# Patient Record
Sex: FEMALE | Race: WHITE | HISPANIC OR LATINO | Employment: OTHER | URBAN - METROPOLITAN AREA
[De-identification: names, ages, dates, MRNs, and addresses within clinical notes are randomized per-mention and may not be internally consistent; named-entity substitution may affect disease eponyms.]

---

## 2017-01-04 ENCOUNTER — OFFICE VISIT (OUTPATIENT)
Dept: ORTHOPEDICS | Facility: CLINIC | Age: 77
End: 2017-01-04
Payer: MEDICARE

## 2017-01-04 VITALS
HEART RATE: 64 BPM | SYSTOLIC BLOOD PRESSURE: 164 MMHG | BODY MASS INDEX: 29.51 KG/M2 | DIASTOLIC BLOOD PRESSURE: 71 MMHG | WEIGHT: 166.56 LBS | HEIGHT: 63 IN

## 2017-01-04 DIAGNOSIS — M17.11 PRIMARY OSTEOARTHRITIS OF RIGHT KNEE: Primary | ICD-10-CM

## 2017-01-04 PROCEDURE — 1159F MED LIST DOCD IN RCRD: CPT | Mod: S$GLB,,, | Performed by: ORTHOPAEDIC SURGERY

## 2017-01-04 PROCEDURE — 1125F AMNT PAIN NOTED PAIN PRSNT: CPT | Mod: S$GLB,,, | Performed by: ORTHOPAEDIC SURGERY

## 2017-01-04 PROCEDURE — 3078F DIAST BP <80 MM HG: CPT | Mod: S$GLB,,, | Performed by: ORTHOPAEDIC SURGERY

## 2017-01-04 PROCEDURE — 3077F SYST BP >= 140 MM HG: CPT | Mod: S$GLB,,, | Performed by: ORTHOPAEDIC SURGERY

## 2017-01-04 PROCEDURE — 1157F ADVNC CARE PLAN IN RCRD: CPT | Mod: S$GLB,,, | Performed by: ORTHOPAEDIC SURGERY

## 2017-01-04 PROCEDURE — 1160F RVW MEDS BY RX/DR IN RCRD: CPT | Mod: S$GLB,,, | Performed by: ORTHOPAEDIC SURGERY

## 2017-01-04 PROCEDURE — 99999 PR PBB SHADOW E&M-EST. PATIENT-LVL III: CPT | Mod: PBBFAC,,, | Performed by: ORTHOPAEDIC SURGERY

## 2017-01-04 PROCEDURE — 99214 OFFICE O/P EST MOD 30 MIN: CPT | Mod: S$GLB,,, | Performed by: ORTHOPAEDIC SURGERY

## 2017-01-04 NOTE — PROGRESS NOTES
Informed patient that  services are available free of charge.  This service was offered, the offer was not accepted, and  services were provided by her family member present and her understanding of english.    CC:  Right knee pain    HX:  Elena Frances returns for re-evaluation of knee arthritis. She has a known history of osteoarthritis of the right knee. She localizes the pain medial and lateral and describes the pain as burning. She has tried multiple NSAID's.  She has tried prior injection therapy. Pain scale is severe and constant.    ROS:    Denies low back pain, distal paresthesias, or radicular pain.  Denies Distal edema or calf pain.  Denies fevers.    PE: Right knee  On physical examination there is an effusion in the knee.  The knee is ligamentally stable to varus/valgus stress.  There is no warmth or erythema. There is no specific pain over the pes anserine bursa.    ROM of the hip does not reproduce pain.  There is no significant distal edema, and there is no calf tenderness to palpation.     Impression:  Osteoarthritis of the knee - severe.  Radiographs show advanced DJD with joint space narrowing, sclerosis, and osteophytes.    Plan:  The conservative options including NSAIDs, activity modification, physical therapy, corticosteroid injection, and viscosupplimentation were discussed. She is interested in surgical intervention at this time as these have failed.    The surgical process of knee replacement was discussed in detail with the patient including a detailed discussion of the procedure itself (including visual model, x-ray review, and literature review). The typical perioperative and post-operative course was discussed and perioperative risks were discussed to the patient's satisfaction.  Risks and complications discussed included but were not limited to the risks of anesthetic complications, infection, bleeding, wound healing complications, aseptic loosening,  instability, limb length inequality, neurologic dysfunction including numbness,  DVT, pulmonary embolism, perioperative medical risks (cardiac, pulmonary, renal, neurologic), and death and the patient will discuss this with her family.  Given her advanced deformity I suspect formal PT will not be effective.  Therefore in lieu of formal PT I have instructed her and counselled her on a home quad strengthening program and have discussed activity modification to help her tolerate her symptoms.

## 2017-01-05 ENCOUNTER — TELEPHONE (OUTPATIENT)
Dept: FAMILY MEDICINE | Facility: CLINIC | Age: 77
End: 2017-01-05

## 2017-01-05 DIAGNOSIS — M17.11 PRIMARY OSTEOARTHRITIS OF RIGHT KNEE: Primary | ICD-10-CM

## 2017-01-05 NOTE — TELEPHONE ENCOUNTER
----- Message from Anuradha Acosta sent at 1/4/2017  2:02 PM CST -----  Contact: Self/ [phone]   Pt want to speak with someone in the office. Pt has a question on a issue. Pt did not want to give any details. Please call and advise     Thank you

## 2017-01-05 NOTE — TELEPHONE ENCOUNTER
Patient was recently prescribed albuterol nebulizer q6h prn on 12/28/16. Patient reports that she has only been using it twice a day because it makes her feel jittery and gives her palpitations. Reports that she has been having itching all over her body for the past 3 days.. Patient reports that she has been taking Benadryl and Zyrtec for the past 3 days and that the itching is somewhat better today. Patient reports that she still has a cough and SOB and is inquiring whether she can still have her knee surgery with her present asthma symptoms. Patient would like to know what else she can use for the itching and how long should she continue use of albuterol. Please advise.

## 2017-01-09 ENCOUNTER — OFFICE VISIT (OUTPATIENT)
Dept: FAMILY MEDICINE | Facility: CLINIC | Age: 77
End: 2017-01-09
Payer: MEDICARE

## 2017-01-09 VITALS
DIASTOLIC BLOOD PRESSURE: 82 MMHG | WEIGHT: 166.69 LBS | BODY MASS INDEX: 29.54 KG/M2 | TEMPERATURE: 98 F | SYSTOLIC BLOOD PRESSURE: 170 MMHG | HEART RATE: 76 BPM | HEIGHT: 63 IN

## 2017-01-09 DIAGNOSIS — I10 ESSENTIAL HYPERTENSION: ICD-10-CM

## 2017-01-09 DIAGNOSIS — R25.1 TREMOR: ICD-10-CM

## 2017-01-09 DIAGNOSIS — J45.909 UNCOMPLICATED ASTHMA, UNSPECIFIED ASTHMA SEVERITY: Primary | ICD-10-CM

## 2017-01-09 PROCEDURE — 3079F DIAST BP 80-89 MM HG: CPT | Mod: S$GLB,,, | Performed by: FAMILY MEDICINE

## 2017-01-09 PROCEDURE — 1157F ADVNC CARE PLAN IN RCRD: CPT | Mod: S$GLB,,, | Performed by: FAMILY MEDICINE

## 2017-01-09 PROCEDURE — 1125F AMNT PAIN NOTED PAIN PRSNT: CPT | Mod: S$GLB,,, | Performed by: FAMILY MEDICINE

## 2017-01-09 PROCEDURE — 99214 OFFICE O/P EST MOD 30 MIN: CPT | Mod: S$GLB,,, | Performed by: FAMILY MEDICINE

## 2017-01-09 PROCEDURE — 99499 UNLISTED E&M SERVICE: CPT | Mod: S$PBB,,, | Performed by: FAMILY MEDICINE

## 2017-01-09 PROCEDURE — 99999 PR PBB SHADOW E&M-EST. PATIENT-LVL III: CPT | Mod: PBBFAC,,, | Performed by: FAMILY MEDICINE

## 2017-01-09 PROCEDURE — 3077F SYST BP >= 140 MM HG: CPT | Mod: S$GLB,,, | Performed by: FAMILY MEDICINE

## 2017-01-09 PROCEDURE — 1159F MED LIST DOCD IN RCRD: CPT | Mod: S$GLB,,, | Performed by: FAMILY MEDICINE

## 2017-01-09 PROCEDURE — 1160F RVW MEDS BY RX/DR IN RCRD: CPT | Mod: S$GLB,,, | Performed by: FAMILY MEDICINE

## 2017-01-09 RX ORDER — MONTELUKAST SODIUM 10 MG/1
10 TABLET ORAL NIGHTLY
Qty: 30 TABLET | Refills: 0 | Status: SHIPPED | OUTPATIENT
Start: 2017-01-09 | End: 2017-02-08

## 2017-01-09 RX ORDER — VALSARTAN 320 MG/1
320 TABLET ORAL DAILY
Qty: 30 TABLET | Refills: 6 | Status: SHIPPED | OUTPATIENT
Start: 2017-01-09 | End: 2017-04-12

## 2017-01-09 RX ORDER — ATENOLOL 25 MG/1
25 TABLET ORAL DAILY
Qty: 90 TABLET | Refills: 3 | Status: SHIPPED | OUTPATIENT
Start: 2017-01-09 | End: 2017-09-29

## 2017-01-09 NOTE — MR AVS SNAPSHOT
[facility]  [address], [address]  [place] [address]  Phone: [phone]  Fax: [phone]                  Elena Frances   2017 2:20 PM   Office Visit    Descripción:  Female : 1940   Personal Médico:  Michael Tinoco MD   Departamento:  [facility]           Razón de la jessee     Asthma     Itching     Cough           Diagnósticos de Esta Visita        Comentarios    Uncomplicated asthma, unspecified asthma severity    -  Primario     Essential hypertension                Lista de tareas           Citas próximas        Personal Médico Departamento Tfno del dpto    2017 1:30 PM JOINT [place], [facility] - Orthopedics Class [phone]    2017 4:00 PM Yudith Maria NP [facility] - Orthopedics [phone]      Marbella cirugías futuras / Procedimientos     2017   Surgery with Rom Moran MD   [facility]    [address] [place] [address] [phone]              Metas (5 Years of Data)     Ninguna      Follow-Up and Disposition     Return in about 2 weeks (around 2017).      Recetas para recoger        Disp Refills Start End    valsartan (DIOVAN) 320 MG tablet 30 tablet 6 2017     Take 1 tablet (320 mg total) by mouth once daily. - Oral    [facility]: [id number] - [facility] [place] - [address] AT [facility] [address] de tlfo: #: [phone]       montelukast (SINGULAIR) 10 mg tablet 30 tablet 0 2017    Take 1 tablet (10 mg total) by mouth every evening. - Oral    [facility]: [facility] [id number] [place] - [address] AT [facility] de tlfo: #: [phone]         [facility]     [facility] - Asistencia   Enfermeras registradas de [facility] pueden ayudarle a reservar genny jessee, proveer educación para la jennifer,  asesoría clínica, y otros servicios de asesoramiento.   Llame para saul servicio gratuito a [phone].             Medicamentos           Mensaje sobre Medicamentos     Verificar los cambios y / o adiciones a montalvo régimen de medicación son los mismos que discutir con montalvo médico. Si cualquiera de estos cambios o adiciones son incorrectos, por favor notifique a montalvo proveedor de atención médica.        EMPEZAR a lisa estos medicamentos NUEVOS        Refills    montelukast (SINGULAIR) 10 mg tablet 0    Sig: Take 1 tablet (10 mg total) by mouth every evening.    Categoría: Normal    Vía: Oral      CAMBIAR la forma en que está tomando estos medicamentos     Start Taking Instead of    valsartan (DIOVAN) 320 MG tablet valsartan (DIOVAN) 160 MG tablet    Dosage:  Take 1 tablet (320 mg total) by mouth once daily. Dosage:  TAKE 1 TABLET BY MOUTH ONCE DAILY    Reason for Change:  Reorder            Verifique que la siguiente lista de medicamentos es genny representación exacta de los medicamentos que está tomando actualmente. Si no hay ningunos reportados, la lista puede estar en bourgeois. Si no es correcta, por favor póngase en contacto con montalvo proveedor de atención médica. Lleve esta lista con usted en juancarlos de emergencia.           Medicamentos Actuales     albuterol (PROVENTIL) 2.5 mg /3 mL (0.083 %) nebulizer solution Take 3 mLs (2.5 mg total) by nebulization every 6 (six) hours as needed for Wheezing.    albuterol 90 mcg/actuation inhaler Inhale 2 puffs into the lungs every 6 (six) hours as needed for Wheezing.    atorvastatin (LIPITOR) 80 MG tablet Take 1 tablet (80 mg total) by mouth once daily.    diclofenac sodium (VOLTAREN) 1 % Gel Apply 2 g topically once daily.    ferrous sulfate 324 mg (65 mg iron) TbEC Take 325 mg by mouth once daily.    gabapentin (NEURONTIN) 100 MG capsule Take 2 capsules three times daily    methylPREDNISolone (MEDROL DOSEPACK) 4 mg tablet Take as directed    omeprazole (PRILOSEC) 20 MG capsule  "TAKE 1 CAPSULE BY MOUTH TWICE DAILY    paroxetine (PAXIL-CR) 25 MG 24 hr tablet Take 1 tablet (25 mg total) by mouth once daily.    valsartan (DIOVAN) 320 MG tablet Take 1 tablet (320 mg total) by mouth once daily.    atenolol (TENORMIN) 25 MG tablet Take 1 tablet (25 mg total) by mouth once daily.    blood-glucose meter ([employer] BLOOD GLUCOSE SYSTM) kit Use as instructed    lancets Misc 1 lancet by Misc.(Non-Drug; Combo Route) route 2 (two) times daily.    montelukast (SINGULAIR) 10 mg tablet Take 1 tablet (10 mg total) by mouth every evening.           Información de referencia clínica           Signos vitales - más recientes  Última actualización: 2017  2:26 PM por Sabrina TALLEY Jerrod, MA    PS Pulso Temperatura [place] BMI (IMC)    (!) 170/82 (BP Location: Right arm, Patient Position: Sitting, BP Method: Manual) 76 97.8 °F (36.6 °C) (Oral) 5' 3" (1.6 m) 75.6 kg (166 lb 10.7 oz) 29.52 kg/m2      Blood Pressure          Most Recent Value    BP  (!)  170/82      Alergias     A partir del:  2017        Vicodin [Hydrocodone-acetaminophen]      Vacunas     Administradas en la fecha de la visita:  2017        None               Elena Erica Juan Daniel   2017 2:20 PM   Office Visit    Description:  Female : 1940   Provider:  Michael Tinoco MD   Department:  [facility]           Reason for Visit     Asthma     Itching     Cough           Diagnoses this Visit        Comments    Uncomplicated asthma, unspecified asthma severity    -  Primary     Essential hypertension                To Do List           Future Appointments        Provider Department Dept Phone    2017 1:30 PM JOINT [place], [facility] - Orthopedics Class [phone]    2017 4:00 PM Yudith Maria NP [facility] - Orthopedics [phone]      Your Future Surgeries/Procedures     2017   Surgery with Rom Moran MD   [facility] ([facility])    [address]  [facility] [address]   [phone]              Goals     None      Follow-Up and Disposition     Return in about 2 weeks (around 1/23/2017).       These Medications        Disp Refills Start End    valsartan (DIOVAN) 320 MG tablet 30 tablet 6 1/9/2017     Take 1 tablet (320 mg total) by mouth once daily. - Oral    Pharmacy: [facility] [id number]  [place] - [address] AT [facility] Ph #: [phone]       montelukast (SINGULAIR) 10 mg tablet 30 tablet 0 1/9/2017 2/8/2017    Take 1 tablet (10 mg total) by mouth every evening. - Oral    Pharmacy: [id number] - [place] - [address] AT [facility] Ph #: [phone]         [facility]     [facility] On Call Nurse Care Line - 24/7 Assistance  Registered nurses in the [facility] provide clinical advisement, health education, appointment booking, and other advisory services.  Call for this free service at [phone].             Medications           Message regarding Medications     Verify the changes and/or additions to your medication regime listed below are the same as discussed with your clinician today.  If any of these changes or additions are incorrect, please notify your healthcare provider.        START taking these NEW medications        Refills    montelukast (SINGULAIR) 10 mg tablet 0    Sig: Take 1 tablet (10 mg total) by mouth every evening.    Class: Normal    Route: Oral      CHANGE how you are taking these medications     Start Taking Instead of    valsartan (DIOVAN) 320 MG tablet valsartan (DIOVAN) 160 MG tablet    Dosage:  Take 1 tablet (320 mg total) by mouth once daily. Dosage:  TAKE 1 TABLET BY MOUTH ONCE DAILY    Reason for Change:  Reorder            Verify that the below list of medications is an accurate representation of the medications you are currently taking.  If none reported, the list may be blank.  "If incorrect, please contact your healthcare provider. Carry this list with you in case of emergency.           Current Medications     albuterol (PROVENTIL) 2.5 mg /3 mL (0.083 %) nebulizer solution Take 3 mLs (2.5 mg total) by nebulization every 6 (six) hours as needed for Wheezing.    albuterol 90 mcg/actuation inhaler Inhale 2 puffs into the lungs every 6 (six) hours as needed for Wheezing.    atorvastatin (LIPITOR) 80 MG tablet Take 1 tablet (80 mg total) by mouth once daily.    diclofenac sodium (VOLTAREN) 1 % Gel Apply 2 g topically once daily.    ferrous sulfate 324 mg (65 mg iron) TbEC Take 325 mg by mouth once daily.    gabapentin (NEURONTIN) 100 MG capsule Take 2 capsules three times daily    methylPREDNISolone (MEDROL DOSEPACK) 4 mg tablet Take as directed    omeprazole (PRILOSEC) 20 MG capsule TAKE 1 CAPSULE BY MOUTH TWICE DAILY    paroxetine (PAXIL-CR) 25 MG 24 hr tablet Take 1 tablet (25 mg total) by mouth once daily.    valsartan (DIOVAN) 320 MG tablet Take 1 tablet (320 mg total) by mouth once daily.    atenolol (TENORMIN) 25 MG tablet Take 1 tablet (25 mg total) by mouth once daily.    blood-glucose meter ([employer] BLOOD GLUCOSE SYSTM) kit Use as instructed    lancets Misc 1 lancet by Misc.(Non-Drug; Combo Route) route 2 (two) times daily.    montelukast (SINGULAIR) 10 mg tablet Take 1 tablet (10 mg total) by mouth every evening.           Clinical Reference Information           Vital Signs - Last Recorded  Most recent update: 1/9/2017  2:26 PM by Sabrina Elder MA    BP Pulse Temp Ht Wt BMI    (!) 170/82 (BP Location: Right arm, Patient Position: Sitting, BP Method: Manual) 76 97.8 °F (36.6 °C) (Oral) 5' 3" (1.6 m) 75.6 kg (166 lb 10.7 oz) 29.52 kg/m2      Blood Pressure          Most Recent Value    BP  (!)  170/82      Allergies as of 1/9/2017     Vicodin [Hydrocodone-acetaminophen]      Immunizations Administered on Date of Encounter - 1/9/2017     None        "

## 2017-01-09 NOTE — PROGRESS NOTES
Subjective:       Patient ID: Elena Frances is a 76 y.o. female.    Chief Complaint: Asthma (Follow up, needs refill for Atenolol); Itching; and Cough    HPI Comments: Disclaimer: This note has been generated using voice-recognition software. There may be typographical errors that have been missed during proof-reading    77 yo presents for follow up of prior history of asthma.  She has noted gradual improvement while on Albuterol, now only using nebulizer once daily.  However, has noted recurrent rhinitis and frequent cough    Asthma   She complains of cough. There is no shortness of breath or wheezing. Associated symptoms include postnasal drip and rhinorrhea. Pertinent negatives include no chest pain or ear pain. Her past medical history is significant for asthma.   Itching   Associated symptoms include congestion and coughing. Pertinent negatives include no chest pain or chills.   Cough   Associated symptoms include postnasal drip and rhinorrhea. Pertinent negatives include no chest pain, chills, ear pain, shortness of breath or wheezing. Her past medical history is significant for asthma.     Review of Systems   Constitutional: Negative for chills.   HENT: Positive for congestion, postnasal drip and rhinorrhea. Negative for ear pain and sinus pressure.    Respiratory: Positive for cough. Negative for chest tightness, shortness of breath and wheezing.    Cardiovascular: Negative for chest pain.   Skin: Positive for itching.       Objective:      Physical Exam   Constitutional: She appears well-developed and well-nourished. No distress.   HENT:   Right Ear: Tympanic membrane and ear canal normal.   Left Ear: Tympanic membrane and ear canal normal.   Nose: Mucosal edema and rhinorrhea present.   Neck: Normal range of motion. No JVD present.   Cardiovascular: Normal rate and regular rhythm.    No murmur heard.  Pulmonary/Chest: Effort normal and breath sounds normal. She has no wheezes. She has no rales.  She exhibits no tenderness.   Musculoskeletal: She exhibits no edema.   Lymphadenopathy:     She has no cervical adenopathy.       Assessment:       1. Uncomplicated asthma, unspecified asthma severity    2. Essential hypertension        Plan:       1.  Increase Valsartan to 320mg daily  2.  Singulair 10mg daily  3.  F/u 2 weeks

## 2017-01-23 ENCOUNTER — OFFICE VISIT (OUTPATIENT)
Dept: FAMILY MEDICINE | Facility: CLINIC | Age: 77
End: 2017-01-23
Payer: MEDICARE

## 2017-01-23 ENCOUNTER — ANESTHESIA EVENT (OUTPATIENT)
Dept: SURGERY | Facility: HOSPITAL | Age: 77
DRG: 470 | End: 2017-01-23
Payer: MEDICARE

## 2017-01-23 ENCOUNTER — CLINICAL SUPPORT (OUTPATIENT)
Dept: REHABILITATION | Facility: HOSPITAL | Age: 77
End: 2017-01-23
Attending: ORTHOPAEDIC SURGERY
Payer: MEDICARE

## 2017-01-23 VITALS
TEMPERATURE: 98 F | HEIGHT: 63 IN | DIASTOLIC BLOOD PRESSURE: 78 MMHG | WEIGHT: 160.94 LBS | HEART RATE: 76 BPM | SYSTOLIC BLOOD PRESSURE: 122 MMHG | BODY MASS INDEX: 28.52 KG/M2

## 2017-01-23 DIAGNOSIS — M79.604 PAIN OF RIGHT LOWER EXTREMITY: Primary | ICD-10-CM

## 2017-01-23 DIAGNOSIS — M25.661 STIFFNESS OF RIGHT KNEE: ICD-10-CM

## 2017-01-23 DIAGNOSIS — L25.9 CONTACT DERMATITIS, UNSPECIFIED CONTACT DERMATITIS TYPE, UNSPECIFIED TRIGGER: Primary | ICD-10-CM

## 2017-01-23 DIAGNOSIS — E11.9 TYPE II OR UNSPECIFIED TYPE DIABETES MELLITUS WITHOUT MENTION OF COMPLICATION, NOT STATED AS UNCONTROLLED: ICD-10-CM

## 2017-01-23 DIAGNOSIS — E78.5 HYPERLIPIDEMIA, UNSPECIFIED HYPERLIPIDEMIA TYPE: ICD-10-CM

## 2017-01-23 DIAGNOSIS — F32.89 OTHER DEPRESSION: ICD-10-CM

## 2017-01-23 DIAGNOSIS — R29.898 RIGHT LEG WEAKNESS: ICD-10-CM

## 2017-01-23 PROCEDURE — 1125F AMNT PAIN NOTED PAIN PRSNT: CPT | Mod: S$GLB,,, | Performed by: FAMILY MEDICINE

## 2017-01-23 PROCEDURE — 3074F SYST BP LT 130 MM HG: CPT | Mod: S$GLB,,, | Performed by: FAMILY MEDICINE

## 2017-01-23 PROCEDURE — 97110 THERAPEUTIC EXERCISES: CPT

## 2017-01-23 PROCEDURE — 3078F DIAST BP <80 MM HG: CPT | Mod: S$GLB,,, | Performed by: FAMILY MEDICINE

## 2017-01-23 PROCEDURE — G8980 MOBILITY D/C STATUS: HCPCS | Mod: CL

## 2017-01-23 PROCEDURE — 4010F ACE/ARB THERAPY RXD/TAKEN: CPT | Mod: S$GLB,,, | Performed by: FAMILY MEDICINE

## 2017-01-23 PROCEDURE — 3044F HG A1C LEVEL LT 7.0%: CPT | Mod: S$GLB,,, | Performed by: FAMILY MEDICINE

## 2017-01-23 PROCEDURE — 1160F RVW MEDS BY RX/DR IN RCRD: CPT | Mod: S$GLB,,, | Performed by: FAMILY MEDICINE

## 2017-01-23 PROCEDURE — 99499 UNLISTED E&M SERVICE: CPT | Mod: S$PBB,,, | Performed by: FAMILY MEDICINE

## 2017-01-23 PROCEDURE — 1157F ADVNC CARE PLAN IN RCRD: CPT | Mod: S$GLB,,, | Performed by: FAMILY MEDICINE

## 2017-01-23 PROCEDURE — 1159F MED LIST DOCD IN RCRD: CPT | Mod: S$GLB,,, | Performed by: FAMILY MEDICINE

## 2017-01-23 PROCEDURE — G8979 MOBILITY GOAL STATUS: HCPCS | Mod: CL

## 2017-01-23 PROCEDURE — 97161 PT EVAL LOW COMPLEX 20 MIN: CPT

## 2017-01-23 PROCEDURE — 99999 PR PBB SHADOW E&M-EST. PATIENT-LVL III: CPT | Mod: PBBFAC,,, | Performed by: FAMILY MEDICINE

## 2017-01-23 PROCEDURE — 99214 OFFICE O/P EST MOD 30 MIN: CPT | Mod: S$GLB,,, | Performed by: FAMILY MEDICINE

## 2017-01-23 PROCEDURE — G8978 MOBILITY CURRENT STATUS: HCPCS | Mod: CL

## 2017-01-23 RX ORDER — PAROXETINE HYDROCHLORIDE HEMIHYDRATE 25 MG/1
25 TABLET, FILM COATED, EXTENDED RELEASE ORAL DAILY
Qty: 90 TABLET | Refills: 2 | Status: SHIPPED | OUTPATIENT
Start: 2017-01-23 | End: 2018-07-16

## 2017-01-23 RX ORDER — FLUOCINONIDE TOPICAL SOLUTION USP, 0.05% 0.5 MG/ML
SOLUTION TOPICAL 2 TIMES DAILY
Qty: 60 ML | Refills: 3 | Status: SHIPPED | OUTPATIENT
Start: 2017-01-23 | End: 2017-02-02

## 2017-01-23 RX ORDER — ATORVASTATIN CALCIUM 80 MG/1
80 TABLET, FILM COATED ORAL DAILY
Qty: 90 TABLET | Refills: 2 | Status: SHIPPED | OUTPATIENT
Start: 2017-01-23 | End: 2017-01-23 | Stop reason: SDUPTHER

## 2017-01-23 RX ORDER — PAROXETINE HYDROCHLORIDE HEMIHYDRATE 25 MG/1
25 TABLET, FILM COATED, EXTENDED RELEASE ORAL DAILY
Qty: 90 TABLET | Refills: 2 | Status: SHIPPED | OUTPATIENT
Start: 2017-01-23 | End: 2017-01-23 | Stop reason: SDUPTHER

## 2017-01-23 RX ORDER — ATORVASTATIN CALCIUM 80 MG/1
80 TABLET, FILM COATED ORAL DAILY
Qty: 90 TABLET | Refills: 2 | Status: SHIPPED | OUTPATIENT
Start: 2017-01-23 | End: 2017-10-09 | Stop reason: SDUPTHER

## 2017-01-23 NOTE — PROGRESS NOTES
[facility]  PATIENT EVALUATION    Date: 01/23/2017  Referring Physician: Rom Moran MD  Visit #: 1   Start Time:  1430  Stop Time:  1530  History     History of Present Illness: c/o 4-5 years of progressive pain in right knee, became intolerable 1/  Primary Diagnosis:   1. Right leg weakness     2. Stiffness of right knee       Treatment Diagnosis: right leg weakness and knee stiffness  Past Medical History   Diagnosis Date    Anemia     Arthritis     Depression     Diabetes mellitus type II     Generalized headaches 4/1/2014    Goiter      MNG    HTN (hypertension), benign     Hyperlipidemia     Hyperparathyroidism      s/p surgery    Intestinal obstruction      resolved spontaneously    Lumbar disc disease      s/p epidural shots    Nuclear sclerosis - Both Eyes 3/11/2014    Osteoporosis, post-menopausal      with 3 rib fractures     Prior Therapy: PT for knee years ago  Patient Therapy Goals: to be able to walk without pain    Subjective     Pain:  Location:right knee  Pain Scale: 0/10 at best 9/10 now  9/10 at worst  Activities Which Increase Pain: Standing, Walking, Night Time, Morning, Extension, Flexing, Lifting, Getting out of bed/chair and stairs  Activities Which Decrease Pain: pain medication and rest    Objective     Gait: Antalgic gait RLE with decreased wayne and step length, decreased right heel strike/toe off, increased lateral sway    Posture: forward head, increased thoracic kyphosis, weight shifted onto LLE     Right Left Right Left    Strength Strength AROM/PROM AROM/PROM   Hip flexion 5 5 WNL WNL   Extension 4 5 WNL WNL   Glute max 4 5 WNL WNL   Abduction 4 5 WNL WNL   Knee ext 3+ 5 5/3 0/0   Knee flexion 3+ 5 100/120 130/140   DF 4 5 WNL WNL   PF 3+ 5 WNL WNL         Balance/Proprioception:  Single leg stance: R:  unable L:  10+ sec    Palpation: tender right hamstring insertion    Sensation: light touch intact      Functional Limitations Reports - G  Codes  Category: Mobility  CMS Impairment/Limitation/Restriction for FOTO Knee Survey    Status Limitation  G-Code   CMS Severity Modifier  Intake   28%  72%   Current Status  CL - At least 60 percent but less than 80 percent  Predicted  53%  47%   Goal Status   CK - At least 40 percent but less than 60 percent  D/C Status CL **only report if this is a one time visit    Treatment:   Quad sets  Heel slided  SLR  Bridges  Prone knee flexion  Standing knee flexion  Heel raises    Assessment       Initial Assessment:  Patient presents with limitations in  ROM, strength, balance and posture affecting her functional mobility and ADLs .  She   will benefit from outpatient physical therapy to improve her ROM, strength, balance and posture to enable her  to return to full pain free function.  SHe is having a knee replacement in 2 weeks and has been instructed in and HEP and is independent.  She is going to perform her HEP on her own and follow up with PT after TKR.  Pt agrees  Rehab Potential: good      Plan   DC PT with HEP  Therapist: Jai Thacker, PT    I CERTIFY THE NEED FOR THESE SERVICES FURNISHED UNDER THIS PLAN OF TREATMENT AND WHILE UNDER MY CARE    Physician's comments: ________________________________________________________________________________________________________________________________________________    Physician's Name: ___________________________________

## 2017-01-23 NOTE — PROGRESS NOTES
Subjective:       Patient ID: Elena Frances is a 76 y.o. female.    Chief Complaint: Asthma (4 week follow up)    HPI Comments: Disclaimer: This note has been generated using voice-recognition software. There may be typographical errors that have been missed during proof-reading    Pt is 77 yo who presents today for follow up of asthma and essential hypertension.  She is tolerating her medications without side effects, and has noted improvement on both the cough and dyspnea since last seen.  She is also here for preop clearance, scheduled for knee replacement surgery by Dr. Moran in 3 weeks.    Preop labs are pending.  NM Stress test done in 4/16:Comments:  This is a technically excellent study. Inspection of the transaxial images demonstrated no significant cranial, caudal, or lateral patient motion in the camera between rest and stress acquisitions. There is homogeneous uptake of radiotracer in all walls   of the myocardium on stress and rest images. The extracardiac distribution of radioactivity is normal. The left ventricular cavity is normal in size and does not increase with stress. On gated SPECT, left ventricular motion is normal at rest.     Nuclear Quantitative Functional Analysis:   Quantitative measurements of LV function are over estimated secondary to small ventricular volumes.   Visually estimated LV function is normal.     Impression: NORMAL MYOCARDIAL PERFUSION  1. The perfusion scan is free of evidence for myocardial ischemia or injury.   2. Resting wall motion is physiologic.   3. Visually estimated LV function is normal.   4. The ventricular volumes are normal at rest and stress.   5. The extracardiac distribution of radioactivity is normal.   6. There was no previous study available to compare.        Asthma   There is no cough or shortness of breath. Pertinent negatives include no appetite change, chest pain, ear pain, fever, headaches, rhinorrhea, sore throat or trouble swallowing.  Her past medical history is significant for asthma.     Review of Systems   Constitutional: Negative.  Negative for activity change, appetite change, chills, diaphoresis, fatigue, fever and unexpected weight change.   HENT: Negative.  Negative for congestion, ear pain, hearing loss, rhinorrhea, sinus pressure, sore throat, tinnitus, trouble swallowing and voice change.    Eyes: Negative.  Negative for photophobia, pain and visual disturbance.   Respiratory: Negative.  Negative for cough, chest tightness and shortness of breath.    Cardiovascular: Negative.  Negative for chest pain, palpitations and leg swelling.   Gastrointestinal: Negative.  Negative for abdominal distention, abdominal pain, blood in stool, constipation, diarrhea, nausea and vomiting.   Genitourinary: Negative.  Negative for vaginal discharge and vaginal pain.   Musculoskeletal: Positive for arthralgias. Negative for back pain, joint swelling, neck pain and neck stiffness.        Recurrent knee pain   Skin: Negative for color change, pallor and rash.   Neurological: Negative.  Negative for dizziness, tremors, speech difficulty, weakness, light-headedness, numbness and headaches.   Hematological: Negative for adenopathy. Does not bruise/bleed easily.   Psychiatric/Behavioral: Negative for agitation, confusion, dysphoric mood, hallucinations and sleep disturbance. The patient is not nervous/anxious.        Objective:      Physical Exam   Constitutional: She is oriented to person, place, and time. She appears well-developed and well-nourished.   HENT:   Right Ear: Tympanic membrane and external ear normal.   Left Ear: Tympanic membrane and external ear normal.   Nose: Nose normal.   Mouth/Throat: Oropharynx is clear and moist.   Eyes: Conjunctivae and EOM are normal. Pupils are equal, round, and reactive to light.   Neck: Normal range of motion. Neck supple. No tracheal deviation present. No thyromegaly present.   Cardiovascular: Normal rate, regular  rhythm, normal heart sounds and intact distal pulses.    Pulmonary/Chest: Effort normal. She has no wheezes. She has no rales. She exhibits no tenderness.   Abdominal: Soft. Bowel sounds are normal. She exhibits no distension and no mass. There is no rebound.   Musculoskeletal: She exhibits no edema.   Lymphadenopathy:     She has no cervical adenopathy.   Neurological: She is alert and oriented to person, place, and time. She has normal reflexes. No cranial nerve deficit. Coordination normal.   Skin: Skin is warm and dry. No rash noted. No erythema.   Psychiatric: She has a normal mood and affect. Her behavior is normal.       Assessment:        1.  Essential hypertension, good control  2.  Hyperlipidemia  3.  Asthma, improved  4.  preop clearance  5.  Type II DM, diet controlled  Plan:       1.  Continue present medications, pt needs to continue Beta Blocker during perioperative period  2.  Labs pending, final clearance after review  3.  F/u 3 months   4.  A1C    Pt's labs, SPECT scan reviewed.  Pt has been cleared for anesthesia and surgery

## 2017-01-23 NOTE — ANESTHESIA PREPROCEDURE EVALUATION
Anesthesia Assessment: Preoperative EQUATION    Planned Procedure: Procedure(s) (LRB):  REPLACEMENT-KNEE-TOTAL (Right)  Requested Anesthesia Type:Regional  Surgeon: Rom Moran MD  Service: Orthopedics  Known or anticipated Date of Surgery:2/7/2017    Surgeon notes: reviewed    Electronic QUestionnaire Assessment completed via nurse interview with patient.  Elena Frances [[id number]] - 76 y.o. Female        Providers Outside of [facility]      No data to display       Surgical Risk Level      Surgical Risk Level:  3           caRDScore (Clinical Anesthesia Rapid Decision Score)        Low  Total Score: 13      13 Sum of Clinical Scores       caRDScores (Grouped)      caRDScore - Ane:  5                caRDScore - CVD:  4                caRDScore - Pul:  2                caRDScore - Met:  2                caRDScore - Phy:  0           caRDScore Items           Pre-admit from 2/7/2017 in [facility]     Anesthesia      Has known vocal cord problem  Yes [AFTER THYROID SURGERY]     Has severe degenerative arthritis (osteoarthritis)  Yes [ALL OVER BODY]     Has GERD, hiatal hernia, or chronic heartburn/dyspepsia requiring Rx some or all times  Yes     Has chronic anxiety  Yes     CVD      Activity similar to best ability for maximal activity or exercise  METS 2     Diagnosed with high blood pressure  Yes     Pulmonary      Has history of Asthma  Yes     Metabolic      Is on Rx for depression or bipolar disease  Yes     Has hyperlipoproteinemia  Yes     Physiologic      Obesity Status  Not Obese (BMI <30)       Flags      Red Flag Score:  1                Yellow Flag Score:  8           Red Flags           Pre-admit from 2/7/2017 in [facility]     Obesity Status  Not Obese (BMI <30)     Pain not well controlled at present  Yes       Yellow Flags           Pre-admit from 2/7/2017 in [facility]     Has had surgery within the last 3 months  Yes     Has had  steroids in the last year  Yes     Takes herbal medications or vitamin supplements  Yes     Obesity Status  Not Obese (BMI <30)     Has Iron Deficiency Anemia  Yes     Aspirin  Yes     Has known vocal cord problem  Yes [AFTER THYROID SURGERY]     Has history of Asthma  Yes     Has pain  Yes       PONV Risk Score (assumes periop narcotic use = +1, Max=4)      PONV Risk Score:  3           PONV Risk Factors  Total Score: 2      1 Female     1 Non-Smoker at present       Sleep Apnea  Total Score: 0        PEDRO STOP-Bang Risk Factors (Max=8)  Total Score: 2      1 Takes medication for high blood pressure     1 Age >50       PEDRO Risk Level - 1 (Low), 2 (Moderate), 3 (High)      PEDRO Risk Level:  1           RCRI (Revised Cardiac Risk Indices of ACC/AHA guidelines, Max=6)  Total Score: 0        CAD Risk Factors  Total Score: 3      1 Female. Age >55     1 Diagnosed with high blood pressure     1 Has hyperlipoproteinemia       CHADS Score if applicable (history of atrial fib/flutter, Max=6)  Total Score: 1      1 Diagnosed with high blood pressure       Maximal Exercise Capacity           Pre-admit from 2/7/2017 in [facility]     Maximal Exercise Capacity  METS 2       Summary of Dependence  Total Score: 1      1 Is totally independent of others for activities of daily living       Phone Fraility Score (Max = 17)  Total Score: 2      1 Uses 5 or more meds on reg basis     1 Functional capacity limit: METS 2       Pain Factors           Pre-admit from 2/7/2017 in [facility]     Has pain  Yes     Location and description of pain  KNEE PAIN SHARP,LOOSE     Duration of pain  Pain is chronic, has been present greater than 12 mo     Typical Pain Scores  5 to 6     Not using strong pain medications  Yes     Pain not well controlled at present  Yes       Risk Triggers (Evidence-Based Risk Triggers)        Pulmonary Risk Triggers  Total Score: 1      1 Age > or = 60       Renal Risk Triggers   Total Score: 1      1 Diagnosed with high blood pressure       Delirium Risk Triggers  Total Score: 0        Urologic Risk Triggers  Total Score: 0        Logistics        Pre-op Clinic Logistics  Total Score: 10      1 Has had surgery within the last 3 months     2 Takes herbal medications or vitamin supplements     1 Has had anesthesia, either as adult or as a child     3 Has Iron Deficiency Anemia     1 Takes medication for high blood pressure     2 Pain not well controlled at present       DOSC Logistics  Total Score: 0        Discharge Logistics  Total Score: 3      2 Functional capacity limit: METS 2     1 Has severe degenerative arthritis (osteoarthritis)       Discharge Planning           Pre-admit from 2/7/2017 in [facility]     Discharge Planning      Discharge plans  Home with assistance       Anes & Int Med <For office use only>      Total Score: 13        Surgical Risk Level Assessment       Triage considerations:     The patient has no apparent active cardiac condition (No unstable coronary Syndrome such as severe unstable angina or recent [<1 month] myocardial infarction, decompensated CHF, severe valvular   disease or significant arrhythmia)    Previous anesthesia records:MAC    Last PCP note: within 1 month , within [facility]   Subspecialty notes: Cardiology: General, Endocrinology    Other important co-morbidities:      Tests already available:  4/19/16 NUCLEAR STRESS NORMAL WITH NSR ON EKG            Instructions given. (See in Nurse's note)    Optimization:  Anesthesia Preop Clinic Assessment  Indicated      Pre-habilitation suggested:   2/1 EDUCATION CLASS        Plan:    Testing:  Hematology Profile, BMP, A1C and PT/INR   Pre-anesthesia  visit       Visit focus: possible regional anesthesia and/or nerve block          Patient  has previously scheduled Medical Appointment: 1/23    Navigation: Tests Scheduled.                      Results will be tracked by Preop Clinic.      NIESHA BARNARD 1/23/17 02/06/2017  Elena Frances is a 76 y.o., female with HTN, headaches, chronic back pain, well-controlled GERD, DM II (diet controlled), and knee pain here for TKA.    NM Stress 2016:  Impression: NORMAL MYOCARDIAL PERFUSION  1. The perfusion scan is free of evidence for myocardial ischemia or injury.   2. Resting wall motion is physiologic.   3. Visually estimated LV function is normal.   4. The ventricular volumes are normal at rest and stress.   5. The extracardiac distribution of radioactivity is normal.   6. There was    TTE 2016:  CONCLUSIONS     1 - Normal left ventricular systolic function (EF 60-65%).     2 - Normal left ventricular diastolic function.     3 - Normal right ventricular systolic function .     4 - The estimated PA systolic pressure is 26 mmHg.     5 - Trivial to mild aortic regurgitation.     6 - Trivial mitral regurgitation.     7 - Moderate tricuspid regurgitation.     ECG with PACs    [facility] Anesthesia Evaluation      I have reviewed the Medications.   Steroids Taken In Past Year:     Review of Systems  Anesthesia Hx:  History of prior surgery of interest to airway management or planning: Previous anesthesia: MAC, General 2009: Parathyroid adenoma removed with general anesthesia.  10/2016: Cataract surgery with MAC.  Denies Family Hx of Anesthesia complications.   Denies Personal Hx of Anesthesia complications.   Social:  Son is an anesthesiologist Patient's occupation is Retired. Denies Tobacco Use. Denies Alcohol Use.   Hematology/Oncology:         -- Anemia:   EENT/Dental:   Throat Symptoms (occasional) include Swallowing difficulty or pain  Denies Jaw Problems   Cardiovascular:   ECG has been reviewed.  Functional Capacity good / => 4 METS, was walking 2 miles before knee pain: denies CP/SOB  Denies Coronary Artery Disease.  Hypertension , Recent  typical clinic B/P of 119/56 & 122/64 @ POC visit    Pulmonary:  Asthma:  last episode was < 1 month ago.   Inhaler use is rescue inhaler PRN.  Possible Obstructive Sleep Apnea , (STOP/BANG) Symptoms P - Pressure being treated for high BP and A - Age > 50    Renal/:  Denies Kidney Function/Disease    Hepatic/GI:  Esophageal / Stomach Disorders Gerd Controlled by chronic antireflux medication.  Bowel Conditions:  Diverticulitis  Denies Liver Disease    Musculoskeletal:  Joint Disease:  Arthritis, Osteoarthritis  Bone Disorders: Osteopenia  Spine Disorders: cervical Degenerative disease and Disc disease  Lumbar Spine Disorders Lumbar radiculopathy  Neurological:  Osteoarthritis  Denies Seizure Disorder  Denies CVA - Cerebrovasular Accident  Denies TIA - Transient Ischemic Attack    Endocrine:  Diabetes, Type 2 Diabetes , controlled by diet.  Thyroid Disease, Goiter, Multinodular Goiter  Parathyroid Disease, Hyperparathyroidism S/P parthyroid adenoma resection 8/2009  Metabolic Disorders, Hyperlipoproteinemia  Psych:  Depression.          Physical Exam  General:  Well nourished    Airway/Jaw/Neck:  Airway Findings: Mouth Opening: Normal Tongue: Normal  General Airway Assessment: Average, Adult  Mallampati: II  Improves to II with phonation.  TM Distance: Normal, at least 6 cm  Jaw/Neck Findings:     Neck ROM: Normal ROM      Dental:  Dental Findings: In tact    Chest/Lungs:  Chest/Lungs Findings: Clear to auscultation, Normal Respiratory Rate     Heart/Vascular:  Heart Findings: Rate: Normal  Rhythm: Regular Rhythm  Heart Murmur  Systolic  Systolic Heart Murmur Description: R Upper Sternal Border, Apical  Systolic Heart Murmur Grade: Grade I  Vascular Findings:  Edema  Edema Locations: LUE  Vascular Exam Findings: trace edema        Mental Status:  Mental Status Findings:  Cooperative, Alert and Oriented         Pt. has been cleared by PCP ([facility]) 1/31/17    Carine Dean RN      Anesthesia Plan  Type of  Anesthesia, risks & benefits discussed:  Anesthesia Type:  spinal, general, CSE  Patient's Preference: Regional  Intra-op Monitoring Plan:   Intra-op Monitoring Plan Comments:   Post Op Pain Control Plan: continuous nerve block and single-shot nerve block  Post Op Pain Control Plan Comments:   Induction:   IV  Beta Blocker:  Patient is on a Beta-Blocker and has received one dose within the past 24 hours (No further documentation required).       Informed Consent: Patient understands risks and agrees with Anesthesia plan.  Questions answered. Anesthesia consent signed with patient.  ASA Score: 2     Day of Surgery Review of History & Physical:    H&P update referred to the surgeon.     Anesthesia Plan Notes: Patient offered free  services but declined.  Expressed understanding of anesthetic plan in English.        Ready For Surgery From Anesthesia Perspective.

## 2017-01-23 NOTE — PRE ADMISSION SCREENING
Anesthesia Assessment: Preoperative EQUATION    Planned Procedure: Procedure(s) (LRB):  REPLACEMENT-KNEE-TOTAL (Right)  Requested Anesthesia Type:Regional  Surgeon: Rom Moran MD  Service: Orthopedics  Known or anticipated Date of Surgery:2/7/2017    Surgeon notes: reviewed    Electronic QUestionnaire Assessment completed via nurse interview with patient.  Elena Frances [[id number]] - 76 y.o. Female        Providers Outside of [facility]      No data to display       Surgical Risk Level      Surgical Risk Level:  3           caRDScore (Clinical Anesthesia Rapid Decision Score)        Low  Total Score: 13      13 Sum of Clinical Scores       caRDScores (Grouped)      caRDScore - Ane:  5                caRDScore - CVD:  4                caRDScore - Pul:  2                caRDScore - Met:  2                caRDScore - Phy:  0           caRDScore Items           Pre-admit from 2/7/2017 in [facility]     Anesthesia      Has known vocal cord problem  Yes [AFTER THYROID SURGERY]     Has severe degenerative arthritis (osteoarthritis)  Yes [ALL OVER BODY]     Has GERD, hiatal hernia, or chronic heartburn/dyspepsia requiring Rx some or all times  Yes     Has chronic anxiety  Yes     CVD      Activity similar to best ability for maximal activity or exercise  METS 2     Diagnosed with high blood pressure  Yes     Pulmonary      Has history of Asthma  Yes     Metabolic      Is on Rx for depression or bipolar disease  Yes     Has hyperlipoproteinemia  Yes     Physiologic      Obesity Status  Not Obese (BMI <30)       Flags      Red Flag Score:  1                Yellow Flag Score:  8           Red Flags           Pre-admit from 2/7/2017 in [facility]     Obesity Status  Not Obese (BMI <30)     Pain not well controlled at present  Yes       Yellow Flags           Pre-admit from 2/7/2017 in [facility]     Has had surgery within the last 3 months  Yes     Has had  steroids in the last year  Yes     Takes herbal medications or vitamin supplements  Yes     Obesity Status  Not Obese (BMI <30)     Has Iron Deficiency Anemia  Yes     Aspirin  Yes     Has known vocal cord problem  Yes [AFTER THYROID SURGERY]     Has history of Asthma  Yes     Has pain  Yes       PONV Risk Score (assumes periop narcotic use = +1, Max=4)      PONV Risk Score:  3           PONV Risk Factors  Total Score: 2      1 Female     1 Non-Smoker at present       Sleep Apnea  Total Score: 0        PEDRO STOP-Bang Risk Factors (Max=8)  Total Score: 2      1 Takes medication for high blood pressure     1 Age >50       PEDRO Risk Level - 1 (Low), 2 (Moderate), 3 (High)      PEDRO Risk Level:  1           RCRI (Revised Cardiac Risk Indices of ACC/AHA guidelines, Max=6)  Total Score: 0        CAD Risk Factors  Total Score: 3      1 Female. Age >55     1 Diagnosed with high blood pressure     1 Has hyperlipoproteinemia       CHADS Score if applicable (history of atrial fib/flutter, Max=6)  Total Score: 1      1 Diagnosed with high blood pressure       Maximal Exercise Capacity           Pre-admit from 2/7/2017 in [facility]     Maximal Exercise Capacity  METS 2       Summary of Dependence  Total Score: 1      1 Is totally independent of others for activities of daily living       Phone Fraility Score (Max = 17)  Total Score: 2      1 Uses 5 or more meds on reg basis     1 Functional capacity limit: METS 2       Pain Factors           Pre-admit from 2/7/2017 in [facility]     Has pain  Yes     Location and description of pain  KNEE PAIN SHARP,LOOSE     Duration of pain  Pain is chronic, has been present greater than 12 mo     Typical Pain Scores  5 to 6     Not using strong pain medications  Yes     Pain not well controlled at present  Yes       Risk Triggers (Evidence-Based Risk Triggers)        Pulmonary Risk Triggers  Total Score: 1      1 Age > or = 60       Renal Risk Triggers   Total Score: 1      1 Diagnosed with high blood pressure       Delirium Risk Triggers  Total Score: 0        Urologic Risk Triggers  Total Score: 0        Logistics        Pre-op Clinic Logistics  Total Score: 10      1 Has had surgery within the last 3 months     2 Takes herbal medications or vitamin supplements     1 Has had anesthesia, either as adult or as a child     3 Has Iron Deficiency Anemia     1 Takes medication for high blood pressure     2 Pain not well controlled at present       DOSC Logistics  Total Score: 0        Discharge Logistics  Total Score: 3      2 Functional capacity limit: METS 2     1 Has severe degenerative arthritis (osteoarthritis)       Discharge Planning           Pre-admit from 2/7/2017 in [facility]     Discharge Planning      Discharge plans  Home with assistance       Anes & Int Med <For office use only>      Total Score: 13        Surgical Risk Level Assessment       Triage considerations:     The patient has no apparent active cardiac condition (No unstable coronary Syndrome such as severe unstable angina or recent [<1 month] myocardial infarction, decompensated CHF, severe valvular   disease or significant arrhythmia)    Previous anesthesia records:[facility]    Last PCP note: within 1 month , within [facility]   Subspecialty notes: Cardiology: General, Endocrinology    Other important co-morbidities:      Tests already available:  4/19/16 NUCLEAR STRESS NORMAL WITH NSR ON EKG            Instructions given. (See in Nurse's note)    Optimization:  Anesthesia Preop Clinic Assessment  Indicated      Pre-habilitation suggested:   2/1 EDUCATION CLASS        Plan:    Testing:  Hematology Profile, BMP, A1C and PT/INR   Pre-anesthesia  visit       Visit focus: possible regional anesthesia and/or nerve block        Patient  has previously scheduled Medical Appointment: 1/23    Navigation: Tests Scheduled.                      Results will be tracked by Preop Clinic.

## 2017-01-24 ENCOUNTER — TELEPHONE (OUTPATIENT)
Dept: FAMILY MEDICINE | Facility: CLINIC | Age: 77
End: 2017-01-24

## 2017-01-24 PROBLEM — R29.898 RIGHT LEG WEAKNESS: Status: ACTIVE | Noted: 2017-01-24

## 2017-01-24 PROBLEM — M25.661 STIFFNESS OF RIGHT KNEE: Status: ACTIVE | Noted: 2017-01-24

## 2017-01-24 NOTE — TELEPHONE ENCOUNTER
----- Message from Yee Bone RN sent at 1/23/2017  9:58 AM CST -----  Patient is having RIGHT TOTAL KNEE REPLACEMENT on 2/7 with DR PEREZ . Anesthesia has received existing labs and test for  perioperative planning. Anesthesia is requesting a medical optimization/clearance prior to surgery. Patient has an appointment with you today @ 10:40 Please advise.    Thank you for your assistance  Vivien BARNARD  Pre op anesthesia  [id number]

## 2017-01-27 ENCOUNTER — HOSPITAL ENCOUNTER (OUTPATIENT)
Dept: PREADMISSION TESTING | Facility: HOSPITAL | Age: 77
Discharge: HOME | End: 2017-01-27
Attending: ANESTHESIOLOGY
Payer: MEDICARE

## 2017-01-27 VITALS
HEART RATE: 80 BPM | SYSTOLIC BLOOD PRESSURE: 122 MMHG | RESPIRATION RATE: 16 BRPM | WEIGHT: 161 LBS | TEMPERATURE: 98 F | BODY MASS INDEX: 28.53 KG/M2 | DIASTOLIC BLOOD PRESSURE: 64 MMHG | OXYGEN SATURATION: 97 % | HEIGHT: 63 IN

## 2017-01-27 NOTE — PRE-PROCEDURE INSTRUCTIONS
PreOp Instructions given:  - Written medication information (what to hold and what to take)  - NPO guidelines  - Arrival place directions given; time to be given the day before procedure by the Surgeon's Office  - Bathing with antibacterial soap   - Don't wear any jewelry or bring any valuables AM of surgery  - No makeup or moisturizer to face  - No perfume/cologne, powder, lotions or aftershave    Pt. verbalized understanding.

## 2017-01-27 NOTE — DISCHARGE INSTRUCTIONS
Your surgery has been scheduled for:__________________________________________    You should report to:  ____[facility], located on the [place] side of the first floor of the           [facility] ([phone]  ____[facility], located on the [facility] side of the            Second floor of the [facility] ([phone])  ____3rd Floor [facility] located on the [facility] side of the [facility] [phone]  Please Note   - Tell your doctor if you take Aspirin, products containing Aspirin, herbal medications  or blood thinners, such as Coumadin, Ticlid, or Plavix.  (Consult your provider regarding holding or stopping before surgery).  - Arrange for someone to drive you home following surgery.  You will not be allowed to leave the surgical facility alone or drive yourself home following sedation and anesthesia.  Before Surgery  - Stop taking all herbal medications 14days prior to surgery  - No Motrin/Advil (Ibuprofen) 7 days before surgery  - No Aleve (Naproxen) 7 days before surgery  - Stop Taking Asprin, products containing Asprin _____days before surgery  - Stop taking blood thinners_______days before surgery  - Refrain from drinking alcoholic beverages for 24hours before and after surgery  - Stop or limit smoking _________days before surgery  Night before Surgery  - DO NOT EAT OR DRINK ANYTHING AFTER MIDNIGHT, INCLUDING GUM, HARD CANDY, MINTS, OR CHEWING TOBACCO.  - Take a shower or bath (shower is recommended).  Bathe with Hibiclens soap or an antibacterial soap from the neck down.  If not supplied by your surgeon, hibiclens soap will need to be purchased over the counter in pharmacy.  Rinse soap off thoroughly.  - Shampoo your hair with your regular shampoo  The Day of Surgery  - Take another bath or shower with hibiclens or any antibacterial soap, to reduce the chance of infection.  - Take heart and blood  pressure medications with a small sip of water, as advised by the perioperative team.  - Do not take fluid pills  - You may brush your teeth and rinse your mouth, but do not swall any additional water.   - Do not apply perfumes, powder, body lotions or deodorant on the day of surgery.  - Nail polish should be removed.  - Do not wear makeup or moisturizer  - Wear comfortable clothes, such as a button front shirt and loose fitting pants.  - Leave all jewelry, including body piercings, and valuables at home.    - Bring any devices you will neeed after surgery such as crutches or canes.  - If you have sleep apnea, please bring your CPAP machine  In the event that your physical condition changes including the onset of a cold or respiratory illness, or if you have to delay or cancel your surgery, please notify your surgeon.

## 2017-01-27 NOTE — IP AVS SNAPSHOT
[facility]  [address] [place] [address]  Phone: [phone]           Instrucciones de Lora de Pacientes    Nuestro objetivo es que te prepara para el éxito. Gin paquete incluye información sobre montalvo enfermedad, medicamentos y atención médica a domicilio. [place] le ayudará a cuidar y que no se enferman más y necesita volver al hospital.     Por favor, pregunte a montalvo enfermera si tiene alguna pregunta.       Hay muchos detalles a recordar cuando se prepara para salir del hospital. [place] es lo que necesita hacer:    1. Colton montalvo medicina. Si usted tiene genny receta, revise la lista de medicamentos en las siguientes páginas. Es posible que tenga nuevos medicamentos para recoger en la farmacia y otros que tendrá que dejar de lisa. Revise las instrucciones sobre cómo y cuándo lisa lulu medicamentos. Consulte con el médico o el enfermeras si no está seguro de qué hacer.    2. Ir a lulu citas de seguimiento. La información específica de seguimiento aparece en las siguientes páginas. Usted puede ser contactado por genny enfermera o proveedor clínico sobre las próximas citas. Estar seguro de que tiene todos los números de teléfono para comunicarse si es necesario. Por favor, póngase en contacto con la oficina de montalvo profesional médico si no puede hacer genny jessee.     3. Esté atento a señales de advertencia. El médico o la enfermera le dará señales de alarma detallados que debe observar y cuándo llamar para la ayuda. Estas instrucciones también pueden incluir información educativa acerca de montalvo condición. Si usted experimenta cualquiera de las señales de advertencia para montalvo jennifer, llame a montalvo médico.             [facility]    Si montalvo médico no le ha indicado a lo contrário, por favor   contactar a [facility], nuestra línea de cuidado de enfermeras está disponible para asistirle 24/7.    [phone] (servicio gratuito)    Enfermeras registradas de [facility] pueden ayudarle a  reservar genny jessee, proveer educación para la jennifer, asesoría clínica, y otros servicios de asesoramiento.                  ** Verificar la lista de medicamentos es correcta y está actualizada. Llevar esto con usted en juancarlos de emergencia. Si lulu medicamentos keating cambiado, por favor notifique a montalvo proveedor de atención médica.             Lista de medicamentos      TOME estos medicamentos        Additional Info                      * albuterol 90 mcg/actuation inhaler   Cantidad:  1 each   Recargas:  11   Dosis:  2 puff    Instrucciones:  Inhale 2 puffs into the lungs every 6 (six) hours as needed for Wheezing.     Begin Date    AM    Noon    PM    Bedtime       * albuterol 2.5 mg /3 mL (0.083 %) nebulizer solution   También conocido luna:  PROVENTIL   Cantidad:  100 each   Recargas:  0   Dosis:  2.5 mg    Instrucciones:  Take 3 mLs (2.5 mg total) by nebulization every 6 (six) hours as needed for Wheezing.     Begin Date    AM    Noon    PM    Bedtime       atenolol 25 MG tablet   También conocido luna:  TENORMIN   Cantidad:  90 tablet   Recargas:  3   Dosis:  25 mg    Instrucciones:  Take 1 tablet (25 mg total) by mouth once daily.     Begin Date    AM    Noon    PM    Bedtime       atorvastatin 80 MG tablet   También conocido luna:  LIPITOR   Cantidad:  90 tablet   Recargas:  2   Dosis:  80 mg    Instrucciones:  Take 1 tablet (80 mg total) by mouth once daily.     Begin Date    AM    Noon    PM    Bedtime       blood-glucose meter kit   También conocido luna:  [employer] BLOOD GLUCOSE SYSTM   Cantidad:  1 each   Recargas:  0   Comentarios:  [employer]S kit    Instrucciones:  Use as instructed     Begin Date    AM    Noon    PM    Bedtime       diclofenac sodium 1 % Gel   También conocido luna:  VOLTAREN   Cantidad:  1 Tube   Recargas:  2   Dosis:  2 g    Instrucciones:  Apply 2 g topically once daily.     Begin Date    AM    Noon    PM    Bedtime       ferrous sulfate 324 mg (65 mg iron) Tbec   Recargas:  0    Dosis:  325 mg    Instrucciones:  Take 325 mg by mouth once daily.     Begin Date    AM    Noon    PM    Bedtime       fluocinonide 0.05 % external solution   También conocido luna:  LIDEX   Cantidad:  60 mL   Recargas:  3    Instrucciones:  Apply topically 2 (two) times daily.     Begin Date    AM    Noon    PM    Bedtime       gabapentin 100 MG capsule   También conocido luna:  NEURONTIN   Cantidad:  180 capsule   Recargas:  11    Instrucciones:  Take 2 capsules three times daily     Begin Date    AM    Noon    PM    Bedtime       lancets Misc   Cantidad:  200 each   Recargas:  3   Dosis:  1 lancet   Comentarios:  [employer]S lancets    Instrucciones:  1 lancet by Misc.(Non-Drug; Combo Route) route 2 (two) times daily.     Begin Date    AM    Noon    PM    Bedtime       montelukast 10 mg tablet   También conocido luna:  SINGULAIR   Cantidad:  30 tablet   Recargas:  0   Dosis:  10 mg    Instrucciones:  Take 1 tablet (10 mg total) by mouth every evening.     Begin Date    AM    Noon    PM    Bedtime       omeprazole 20 MG capsule   También conocido luna:  PRILOSEC   Cantidad:  120 capsule   Recargas:  0    Instrucciones:  TAKE 1 CAPSULE BY MOUTH TWICE DAILY     Begin Date    AM    Noon    PM    Bedtime       paroxetine 25 MG 24 hr tablet   También conocido luna:  PAXIL-CR   Cantidad:  90 tablet   Recargas:  2   Dosis:  25 mg    Instrucciones:  Take 1 tablet (25 mg total) by mouth once daily.     Begin Date    AM    Noon    PM    Bedtime       valsartan 320 MG tablet   También conocido luna:  DIOVAN   Cantidad:  30 tablet   Recargas:  6   Dosis:  320 mg    Instrucciones:  Take 1 tablet (320 mg total) by mouth once daily.     Begin Date    AM    Noon    PM    Bedtime       VITAMIN B-12 ORAL   Recargas:  0   Dosis:  2500 mcg    Instrucciones:  Take 2,500 mcg by mouth.     Begin Date    AM    Noon    PM    Bedtime       VITAMIN D-3 ORAL   Recargas:  0    Instrucciones:  Take by mouth.     Begin Date    AM    Noon     PM    Bedtime       * Aviso:  Esta lista contiene medicamentos que son iguales a otros medicamentos recetados para usted. Norma las instrucciones con cuidado y pida a montalvo personal médico que revise la lista de medicamentos y las instrucciones correspondientes con usted.               Por favor traiga a todos las citas de seguimiento:    1. Dina copia de las instrucciones de chris.  2. Todos los medicamentos que está tomando saul momento, en marbella envases originales.  3. Identificación y tarjeta de seguro.    Por favor llegue 15 minutos antes de la hora de la jessee.    Por favor llame con 24 horas de antelación si tiene que cambiar montalvo jessee y / o tiempo.        Marbella Citas Programadas     Jan 27, 2017 11:45 AM CST   Non-Fasting Lab with LAB, APPOINT[place] [facility]    [address] [place] [address] [place] [address] [phone]            Feb 01, 2017  1:30 PM CST   Education Class with JOINT [facility]AMP, [facility] - Orthopedics Class ([facility] )    [facility]  [place] [address] [phone]            Feb 01, 2017  4:00 PM CST   Pre OP with Yudith Maria NP   [facility] - Orthopedics ([facility] )    [address] [place] [address] [place] [address] [phone]              Marbella cirugías futuras / Procedimientos     Feb 07, 2017   Surgery with Rom Moran MD   [facility])    [address] [place] [address] [place] [address] [phone]                  Instrucciones a luis de chris       Your surgery has been scheduled for:__________________________________________    You should report to:  ____[facility], located on the [place] side of the first floor of the           [facility] ([phone])  ____[facility], located on the [facility] side of the            Second floor of the [facility] ([phone])  ____3rd  Floor [facility] located on the [facility] side of the [facility] [phone]  Please Note   - Tell your doctor if you take Aspirin, products containing Aspirin, herbal medications  or blood thinners, such as Coumadin, Ticlid, or Plavix.  (Consult your provider regarding holding or stopping before surgery).  - Arrange for someone to drive you home following surgery.  You will not be allowed to leave the surgical facility alone or drive yourself home following sedation and anesthesia.  Before Surgery  - Stop taking all herbal medications 14days prior to surgery  - No Motrin/Advil (Ibuprofen) 7 days before surgery  - No Aleve (Naproxen) 7 days before surgery  - Stop Taking Asprin, products containing Asprin _____days before surgery  - Stop taking blood thinners_______days before surgery  - Refrain from drinking alcoholic beverages for 24hours before and after surgery  - Stop or limit smoking _________days before surgery  Night before Surgery  - DO NOT EAT OR DRINK ANYTHING AFTER MIDNIGHT, INCLUDING GUM, HARD CANDY, MINTS, OR CHEWING TOBACCO.  - Take a shower or bath (shower is recommended).  Bathe with Hibiclens soap or an antibacterial soap from the neck down.  If not supplied by your surgeon, hibiclens soap will need to be purchased over the counter in pharmacy.  Rinse soap off thoroughly.  - Shampoo your hair with your regular shampoo  The Day of Surgery  - Take another bath or shower with hibiclens or any antibacterial soap, to reduce the chance of infection.  - Take heart and blood pressure medications with a small sip of water, as advised by the perioperative team.  - Do not take fluid pills  - You may brush your teeth and rinse your mouth, but do not swall any additional water.   - Do not apply perfumes, powder, body lotions or deodorant on the day of surgery.  - Nail polish should be removed.  - Do not wear makeup or moisturizer  - Wear comfortable clothes, such as a button front  "shirt and loose fitting pants.  - Leave all jewelry, including body piercings, and valuables at home.    - Bring any devices you will neeed after surgery such as crutches or canes.  - If you have sleep apnea, please bring your CPAP machine  In the event that your physical condition changes including the onset of a cold or respiratory illness, or if you have to delay or cancel your surgery, please notify your surgeon.    Referencias/Adjuntos de chris     PAIN AT HOME, MANAGING POST-OP: NON-MEDICATION RELIEF (Welsh)    PAIN MANAGEMENT AFTER SURGERY (Welsh)    ANESTHESIA: REGIONAL ANESTHESIA (Welsh)        Información de Admisión     Fecha y hora Proveedor Departamento CSN    1/27/2017 11:00 AM Eugenia Seay MD [facility] [phone]      Los proveedores de cuidado     Personal Médico Rol [facility] Teléfono principal de la oficina    Eugenia Seay MD Attending Provider Anesthesiology [phone]      Marbella signos vitales willie     PS Pulso Temperatura Resp [place] Peso    122/64 80 98.2 °F (36.8 °C) 16 5' 3" (1.6 m) 73 kg (161 lb)    SpO2 BMI ([facility])                97% 28.52 kg/m2          Recent Lab Values        10/21/2013 2/24/2014 6/23/2014 10/21/2014 1/5/2015 5/7/2015 1/27/2016 5/24/2016      9:45 AM  8:35 AM  8:35 AM  1:48 PM  9:20 AM  9:50 AM  8:28 AM 11:03 AM    A1C 6.7 (H) 6.3 (H) 6.3 (H) 6.5 (H) 6.4 (H) 6.5 (H) 6.4 (H) 6.2      Alergias     A partir del:  1/27/2017           Reacciones    Vicodin [Hydrocodone-acetaminophen] Anxiety      Directiva Anticipada     Dina directiva anticipada es un documento que, en juancarlos de que ya no capaz de hacer decisiones por sí mismo, le dice a montalvo equipo médico qué tipo de tratamiento quiere o no quiere recibir, o que le gustaría lisa esas decisiones para usted . Si actualmente no tiene dina directiva anticipada, [facility] le anima a crear huma. Para más información llame al: [phone] ([phone]), o entrando en " [email or web address].        Language Assistance Services     ATTENTION: Language assistance services are available, free of charge. Please call [phone].      ATENCIÓN: Si habla español, tiene a montalvo disposición servicios gratuitos de asistencia lingüística. Llame al [phone].     CHÚ Ý: N?u b?n nói Ti?ng Vi?t, có các d?ch v? h? tr? ngôn ng? mi?n phí dành cho b?n. G?i s? [phone].        Instrucciones de chris para la diabetes       Diabetes (Información general)  La diabetes es un problema de jennifer a didi plazo que significa que montalvo cuerpo no produce la suficiente insulina. O también puede significar que montalvo cuerpo no puede utilizar la insulina que produce. La insulina es genny hormona en montalvo organismo, que permite que el azúcar en la cyndi (glucosa) llegue a las células en montalvo cuerpo. Todas lulu células necesitan glucosa luna combustible.  Cuando usted tiene diabetes la glucosa en montalvo cuerpo se acumula porque no puede llegar hasta las células. Esta acumulación se conoce luna un nivel alto de azúcar en la cyndi (hiperglucemia).  Montalvo nivel de azúcar en la cyndi depende de varias cosas. Depende de la clase de alimentos que usted come y de cuánto come. También depende de cuánto ejercicio hace y de cuánta insulina tiene en montalvo organismo. [place] mucho de las clases indebidas de alimentos o no sandy a tiempo los medicamentos para la diabetes puede causar con el tiempo un nivel alto de azúcar en la cyndi. Las infecciones pueden provocar un nivel alto de azúcar en la cyndi, incluso si está tomando lulu medicamentos correctamente.  Estas cosas también pueden causar un nivel bajo de azúcar en la cyndi:  · Saltarse las comidas  · No comer suficientes alimentos  · Sandy demasiado de un medicamento para la diabetes  Con el tiempo, si no se trata la diabetes puede causar problemas serios. Estos problemas incluyen enfermedades del corazón, ataque cerebral, insuficiencia renal y ceguera. También pueden incluir dolor  en los nervios o pérdida de sensación en lulu piernas o pies. Al mantener montalvo azúcar en la cyndi bajo control usted puede prevenir o retrasar estos problemas.  Los niveles normales en la cyndi son de 80 a 130 antes de las comidas y menos de 180 de 1 a 2 horas después de comer.    Cuidados en el hogar  Cuando se esté cuidando usted mismo en montalvo hogar, siga estas pautas:  · Siga la dieta que le recomiende montalvo proveedor de atención médica.  Use la insulina o cualquier otro medicamento para la diabetes, exactamente luna se lo indiquen.  · Vigile lulu niveles de azúcar en la cyndi luna le indiquen. Lleve un registro con los resultados. [place] le ayudará a montalvo proveedor a cambiar lulu medicamentos para mantener el azúcar en la cyndi bajo control.   · Trate de alcanzar montalvo peso ideal. Es posible que pueda reducir o dejar de lisa medicamentos para la diabetes si come los alimentos adecuados y hace ejercicio.  · No fume. Fumar hace que los efectos de la diabetes empeoren en montalvo circulación. Si usted fuma y tiene diabetes es mucho más probable que sufra un ataque al corazón.   · Cuídese pérez lulu pies. Si [place] perdido la sensación en lulu pies, es posible que no sarabjit genny herida o genny infección. Revise lulu pies y la galilea entre los dedos por lo menos genny vez a la semana.    · Utilice montalvo brazalete de alerta médica o lleve genny tarjeta en montalvo billetera que diga que usted tiene diabetes. [place] les ayudará a los proveedores de atención médica a brindarle los cuidados adecuados si usted se enferma hasta el punto que no pueda decirles que tiene diabetes.  Plan para un día de enfermedad  Si usted contrae un resfriado, la gripe o genny infección viral, siga estos pasos:  · Revise montalvo plan para un día de enfermedad de la diabetes y llame a montalvo proveedor de atención médica luna le indicaron que lo hiciera. Es posible que deba llamar al proveedor de inmediato si:  ¨ Montalvo nivel de azúcar en la cyndi está por encima de 240 a pesar de estar tomando los  medicamentos para la diabetes  ¨ Los niveles de cetonas en montalvo orina están por encima de lo normal o altos  ¨ Ha estado vomitando joi más de 6 horas  ¨ Tiene dificultades para respirar  ¨ Tiene genny fiebre chris  ¨ Tiene fiebre joi varios días y no mejora  ¨ Se siente más aturdido o somnoliento de lo usual  · Siga tomando lulu pastillas para la diabetes (medicamentos orales) incluso si ha estado vomitando y se siente muy enfermo. Llame a montalvo proveedor de inmediato porque es posible que necesite insulina para bajar los niveles de azúcar en la cyndi hasta que se recupere de montalvo enfermedad.    · Monitoree montalvo insulina incluso si ha estado vomitando y se siente muy enfermo. Llame a montalvo proveedor de inmediato y pregunte si necesita cambiar montalvo dosis de insulina. [place] dependerá de los resultados de lulu niveles de azúcar en la cyndi.   · Revise montalvo nivel de azúcar en la cyndi cada 2 a 4 horas, o por lo menos 4 veces al día.  · Revise con frecuencia lulu cetonas. Si está vomitando y tiene diarrea, revíselas con más frecuencia.  · No se salte comidas. Trate de comer comidas pequeñas a intervalos regulares. Hágalo aunque no sienta gana de comer.  · Paulo agua y otros líquidos que no contengan cafeína o calorías. [place] prevendrá que se deshidrate. Si tiene náuseas o vómito, tome pequeños sorbos cada 5 minutos. Para prevenir la deshidratación trate de beber genny taza (8 onzas) de líquido cada hora mientras esté despierto.  Cuidados generales  Siempre lleve con usted genny maxx de azúcar de acción rápida para el juancarlos de que tenga síntomas de un nivel bajo de azúcar en la cyndi (menos de 70). A las primeras señales de un nivel bajo de azúcar en la cyndi, coma o paulo de 15 a 20 gramos de azúcar de acción rápida para elevar el nivel de azúcar en la cyndi. Algunos ejemplos son:    · 3 a 4 tabletas de glucosa. Estas pueden comprarse en la mayoría de las farmacias.  · 4 onzas (1/2 taza) de un refresco (que no sea de dieta)  · 4 onzas  (1/2 taza) de cualquier jugo de fruta  · 8 onzas (1 taza) de leche  · 5 a 6 unidades de caramelos duros  · 1 cucharada de miel  Revise montalvo nivel de azúcar en la cyndi 15 minutos después de tratarse. Si sigue por debajo de 70, tome de 15 a 20 gramos más de azúcar de acción rápida. Vuelva a revisarse en 15 minutos. Si regresa a lo normal (70 o más), coma un bocadillo o genny comida para mantener el azúcar en la cyndi dentro de un nivel seguro. Si permanece bajo, llame a montalvo médico o vaya a genny tiffanie de emergencias.  Cuidado de seguimiento  [place] genny jessee de seguimiento con montalvo proveedor de atención médica, o luna le indiquen. Para más información acerca de la diabetes, visite la página web de la Asociación Americana de la Diabetes (American Diabetes Association) en www.diabetes.org o llame al [phone].  Cuándo buscar consejo médico  Llame a montalvo proveedor de atención médica de inmediato si tiene cualquiera de estos síntomas de un nivel alto de azúcar en la cyndi:    · Orina frecuentemente  · Mareos  · Somnolencia  · Sed  · Dolor de juliette  · Náuseas o vómito  · Dolor abdominal  · Cambios en la vista  · Respiración rápida  · Confusión o pérdida del conocimiento  También llame a montalvo proveedor de inmediato si tiene alguna de estas señales de un nivel bajo de azúcar en la cyndi:    · Fatiga  · Dolor de juliette  · Temblores  · Sudoración excesiva  · Hambre  · Se siente ansioso o inquieto  · Cambios en la vista  · Somnolencia  · Debilidad  · Confusión o pérdida del conocimiento  Llame a montalvo proveedor de inmediato si cualquiera de estos ocurre:  · Dolor en el pecho o falta de aire  · Mareos o desmayos  · Debilidad en un brazo o genny pierna, o en un lado de la aguilar  · Dificultad para hablar o para diane   © [id number] [employer]. [address] [place] [address]. Todos los derechos reservados. Esta información no pretende sustituir la atención médica profesional. Sólo montalvo médico puede diagnosticar y tratar un  problema de jennifer.               [facility] cumple con las leyes federales aplicables de derechos civiles y no discrimina por motivos de laura, color, origen nacional, edad, discapacidad, o sexo.                        [facility]  [address] [place] [address]  Phone: [phone]           Patient Discharge Instructions    Our goal is to set you up for success. This packet includes information on your condition, medications, and your home care. It will help you to care for yourself so you don't get sicker.     Please ask your nurse if you have any questions.        There are many details to remember when preparing for your surgery. Here is what you will need to do, please ask your nurse if there are more specific instructions and if you have any questions:    1. 24 hours before procedure Do not smoke or drink alcoholic beverages 24 hours prior to your procedure    2. Eating before procedure Do not eat or drink anything 8 hours before your procedure - this includes gum, mints, and candy.     3. Day of procedure Please remove all jewelry for the procedure. If you wear contact lenses, dentures, hearing aids or glasses, bring a container to put them in during your surgery and give to a family member for safekeeping.  If your doctor has scheduled you for an overnight stay, bring a small overnight bag with any personal items that you need.    4. After procedure Make arrangements in advance for transportation home by a responsible adult. It is not safe to drive a vehicle during the 24 hours following surgery.     PLEASE NOTE: You may be contacted the day before your surgery to confirm your surgery date and arrival time. The Surgery schedule has many variables which may affect the time of your surgery case. Family members should be available if your surgery time changes.                [facility]  Unless otherwise directed by your provider, please contact [facility], our nurse care line that is available for 24/7 assistance.     [phone] (toll-free)    Registered nurses in the [facility] provide clinical advisement, health education, appointment booking, and other advisory services.                ** Verificar la lista de medicamentos es correcta y está actualizada. Llevar esto con usted en juancarlos de emergencia. Si lulu medicamentos keating cambiado, por favor notifique a montalvo proveedor de atención médica.             Medication List      TAKE these medications        Additional Info                      * albuterol 90 mcg/actuation inhaler   Quantity:  1 each   Refills:  11   Dose:  2 puff    Instructions:  Inhale 2 puffs into the lungs every 6 (six) hours as needed for Wheezing.     Begin Date    AM    Noon    PM    Bedtime       * albuterol 2.5 mg /3 mL (0.083 %) nebulizer solution   Commonly known as:  PROVENTIL   Quantity:  100 each   Refills:  0   Dose:  2.5 mg    Instructions:  Take 3 mLs (2.5 mg total) by nebulization every 6 (six) hours as needed for Wheezing.     Begin Date    AM    Noon    PM    Bedtime       atenolol 25 MG tablet   Commonly known as:  TENORMIN   Quantity:  90 tablet   Refills:  3   Dose:  25 mg    Instructions:  Take 1 tablet (25 mg total) by mouth once daily.     Begin Date    AM    Noon    PM    Bedtime       atorvastatin 80 MG tablet   Commonly known as:  LIPITOR   Quantity:  90 tablet   Refills:  2   Dose:  80 mg    Instructions:  Take 1 tablet (80 mg total) by mouth once daily.     Begin Date    AM    Noon    PM    Bedtime       blood-glucose meter kit   Commonly known as:  [employer] BLOOD GLUCOSE SYSTM   Quantity:  1 each   Refills:  0   Comments:  [employer]S kit    Instructions:  Use as instructed     Begin Date    AM    Noon    PM    Bedtime       diclofenac sodium 1 % Gel   Commonly known as:  VOLTAREN   Quantity:  1 Tube   Refills:  2   Dose:  2 g    Instructions:  Apply 2 g topically once daily.     Begin Date    AM     Noon    PM    Bedtime       ferrous sulfate 324 mg (65 mg iron) Tbec   Refills:  0   Dose:  325 mg    Instructions:  Take 325 mg by mouth once daily.     Begin Date    AM    Noon    PM    Bedtime       fluocinonide 0.05 % external solution   Commonly known as:  LIDEX   Quantity:  60 mL   Refills:  3    Instructions:  Apply topically 2 (two) times daily.     Begin Date    AM    Noon    PM    Bedtime       gabapentin 100 MG capsule   Commonly known as:  NEURONTIN   Quantity:  180 capsule   Refills:  11    Instructions:  Take 2 capsules three times daily     Begin Date    AM    Noon    PM    Bedtime       lancets Misc   Quantity:  200 each   Refills:  3   Dose:  1 lancet   Comments:  [employer]S lancets    Instructions:  1 lancet by Misc.(Non-Drug; Combo Route) route 2 (two) times daily.     Begin Date    AM    Noon    PM    Bedtime       montelukast 10 mg tablet   Commonly known as:  SINGULAIR   Quantity:  30 tablet   Refills:  0   Dose:  10 mg    Instructions:  Take 1 tablet (10 mg total) by mouth every evening.     Begin Date    AM    Noon    PM    Bedtime       omeprazole 20 MG capsule   Commonly known as:  PRILOSEC   Quantity:  120 capsule   Refills:  0    Instructions:  TAKE 1 CAPSULE BY MOUTH TWICE DAILY     Begin Date    AM    Noon    PM    Bedtime       paroxetine 25 MG 24 hr tablet   Commonly known as:  PAXIL-CR   Quantity:  90 tablet   Refills:  2   Dose:  25 mg    Instructions:  Take 1 tablet (25 mg total) by mouth once daily.     Begin Date    AM    Noon    PM    Bedtime       valsartan 320 MG tablet   Commonly known as:  DIOVAN   Quantity:  30 tablet   Refills:  6   Dose:  320 mg    Instructions:  Take 1 tablet (320 mg total) by mouth once daily.     Begin Date    AM    Noon    PM    Bedtime       VITAMIN B-12 ORAL   Refills:  0   Dose:  2500 mcg    Instructions:  Take 2,500 mcg by mouth.     Begin Date    AM    Noon    PM    Bedtime       VITAMIN D-3 ORAL   Refills:  0    Instructions:  Take by mouth.      Begin Date    AM    Noon    PM    Bedtime       * Notice:  This list has 2 medication(s) that are the same as other medications prescribed for you. Read the directions carefully, and ask your doctor or other care provider to review them with you.               Por favor traiga a todos las citas de seguimiento:    1. Dina copia de las instrucciones de chris.  2. Todos los medicamentos que está tomando saul momento, en lulu envases originales.  3. Identificación y tarjeta de seguro.    Por favor llegue 15 minutos antes de la hora de la jessee.    Por favor llame con 24 horas de antelación si tiene que cambiar montalvo jessee y / o tiempo.        Your Scheduled Appointments     Jan 27, 2017 11:45 AM CST   Non-Fasting Lab with LAB, APPOINT[place] [facility] ([facility])    [address] [facility] [address] [phone]            Feb 01, 2017  1:30 PM CST   Education Class with [facility], [facility] - Orthopedics Class ([facility] )    [address]  [place] [address] [phone]            Feb 01, 2017  4:00 PM CST   Pre OP with Yudith Maria NP   [facility] - Orthopedics ([facility] )    [address]  [place] [address] [phone]              Your Future Surgeries/Procedures     Feb 07, 2017   Surgery with Rom Moran MD   [facility] ([facility])    [address] [facility] [address] [phone]                  Discharge Instructions       Your surgery has been scheduled for:__________________________________________    You should report to:  ____[facility], located on the [place] side of the first floor of the           [facility] ([phone])  ____[facility], located on the [facility] side of the            Second floor of the [facility] ([phone])  ____3rd Floor [facility]U located on the  [facility] of the [facility] [phone]  Please Note   - Tell your doctor if you take Aspirin, products containing Aspirin, herbal medications  or blood thinners, such as Coumadin, Ticlid, or Plavix.  (Consult your provider regarding holding or stopping before surgery).  - Arrange for someone to drive you home following surgery.  You will not be allowed to leave the surgical facility alone or drive yourself home following sedation and anesthesia.  Before Surgery  - Stop taking all herbal medications 14days prior to surgery  - No Motrin/Advil (Ibuprofen) 7 days before surgery  - No Aleve (Naproxen) 7 days before surgery  - Stop Taking Asprin, products containing Asprin _____days before surgery  - Stop taking blood thinners_______days before surgery  - Refrain from drinking alcoholic beverages for 24hours before and after surgery  - Stop or limit smoking _________days before surgery  Night before Surgery  - DO NOT EAT OR DRINK ANYTHING AFTER MIDNIGHT, INCLUDING GUM, HARD CANDY, MINTS, OR CHEWING TOBACCO.  - Take a shower or bath (shower is recommended).  Bathe with Hibiclens soap or an antibacterial soap from the neck down.  If not supplied by your surgeon, hibiclens soap will need to be purchased over the counter in pharmacy.  Rinse soap off thoroughly.  - Shampoo your hair with your regular shampoo  The Day of Surgery  - Take another bath or shower with hibiclens or any antibacterial soap, to reduce the chance of infection.  - Take heart and blood pressure medications with a small sip of water, as advised by the perioperative team.  - Do not take fluid pills  - You may brush your teeth and rinse your mouth, but do not swall any additional water.   - Do not apply perfumes, powder, body lotions or deodorant on the day of surgery.  - Nail polish should be removed.  - Do not wear makeup or moisturizer  - Wear comfortable clothes, such as a button front shirt and loose fitting  "pants.  - Leave all jewelry, including body piercings, and valuables at home.    - Bring any devices you will neeed after surgery such as crutches or canes.  - If you have sleep apnea, please bring your CPAP machine  In the event that your physical condition changes including the onset of a cold or respiratory illness, or if you have to delay or cancel your surgery, please notify your surgeon.    Discharge References/Attachments     PAIN AT HOME, MANAGING POST-OP: NON-MEDICATION RELIEF ([facility])    PAIN MANAGEMENT AFTER SURGERY ([facility])    ANESTHESIA: REGIONAL ANESTHESIA ([facility])        Admission Information     Date & Time Provider Department CSN    1/27/2017 11:00 AM Eugenia Seay MD [facility] [id number]      Care Providers     Provider Role Specialty Primary office phone    Eugenia Seay MD Attending Provider Anesthesiology [phone]      Your Vitals Were     BP Pulse Temp Resp Height Weight    122/64 80 98.2 °F (36.8 °C) 16 5' 3" (1.6 m) 73 kg (161 lb)    SpO2 BMI                97% 28.52 kg/m2          Recent Lab Values        10/21/2013 2/24/2014 6/23/2014 10/21/2014 1/5/2015 5/7/2015 1/27/2016 5/24/2016      9:45 AM  8:35 AM  8:35 AM  1:48 PM  9:20 AM  9:50 AM  8:28 AM 11:03 AM    A1C 6.7 (H) 6.3 (H) 6.3 (H) 6.5 (H) 6.4 (H) 6.5 (H) 6.4 (H) 6.2      Allergies as of 1/27/2017        Reactions    Vicodin [Hydrocodone-acetaminophen] Anxiety      Advance Directives     An advance directive is a document which, in the event you are no longer able to make decisions for yourself, tells your healthcare team what kind of treatment you do or do not want to receive, or who you would like to make those decisions for you.  If you do not currently have an advance directive, [facility] encourages you to create one.  For more information call:  [phone]), [phone] ([phone]),  or log on to [email or web address].        Language Assistance Services     ATTENTION: " Language assistance services are available, free of charge. Please call [phone].      ATENCIÓN: Si habla calvin, tiene a montalvo disposición servicios gratuitos de asistencia lingüística. Llame al [phone].     CHÚ Ý: N?u b?n nói Ti?ng Vi?t, có các d?ch v? h? tr? ngôn ng? mi?n phí dành cho b?n. G?i s? [phone].        Diabetes Discharge Instructions                                    [facility] complies with applicable Federal civil rights laws and does not discriminate on the basis of race, color, national origin, age, disability, or sex.

## 2017-01-31 ENCOUNTER — TELEPHONE (OUTPATIENT)
Dept: FAMILY MEDICINE | Facility: CLINIC | Age: 77
End: 2017-01-31

## 2017-01-31 NOTE — TELEPHONE ENCOUNTER
----- Message from Carine Dean RN sent at 1/31/2017  9:36 AM CST -----  Regarding: Final PCP clearance  We are looking for a final PCP clearance before surgery scheduled with Dr. Moran on Tuesday 2/7/17 (Right TKA).    Thanks,  Carine Dean RN  [facility]  Ext. [phone]

## 2017-02-03 ENCOUNTER — OFFICE VISIT (OUTPATIENT)
Dept: ORTHOPEDICS | Facility: CLINIC | Age: 77
End: 2017-02-03
Payer: MEDICARE

## 2017-02-03 VITALS
DIASTOLIC BLOOD PRESSURE: 62 MMHG | BODY MASS INDEX: 28.3 KG/M2 | SYSTOLIC BLOOD PRESSURE: 113 MMHG | WEIGHT: 159.75 LBS | HEART RATE: 74 BPM | HEIGHT: 63 IN | RESPIRATION RATE: 16 BRPM | TEMPERATURE: 98 F

## 2017-02-03 DIAGNOSIS — M17.11 PRIMARY OSTEOARTHRITIS OF RIGHT KNEE: Primary | ICD-10-CM

## 2017-02-03 PROCEDURE — 99999 PR PBB SHADOW E&M-EST. PATIENT-LVL IV: CPT | Mod: PBBFAC,,, | Performed by: NURSE PRACTITIONER

## 2017-02-03 PROCEDURE — 99499 UNLISTED E&M SERVICE: CPT | Mod: S$GLB,,, | Performed by: NURSE PRACTITIONER

## 2017-02-03 RX ORDER — ACETAMINOPHEN 500 MG
1000 TABLET ORAL EVERY 6 HOURS
Status: CANCELLED | OUTPATIENT
Start: 2017-02-03 | End: 2017-02-05

## 2017-02-03 RX ORDER — CELECOXIB 100 MG/1
200 CAPSULE ORAL DAILY
Status: CANCELLED | OUTPATIENT
Start: 2017-02-04

## 2017-02-03 RX ORDER — MORPHINE SULFATE 10 MG/ML
2 INJECTION, SOLUTION INTRAMUSCULAR; INTRAVENOUS
Status: CANCELLED | OUTPATIENT
Start: 2017-02-03

## 2017-02-03 RX ORDER — RAMELTEON 8 MG/1
8 TABLET ORAL NIGHTLY PRN
Status: CANCELLED | OUTPATIENT
Start: 2017-02-03

## 2017-02-03 RX ORDER — OXYCODONE HYDROCHLORIDE 5 MG/1
5 TABLET ORAL
Status: CANCELLED | OUTPATIENT
Start: 2017-02-03

## 2017-02-03 RX ORDER — SODIUM CHLORIDE 0.9 % (FLUSH) 0.9 %
3 SYRINGE (ML) INJECTION EVERY 8 HOURS PRN
Status: CANCELLED | OUTPATIENT
Start: 2017-02-03

## 2017-02-03 RX ORDER — ROPIVACAINE HYDROCHLORIDE 2 MG/ML
8 INJECTION, SOLUTION EPIDURAL; INFILTRATION; PERINEURAL CONTINUOUS
Status: CANCELLED | OUTPATIENT
Start: 2017-02-03

## 2017-02-03 RX ORDER — OXYCODONE HYDROCHLORIDE 5 MG/1
15 TABLET ORAL
Status: CANCELLED | OUTPATIENT
Start: 2017-02-03

## 2017-02-03 RX ORDER — LIDOCAINE HYDROCHLORIDE 10 MG/ML
1 INJECTION, SOLUTION EPIDURAL; INFILTRATION; INTRACAUDAL; PERINEURAL
Status: CANCELLED | OUTPATIENT
Start: 2017-02-03

## 2017-02-03 RX ORDER — SODIUM CHLORIDE 9 MG/ML
INJECTION, SOLUTION INTRAVENOUS CONTINUOUS
Status: CANCELLED | OUTPATIENT
Start: 2017-02-03 | End: 2017-02-04

## 2017-02-03 RX ORDER — ONDANSETRON 2 MG/ML
4 INJECTION INTRAMUSCULAR; INTRAVENOUS EVERY 12 HOURS PRN
Status: CANCELLED | OUTPATIENT
Start: 2017-02-03

## 2017-02-03 RX ORDER — FAMOTIDINE 20 MG/1
20 TABLET, FILM COATED ORAL 2 TIMES DAILY
Status: CANCELLED | OUTPATIENT
Start: 2017-02-03

## 2017-02-03 RX ORDER — NALOXONE HCL 0.4 MG/ML
0.02 VIAL (ML) INJECTION
Status: CANCELLED | OUTPATIENT
Start: 2017-02-03

## 2017-02-03 RX ORDER — FENTANYL CITRATE 50 UG/ML
25 INJECTION, SOLUTION INTRAMUSCULAR; INTRAVENOUS EVERY 5 MIN PRN
Status: CANCELLED | OUTPATIENT
Start: 2017-02-03

## 2017-02-03 RX ORDER — PREGABALIN 25 MG/1
75 CAPSULE ORAL
Status: CANCELLED | OUTPATIENT
Start: 2017-02-03

## 2017-02-03 RX ORDER — ACETAMINOPHEN 10 MG/ML
1000 INJECTION, SOLUTION INTRAVENOUS ONCE
Status: CANCELLED | OUTPATIENT
Start: 2017-02-03 | End: 2017-02-03

## 2017-02-03 RX ORDER — MUPIROCIN 20 MG/G
1 OINTMENT TOPICAL
Status: CANCELLED | OUTPATIENT
Start: 2017-02-03

## 2017-02-03 RX ORDER — PREGABALIN 25 MG/1
75 CAPSULE ORAL NIGHTLY
Status: CANCELLED | OUTPATIENT
Start: 2017-02-03

## 2017-02-03 RX ORDER — AMOXICILLIN 250 MG
1 CAPSULE ORAL 2 TIMES DAILY
Status: CANCELLED | OUTPATIENT
Start: 2017-02-03

## 2017-02-03 RX ORDER — CELECOXIB 100 MG/1
400 CAPSULE ORAL ONCE
Status: CANCELLED | OUTPATIENT
Start: 2017-02-03 | End: 2017-02-03

## 2017-02-03 RX ORDER — OXYCODONE HYDROCHLORIDE 5 MG/1
10 TABLET ORAL
Status: CANCELLED | OUTPATIENT
Start: 2017-02-03

## 2017-02-03 RX ORDER — POLYETHYLENE GLYCOL 3350 17 G/17G
17 POWDER, FOR SOLUTION ORAL DAILY
Status: CANCELLED | OUTPATIENT
Start: 2017-02-04

## 2017-02-03 RX ORDER — MIDAZOLAM HYDROCHLORIDE 5 MG/ML
1 INJECTION INTRAMUSCULAR; INTRAVENOUS EVERY 5 MIN PRN
Status: CANCELLED | OUTPATIENT
Start: 2017-02-03

## 2017-02-03 RX ORDER — BISACODYL 10 MG
10 SUPPOSITORY, RECTAL RECTAL EVERY 12 HOURS PRN
Status: CANCELLED | OUTPATIENT
Start: 2017-02-03

## 2017-02-03 RX ORDER — ASPIRIN 325 MG
325 TABLET, DELAYED RELEASE (ENTERIC COATED) ORAL 2 TIMES DAILY
Status: CANCELLED | OUTPATIENT
Start: 2017-02-03

## 2017-02-03 RX ORDER — SODIUM CHLORIDE 9 MG/ML
INJECTION, SOLUTION INTRAVENOUS
Status: CANCELLED | OUTPATIENT
Start: 2017-02-03

## 2017-02-03 NOTE — H&P
CC: Right knee pain    Elena Frances is a 76 y.o. female who has had pain in Right knee for  several years. Pain is worse with activity and weight bearing.  Patient has experienced interference of activities of daily living due to decreased range of motion and an increase in joint pain and swelling.  Patient has failed non-operative treatment including NSAIDs, corticosteroid injections, viscosupplement injections, and activity modification. Physical therapy was contraindicated in this patient due to pain and potential bone loss on this severe arthritic joint. Elena Frances currently ambulates independently.     Past Medical History   Diagnosis Date    Anemia     Arthritis     Depression     Diabetes mellitus type II     Generalized headaches 2014    Goiter      MNG    HTN (hypertension), benign     Hyperlipidemia     Hyperparathyroidism      s/p surgery    Intestinal obstruction      resolved spontaneously    Lumbar disc disease      s/p epidural shots    Nuclear sclerosis - Both Eyes 3/11/2014    Osteoporosis, post-menopausal      with 3 rib fractures       Past Surgical History   Procedure Laterality Date    Bilateral oophorectomy      Parathyroid gland surgery       for parathyroid adenoma    Cholecystectomy      Hysterectomy       section, classic       x 1    Appendectomy      Breast cyst excision       left    Lumbar epidural injection       x 4    Mouth surgery       for abscess drainage of gum of frontal teeth    Colonoscopy N/A 3/1/2016     Procedure: COLONOSCOPY;  Surgeon: Dony Bronson MD;  Location: [facility] ([address] [place] [address]);  Service: Endoscopy;  Laterality: N/A;  PM Prep    Cataract extraction w/  intraocular lens implant Right 2016     Dr. Fang ([place])    Cataract extraction w/  intraocular lens implant Left 10/17/2016     Dr. Fang ([place])       Family History   Problem Relation Age of Onset    Heart disease Mother     Hypertension Mother      Heart attack Mother     Heart disease Sister       in their 50's    Heart failure Sister     Heart disease Brother      CABG in age 60's    Hyperlipidemia Brother     Heart disease Sister      in her 50's    Heart disease Sister      in her 50's    Heart disease Sister     Hypertension Sister     Hyperlipidemia Sister     Heart disease Brother      CABG in age 60's    Hyperlipidemia Brother     Thyroid disease Daughter     Amblyopia Neg Hx     Blindness Neg Hx     Cancer Neg Hx     Cataracts Neg Hx     Diabetes Neg Hx     Glaucoma Neg Hx     Macular degeneration Neg Hx     Retinal detachment Neg Hx     Strabismus Neg Hx     Stroke Neg Hx        Review of patient's allergies indicates:   Allergen Reactions    Vicodin [hydrocodone-acetaminophen] Anxiety         Current Outpatient Prescriptions:     albuterol (PROVENTIL) 2.5 mg /3 mL (0.083 %) nebulizer solution, Take 3 mLs (2.5 mg total) by nebulization every 6 (six) hours as needed for Wheezing. (Patient taking differently: Take 2.5 mg by nebulization every 12 (twelve) hours as needed for Wheezing. ), Disp: 100 each, Rfl: 0    albuterol 90 mcg/actuation inhaler, Inhale 2 puffs into the lungs every 6 (six) hours as needed for Wheezing., Disp: 1 each, Rfl: 11    atenolol (TENORMIN) 25 MG tablet, Take 1 tablet (25 mg total) by mouth once daily., Disp: 90 tablet, Rfl: 3    atorvastatin (LIPITOR) 80 MG tablet, Take 1 tablet (80 mg total) by mouth once daily., Disp: 90 tablet, Rfl: 2    blood-glucose meter ([employer] BLOOD GLUCOSE SYSTM) kit, Use as instructed, Disp: 1 each, Rfl: 0    CALCIUM CARBONATE/VITAMIN D3 (VITAMIN D-3 ORAL), Take by mouth., Disp: , Rfl:     CYANOCOBALAMIN, VITAMIN B-12, (VITAMIN B-12 ORAL), Take 2,500 mcg by mouth., Disp: , Rfl:     diclofenac sodium (VOLTAREN) 1 % Gel, Apply 2 g topically once daily., Disp: 1 Tube, Rfl: 2    ferrous sulfate 324 mg (65 mg iron) TbEC, Take 325 mg by mouth once daily., Disp: ,  "Rfl:     fluocinonide (LIDEX) 0.05 % external solution, Apply topically 2 (two) times daily., Disp: 60 mL, Rfl: 3    gabapentin (NEURONTIN) 100 MG capsule, Take 2 capsules three times daily, Disp: 180 capsule, Rfl: 11    lancets Misc, 1 lancet by Misc.(Non-Drug; Combo Route) route 2 (two) times daily., Disp: 200 each, Rfl: 3    montelukast (SINGULAIR) 10 mg tablet, Take 1 tablet (10 mg total) by mouth every evening., Disp: 30 tablet, Rfl: 0    omeprazole (PRILOSEC) 20 MG capsule, TAKE 1 CAPSULE BY MOUTH TWICE DAILY, Disp: 120 capsule, Rfl: 0    paroxetine (PAXIL-CR) 25 MG 24 hr tablet, Take 1 tablet (25 mg total) by mouth once daily., Disp: 90 tablet, Rfl: 2    valsartan (DIOVAN) 320 MG tablet, Take 1 tablet (320 mg total) by mouth once daily., Disp: 30 tablet, Rfl: 6    Review of Systems:  Constitutional: no fever or chills, pain well controlled  Eyes: no visual changes  ENT: no nasal congestion or sore throat  Respiratory: no cough or shortness of breath  Cardiovascular: no chest pain or palpitations  Gastrointestinal: no nausea or vomiting, tolerating diet  Genitourinary: no hematuria or dysuria  Integument/Breast: no rash or pruritis  Hematologic/Lymphatic: no easy bruising or lymphadenopathy  Musculoskeletal: c/o right knee pain worse with activity  Neurological: no seizures or tremors  Behavioral/Psych: no auditory or visual hallucinations  Endocrine: no heat or cold intolerance    PE:  Visit Vitals    /62    Pulse 74    Temp 98 °F (36.7 °C) (Oral)    Resp 16    Ht 5' 3" (1.6 m)    Wt 72.4 kg (159 lb 11.6 oz)    BMI 28.29 kg/m2     General: Pleasant, cooperative, NAD   Gait: antalgic  HEENT: NCAT, sclera nonicteric   Lungs: Respirations clear bilaterally; equal and unlabored.   CV: S1S2; 2+ bilateral upper and lower extremity pulses.   Skin: Intact throughout with no rashes, erythema, or lesions  Extremities: No LE edema,  no erythema or warmth of the skin in either lower " extremity.    Right knee exam:  Knee Range of Motion:normal, pain with passive range of motion  Effusion:none  Condition of skin:intact  Location of tenderness:Medial joint line   Strength:normal  Stability: stable to testing    Hip Examination:normal    Radiographs: Radiographs reveal There is moderate right tibiofemoral cartilage space narrowing with bilateral extensive chondrocalcinosis.    Knee Alignment: normal    Diagnosis: osteoarthritis Right knee    Plan: Right total knee arthroplasty    Due to the serious nature of total joint infection and the high prevalence of community acquired MRSA, vancomycin will be used perioperatively.

## 2017-02-03 NOTE — PROGRESS NOTES
Elena Frances is a 76 y.o. year old here today for a pre-operative visit in preparation for a Right total knee arthroplasty to be performed by  Dr. Moran on 2/7/17.  she was last seen and treated in the clinic on 1/4/2017. she will be medically optimized by the pre op center. There has been no significant change in medical status since last visit. No fever, chills, malaise, or unexplained weight change.      Allergies, Medications, past medical and surgical history reviewed.    Focused examination performed.    All questions answered. Patient encouraged to call with questions. Contact information given.     Pre, cici, and post operative procedures and expectations discussed. Questions were answered. Elena Frances has been educated and is ready to proceed with surgery. Approximately 30 minutes was spent discussing surgical outcomes, plans, procedures pre, cici, and post operative expections and care.  Surgical consent signed.    Elena Frances will contact us if there are any questions, concerns, or changes in medical status prior to surgery.

## 2017-02-06 DIAGNOSIS — E11.9 TYPE 2 DIABETES MELLITUS WITHOUT COMPLICATION: ICD-10-CM

## 2017-02-07 ENCOUNTER — ANESTHESIA (OUTPATIENT)
Dept: SURGERY | Facility: HOSPITAL | Age: 77
DRG: 470 | End: 2017-02-07
Payer: MEDICARE

## 2017-02-07 ENCOUNTER — HOSPITAL ENCOUNTER (INPATIENT)
Facility: HOSPITAL | Age: 77
LOS: 2 days | Discharge: HOME HEALTH | DRG: 470 | End: 2017-02-09
Attending: ORTHOPAEDIC SURGERY | Admitting: ORTHOPAEDIC SURGERY
Payer: MEDICARE

## 2017-02-07 ENCOUNTER — SURGERY (OUTPATIENT)
Age: 77
End: 2017-02-07

## 2017-02-07 DIAGNOSIS — M17.11 PRIMARY OSTEOARTHRITIS OF RIGHT KNEE: ICD-10-CM

## 2017-02-07 LAB
ANION GAP SERPL CALC-SCNC: 7 MMOL/L
BUN SERPL-MCNC: 14 MG/DL
CALCIUM SERPL-MCNC: 7.8 MG/DL
CHLORIDE SERPL-SCNC: 108 MMOL/L
CO2 SERPL-SCNC: 26 MMOL/L
CREAT SERPL-MCNC: 0.8 MG/DL
EST. GFR  (AFRICAN AMERICAN): >60 ML/MIN/1.73 M^2
EST. GFR  (NON AFRICAN AMERICAN): >60 ML/MIN/1.73 M^2
GLUCOSE SERPL-MCNC: 126 MG/DL
POTASSIUM SERPL-SCNC: 4.2 MMOL/L
SODIUM SERPL-SCNC: 141 MMOL/L

## 2017-02-07 PROCEDURE — 63600175 PHARM REV CODE 636 W HCPCS: Performed by: NURSE PRACTITIONER

## 2017-02-07 PROCEDURE — 94640 AIRWAY INHALATION TREATMENT: CPT

## 2017-02-07 PROCEDURE — 64450 NJX AA&/STRD OTHER PN/BRANCH: CPT | Performed by: ANESTHESIOLOGY

## 2017-02-07 PROCEDURE — 25000003 PHARM REV CODE 250: Performed by: ANESTHESIOLOGY

## 2017-02-07 PROCEDURE — 3E0T3GC INTRODUCTION OF OTHER THERAPEUTIC SUBSTANCE INTO PERIPHERAL NERVES AND PLEXI, PERCUTANEOUS APPROACH: ICD-10-PCS | Performed by: ANESTHESIOLOGY

## 2017-02-07 PROCEDURE — 80048 BASIC METABOLIC PNL TOTAL CA: CPT

## 2017-02-07 PROCEDURE — 0SRC0J9 REPLACEMENT OF RIGHT KNEE JOINT WITH SYNTHETIC SUBSTITUTE, CEMENTED, OPEN APPROACH: ICD-10-PCS | Performed by: ORTHOPAEDIC SURGERY

## 2017-02-07 PROCEDURE — 63600175 PHARM REV CODE 636 W HCPCS

## 2017-02-07 PROCEDURE — 27800517 HC TRAY,EPIDURAL-CONTINUOUS: Performed by: STUDENT IN AN ORGANIZED HEALTH CARE EDUCATION/TRAINING PROGRAM

## 2017-02-07 PROCEDURE — 11000001 HC ACUTE MED/SURG PRIVATE ROOM

## 2017-02-07 PROCEDURE — 88305 TISSUE EXAM BY PATHOLOGIST: CPT | Performed by: PATHOLOGY

## 2017-02-07 PROCEDURE — C1776 JOINT DEVICE (IMPLANTABLE): HCPCS | Performed by: ORTHOPAEDIC SURGERY

## 2017-02-07 PROCEDURE — C1713 ANCHOR/SCREW BN/BN,TIS/BN: HCPCS | Performed by: ORTHOPAEDIC SURGERY

## 2017-02-07 PROCEDURE — 25000003 PHARM REV CODE 250: Performed by: STUDENT IN AN ORGANIZED HEALTH CARE EDUCATION/TRAINING PROGRAM

## 2017-02-07 PROCEDURE — 63600175 PHARM REV CODE 636 W HCPCS: Performed by: ANESTHESIOLOGY

## 2017-02-07 PROCEDURE — 25000242 PHARM REV CODE 250 ALT 637 W/ HCPCS: Performed by: STUDENT IN AN ORGANIZED HEALTH CARE EDUCATION/TRAINING PROGRAM

## 2017-02-07 PROCEDURE — 25000003 PHARM REV CODE 250: Performed by: NURSE PRACTITIONER

## 2017-02-07 PROCEDURE — 37000009 HC ANESTHESIA EA ADD 15 MINS: Performed by: ORTHOPAEDIC SURGERY

## 2017-02-07 PROCEDURE — 71000033 HC RECOVERY, INTIAL HOUR: Performed by: ORTHOPAEDIC SURGERY

## 2017-02-07 PROCEDURE — 25000003 PHARM REV CODE 250: Performed by: NURSE ANESTHETIST, CERTIFIED REGISTERED

## 2017-02-07 PROCEDURE — 37000008 HC ANESTHESIA 1ST 15 MINUTES: Performed by: ORTHOPAEDIC SURGERY

## 2017-02-07 PROCEDURE — D9220A PRA ANESTHESIA: Mod: ANES,,, | Performed by: ANESTHESIOLOGY

## 2017-02-07 PROCEDURE — 97162 PT EVAL MOD COMPLEX 30 MIN: CPT

## 2017-02-07 PROCEDURE — 63600175 PHARM REV CODE 636 W HCPCS: Performed by: NURSE ANESTHETIST, CERTIFIED REGISTERED

## 2017-02-07 PROCEDURE — D9220A PRA ANESTHESIA: Mod: CRNA,,, | Performed by: NURSE ANESTHETIST, CERTIFIED REGISTERED

## 2017-02-07 PROCEDURE — 27000221 HC OXYGEN, UP TO 24 HOURS

## 2017-02-07 PROCEDURE — 76942 ECHO GUIDE FOR BIOPSY: CPT | Mod: 26,,, | Performed by: ANESTHESIOLOGY

## 2017-02-07 PROCEDURE — 71000039 HC RECOVERY, EACH ADD'L HOUR: Performed by: ORTHOPAEDIC SURGERY

## 2017-02-07 PROCEDURE — 27447 TOTAL KNEE ARTHROPLASTY: CPT | Mod: RT,,, | Performed by: ORTHOPAEDIC SURGERY

## 2017-02-07 PROCEDURE — 63600175 PHARM REV CODE 636 W HCPCS: Performed by: ORTHOPAEDIC SURGERY

## 2017-02-07 PROCEDURE — G8988 SELF CARE GOAL STATUS: HCPCS | Mod: CI

## 2017-02-07 PROCEDURE — 27200688 HC TRAY, SPINAL-HYPER/ ISOBARIC: Performed by: ANESTHESIOLOGY

## 2017-02-07 PROCEDURE — 64448 NJX AA&/STRD FEM NRV NFS IMG: CPT | Mod: 59,RT,, | Performed by: ANESTHESIOLOGY

## 2017-02-07 PROCEDURE — 27201423 OPTIME MED/SURG SUP & DEVICES STERILE SUPPLY: Performed by: ORTHOPAEDIC SURGERY

## 2017-02-07 PROCEDURE — 97165 OT EVAL LOW COMPLEX 30 MIN: CPT

## 2017-02-07 PROCEDURE — 36000710: Performed by: ORTHOPAEDIC SURGERY

## 2017-02-07 PROCEDURE — G8987 SELF CARE CURRENT STATUS: HCPCS | Mod: CN

## 2017-02-07 PROCEDURE — 36000711: Performed by: ORTHOPAEDIC SURGERY

## 2017-02-07 PROCEDURE — 88311 DECALCIFY TISSUE: CPT | Mod: 26,,, | Performed by: PATHOLOGY

## 2017-02-07 PROCEDURE — 88305 TISSUE EXAM BY PATHOLOGIST: CPT | Mod: 26,,, | Performed by: PATHOLOGY

## 2017-02-07 PROCEDURE — 27200750 HC INSULATED NEEDLE/ STIMUPLEX: Performed by: STUDENT IN AN ORGANIZED HEALTH CARE EDUCATION/TRAINING PROGRAM

## 2017-02-07 PROCEDURE — G8989 SELF CARE D/C STATUS: HCPCS | Mod: CN

## 2017-02-07 DEVICE — COMP FEM POST STAB CEM SZ 3 RT: Type: IMPLANTABLE DEVICE | Site: FEMUR | Status: FUNCTIONAL

## 2017-02-07 DEVICE — CEMENT BONE SIMPLE P INDIVID: Type: IMPLANTABLE DEVICE | Site: KNEE | Status: FUNCTIONAL

## 2017-02-07 DEVICE — BASEPLATE TIB CEM PRIM SZ 3: Type: IMPLANTABLE DEVICE | Site: TIBIA | Status: FUNCTIONAL

## 2017-02-07 DEVICE — IMPLANTABLE DEVICE: Type: IMPLANTABLE DEVICE | Site: PATELLA | Status: FUNCTIONAL

## 2017-02-07 RX ORDER — ALBUTEROL SULFATE 2.5 MG/.5ML
2.5 SOLUTION RESPIRATORY (INHALATION) EVERY 4 HOURS
Status: DISCONTINUED | OUTPATIENT
Start: 2017-02-07 | End: 2017-02-07

## 2017-02-07 RX ORDER — GABAPENTIN 100 MG/1
200 CAPSULE ORAL 3 TIMES DAILY
Status: DISCONTINUED | OUTPATIENT
Start: 2017-02-07 | End: 2017-02-07

## 2017-02-07 RX ORDER — MIDAZOLAM HYDROCHLORIDE 1 MG/ML
1 INJECTION INTRAMUSCULAR; INTRAVENOUS EVERY 5 MIN PRN
Status: DISCONTINUED | OUTPATIENT
Start: 2017-02-07 | End: 2017-02-07 | Stop reason: HOSPADM

## 2017-02-07 RX ORDER — MONTELUKAST SODIUM 10 MG/1
10 TABLET ORAL DAILY
Status: DISCONTINUED | OUTPATIENT
Start: 2017-02-07 | End: 2017-02-09 | Stop reason: HOSPADM

## 2017-02-07 RX ORDER — MORPHINE SULFATE 2 MG/ML
2 INJECTION, SOLUTION INTRAMUSCULAR; INTRAVENOUS
Status: DISCONTINUED | OUTPATIENT
Start: 2017-02-07 | End: 2017-02-09 | Stop reason: HOSPADM

## 2017-02-07 RX ORDER — DOCUSATE SODIUM 100 MG/1
100 CAPSULE, LIQUID FILLED ORAL 2 TIMES DAILY PRN
Qty: 60 CAPSULE | Refills: 1 | Status: SHIPPED | OUTPATIENT
Start: 2017-02-07 | End: 2017-03-27

## 2017-02-07 RX ORDER — ACETAMINOPHEN 10 MG/ML
1000 INJECTION, SOLUTION INTRAVENOUS ONCE
Status: COMPLETED | OUTPATIENT
Start: 2017-02-07 | End: 2017-02-07

## 2017-02-07 RX ORDER — FAMOTIDINE 20 MG/1
20 TABLET, FILM COATED ORAL 2 TIMES DAILY
Status: DISCONTINUED | OUTPATIENT
Start: 2017-02-07 | End: 2017-02-09 | Stop reason: HOSPADM

## 2017-02-07 RX ORDER — OXYCODONE HYDROCHLORIDE 5 MG/1
10 TABLET ORAL
Status: DISCONTINUED | OUTPATIENT
Start: 2017-02-07 | End: 2017-02-09 | Stop reason: HOSPADM

## 2017-02-07 RX ORDER — KETAMINE HCL IN 0.9 % NACL 50 MG/5 ML
SYRINGE (ML) INTRAVENOUS
Status: DISCONTINUED | OUTPATIENT
Start: 2017-02-07 | End: 2017-02-07

## 2017-02-07 RX ORDER — PHENYLEPHRINE HYDROCHLORIDE 10 MG/ML
INJECTION INTRAVENOUS
Status: DISCONTINUED | OUTPATIENT
Start: 2017-02-07 | End: 2017-02-07

## 2017-02-07 RX ORDER — SODIUM CHLORIDE 9 MG/ML
INJECTION, SOLUTION INTRAVENOUS CONTINUOUS
Status: ACTIVE | OUTPATIENT
Start: 2017-02-07 | End: 2017-02-08

## 2017-02-07 RX ORDER — ACETAMINOPHEN 500 MG
1000 TABLET ORAL EVERY 6 HOURS
Status: DISPENSED | OUTPATIENT
Start: 2017-02-07 | End: 2017-02-09

## 2017-02-07 RX ORDER — SODIUM CHLORIDE 9 MG/ML
INJECTION, SOLUTION INTRAVENOUS CONTINUOUS PRN
Status: DISCONTINUED | OUTPATIENT
Start: 2017-02-07 | End: 2017-02-07

## 2017-02-07 RX ORDER — PROPOFOL 10 MG/ML
VIAL (ML) INTRAVENOUS CONTINUOUS PRN
Status: DISCONTINUED | OUTPATIENT
Start: 2017-02-07 | End: 2017-02-07

## 2017-02-07 RX ORDER — OXYCODONE AND ACETAMINOPHEN 10; 325 MG/1; MG/1
1 TABLET ORAL EVERY 4 HOURS PRN
Qty: 90 TABLET | Refills: 0 | Status: SHIPPED | OUTPATIENT
Start: 2017-02-07 | End: 2017-02-21 | Stop reason: SDUPTHER

## 2017-02-07 RX ORDER — POLYETHYLENE GLYCOL 3350 17 G/17G
17 POWDER, FOR SOLUTION ORAL DAILY
Status: DISCONTINUED | OUTPATIENT
Start: 2017-02-07 | End: 2017-02-09 | Stop reason: HOSPADM

## 2017-02-07 RX ORDER — PREGABALIN 75 MG/1
75 CAPSULE ORAL NIGHTLY
Status: DISCONTINUED | OUTPATIENT
Start: 2017-02-07 | End: 2017-02-09 | Stop reason: HOSPADM

## 2017-02-07 RX ORDER — SODIUM CHLORIDE 0.9 % (FLUSH) 0.9 %
3 SYRINGE (ML) INJECTION EVERY 8 HOURS PRN
Status: DISCONTINUED | OUTPATIENT
Start: 2017-02-07 | End: 2017-02-09 | Stop reason: HOSPADM

## 2017-02-07 RX ORDER — ONDANSETRON 2 MG/ML
4 INJECTION INTRAMUSCULAR; INTRAVENOUS EVERY 8 HOURS PRN
Status: DISCONTINUED | OUTPATIENT
Start: 2017-02-07 | End: 2017-02-09 | Stop reason: HOSPADM

## 2017-02-07 RX ORDER — CELECOXIB 200 MG/1
400 CAPSULE ORAL ONCE
Status: COMPLETED | OUTPATIENT
Start: 2017-02-07 | End: 2017-02-07

## 2017-02-07 RX ORDER — AMOXICILLIN 250 MG
1 CAPSULE ORAL 2 TIMES DAILY
Status: DISCONTINUED | OUTPATIENT
Start: 2017-02-07 | End: 2017-02-09 | Stop reason: HOSPADM

## 2017-02-07 RX ORDER — SODIUM CHLORIDE 9 MG/ML
INJECTION, SOLUTION INTRAVENOUS
Status: COMPLETED | OUTPATIENT
Start: 2017-02-07 | End: 2017-02-07

## 2017-02-07 RX ORDER — ACETAMINOPHEN 10 MG/ML
INJECTION, SOLUTION INTRAVENOUS
Status: COMPLETED
Start: 2017-02-07 | End: 2017-02-07

## 2017-02-07 RX ORDER — NALOXONE HCL 0.4 MG/ML
0.02 VIAL (ML) INJECTION
Status: DISCONTINUED | OUTPATIENT
Start: 2017-02-07 | End: 2017-02-09 | Stop reason: HOSPADM

## 2017-02-07 RX ORDER — OXYCODONE HYDROCHLORIDE 5 MG/1
15 TABLET ORAL
Status: DISCONTINUED | OUTPATIENT
Start: 2017-02-07 | End: 2017-02-09 | Stop reason: HOSPADM

## 2017-02-07 RX ORDER — PREGABALIN 75 MG/1
75 CAPSULE ORAL
Status: COMPLETED | OUTPATIENT
Start: 2017-02-07 | End: 2017-02-07

## 2017-02-07 RX ORDER — ASPIRIN 325 MG
325 TABLET, DELAYED RELEASE (ENTERIC COATED) ORAL 2 TIMES DAILY
Status: DISCONTINUED | OUTPATIENT
Start: 2017-02-07 | End: 2017-02-09 | Stop reason: HOSPADM

## 2017-02-07 RX ORDER — ONDANSETRON HYDROCHLORIDE 8 MG/1
8 TABLET, FILM COATED ORAL EVERY 12 HOURS PRN
Qty: 60 TABLET | Refills: 0 | Status: SHIPPED | OUTPATIENT
Start: 2017-02-07 | End: 2017-07-26

## 2017-02-07 RX ORDER — VALSARTAN 160 MG/1
320 TABLET ORAL DAILY
Status: DISCONTINUED | OUTPATIENT
Start: 2017-02-07 | End: 2017-02-09 | Stop reason: HOSPADM

## 2017-02-07 RX ORDER — DEXAMETHASONE SODIUM PHOSPHATE 4 MG/ML
INJECTION, SOLUTION INTRA-ARTICULAR; INTRALESIONAL; INTRAMUSCULAR; INTRAVENOUS; SOFT TISSUE
Status: DISCONTINUED | OUTPATIENT
Start: 2017-02-07 | End: 2017-02-07

## 2017-02-07 RX ORDER — LIDOCAINE HYDROCHLORIDE 10 MG/ML
1 INJECTION, SOLUTION EPIDURAL; INFILTRATION; INTRACAUDAL; PERINEURAL
Status: COMPLETED | OUTPATIENT
Start: 2017-02-07 | End: 2017-02-07

## 2017-02-07 RX ORDER — PAROXETINE HYDROCHLORIDE HEMIHYDRATE 25 MG/1
25 TABLET, FILM COATED, EXTENDED RELEASE ORAL DAILY
Status: DISCONTINUED | OUTPATIENT
Start: 2017-02-07 | End: 2017-02-09 | Stop reason: HOSPADM

## 2017-02-07 RX ORDER — HYDRALAZINE HYDROCHLORIDE 25 MG/1
25 TABLET, FILM COATED ORAL EVERY 8 HOURS PRN
Status: DISCONTINUED | OUTPATIENT
Start: 2017-02-07 | End: 2017-02-09 | Stop reason: HOSPADM

## 2017-02-07 RX ORDER — ATORVASTATIN CALCIUM 20 MG/1
80 TABLET, FILM COATED ORAL DAILY
Status: DISCONTINUED | OUTPATIENT
Start: 2017-02-07 | End: 2017-02-09 | Stop reason: HOSPADM

## 2017-02-07 RX ORDER — MUPIROCIN 20 MG/G
1 OINTMENT TOPICAL
Status: COMPLETED | OUTPATIENT
Start: 2017-02-07 | End: 2017-02-07

## 2017-02-07 RX ORDER — ASPIRIN 325 MG
325 TABLET ORAL 2 TIMES DAILY
Qty: 70 TABLET | Refills: 0 | Status: SHIPPED | OUTPATIENT
Start: 2017-02-07 | End: 2019-07-03

## 2017-02-07 RX ORDER — ALBUTEROL SULFATE 2.5 MG/.5ML
2.5 SOLUTION RESPIRATORY (INHALATION) EVERY 4 HOURS PRN
Status: DISCONTINUED | OUTPATIENT
Start: 2017-02-07 | End: 2017-02-09 | Stop reason: HOSPADM

## 2017-02-07 RX ORDER — ATENOLOL 25 MG/1
25 TABLET ORAL DAILY
Status: DISCONTINUED | OUTPATIENT
Start: 2017-02-07 | End: 2017-02-09 | Stop reason: HOSPADM

## 2017-02-07 RX ORDER — ROPIVACAINE HYDROCHLORIDE 2 MG/ML
8 INJECTION, SOLUTION EPIDURAL; INFILTRATION; PERINEURAL CONTINUOUS
Status: DISCONTINUED | OUTPATIENT
Start: 2017-02-07 | End: 2017-02-09 | Stop reason: HOSPADM

## 2017-02-07 RX ORDER — ROPIVACAINE HYDROCHLORIDE 2 MG/ML
INJECTION, SOLUTION EPIDURAL; INFILTRATION; PERINEURAL
Status: COMPLETED
Start: 2017-02-07 | End: 2017-02-07

## 2017-02-07 RX ORDER — FENTANYL CITRATE 50 UG/ML
25 INJECTION, SOLUTION INTRAMUSCULAR; INTRAVENOUS EVERY 5 MIN PRN
Status: DISCONTINUED | OUTPATIENT
Start: 2017-02-07 | End: 2017-02-07 | Stop reason: HOSPADM

## 2017-02-07 RX ORDER — RAMELTEON 8 MG/1
8 TABLET ORAL NIGHTLY PRN
Status: DISCONTINUED | OUTPATIENT
Start: 2017-02-07 | End: 2017-02-09 | Stop reason: HOSPADM

## 2017-02-07 RX ORDER — CEFAZOLIN SODIUM 2 G/50ML
2 SOLUTION INTRAVENOUS
Status: COMPLETED | OUTPATIENT
Start: 2017-02-07 | End: 2017-02-08

## 2017-02-07 RX ORDER — LIDOCAINE HCL/PF 100 MG/5ML
SYRINGE (ML) INTRAVENOUS
Status: DISCONTINUED | OUTPATIENT
Start: 2017-02-07 | End: 2017-02-07

## 2017-02-07 RX ORDER — BISACODYL 10 MG
10 SUPPOSITORY, RECTAL RECTAL EVERY 12 HOURS PRN
Status: DISCONTINUED | OUTPATIENT
Start: 2017-02-07 | End: 2017-02-09 | Stop reason: HOSPADM

## 2017-02-07 RX ORDER — OXYCODONE HYDROCHLORIDE 5 MG/1
5 TABLET ORAL
Status: DISCONTINUED | OUTPATIENT
Start: 2017-02-07 | End: 2017-02-09 | Stop reason: HOSPADM

## 2017-02-07 RX ORDER — ONDANSETRON 2 MG/ML
INJECTION INTRAMUSCULAR; INTRAVENOUS
Status: DISCONTINUED | OUTPATIENT
Start: 2017-02-07 | End: 2017-02-07

## 2017-02-07 RX ADMIN — MIDAZOLAM HYDROCHLORIDE 1 MG: 1 INJECTION, SOLUTION INTRAMUSCULAR; INTRAVENOUS at 06:02

## 2017-02-07 RX ADMIN — PREGABALIN 75 MG: 75 CAPSULE ORAL at 06:02

## 2017-02-07 RX ADMIN — SODIUM CHLORIDE: 0.9 INJECTION, SOLUTION INTRAVENOUS at 06:02

## 2017-02-07 RX ADMIN — SODIUM CHLORIDE, SODIUM GLUCONATE, SODIUM ACETATE, POTASSIUM CHLORIDE, MAGNESIUM CHLORIDE, SODIUM PHOSPHATE, DIBASIC, AND POTASSIUM PHOSPHATE: .53; .5; .37; .037; .03; .012; .00082 INJECTION, SOLUTION INTRAVENOUS at 07:02

## 2017-02-07 RX ADMIN — PREGABALIN 75 MG: 75 CAPSULE ORAL at 09:02

## 2017-02-07 RX ADMIN — CEFAZOLIN SODIUM 2 G: 2 SOLUTION INTRAVENOUS at 11:02

## 2017-02-07 RX ADMIN — Medication 1000 MG: at 05:02

## 2017-02-07 RX ADMIN — ROPIVACAINE HYDROCHLORIDE 8 ML/HR: 2 INJECTION, SOLUTION EPIDURAL; INFILTRATION at 08:02

## 2017-02-07 RX ADMIN — PROPOFOL 100 MCG/KG/MIN: 10 INJECTION, EMULSION INTRAVENOUS at 07:02

## 2017-02-07 RX ADMIN — DEXAMETHASONE SODIUM PHOSPHATE 8 MG: 4 INJECTION, SOLUTION INTRAMUSCULAR; INTRAVENOUS at 07:02

## 2017-02-07 RX ADMIN — STANDARDIZED SENNA CONCENTRATE AND DOCUSATE SODIUM 1 TABLET: 8.6; 5 TABLET, FILM COATED ORAL at 09:02

## 2017-02-07 RX ADMIN — ROPIVACAINE HYDROCHLORIDE 8 ML/HR: 2 INJECTION, SOLUTION EPIDURAL; INFILTRATION at 11:02

## 2017-02-07 RX ADMIN — LIDOCAINE HYDROCHLORIDE 20 MG: 20 INJECTION, SOLUTION INTRAVENOUS at 07:02

## 2017-02-07 RX ADMIN — ACETAMINOPHEN 1000 MG: 500 TABLET ORAL at 05:02

## 2017-02-07 RX ADMIN — OXYCODONE HYDROCHLORIDE 10 MG: 5 TABLET ORAL at 08:02

## 2017-02-07 RX ADMIN — VANCOMYCIN HYDROCHLORIDE 1000 MG: 1 INJECTION, POWDER, LYOPHILIZED, FOR SOLUTION INTRAVENOUS at 06:02

## 2017-02-07 RX ADMIN — MORPHINE SULFATE 2 MG: 2 INJECTION, SOLUTION INTRAMUSCULAR; INTRAVENOUS at 03:02

## 2017-02-07 RX ADMIN — ATORVASTATIN CALCIUM 80 MG: 20 TABLET, FILM COATED ORAL at 08:02

## 2017-02-07 RX ADMIN — ACETAMINOPHEN 1000 MG: 10 INJECTION, SOLUTION INTRAVENOUS at 08:02

## 2017-02-07 RX ADMIN — ONDANSETRON 4 MG: 2 INJECTION INTRAMUSCULAR; INTRAVENOUS at 07:02

## 2017-02-07 RX ADMIN — Medication 2 G: at 07:02

## 2017-02-07 RX ADMIN — TRANEXAMIC ACID 3000 MG: 100 INJECTION, SOLUTION INTRAVENOUS at 07:02

## 2017-02-07 RX ADMIN — ASPIRIN 325 MG: 325 TABLET, DELAYED RELEASE ORAL at 09:02

## 2017-02-07 RX ADMIN — ASPIRIN 325 MG: 325 TABLET, DELAYED RELEASE ORAL at 08:02

## 2017-02-07 RX ADMIN — CEFAZOLIN SODIUM 2 G: 2 SOLUTION INTRAVENOUS at 04:02

## 2017-02-07 RX ADMIN — MORPHINE SULFATE 2 MG: 2 INJECTION, SOLUTION INTRAMUSCULAR; INTRAVENOUS at 11:02

## 2017-02-07 RX ADMIN — ALBUTEROL SULFATE 2.5 MG: 2.5 SOLUTION RESPIRATORY (INHALATION) at 09:02

## 2017-02-07 RX ADMIN — OXYCODONE HYDROCHLORIDE 10 MG: 5 TABLET ORAL at 04:02

## 2017-02-07 RX ADMIN — ACETAMINOPHEN 1000 MG: 500 TABLET ORAL at 01:02

## 2017-02-07 RX ADMIN — OXYCODONE HYDROCHLORIDE 10 MG: 5 TABLET ORAL at 11:02

## 2017-02-07 RX ADMIN — FAMOTIDINE 20 MG: 20 TABLET, FILM COATED ORAL at 09:02

## 2017-02-07 RX ADMIN — PAROXETINE HYDROCHLORIDE 25 MG: 25 TABLET, FILM COATED, EXTENDED RELEASE ORAL at 11:02

## 2017-02-07 RX ADMIN — POLYETHYLENE GLYCOL 3350 17 G: 17 POWDER, FOR SOLUTION ORAL at 08:02

## 2017-02-07 RX ADMIN — STANDARDIZED SENNA CONCENTRATE AND DOCUSATE SODIUM 1 TABLET: 8.6; 5 TABLET, FILM COATED ORAL at 08:02

## 2017-02-07 RX ADMIN — PHENYLEPHRINE HYDROCHLORIDE 100 MCG: 10 INJECTION INTRAVENOUS at 07:02

## 2017-02-07 RX ADMIN — MORPHINE SULFATE 2 MG: 2 INJECTION, SOLUTION INTRAMUSCULAR; INTRAVENOUS at 12:02

## 2017-02-07 RX ADMIN — MUPIROCIN 1 G: 20 OINTMENT TOPICAL at 06:02

## 2017-02-07 RX ADMIN — ALBUTEROL SULFATE 2.5 MG: 2.5 SOLUTION RESPIRATORY (INHALATION) at 12:02

## 2017-02-07 RX ADMIN — RAMELTEON 8 MG: 8 TABLET, FILM COATED ORAL at 08:02

## 2017-02-07 RX ADMIN — PHENYLEPHRINE HYDROCHLORIDE 100 MCG: 10 INJECTION INTRAVENOUS at 08:02

## 2017-02-07 RX ADMIN — OXYCODONE HYDROCHLORIDE 15 MG: 5 TABLET ORAL at 08:02

## 2017-02-07 RX ADMIN — SODIUM CHLORIDE: 0.9 INJECTION, SOLUTION INTRAVENOUS at 03:02

## 2017-02-07 RX ADMIN — MIDAZOLAM HYDROCHLORIDE 2 MG: 1 INJECTION, SOLUTION INTRAMUSCULAR; INTRAVENOUS at 06:02

## 2017-02-07 RX ADMIN — SODIUM CHLORIDE: 0.9 INJECTION, SOLUTION INTRAVENOUS at 08:02

## 2017-02-07 RX ADMIN — CALCIUM GLUCONATE 1000 MG: 94 INJECTION, SOLUTION INTRAVENOUS at 03:02

## 2017-02-07 RX ADMIN — ATENOLOL 25 MG: 25 TABLET ORAL at 03:02

## 2017-02-07 RX ADMIN — MONTELUKAST SODIUM 10 MG: 10 TABLET, FILM COATED ORAL at 03:02

## 2017-02-07 RX ADMIN — ROPIVACAINE HYDROCHLORIDE 8 ML/HR: 2 INJECTION, SOLUTION EPIDURAL; INFILTRATION at 12:02

## 2017-02-07 RX ADMIN — FAMOTIDINE 20 MG: 20 TABLET, FILM COATED ORAL at 08:02

## 2017-02-07 RX ADMIN — LIDOCAINE HYDROCHLORIDE 10 MG: 10 INJECTION, SOLUTION EPIDURAL; INFILTRATION; INTRACAUDAL; PERINEURAL at 06:02

## 2017-02-07 RX ADMIN — Medication 20 MG: at 07:02

## 2017-02-07 RX ADMIN — CELECOXIB 400 MG: 200 CAPSULE ORAL at 08:02

## 2017-02-07 NOTE — PT/OT/SLP EVAL
Occupational Therapy  Evaluation    Elena Frances   MRN: [id number]   Admitting Diagnosis: Primary osteoarthritis of right knee    OT Date of Treatment: 17   OT Start Time: 1145  OT Stop Time: 1215  OT Total Time (min): 30 min    Billable Minutes:  Evaluation 30    Diagnosis: Primary osteoarthritis of right knee ; R TKA    Past Medical History   Diagnosis Date    Anemia     Arthritis     Depression     Generalized headaches 2014    Goiter      MNG    HTN (hypertension), benign     Hyperlipidemia     Hyperparathyroidism      s/p surgery    Intestinal obstruction      resolved spontaneously    Lumbar disc disease      s/p epidural shots    Nuclear sclerosis - Both Eyes 3/11/2014    Osteoporosis, post-menopausal      with 3 rib fractures      Past Surgical History   Procedure Laterality Date    Bilateral oophorectomy      Parathyroid gland surgery       for parathyroid adenoma    Cholecystectomy      Hysterectomy       section, classic       x 1    Appendectomy      Breast cyst excision       left    Lumbar epidural injection       x 4    Mouth surgery       for abscess drainage of gum of frontal teeth    Colonoscopy N/A 3/1/2016     Procedure: COLONOSCOPY;  Surgeon: Dony Bronson MD;  Location: [address]);  Service: Endoscopy;  Laterality: N/A;  PM Prep    Cataract extraction w/  intraocular lens implant Right 2016     Dr. Fang ([place])    Cataract extraction w/  intraocular lens implant Left 10/17/2016     Dr. Fang ([place])           General Precautions: Standard, fall  Orthopedic Precautions: RLE weight bearing as tolerated  Braces: N/A    Do you have any cultural, spiritual, Yarsanism conflicts, given your current situation?: None     Patient History:  Living Environment  Lives With: spouse ( works during the day)  Living Arrangements: house ([facility] with no MARIELA)  Home Layout: Able to live on 1st floor  Transportation Available: family or friend  will provide  Living Environment Comment: Pt (I) in all ADL prior to admit. Pt lives with  in a SSH with no MARIELA. Pt has access to walk in shower with small lip to enter.   Equipment Currently Used at Home: none    Prior level of function:   Bed Mobility/Transfers: independent  Grooming: independent  Bathing: independent  Upper Body Dressing: independent  Lower Body Dressing: independent  Toileting: independent  Home Management Skills: independent  Homemaking Responsibilities: No         Subjective:  Communicated with RN prior to session.  Chief Complaint: No complaints  Patient/Family stated goals: Return to PLOF    Pain Ratin10  Location - Side: Right     Location: knee  Pain Addressed: Pre-medicate for activity, Nurse notified  Pain Rating Post-Intervention: 2/10    Objective:  Patient found with: blood pressure cuff, FCD, perineural catheter, PCEA, peripheral IV, Polar ice, pulse ox (continuous), telemetry    Cognitive Exam:  Oriented to: Person, Place, Time and Situation  Follows Commands/attention: Follows multistep  commands  Communication: clear/fluent  Memory:  No Deficits noted  Safety awareness/insight to disability: intact  Coping skills/emotional control: Appropriate to situation    Visual/perceptual:  Intact    Physical Exam:  Postural examination/scapula alignment: No postural abnormalities identified  Skin integrity: Visible skin intact  Edema: None noted     Sensation:   Intact    Upper Extremity Range of Motion:  Right Upper Extremity: WFL  Left Upper Extremity: WFL    Upper Extremity Strength:  Right Upper Extremity: WFL 4/5  Left Upper Extremity: WFL  4/5   Strength: WFL      Gross motor coordination: WFL    Functional Mobility:  Bed Mobility:  Rolling/Turning Right: Moderate assistance  Supine to Sit: Moderate Assistance  Sit to Supine: Moderate Assistance    Transfers:  Sit <> Stand Assistance: Minimum Assistance  Sit <> Stand Assistive Device: Standard Walker    Functional  "Ambulation: Pt took several steps forward, backwards, and sideways with CGA and SW for support.     Activities of Daily Living:             Toileting Where Assessed: Bed level  Toileting Level of Assistance: Maximum assistance (pt performed toielting using bedpan at bed level)            Balance:   Static Sit: FAIR+: Able to take MINIMAL challenges from all directions  Dynamic Sit: FAIR+: Maintains balance through MINIMAL excursions of active trunk motion  Static Stand: FAIR: Maintains without assist but unable to take challenges  Dynamic stand: FAIR: Needs CONTACT GUARD during gait        AM-PAC 6 CLICK ADL  How much help from another person does this patient currently need?  1 = Unable, Total/Dependent Assistance  2 = A lot, Maximum/Moderate Assistance  3 = A little, Minimum/Contact Guard/Supervision  4 = None, Modified [place]/Independent    Putting on and taking off regular lower body clothing? : 2  Bathing (including washing, rinsing, drying)?: 2  Toileting, which includes using toilet, bedpan, or urinal? : 2  Putting on and taking off regular upper body clothing?: 2  Taking care of personal grooming such as brushing teeth?: 3  Eating meals?: 3  Total Score: 14    AM-PAC Raw Score CMS "G-Code Modifier Level of Impairment Assistance   6 % Total / Unable   7 - 9 CM 80 - 100% Maximal Assist   10 - 14 CL 60 - 80% Moderate Assist   15 - 19 CK 40 - 60% Moderate Assist   20 - 22 CJ 20 - 40% Minimal Assist   23 CI 1-20% SBA / CGA   24 CH 0% Independent/ Mod I       Patient left supine with all lines intact, call button in reach and RN notified    Assessment:  Elena Frances is a 76 y.o. female with a medical diagnosis of Primary osteoarthritis of right knee and presents with decreased strength, endurance, self care skills, and overall functional mobility. Pt tolerated evaluation well this date. She required max A to perform toileting at bed level. Pt participated in bed mobility with mod A and sit " <> stand with min A. She took several steps utilizing SW and CGA with good performance. Pt would benefit from skilled OT to address goals and maximize return to PLOF.     Rehab identified problem list/impairments: Rehab identified problem list/impairments: weakness, impaired endurance, impaired self care skills, impaired functional mobilty, pain, orthopedic precautions    Rehab potential is good.    Activity tolerance: Good    Discharge recommendations: Discharge Facility/Level Of Care Needs: home health OT     Barriers to discharge:      Equipment recommendations: shower chair, bedside commode, walker, standard     GOALS:   Occupational Therapy Goals        Problem: Occupational Therapy Goal    Goal Priority Disciplines Outcome Interventions   Occupational Therapy Goal     OT, PT/OT     Description:  Goals to be met by: 02/14/17    Patient will increase functional independence with ADLs by performing:    UE Dressing with Supervision.  LE Dressing with Stand-by Assistance.  Grooming while standing at sink with Supervision.  Toileting from bedside commode with Supervision for hygiene and clothing management.   Bathing from  edge of bed with Stand-by Assistance.  Supine to sit with Modified [place].  Toilet transfer to bedside commode with Supervision.  Increased functional strength to 5/5 for BUE.  Upper extremity exercise program x15 reps per handout, with independence.                PLAN:  Patient to be seen daily to address the above listed problems via self-care/home management, therapeutic activities, therapeutic exercises  Plan of Care expires: 02/14/17  Plan of Care reviewed with: patient,          SUSAN Swartz  02/07/2017

## 2017-02-07 NOTE — PLAN OF CARE
Problem: Occupational Therapy Goal  Goal: Occupational Therapy Goal  Goals to be met by: 02/14/17    Patient will increase functional independence with ADLs by performing:    UE Dressing with Supervision.  LE Dressing with Stand-by Assistance.  Grooming while standing at sink with Supervision.  Toileting from bedside commode with Supervision for hygiene and clothing management.   Bathing from edge of bed with Stand-by Assistance.  Supine to sit with Modified [employer].  Toilet transfer to bedside commode with Supervision.  Increased functional strength to 5/5 for BUE.  Upper extremity exercise program x15 reps per handout, with independence.  Continue POC.

## 2017-02-07 NOTE — INTERVAL H&P NOTE
The patient has been examined and the H&P has been reviewed:    I concur with the findings and no changes have occurred since H&P was written.    Anesthesia/Surgery risks, benefits and alternative options discussed and understood by patient/family.          Active Hospital Problems    Diagnosis  POA    Primary osteoarthritis of right knee [M17.11]  Yes      Resolved Hospital Problems    Diagnosis Date Resolved POA   No resolved problems to display.

## 2017-02-07 NOTE — ANESTHESIA PROCEDURE NOTES
IPACK Single Shot Block    Patient location during procedure: pre-op   Block not for primary anesthetic.  Reason for block: at surgeon's request and post-op pain management   Post-op Pain Location: R knee pain   Start time: 2/7/2017 6:29 AM  Timeout: 2/7/2017 6:29 AM   End time: 2/7/2017 6:50 AM  Staffing  Anesthesiologist: PRESTON MOISE  Resident/CRNA: YOLANDA CYR  Performed by: resident/CRNA   Preanesthetic Checklist  Completed: patient identified, site marked, surgical consent, pre-op evaluation, timeout performed, IV checked, risks and benefits discussed and monitors and equipment checked  Peripheral Block  Patient position: supine  Prep: ChloraPrep  Patient monitoring: heart rate, cardiac monitor, continuous pulse ox, continuous capnometry and frequent blood pressure checks  Block type: I PACK  Laterality: right  Injection technique: single shot  Needle  Needle type: Stimuplex   Needle gauge: 21 G  Needle length: 4 in  Needle localization: anatomical landmarks and ultrasound guidance   -ultrasound image captured on disc.  Assessment  Injection assessment: negative aspiration, negative parasthesia and local visualized surrounding nerve  Paresthesia pain: none  Heart rate change: no  Slow fractionated injection: yes  Medications:  Bolus administered: 20 mL of 0.25 ropivacaine  Epinephrine added: 3.75 mcg/mL (1/300,000)  Additional Notes  VSS.  [facility] RN monitoring vitals throughout procedure.  Patient tolerated procedure well.

## 2017-02-07 NOTE — NURSING
Pt admitted to floor, stable, VSS, no complaints at this time noted or stated, I.S at bedside and explained to pt, SCDs intact. BITE pain menu, TV guide, pain control pamphlet, given, explained, and offered to patient. Will hand over to nurse. Zeinab Farrell RN

## 2017-02-07 NOTE — PLAN OF CARE
Problem: Physical Therapy Goal  Goal: Physical Therapy Goal  Goals to be met by: 17     Patient will increase functional independence with mobility by performin. Supine to sit with Set-up [place]  2. Sit to supine with Set-up [place]  3. Sit to stand transfer with Supervision  4. Bed to chair transfer with Stand-by Assistance using appropriate AD  5. Gait x 150 feet with Stand-by Assistance using appropriate AD.   6. Lower extremity exercise program x20-30 reps per handout, with independence  Outcome: Ongoing (interventions implemented as appropriate)  Pt tolerated session well today. Pt with no LOB during ambulation and transfers today using SW. Pt will cont to benefit from skilled therapy services and is appropriate for HHPT upon d/c. Pt safe to t/f with nsg assistance x1 person using SW (min A).

## 2017-02-07 NOTE — TRANSFER OF CARE
"Anesthesia Transfer of Care Note    Patient: Elena Frances    Procedure(s) Performed: Procedure(s) (LRB):  REPLACEMENT-KNEE-TOTAL (Right)    Patient location: PACU    Anesthesia Type: regional and MAC    Transport from OR: Transported from OR on room air with adequate spontaneous ventilation    Post pain: adequate analgesia    Post assessment: no apparent anesthetic complications    Post vital signs: stable    Level of consciousness: awake, alert and oriented    Nausea/Vomiting: no nausea/vomiting    Complications: none          Last vitals:   Visit Vitals    BP (!) 124/49 (BP Location: Left arm, Patient Position: Lying, BP Method: Automatic)    Pulse 68    Temp 36.7 °C (98 °F) (Oral)    Resp 12    Ht 5' 3" (1.6 m)    Wt 68 kg (150 lb)    SpO2 100%    Breastfeeding No    BMI 26.57 kg/m2     "

## 2017-02-07 NOTE — PROGRESS NOTES
No family available to taken patient belongings so placed in phase II lockers, notified patient , verbalizes understanding. PACU charge nurse notified to take belongings with patient once patient is transferred to room. Patient label given to PACU charge.

## 2017-02-07 NOTE — H&P (VIEW-ONLY)
CC: Right knee pain    Elena Frances is a 76 y.o. female who has had pain in Right knee for  several years. Pain is worse with activity and weight bearing.  Patient has experienced interference of activities of daily living due to decreased range of motion and an increase in joint pain and swelling.  Patient has failed non-operative treatment including NSAIDs, corticosteroid injections, viscosupplement injections, and activity modification. Physical therapy was contraindicated in this patient due to pain and potential bone loss on this severe arthritic joint. Elena Frances currently ambulates independently.     Past Medical History   Diagnosis Date    Anemia     Arthritis     Depression     Diabetes mellitus type II     Generalized headaches 2014    Goiter      MNG    HTN (hypertension), benign     Hyperlipidemia     Hyperparathyroidism      s/p surgery    Intestinal obstruction      resolved spontaneously    Lumbar disc disease      s/p epidural shots    Nuclear sclerosis - Both Eyes 3/11/2014    Osteoporosis, post-menopausal      with 3 rib fractures       Past Surgical History   Procedure Laterality Date    Bilateral oophorectomy      Parathyroid gland surgery       for parathyroid adenoma    Cholecystectomy      Hysterectomy       section, classic       x 1    Appendectomy      Breast cyst excision       left    Lumbar epidural injection       x 4    Mouth surgery       for abscess drainage of gum of frontal teeth    Colonoscopy N/A 3/1/2016     Procedure: COLONOSCOPY;  Surgeon: Dony Bronson MD;  Location: [facility] ([address] [place] [address]);  Service: Endoscopy;  Laterality: N/A;  PM Prep    Cataract extraction w/  intraocular lens implant Right 2016     Dr. Fang ([place])    Cataract extraction w/  intraocular lens implant Left 10/17/2016     Dr. Fang ([place])       Family History   Problem Relation Age of Onset    Heart disease Mother     Hypertension Mother      Heart attack Mother     Heart disease Sister       in their 50's    Heart failure Sister     Heart disease Brother      CABG in age 60's    Hyperlipidemia Brother     Heart disease Sister      in her 50's    Heart disease Sister      in her 50's    Heart disease Sister     Hypertension Sister     Hyperlipidemia Sister     Heart disease Brother      CABG in age 60's    Hyperlipidemia Brother     Thyroid disease Daughter     Amblyopia Neg Hx     Blindness Neg Hx     Cancer Neg Hx     Cataracts Neg Hx     Diabetes Neg Hx     Glaucoma Neg Hx     Macular degeneration Neg Hx     Retinal detachment Neg Hx     Strabismus Neg Hx     Stroke Neg Hx        Review of patient's allergies indicates:   Allergen Reactions    Vicodin [hydrocodone-acetaminophen] Anxiety         Current Outpatient Prescriptions:     albuterol (PROVENTIL) 2.5 mg /3 mL (0.083 %) nebulizer solution, Take 3 mLs (2.5 mg total) by nebulization every 6 (six) hours as needed for Wheezing. (Patient taking differently: Take 2.5 mg by nebulization every 12 (twelve) hours as needed for Wheezing. ), Disp: 100 each, Rfl: 0    albuterol 90 mcg/actuation inhaler, Inhale 2 puffs into the lungs every 6 (six) hours as needed for Wheezing., Disp: 1 each, Rfl: 11    atenolol (TENORMIN) 25 MG tablet, Take 1 tablet (25 mg total) by mouth once daily., Disp: 90 tablet, Rfl: 3    atorvastatin (LIPITOR) 80 MG tablet, Take 1 tablet (80 mg total) by mouth once daily., Disp: 90 tablet, Rfl: 2    blood-glucose meter ([employer] BLOOD GLUCOSE SYSTM) kit, Use as instructed, Disp: 1 each, Rfl: 0    CALCIUM CARBONATE/VITAMIN D3 (VITAMIN D-3 ORAL), Take by mouth., Disp: , Rfl:     CYANOCOBALAMIN, VITAMIN B-12, (VITAMIN B-12 ORAL), Take 2,500 mcg by mouth., Disp: , Rfl:     diclofenac sodium (VOLTAREN) 1 % Gel, Apply 2 g topically once daily., Disp: 1 Tube, Rfl: 2    ferrous sulfate 324 mg (65 mg iron) TbEC, Take 325 mg by mouth once daily., Disp: ,  "Rfl:     fluocinonide (LIDEX) 0.05 % external solution, Apply topically 2 (two) times daily., Disp: 60 mL, Rfl: 3    gabapentin (NEURONTIN) 100 MG capsule, Take 2 capsules three times daily, Disp: 180 capsule, Rfl: 11    lancets Misc, 1 lancet by Misc.(Non-Drug; Combo Route) route 2 (two) times daily., Disp: 200 each, Rfl: 3    montelukast (SINGULAIR) 10 mg tablet, Take 1 tablet (10 mg total) by mouth every evening., Disp: 30 tablet, Rfl: 0    omeprazole (PRILOSEC) 20 MG capsule, TAKE 1 CAPSULE BY MOUTH TWICE DAILY, Disp: 120 capsule, Rfl: 0    paroxetine (PAXIL-CR) 25 MG 24 hr tablet, Take 1 tablet (25 mg total) by mouth once daily., Disp: 90 tablet, Rfl: 2    valsartan (DIOVAN) 320 MG tablet, Take 1 tablet (320 mg total) by mouth once daily., Disp: 30 tablet, Rfl: 6    Review of Systems:  Constitutional: no fever or chills, pain well controlled  Eyes: no visual changes  ENT: no nasal congestion or sore throat  Respiratory: no cough or shortness of breath  Cardiovascular: no chest pain or palpitations  Gastrointestinal: no nausea or vomiting, tolerating diet  Genitourinary: no hematuria or dysuria  Integument/Breast: no rash or pruritis  Hematologic/Lymphatic: no easy bruising or lymphadenopathy  Musculoskeletal: c/o right knee pain worse with activity  Neurological: no seizures or tremors  Behavioral/Psych: no auditory or visual hallucinations  Endocrine: no heat or cold intolerance    PE:  Visit Vitals    /62    Pulse 74    Temp 98 °F (36.7 °C) (Oral)    Resp 16    Ht 5' 3" (1.6 m)    Wt 72.4 kg (159 lb 11.6 oz)    BMI 28.29 kg/m2     General: Pleasant, cooperative, NAD   Gait: antalgic  HEENT: NCAT, sclera nonicteric   Lungs: Respirations clear bilaterally; equal and unlabored.   CV: S1S2; 2+ bilateral upper and lower extremity pulses.   Skin: Intact throughout with no rashes, erythema, or lesions  Extremities: No LE edema,  no erythema or warmth of the skin in either lower " extremity.    Right knee exam:  Knee Range of Motion:normal, pain with passive range of motion  Effusion:none  Condition of skin:intact  Location of tenderness:Medial joint line   Strength:normal  Stability: stable to testing    Hip Examination:normal    Radiographs: Radiographs reveal There is moderate right tibiofemoral cartilage space narrowing with bilateral extensive chondrocalcinosis.    Knee Alignment: normal    Diagnosis: osteoarthritis Right knee    Plan: Right total knee arthroplasty    Due to the serious nature of total joint infection and the high prevalence of community acquired MRSA, vancomycin will be used perioperatively.

## 2017-02-07 NOTE — IP AVS SNAPSHOT
[facility]  [address] [place] [address]  Phone: [phone]           Instrucciones de Lora de Pacientes    Nuestro objetivo es que te prepara para el éxito. Gin paquete incluye información sobre montalvo enfermedad, medicamentos y atención médica a domicilio. [place] le ayudará a cuidar y que no se enferman más y necesita volver al hospital.     Por favor, pregunte a montalvo enfermera si tiene alguna pregunta.       Hay muchos detalles a recordar cuando se prepara para salir del hospital. [place] es lo que necesita hacer:    1. Lula montalvo medicina. Si usted tiene genny receta, revise la lista de medicamentos en las siguientes páginas. Es posible que tenga nuevos medicamentos para recoger en la farmacia y otros que tendrá que dejar de lisa. Revise las instrucciones sobre cómo y cuándo lisa lulu medicamentos. Consulte con el médico o el enfermeras si no está seguro de qué hacer.    2. Ir a lulu citas de seguimiento. La información específica de seguimiento aparece en las siguientes páginas. Usted puede ser contactado por genny enfermera o proveedor clínico sobre las próximas citas. Estar seguro de que tiene todos los números de teléfono para comunicarse si es necesario. Por favor, póngase en contacto con la oficina de montalvo profesional médico si no puede hacer genny jessee.     3. Esté atento a señales de advertencia. El médico o la enfermera le dará señales de alarma detallados que debe observar y cuándo llamar para la ayuda. Estas instrucciones también pueden incluir información educativa acerca de montalvo condición. Si usted experimenta cualquiera de las señales de advertencia para montalvo jennifer, llame a montalvo médico.             [facility]    Si montalvo médico no le ha indicado a lo contrário, por favor   contactar a [facility] [place], nuestra línea de cuidado de enfermeras está disponible para asistirle 24/7.    [phone] (servicio gratuito)    Enfermeras registradas de [facility] pueden ayudarle a  reservar genny jessee, proveer educación para la jennifer, asesoría clínica, y otros servicios de asesoramiento.                  ** Verificar la lista de medicamentos es correcta y está actualizada. Llevar esto con usted en juancarlos de emergencia. Si lulu medicamentos keating cambiado, por favor notifique a montalvo proveedor de atención médica.             Lista de medicamentos      EMPIECE a lisa estos medicamentos        Additional Info                      aspirin 325 MG tablet   Cantidad:  70 tablet   Recargas:  0   Dosis:  325 mg    Instrucciones:  Take 1 tablet (325 mg total) by mouth 2 (two) times daily.     Begin Date    AM    Noon    PM    Bedtime       docusate sodium 100 MG capsule   También conocido luna:  COLACE   Cantidad:  60 capsule   Recargas:  1   Dosis:  100 mg    Instrucciones:  Take 1 capsule (100 mg total) by mouth 2 (two) times daily as needed for Constipation.     Begin Date    AM    Noon    PM    Bedtime       ondansetron 8 MG tablet   También conocido luna:  ZOFRAN   Cantidad:  60 tablet   Recargas:  0   Dosis:  8 mg    Instrucciones:  Take 1 tablet (8 mg total) by mouth every 12 (twelve) hours as needed for Nausea.     Begin Date    AM    Noon    PM    Bedtime       oxycodone-acetaminophen  mg per tablet   También conocido luna:  PERCOCET   Cantidad:  90 tablet   Recargas:  0   Dosis:  1 tablet    Instrucciones:  Take 1 tablet by mouth every 4 (four) hours as needed for Pain.     Begin Date    AM    Noon    PM    Bedtime         CAMBIE la forma de lisa estos medicamentos        Additional Info                      * albuterol 90 mcg/actuation inhaler   Cantidad:  1 each   Recargas:  11   Dosis:  2 puff   Qué cambió:  Se modificó otro medicamento con el mismo nombre. Asegúrese de que entiende cómo y cuándo lisa cada huma.    Instrucciones:  Inhale 2 puffs into the lungs every 6 (six) hours as needed for Wheezing.     Begin Date    AM    Noon    PM    Bedtime       * albuterol 2.5 mg /3 mL (0.083 %)  nebulizer solution   También conocido luna:  PROVENTIL   Cantidad:  100 each   Recargas:  0   Dosis:  2.5 mg   Qué cambió:  cuándo lo debe lisa    Instrucciones:  Take 3 mLs (2.5 mg total) by nebulization every 6 (six) hours as needed for Wheezing.     Begin Date    AM    Noon    PM    Bedtime       * Aviso:  Esta lista contiene medicamentos que son iguales a otros medicamentos recetados para usted. Norma las instrucciones con cuidado y pida a montalvo personal médico que revise la lista de medicamentos y las instrucciones correspondientes con usted.      SIGA tomando estos medicamentos        Additional Info                      atenolol 25 MG tablet   También conocido luna:  TENORMIN   Cantidad:  90 tablet   Recargas:  3   Dosis:  25 mg    Última administración:  25 mg on 2/9/2017  8:01 AM   Instrucciones:  Take 1 tablet (25 mg total) by mouth once daily.     Begin Date    AM    Noon    PM    Bedtime       atorvastatin 80 MG tablet   También conocido luna:  LIPITOR   Cantidad:  90 tablet   Recargas:  2   Dosis:  80 mg    Última administración:  80 mg on 2/9/2017  8:00 AM   Instrucciones:  Take 1 tablet (80 mg total) by mouth once daily.     Begin Date    AM    Noon    PM    Bedtime       blood-glucose meter kit   También conocido luna:  [employer] BLOOD GLUCOSE SYSTM   Cantidad:  1 each   Recargas:  0   Comentarios:  [employer]S kit    Instrucciones:  Use as instructed     Begin Date    AM    Noon    PM    Bedtime       diclofenac sodium 1 % Gel   También conocido luna:  VOLTAREN   Cantidad:  1 Tube   Recargas:  2   Dosis:  2 g    Instrucciones:  Apply 2 g topically once daily.     Begin Date    AM    Noon    PM    Bedtime       ferrous sulfate 324 mg (65 mg iron) Tbec   Recargas:  0   Dosis:  325 mg    Instrucciones:  Take 325 mg by mouth once daily.     Begin Date    AM    Noon    PM    Bedtime       fluocinonide 0.05 % external solution   También conocido luna:  LIDEX   Cantidad:  60 mL   Recargas:  3     Instrucciones:  Apply topically 2 (two) times daily.     Begin Date    AM    Noon    PM    Bedtime       gabapentin 100 MG capsule   También conocido luna:  NEURONTIN   Cantidad:  180 capsule   Recargas:  11    Instrucciones:  Take 2 capsules three times daily     Begin Date    AM    Noon    PM    Bedtime       lancets Misc   Cantidad:  200 each   Recargas:  3   Dosis:  1 lancet   Comentarios:  [employer]S lancets    Instrucciones:  1 lancet by Misc.(Non-Drug; Combo Route) route 2 (two) times daily.     Begin Date    AM    Noon    PM    Bedtime       omeprazole 20 MG capsule   También conocido luna:  PRILOSEC   Cantidad:  120 capsule   Recargas:  0    Instrucciones:  TAKE 1 CAPSULE BY MOUTH TWICE DAILY     Begin Date    AM    Noon    PM    Bedtime       paroxetine 25 MG 24 hr tablet   También conocido luna:  PAXIL-CR   Cantidad:  90 tablet   Recargas:  2   Dosis:  25 mg    Última administración:  25 mg on 2/9/2017  8:01 AM   Instrucciones:  Take 1 tablet (25 mg total) by mouth once daily.     Begin Date    AM    Noon    PM    Bedtime       valsartan 320 MG tablet   También conocido luna:  DIOVAN   Cantidad:  30 tablet   Recargas:  6   Dosis:  320 mg    Última administración:  320 mg on 2/9/2017  8:00 AM   Instrucciones:  Take 1 tablet (320 mg total) by mouth once daily.     Begin Date    AM    Noon    PM    Bedtime       VITAMIN B-12 ORAL   Recargas:  0   Dosis:  2500 mcg    Instrucciones:  Take 2,500 mcg by mouth.     Begin Date    AM    Noon    PM    Bedtime       VITAMIN D-3 ORAL   Recargas:  0    Instrucciones:  Take by mouth.     Begin Date    AM    Noon    PM    Bedtime         CONSULTE con montalvo médico sobre estos medicamentos        Additional Info                      montelukast 10 mg tablet   También conocido luna:  SINGULAIR   Cantidad:  30 tablet   Recargas:  0   Dosis:  10 mg   Preguntar sobre: ¿Debería lisa saul medicamento?    Última administración:  10 mg on 2/9/2017  8:01 AM   Instrucciones:  Take 1  "tablet (10 mg total) by mouth every evening.     Begin Date    AM    Noon    PM    Bedtime            Dónde puede recoger marbella medicamentos      Puede obtener estos medicamentos en cualquier farmacia     Traiga genny receta en papel para cada huma de estos medicamentos     aspirin 325 MG tablet    docusate sodium 100 MG capsule    ondansetron 8 MG tablet    oxycodone-acetaminophen  mg per tablet                  Por favor traiga a todos las citas de seguimiento:    1. Genny copia de las instrucciones de chris.  2. Todos los medicamentos que está tomando saul momento, en marbella envases originales.  3. Identificación y tarjeta de seguro.    Por favor llegue 15 minutos antes de la hora de la jessee.    Por favor llame con 24 horas de antelación si tiene que cambiar montalvo jessee y / o tiempo.        [place] Citas Programadas     Feb 21, 2017  9:15 AM CST   Post OP with MARCY Horn - Orthopedics ([facility] )    [address]  [place] [id number] [phone]              Información de seguimiento     Realice un seguimiento con:  Yudith Maria NP    Cómo:  Yeison a    Cuándo:  2/21/2017    Especialidad:  Orthopedic Surgery    Por qué:  Follow-Up Appointment    Información de contacto:    [address]  [place] [address]  [phone]          Instrucciones de chris     [place]    3 IN 1 COMMODE FOR HOME USE     Preguntas:    Type:  Standard    Height:  5' 3" (1.6 m)    Weight:  72.4 kg (159 lb 11.6 oz)    Does patient have medical equipment at home?:  none    Length of need (1-99 months):  99    Vendor:  Other (use comments) Comment - Duplicate    Expected Date of Delivery:  2/7/2017    Expected Time of Delivery:      Activity as tolerated     Call MD for:  difficulty breathing or increased cough     Call MD for:  increased confusion or weakness     Call MD for:  persistent dizziness, light-headedness, or visual disturbances     Call MD for:  persistent nausea and vomiting or diarrhea " "    Call MD for:  redness, tenderness, or signs of infection (pain, swelling, redness, odor or green/yellow discharge around incision site)     Call MD for:  severe persistent headache     Call MD for:  severe uncontrolled pain     Call MD for:  temperature >100.4     Call MD for:  worsening rash     COMMODE FOR HOME USE     Preguntas:    Type:  Standard    Height:  5' 3" (1.6 m)    Weight:  68 kg (150 lb)    Does patient have medical equipment at home?:  none    Length of need (1-99 months):  99    Vendor:  [facility] Comment - Aimee to deliver to pts bedside    Expected Date of Delivery:  2/9/2017    Expected Time of Delivery:      Diet general     Preguntas:    Total calories:      Fat restriction, if any:      Protein restriction, if any:      Na restriction, if any:      Fluid restriction:      Additional restrictions:      Leave dressing on - Keep it clean, dry, and intact until clinic visit     Lifting restrictions     Comentarios:    No Strenuous Exercise or Lifting Greater Than 5 Pounds    Sponge bath only until clinic visit     TRANSFER TUB BENCH FOR HOME USE     Preguntas:    Type of Transfer Tub Bench:  Padded    Height:  5' 3" (1.6 m)    Weight:  68 kg (150 lb)    Does patient have medical equipment at home?:  none    Length of need (1-99 months):  99    Vendor:  Other (use comments) Comment - Pt refused    Expected Date of Delivery:  2/9/2017    Expected Time of Delivery:      TRANSFER TUB BENCH FOR HOME USE     Preguntas:    Type of Transfer Tub Bench:  Unpadded    Height:  5' 3" (1.6 m)    Weight:  72.4 kg (159 lb 11.6 oz)    Does patient have medical equipment at home?:  none    Length of need (1-99 months):  99    Vendor:  Other (use comments) Comment - Duplicate    Expected Date of Delivery:  2/7/2017    Expected Time of Delivery:      WALKER FOR HOME USE     Preguntas:    Type of Walker:  Adult (5'4"-6'6")    With wheels?:  No    Height:  5' 3" (1.6 m)    Weight:  68 kg (150 lb)    Length of " "need (1-99 months):  99    Platform attachment:      Accessories/Other:      Assistance needed:      Does patient have medical equipment at home?:  none    Vendor:  [facility] Comment - Aimee to deliver to bedside    Expected Date of Delivery:  2/9/2017    Expected Time of Delivery:      WALKER FOR HOME USE     Preguntas:    Type of Walker:  Adult (5'4"-6'6")    With wheels?:  No    Height:  5' 3" (1.6 m)    Weight:  68 kg (150 lb)    Length of need (1-99 months):  99    Platform attachment:      Accessories/Other:      Assistance needed:      Does patient have medical equipment at home?:      Vendor:  Other (use comments) Comment - Duplicate    Expected Date of Delivery:  2/7/2017    Expected Time of Delivery:      WALKER FOR HOME USE     Preguntas:    Type of Walker:  Adult (5'4"-6'6")    With wheels?:  No    Height:  5' 3" (1.6 m)    Weight:  72.4 kg (159 lb 11.6 oz)    Length of need (1-99 months):  99    Platform attachment:      Accessories/Other:      Assistance needed:      Does patient have medical equipment at home?:  none    Please check all that apply:  Walker will be used for gait training.    Vendor:  Other (use comments) Comment - Duplicate    Expected Date of Delivery:  2/7/2017    Expected Time of Delivery:          Primary Diagnosis     Bañuelos diagnosis primaria es:  Degenerative Joint Disease Of Lower Leg      Información de Admisión     Fecha y hora Proveedor Departamento CSN    2/7/2017  5:33 AM Rom Moran MD [facility] [phone]      Los proveedores de cuidado     Personal Médico Rol Especialidad Teléfono principal de la oficina    Rom Moran MD Attending Provider Orthopedic Surgery [phone]    Rom Moran MD Surgeon  Orthopedic Surgery [phone]      [place] signos vitales willie     PS Pulso Temperatura Resp [place]    123/57 (BP Location: Left arm, Patient Position: Lying, BP Method: Automatic) 73 98.4 °F (36.9 °C) (Oral) 18 5' 3" (1.6 m) 68 kg (150 lb)    " SpO2 BMI (IMC)                96% 26.57 kg/m2          Recent Lab Values        2/24/2014 6/23/2014 10/21/2014 1/5/2015 5/7/2015 1/27/2016 5/24/2016 1/27/2017      8:35 AM  8:35 AM  1:48 PM  9:20 AM  9:50 AM  8:28 AM 11:03 AM 11:50 AM    A1C 6.3 (H) 6.3 (H) 6.5 (H) 6.4 (H) 6.5 (H) 6.4 (H) 6.2 6.2    Comment for A1C at 11:50 AM on 1/27/2017:  According to ADA guidelines, hemoglobin A1C <7.0% represents  optimal control in non-pregnant diabetic patients.  Different  metrics may apply to specific populations.   Standards of Medical Care in Diabetes - 2016.  For the purpose of screening for the presence of diabetes:  <5.7%     Consistent with the absence of diabetes  5.7-6.4%  Consistent with increasing risk for diabetes   (prediabetes)  >or=6.5%  Consistent with diabetes  Currently no consensus exists for use of hemoglobin A1C  for diagnosis of diabetes for children.        Pending Labs     Order Current Status    Specimen to Pathology - Surgery In process      Alergias     A partir del:  2/9/2017           Reacciones    Vicodin [Hydrocodone-acetaminophen] Anxiety      Directiva Anticipada     Dina directiva anticipada es un documento que, en juancarlos de que ya no capaz de hacer decisiones por sí mismo, le dice a montalvo equipo médico qué tipo de tratamiento quiere o no quiere recibir, o que le gustaría lisa esas decisiones para usted . Si actualmente no tiene dina directiva anticipada, [facility] le anima a crear huma. Para más información llame al: [phone] ([phone]), o entrando en [email or web address].        Language Assistance Services     ATTENTION: Language assistance services are available, free of charge. Please call [phone].      ATENCIÓN: Si habla español, tiene a montalvo disposición servicios gratuitos de asistencia lingüística. Jason al [phone].     LIBRA Ý: N?u b?n nói Ti?ng Vi?t, có các d?ch v? h? tr? ngôn ng? mi?n phí dành cho b?n. G?i s? [phone].         Instrucciones de chris para la diabetes       Diabetes (Información general)  La diabetes es un problema de jennifer a didi plazo que significa que montalvo cuerpo no produce la suficiente insulina. O también puede significar que montalvo cuerpo no puede utilizar la insulina que produce. La insulina es genny hormona en montalvo organismo, que permite que el azúcar en la cyndi (glucosa) llegue a las células en montalvo cuerpo. Todas lulu células necesitan glucosa luna combustible.  Cuando usted tiene diabetes la glucosa en montalvo cuerpo se acumula porque no puede llegar hasta las células. Esta acumulación se conoce luna un nivel alto de azúcar en la cyndi (hiperglucemia).  Montalvo nivel de azúcar en la cyndi depende de varias cosas. Depende de la clase de alimentos que usted come y de cuánto come. También depende de cuánto ejercicio hace y de cuánta insulina tiene en montalvo organismo. [place] mucho de las clases indebidas de alimentos o no sandy a tiempo los medicamentos para la diabetes puede causar con el tiempo un nivel alto de azúcar en la cyndi. Las infecciones pueden provocar un nivel alto de azúcar en la cyndi, incluso si está tomando lulu medicamentos correctamente.  Estas cosas también pueden causar un nivel bajo de azúcar en la cyndi:  · Saltarse las comidas  · No comer suficientes alimentos  · Sandy demasiado de un medicamento para la diabetes  Con el tiempo, si no se trata la diabetes puede causar problemas serios. Estos problemas incluyen enfermedades del corazón, ataque cerebral, insuficiencia renal y ceguera. También pueden incluir dolor en los nervios o pérdida de sensación en lulu piernas o pies. Al mantener montalvo azúcar en la cyndi bajo control usted puede prevenir o retrasar estos problemas.  Los niveles normales en la cyndi son de 80 a 130 antes de las comidas y menos de 180 de 1 a 2 horas después de comer.    Cuidados en el hogar  Cuando se esté cuidando usted mismo en montalvo hogar, siga estas pautas:  · Siga la dieta que le recomiende montalvo  proveedor de atención médica.  Use la insulina o cualquier otro medicamento para la diabetes, exactamente luna se lo indiquen.  · Vigile lulu niveles de azúcar en la cyndi luna le indiquen. Lleve un registro con los resultados. [place] le ayudará a montalvo proveedor a cambiar lulu medicamentos para mantener el azúcar en la cyndi bajo control.   · Trate de alcanzar montalvo peso ideal. Es posible que pueda reducir o dejar de lisa medicamentos para la diabetes si come los alimentos adecuados y hace ejercicio.  · No fume. Fumar hace que los efectos de la diabetes empeoren en montalvo circulación. Si usted fuma y tiene diabetes es mucho más probable que sufra un ataque al corazón.   · Cuídese pérez lulu pies. Si [place] perdido la sensación en lulu pies, es posible que no sarabjit genny herida o genny infección. Revise lulu pies y la galilea entre los dedos por lo menos genny vez a la semana.    · Utilice montalvo brazalete de alerta médica o lleve genny tarjeta en montalvo billetera que diga que usted tiene diabetes. [place] les ayudará a los proveedores de atención médica a brindarle los cuidados adecuados si usted se enferma hasta el punto que no pueda decirles que tiene diabetes.  Plan para un día de enfermedad  Si usted contrae un resfriado, la gripe o genny infección viral, siga estos pasos:  · Revise montalvo plan para un día de enfermedad de la diabetes y llame a montalvo proveedor de atención médica luna le indicaron que lo hiciera. Es posible que deba llamar al proveedor de inmediato si:  ¨ Montalvo nivel de azúcar en la cyndi está por encima de 240 a pesar de estar tomando los medicamentos para la diabetes  ¨ Los niveles de cetonas en montalvo orina están por encima de lo normal o altos  ¨ Ha estado vomitando joi más de 6 horas  ¨ Tiene dificultades para respirar  ¨ Tiene genny fiebre chris  ¨ Tiene fiebre joi varios días y no mejora  ¨ Se siente más aturdido o somnoliento de lo usual  · Siga tomando lulu pastillas para la diabetes (medicamentos orales) incluso si ha estado vomitando y  se siente muy enfermo. Llame a montalvo proveedor de inmediato porque es posible que necesite insulina para bajar los niveles de azúcar en la cyndi hasta que se recupere de montalvo enfermedad.    · Monitoree montalvo insulina incluso si ha estado vomitando y se siente muy enfermo. Llame a montalvo proveedor de inmediato y pregunte si necesita cambiar montalvo dosis de insulina. [place] dependerá de los resultados de lulu niveles de azúcar en la cyndi.   · Revise montalvo nivel de azúcar en la cyndi cada 2 a 4 horas, o por lo menos 4 veces al día.  · Revise con frecuencia lulu cetonas. Si está vomitando y tiene diarrea, revíselas con más frecuencia.  · No se salte comidas. Trate de comer comidas pequeñas a intervalos regulares. Hágalo aunque no sienta gana de comer.  · Paulo agua y otros líquidos que no contengan cafeína o calorías. [place] prevendrá que se deshidrate. Si tiene náuseas o vómito, tome pequeños sorbos cada 5 minutos. Para prevenir la deshidratación trate de beber genny taza (8 onzas) de líquido cada hora mientras esté despierto.  Cuidados generales  Siempre lleve con usted genny maxx de azúcar de acción rápida para el juancarlos de que tenga síntomas de un nivel bajo de azúcar en la cyndi (menos de 70). A las primeras señales de un nivel bajo de azúcar en la cyndi, coma o paulo de 15 a 20 gramos de azúcar de acción rápida para elevar el nivel de azúcar en la cyndi. Algunos ejemplos son:    · 3 a 4 tabletas de glucosa. Estas pueden comprarse en la mayoría de las farmacias.  · 4 onzas (1/2 taza) de un refresco (que no sea de dieta)  · 4 onzas (1/2 taza) de cualquier jugo de fruta  · 8 onzas (1 taza) de leche  · 5 a 6 unidades de caramelos duros  · 1 cucharada de miel  Revise montalvo nivel de azúcar en la cyndi 15 minutos después de tratarse. Si sigue por debajo de 70, tome de 15 a 20 gramos más de azúcar de acción rápida. Vuelva a revisarse en 15 minutos. Si regresa a lo normal (70 o más), coma un bocadillo o genny comida para mantener el azúcar en la  cyndi dentro de un nivel seguro. Si permanece bajo, llame a montalvo médico o vaya a genny tiffanie de emergencias.  Cuidado de seguimiento  [place] genny jessee de seguimiento con montalvo proveedor de atención médica, o luna le indiquen. Para más información acerca de la diabetes, visite la [employer] web de la Asociación Americana de la Diabetes (American Diabetes Association) en www.diabetes.org o llame al [phone].  Cuándo buscar consejo médico  Llame a montalvo proveedor de atención médica de inmediato si tiene cualquiera de estos síntomas de un nivel alto de azúcar en la cyndi:    · Orina frecuentemente  · Mareos  · Somnolencia  · Sed  · Dolor de juliette  · Náuseas o vómito  · Dolor abdominal  · Cambios en la vista  · Respiración rápida  · Confusión o pérdida del conocimiento  También llame a montalvo proveedor de inmediato si tiene alguna de estas señales de un nivel bajo de azúcar en la cyndi:    · Fatiga  · Dolor de juliette  · Temblores  · Sudoración excesiva  · Hambre  · Se siente ansioso o inquieto  · Cambios en la vista  · Somnolencia  · Debilidad  · Confusión o pérdida del conocimiento  Llame a montalvo proveedor de inmediato si cualquiera de estos ocurre:  · Dolor en el pecho o falta de aire  · Mareos o desmayos  · Debilidad en un brazo o genny pierna, o en un lado de la aguilar  · Dificultad para hablar o para diane   Date Last Reviewed: 6/1/2016  © [id number] [employer]. [address] [place] [id number]. Todos los derechos reservados. Esta información no pretende sustituir la atención médica profesional. Sólo montalvo médico puede diagnosticar y tratar un problema de jennifer.               [facility] cumple con las leyes federales aplicables de derechos civiles y no discrimina por motivos de laura, color, origen nacional, edad, discapacidad, o sexo.                        [facility]  [address] [place] [address]  Phone: [phone]           Patient Discharge Instructions     Our  goal is to set you up for success. This packet includes information on your condition, medications, and your home care. It will help you to care for yourself so you don't get sicker and need to go back to the hospital.     Please ask your nurse if you have any questions.        There are many details to remember when preparing to leave the hospital. Here is what you will need to do:    1. Take your medicine. If you are prescribed medications, review your Medication List in the following pages. You may have new medications to  at the pharmacy and others that you'll need to stop taking. Review the instructions for how and when to take your medications. Talk with your doctor or nurses if you are unsure of what to do.     2. Go to your follow-up appointments. Specific follow-up information is listed in the following pages. Your may be contacted by a transition nurse or clinical provider about future appointments. Be sure we have all of the phone numbers to reach you, if needed. Please contact your provider's office if you are unable to make an appointment.     3. Watch for warning signs. Your doctor or nurse will give you detailed warning signs to watch for and when to call for assistance. These instructions may also include educational information about your condition. If you experience any of warning signs to your health, call your doctor.               [facility]  Unless otherwise directed by your provider, please contact [facility], our nurse care line that is available for 24/7 assistance.     [phone] (toll-free)    Registered nurses in the [facility] provide clinical advisement, health education, appointment booking, and other advisory services.                ** Verificar la lista de medicamentos es correcta y está actualizada. Llevar esto con usted en juancarlos de emergencia. Si lulu medicamentos keating cambiado, por favor notifique a montalvo proveedor de atención médica.              Medication List      START taking these medications        Additional Info                      aspirin 325 MG tablet   Quantity:  70 tablet   Refills:  0   Dose:  325 mg    Instructions:  Take 1 tablet (325 mg total) by mouth 2 (two) times daily.     Begin Date    AM    Noon    PM    Bedtime       docusate sodium 100 MG capsule   Commonly known as:  COLACE   Quantity:  60 capsule   Refills:  1   Dose:  100 mg    Instructions:  Take 1 capsule (100 mg total) by mouth 2 (two) times daily as needed for Constipation.     Begin Date    AM    Noon    PM    Bedtime       ondansetron 8 MG tablet   Commonly known as:  ZOFRAN   Quantity:  60 tablet   Refills:  0   Dose:  8 mg    Instructions:  Take 1 tablet (8 mg total) by mouth every 12 (twelve) hours as needed for Nausea.     Begin Date    AM    Noon    PM    Bedtime       oxycodone-acetaminophen  mg per tablet   Commonly known as:  PERCOCET   Quantity:  90 tablet   Refills:  0   Dose:  1 tablet    Instructions:  Take 1 tablet by mouth every 4 (four) hours as needed for Pain.     Begin Date    AM    Noon    PM    Bedtime         CHANGE how you take these medications        Additional Info                      * albuterol 90 mcg/actuation inhaler   Quantity:  1 each   Refills:  11   Dose:  2 puff   What changed:  Another medication with the same name was changed. Make sure you understand how and when to take each.    Instructions:  Inhale 2 puffs into the lungs every 6 (six) hours as needed for Wheezing.     Begin Date    AM    Noon    PM    Bedtime       * albuterol 2.5 mg /3 mL (0.083 %) nebulizer solution   Commonly known as:  PROVENTIL   Quantity:  100 each   Refills:  0   Dose:  2.5 mg   What changed:  when to take this    Instructions:  Take 3 mLs (2.5 mg total) by nebulization every 6 (six) hours as needed for Wheezing.     Begin Date    AM    Noon    PM    Bedtime       * Notice:  This list has 2 medication(s) that are the same as other medications prescribed  for you. Read the directions carefully, and ask your doctor or other care provider to review them with you.      CONTINUE taking these medications        Additional Info                      atenolol 25 MG tablet   Commonly known as:  TENORMIN   Quantity:  90 tablet   Refills:  3   Dose:  25 mg    Last time this was given:  25 mg on 2/9/2017  8:01 AM   Instructions:  Take 1 tablet (25 mg total) by mouth once daily.     Begin Date    AM    Noon    PM    Bedtime       atorvastatin 80 MG tablet   Commonly known as:  LIPITOR   Quantity:  90 tablet   Refills:  2   Dose:  80 mg    Last time this was given:  80 mg on 2/9/2017  8:00 AM   Instructions:  Take 1 tablet (80 mg total) by mouth once daily.     Begin Date    AM    Noon    PM    Bedtime       blood-glucose meter kit   Commonly known as:  [employer] BLOOD GLUCOSE SYSTM   Quantity:  1 each   Refills:  0   Comments:  [employer]S kit    Instructions:  Use as instructed     Begin Date    AM    Noon    PM    Bedtime       diclofenac sodium 1 % Gel   Commonly known as:  VOLTAREN   Quantity:  1 Tube   Refills:  2   Dose:  2 g    Instructions:  Apply 2 g topically once daily.     Begin Date    AM    Noon    PM    Bedtime       ferrous sulfate 324 mg (65 mg iron) Tbec   Refills:  0   Dose:  325 mg    Instructions:  Take 325 mg by mouth once daily.     Begin Date    AM    Noon    PM    Bedtime       fluocinonide 0.05 % external solution   Commonly known as:  LIDEX   Quantity:  60 mL   Refills:  3    Instructions:  Apply topically 2 (two) times daily.     Begin Date    AM    Noon    PM    Bedtime       gabapentin 100 MG capsule   Commonly known as:  NEURONTIN   Quantity:  180 capsule   Refills:  11    Instructions:  Take 2 capsules three times daily     Begin Date    AM    Noon    PM    Bedtime       lancets Misc   Quantity:  200 each   Refills:  3   Dose:  1 lancet   Comments:  [employer]S lancets    Instructions:  1 lancet by Misc.(Non-Drug; Combo Route) route 2 (two)  times daily.     Begin Date    AM    Noon    PM    Bedtime       omeprazole 20 MG capsule   Commonly known as:  PRILOSEC   Quantity:  120 capsule   Refills:  0    Instructions:  TAKE 1 CAPSULE BY MOUTH TWICE DAILY     Begin Date    AM    Noon    PM    Bedtime       paroxetine 25 MG 24 hr tablet   Commonly known as:  PAXIL-CR   Quantity:  90 tablet   Refills:  2   Dose:  25 mg    Last time this was given:  25 mg on 2/9/2017  8:01 AM   Instructions:  Take 1 tablet (25 mg total) by mouth once daily.     Begin Date    AM    Noon    PM    Bedtime       valsartan 320 MG tablet   Commonly known as:  DIOVAN   Quantity:  30 tablet   Refills:  6   Dose:  320 mg    Last time this was given:  320 mg on 2/9/2017  8:00 AM   Instructions:  Take 1 tablet (320 mg total) by mouth once daily.     Begin Date    AM    Noon    PM    Bedtime       VITAMIN B-12 ORAL   Refills:  0   Dose:  2500 mcg    Instructions:  Take 2,500 mcg by mouth.     Begin Date    AM    Noon    PM    Bedtime       VITAMIN D-3 ORAL   Refills:  0    Instructions:  Take by mouth.     Begin Date    AM    Noon    PM    Bedtime         ASK your doctor about these medications        Additional Info                      montelukast 10 mg tablet   Commonly known as:  SINGULAIR   Quantity:  30 tablet   Refills:  0   Dose:  10 mg   Ask about: Should I take this medication?    Last time this was given:  10 mg on 2/9/2017  8:01 AM   Instructions:  Take 1 tablet (10 mg total) by mouth every evening.     Begin Date    AM    Noon    PM    Bedtime            Where to Get Your Medications      You can get these medications from any pharmacy     Bring a paper prescription for each of these medications     aspirin 325 MG tablet    docusate sodium 100 MG capsule    ondansetron 8 MG tablet    oxycodone-acetaminophen  mg per tablet                  Por favor traiga a todos las citas de seguimiento:    1. Dina copia de las instrucciones de chris.  2. Todos los medicamentos que está  "tomando saul momento, en lulu envases originales.  3. Identificación y tarjeta de seguro.    Por favor llegue 15 minutos antes de la hora de la jessee.    Por favor llame con 24 horas de antelación si tiene que cambiar montalvo jessee y / o tiempo.        Your Scheduled Appointments     Feb 21, 2017  9:15 AM CST   Post OP with [facility] )    [address]  [place] [id number] [phone]              Follow-up Information     Follow up with Yudith Maria NP. Go on 2/21/2017.    Specialty:  Orthopedic Surgery    Why:  Follow-Up Appointment    Contact information:    [address]  [place] [id number] [phone]          Discharge Instructions     Future Orders    3 IN 1 COMMODE FOR HOME USE     Questions:    Type:  Standard    Height:  5' 3" (1.6 m)    Weight:  72.4 kg (159 lb 11.6 oz)    Does patient have medical equipment at home?:  none    Length of need (1-99 months):  99    Vendor:  Other (use comments) Comment - Duplicate    Expected Date of Delivery:  2/7/2017    Expected Time of Delivery:      Activity as tolerated     Call MD for:  difficulty breathing or increased cough     Call MD for:  increased confusion or weakness     Call MD for:  persistent dizziness, light-headedness, or visual disturbances     Call MD for:  persistent nausea and vomiting or diarrhea     Call MD for:  redness, tenderness, or signs of infection (pain, swelling, redness, odor or green/yellow discharge around incision site)     Call MD for:  severe persistent headache     Call MD for:  severe uncontrolled pain     Call MD for:  temperature >100.4     Call MD for:  worsening rash     COMMODE FOR HOME USE     Questions:    Type:  Standard    Height:  5' 3" (1.6 m)    Weight:  68 kg (150 lb)    Does patient have medical equipment at home?:  none    Length of need (1-99 months):  99    Vendor:  [facility] Comment - Aimee to deliver to pts bedside    Expected Date of " "Delivery:  2/9/2017    Expected Time of Delivery:      Diet general     Questions:    Total calories:      Fat restriction, if any:      Protein restriction, if any:      Na restriction, if any:      Fluid restriction:      Additional restrictions:      Leave dressing on - Keep it clean, dry, and intact until clinic visit     Lifting restrictions     Comments:    No Strenuous Exercise or Lifting Greater Than 5 Pounds    Sponge bath only until clinic visit     TRANSFER TUB BENCH FOR HOME USE     Questions:    Type of Transfer Tub Bench:  Padded    Height:  5' 3" (1.6 m)    Weight:  68 kg (150 lb)    Does patient have medical equipment at home?:  none    Length of need (1-99 months):  99    Vendor:  Other (use comments) Comment - Pt refused    Expected Date of Delivery:  2/9/2017    Expected Time of Delivery:      TRANSFER TUB BENCH FOR HOME USE     Questions:    Type of Transfer Tub Bench:  Unpadded    Height:  5' 3" (1.6 m)    Weight:  72.4 kg (159 lb 11.6 oz)    Does patient have medical equipment at home?:  none    Length of need (1-99 months):  99    Vendor:  Other (use comments) Comment - Duplicate    Expected Date of Delivery:  2/7/2017    Expected Time of Delivery:      WALKER FOR HOME USE     Questions:    Type of Walker:  Adult (5'4"-6'6")    With wheels?:  No    Height:  5' 3" (1.6 m)    Weight:  68 kg (150 lb)    Length of need (1-99 months):  99    Platform attachment:      Accessories/Other:      Assistance needed:      Does patient have medical equipment at home?:  none    Vendor:  [facility] Comment - Aimee to deliver to bedside    Expected Date of Delivery:  2/9/2017    Expected Time of Delivery:      WALKER FOR HOME USE     Questions:    Type of Walker:  Adult (5'4"-6'6")    With wheels?:  No    Height:  5' 3" (1.6 m)    Weight:  68 kg (150 lb)    Length of need (1-99 months):  99    Platform attachment:      Accessories/Other:      Assistance needed:      Does patient have medical equipment at " "home?:      Vendor:  Other (use comments) Comment - Duplicate    Expected Date of Delivery:  2/7/2017    Expected Time of Delivery:      WALKER FOR HOME USE     Questions:    Type of Walker:  Adult (5'4"-6'6")    With wheels?:  No    Height:  5' 3" (1.6 m)    Weight:  72.4 kg (159 lb 11.6 oz)    Length of need (1-99 months):  99    Platform attachment:      Accessories/Other:      Assistance needed:      Does patient have medical equipment at home?:  none    Please check all that apply:  Walker will be used for gait training.    Vendor:  Other (use comments) Comment - Duplicate    Expected Date of Delivery:  2/7/2017    Expected Time of Delivery:          Primary Diagnosis     Your primary diagnosis was:  Degenerative Joint Disease Of Lower Leg      Admission Information     Date & Time Provider Department CSN    2/7/2017  5:33 AM Rom Moran MD [facility] [id number]      Care Providers     Provider Role Specialty Primary office phone    Rom Moran MD Attending Provider Orthopedic Surgery [phone]    Rom Moran MD Surgeon  Orthopedic Surgery [phone]      Your Vitals Were     BP Pulse Temp Resp Height Weight    123/57 (BP Location: Left arm, Patient Position: Lying, BP Method: Automatic) 73 98.4 °F (36.9 °C) (Oral) 18 5' 3" (1.6 m) 68 kg (150 lb)    SpO2 BMI                96% 26.57 kg/m2          Recent Lab Values        2/24/2014 6/23/2014 10/21/2014 1/5/2015 5/7/2015 1/27/2016 5/24/2016 1/27/2017      8:35 AM  8:35 AM  1:48 PM  9:20 AM  9:50 AM  8:28 AM 11:03 AM 11:50 AM    A1C 6.3 (H) 6.3 (H) 6.5 (H) 6.4 (H) 6.5 (H) 6.4 (H) 6.2 6.2    Comment for A1C at 11:50 AM on 1/27/2017:  According to ADA guidelines, hemoglobin A1C <7.0% represents  optimal control in non-pregnant diabetic patients.  Different  metrics may apply to specific populations.   Standards of Medical Care in Diabetes - 2016.  For the purpose of screening for the presence of diabetes:  <5.7%     Consistent with " the absence of diabetes  5.7-6.4%  Consistent with increasing risk for diabetes   (prediabetes)  >or=6.5%  Consistent with diabetes  Currently no consensus exists for use of hemoglobin A1C  for diagnosis of diabetes for children.        Pending Labs     Order Current Status    Specimen to Pathology - Surgery In process      Allergies as of 2/9/2017        Reactions    Vicodin [Hydrocodone-acetaminophen] Anxiety      Advance Directives     An advance directive is a document which, in the event you are no longer able to make decisions for yourself, tells your healthcare team what kind of treatment you do or do not want to receive, or who you would like to make those decisions for you.  If you do not currently have an advance directive, [facility] encourages you to create one.  For more information call:  [phone]), [phone] ([phone]),  or log on to [email or web address].        Language Assistance Services     ATTENTION: Language assistance services are available, free of charge. Please call [phone].      ATENCIÓN: Si habla español, tiene a montalvo disposición servicios gratuitos de asistencia lingüística. Llame al [phone].     CHÚ Ý: N?u b?n nói Ti?ng Vi?t, có các d?ch v? h? tr? ngôn ng? mi?n phí dành cho b?n. G?i s? [phone].        Diabetes Discharge Instructions                                    [facility] complies with applicable Federal civil rights laws and does not discriminate on the basis of race, color, national origin, age, disability, or sex.

## 2017-02-07 NOTE — OP NOTE
02/07/2017    PREOPERATIVE DIAGNOSIS:  Osteoarthritis right knee.    POSTOPERATIVE DIAGNOSIS:  Osteoarthritis right knee.    PROCEDURE:  Right total knee replacement. (CPT# 10821)    SURGEON:  Rom Moran M.D.    ASSISTANT:   Tanya.    ANESTHESIA:  spinal.    ESTIMATED BLOOD LOSS:  100 mL    Complications: None    Specimen: Bone    INDICATIONS:  Elena Frances is a 76 y.o. year-old with a longstanding history of right knee pain.  She has failed extensive conservative care to this point.  Preoperative imaging revealed degenerative joint disease and treatment options were discussed.  She elected to proceed with knee replacement.    Risks and complications were discussed including, but not limited to risks of anesthetic complications, infections, wound healing complications, aseptic loosening, instability, DVT, pulmonary embolism and death among others and she elected to proceed.    COMPONENTS USED:  The [employer] triathlon system with size femur 3, tibia 3, 11  mm polyethylene, 31 mm patella.    DESCRIPTION OF PROCEDURE:  The patient was taken to the Operating Room where anesthesia was administered by the Anesthesia Department. She was then placed in the supine position and all superficial neurovascular structures were well padded.  The right lower extremity was then sterilely prepped and draped in the normal fashion.    Under tourniquet control, a 20 cm longitudinal incision was made over the anterior aspect of the knee.  Subcutaneous tissue was sharply dissected down to the deep fascia, which was incised along the line of the incision.  A standard medial parapatellar arthrotomy was made.  The proximal medial tibial plateau was then subperiosteally exposed protecting the medial collateral ligament.  A portion of the infrapatellar fat pad was excised.  The patella was everted and the knee was flexed to 90 degrees.    The extramedullary tibial alignment guide was then placed and the proximal tibial osteotomy was  made approximately 2 mm distal to the most shallow surface of the tibial plateau.  This was done perpendicular to the axis of the tibia with approximately 3 degrees posterior slope.    A drill was then used to gain access into the intramedullary canal of the distal femur. The distal femoral cutting guide was placed and the 8 mm distal femoral cut was made in 5 degrees of valgus.  Femur was sized to a size 3.  The size 3 cutting guide was applied and the posterior femoral condyle cuts were made.  At this time, the cutting guide was removed and the cruciate ligaments, osteophytes, menisci and any debris was removed from the joint space.  Flexion and extension gaps were checked and found to be equal and stable at 11 mm. The femoral cutting guide was reapplied and the posterior chamfer, anterior, and anterior chamfer cuts were made. The notch cutting guide for the posterior stabilize housing was placed and the notch cuts were made.    We then turned our attention back towards the proximal tibia.  This was sized to a size 3, placed in neutral rotation, and the keel was punched in the standard fashion.  Trial sizes of the 3 femur, 3 tibia, and 11 mm polyethylene liner were then placed.    The patellar cutting guide was then used to remove the dorsal 8 mm of the patellar surface to a final thickness of 14 mm.  This was sized to a size 31. Drill holes were placed and patellar trial was placed.    At this time, the knee was placed through range of motion.  Excellent patellofemoral tracking and stability were noted throughout.  Full extension was easily obtained and intraoperative range of motion was from approximately 0 to 130 degrees.    Trial components were removed and the bony surfaces were thoroughly irrigated in a pulse lavage fashion and dried.  Two batches of cement were mixed and the tibial, femoral and patellar components were cemented into position, impacted and held in place as the cement polymerized.  Any excess  cement was removed using Violet elevators.  The 11 mm polyethylene was then locked into position.    The wound was once again thoroughly irrigated.  The tourniquet was deflated and any significant bleeding was stopped using electrocautery.  Tranexamic acid was placed in the wound as well to aid in hemostasis.    The quadriceps tendon and parapatellar retinaculum were then closed with interrupted figure-of-eight sutures of #1 Vicryl.  Subcutaneous tissue was closed with interrupted inverted stitches #3-0 Vicryl.  Skin was approximated using 3-0 monocryl and dermabond.  Sterile dressing was applied and she was returned to the Postanesthesia Care Unit in stable condition.    As the attending surgeon I was physically present for the key/critical portions of the procedure.

## 2017-02-07 NOTE — ANESTHESIA POSTPROCEDURE EVALUATION
"Anesthesia Post Evaluation    Patient: Elena Frances    Procedure(s) Performed: Procedure(s) (LRB):  REPLACEMENT-KNEE-TOTAL (Right)    Final Anesthesia Type: regional  Patient location during evaluation: PACU  Patient participation: Yes- Able to Participate  Level of consciousness: awake and alert, awake and oriented  Post-procedure vital signs: reviewed and stable  Pain management: adequate  Airway patency: patent  PONV status at discharge: No PONV  Anesthetic complications: no      Cardiovascular status: blood pressure returned to baseline and hemodynamically stable  Respiratory status: unassisted, spontaneous ventilation and room air  Hydration status: euvolemic  Follow-up not needed.        Visit Vitals    /61 (BP Location: Left arm, Patient Position: Lying, BP Method: Automatic)    Pulse 72    Temp 36.3 °C (97.4 °F) (Axillary)    Resp (!) 22    Ht 5' 3" (1.6 m)    Wt 68 kg (150 lb)    SpO2 99%    Breastfeeding No    BMI 26.57 kg/m2       Pain/Elkin Score: Pain Assessment Performed: Yes (2/7/2017  8:30 AM)  Presence of Pain: complains of pain/discomfort (2/7/2017  8:30 AM)  Pain Rating Prior to Med Admin: 7 (2/7/2017 12:39 PM)  Elkin Score: 9 (2/7/2017  8:30 AM)      "

## 2017-02-07 NOTE — ANESTHESIA RELEASE NOTE
"Anesthesia Release from PACU Note    Patient: Elena Frances    Procedure(s) Performed: Procedure(s) (LRB):  REPLACEMENT-KNEE-TOTAL (Right)    Anesthesia type: regional    Post pain: Adequate analgesia    Post assessment: no apparent anesthetic complications, tolerated procedure well and no evidence of recall    Last Vitals:   Visit Vitals    /61 (BP Location: Left arm, Patient Position: Lying, BP Method: Automatic)    Pulse 72    Temp 36.3 °C (97.4 °F) (Axillary)    Resp (!) 22    Ht 5' 3" (1.6 m)    Wt 68 kg (150 lb)    SpO2 99%    Breastfeeding No    BMI 26.57 kg/m2       Post vital signs: stable    Level of consciousness: awake, alert  and oriented    Nausea/Vomiting: no nausea/no vomiting    Complications: none    Airway Patency: patent    Respiratory: unassisted, spontaneous ventilation, room air    Cardiovascular: stable and blood pressure at baseline    Hydration: euvolemic  "

## 2017-02-07 NOTE — PLAN OF CARE
[facility]    HOME HEALTH ORDERS  FACE TO FACE ENCOUNTER    Patient Name: Elena Frances  YOB: 1940    PCP: Michael Tinoco MD   PCP Address: [address] [facility] [address] / [facility] [address]  PCP Phone Number: [phone]  PCP Fax: [phone]    Encounter Date: 02/07/2017    Admit to Home Health    Diagnoses:  Active Hospital Problems    Diagnosis  POA    *Primary osteoarthritis of right knee [M17.11]  Yes      Resolved Hospital Problems    Diagnosis Date Resolved POA   No resolved problems to display.       Future Appointments  Date Time Provider Department Center   2/21/2017 9:15 AM Yudith Maria NP [facility]     Follow-up Information     Follow up with Rom Moran MD In 2 weeks.    Specialty:  Orthopedic Surgery    Why:  For wound re-check, For suture removal    Contact information:    [address]  [facility] [address]  [phone]              I have seen and examined this patient face to face today. My clinical findings that support the need for the home health skilled services and home bound status are the following:  Weakness/numbness causing balance and gait disturbance due to Joint Replacement making it taxing to leave home.    Allergies:  Review of patient's allergies indicates:   Allergen Reactions    Vicodin [hydrocodone-acetaminophen] Anxiety       Diet: regular diet    Activities: activity as tolerated    Home Health Admitting Clinician:   SN/PT to complete comprehensive assessment including routine vital signs. Instruct on disease process and s/s of complications to report to MD. Follow specific home health arthoplasty protocol. Review/verify medication list sent home with the patient at time of discharge  and instruct patient/caregiver as needed. If coumadin ordered, coumadin clinic to manage INR with INR draws 2x per week with a goal to maintain INR between 1.8 and 2.2. Frequency may be adjusted depending on start of  care date.    Notify MD if SBP > 160 or < 90; DBP > 90 or < 50; HR > 120 or < 50; Temp > 101    Home Medical Equipment:  Walker, 3-1 bedside commode, transfer tub bench    CONSULTS:    Physical Therapy may admit if patient not on coumadin, PT to perform comprehensive assessment if performing admit visit and generate therapy plan of care. Evaluate for home safety and equipment needs; Establish/upgrade home exercise program. Perform/instruct on therapeutic exercises, gait training, transfer training, and Range of Motion.      OTHER: (only select if patient needs other therapy disciplines)      MISCELLANEOUS CARE:      WOUND CARE ORDERS:  Assess Surgical Incision/DSRG each TX  Aquacel AG drsg applied post-op leave on 14 days post op. Call MD if any drainage reaches border to border of drsg horizontally, s/s of infection, temp >101, induration, swelling or redness.  If dressing is removed per MD order, then apply island dressing, change/teach caregiver to perform daily dressing change if island dressing present.    Medications: Review discharge medications with patient and family and provide education.      Current Discharge Medication List      START taking these medications    Details   aspirin 325 MG tablet Take 1 tablet (325 mg total) by mouth 2 (two) times daily.  Qty: 70 tablet, Refills: 0      docusate sodium (COLACE) 100 MG capsule Take 1 capsule (100 mg total) by mouth 2 (two) times daily as needed for Constipation.  Qty: 60 capsule, Refills: 1      ondansetron (ZOFRAN) 8 MG tablet Take 1 tablet (8 mg total) by mouth every 12 (twelve) hours as needed for Nausea.  Qty: 60 tablet, Refills: 0      oxycodone-acetaminophen (PERCOCET)  mg per tablet Take 1 tablet by mouth every 4 (four) hours as needed for Pain.  Qty: 90 tablet, Refills: 0         CONTINUE these medications which have NOT CHANGED    Details   albuterol (PROVENTIL) 2.5 mg /3 mL (0.083 %) nebulizer solution Take 3 mLs (2.5 mg total) by  nebulization every 6 (six) hours as needed for Wheezing.  Qty: 100 each, Refills: 0    Associated Diagnoses: Uncomplicated asthma, unspecified asthma severity      atenolol (TENORMIN) 25 MG tablet Take 1 tablet (25 mg total) by mouth once daily.  Qty: 90 tablet, Refills: 3    Associated Diagnoses: Tremor      atorvastatin (LIPITOR) 80 MG tablet Take 1 tablet (80 mg total) by mouth once daily.  Qty: 90 tablet, Refills: 2    Associated Diagnoses: Hyperlipidemia, unspecified hyperlipidemia type      CALCIUM CARBONATE/VITAMIN D3 (VITAMIN D-3 ORAL) Take by mouth.      CYANOCOBALAMIN, VITAMIN B-12, (VITAMIN B-12 ORAL) Take 2,500 mcg by mouth.      gabapentin (NEURONTIN) 100 MG capsule Take 2 capsules three times daily  Qty: 180 capsule, Refills: 11      montelukast (SINGULAIR) 10 mg tablet Take 1 tablet (10 mg total) by mouth every evening.  Qty: 30 tablet, Refills: 0    Associated Diagnoses: Uncomplicated asthma, unspecified asthma severity      omeprazole (PRILOSEC) 20 MG capsule TAKE 1 CAPSULE BY MOUTH TWICE DAILY  Qty: 120 capsule, Refills: 0      paroxetine (PAXIL-CR) 25 MG 24 hr tablet Take 1 tablet (25 mg total) by mouth once daily.  Qty: 90 tablet, Refills: 2    Associated Diagnoses: Other depression      valsartan (DIOVAN) 320 MG tablet Take 1 tablet (320 mg total) by mouth once daily.  Qty: 30 tablet, Refills: 6    Associated Diagnoses: Essential hypertension      albuterol 90 mcg/actuation inhaler Inhale 2 puffs into the lungs every 6 (six) hours as needed for Wheezing.  Qty: 1 each, Refills: 11    Associated Diagnoses: Cough      blood-glucose meter ([employer] BLOOD GLUCOSE SYSTM) kit Use as instructed  Qty: 1 each, Refills: 0    Comments: [employer]S kit      diclofenac sodium (VOLTAREN) 1 % Gel Apply 2 g topically once daily.  Qty: 1 Tube, Refills: 2    Associated Diagnoses: Lumbar facet arthropathy; Sacroiliitis; Hip arthritis; DDD (degenerative disc disease)      ferrous sulfate 324 mg (65 mg iron) TbEC  Take 325 mg by mouth once daily.      fluocinonide (LIDEX) 0.05 % external solution Apply topically 2 (two) times daily.  Qty: 60 mL, Refills: 3    Associated Diagnoses: Contact dermatitis, unspecified contact dermatitis type, unspecified trigger      lancets Misc 1 lancet by Misc.(Non-Drug; Combo Route) route 2 (two) times daily.  Qty: 200 each, Refills: 3    Comments: [employer]S lancets             I certify that this patient is confined to her home and needs intermittent skilled nursing care and physical therapy.

## 2017-02-07 NOTE — PT/OT/SLP EVAL
Physical Therapy  Evaluation    Elena Frances   MRN: [id number]   Admitting Diagnosis: Primary osteoarthritis of right knee    PT Received On: 17  PT Start Time: 1146     PT Stop Time: 1215    PT Total Time (min): 29 min       Billable Minutes:  Evaluation 29    Diagnosis: Primary osteoarthritis of right knee      Past Medical History   Diagnosis Date    Anemia     Arthritis     Depression     Generalized headaches 2014    Goiter      MNG    HTN (hypertension), benign     Hyperlipidemia     Hyperparathyroidism      s/p surgery    Intestinal obstruction      resolved spontaneously    Lumbar disc disease      s/p epidural shots    Nuclear sclerosis - Both Eyes 3/11/2014    Osteoporosis, post-menopausal      with 3 rib fractures      Past Surgical History   Procedure Laterality Date    Bilateral oophorectomy      Parathyroid gland surgery       for parathyroid adenoma    Cholecystectomy      Hysterectomy       section, classic       x 1    Appendectomy      Breast cyst excision       left    Lumbar epidural injection       x 4    Mouth surgery       for abscess drainage of gum of frontal teeth    Colonoscopy N/A 3/1/2016     Procedure: COLONOSCOPY;  Surgeon: Dony Bronson MD;  Location: [address]);  Service: Endoscopy;  Laterality: N/A;  PM Prep    Cataract extraction w/  intraocular lens implant Right 2016     Dr. Fang ([facility])    Cataract extraction w/  intraocular lens implant Left 10/17/2016     Dr. Fang ([facility])       Referring physician: Dean  Date referred to PT: 2017      General Precautions: Standard, fall  Orthopedic Precautions: RLE weight bearing as tolerated   Braces: N/A       Do you have any cultural, spiritual, Christianity conflicts, given your current situation?: none    Patient History:  Lives With: spouse  Living Arrangements: house  Home Accessibility: other (see comments), not wheelchair accessible (no MARIELA)  Home Layout: Able to  live on 1st floor  Stair Railings at Home: none  Transportation Available: family or friend will provide  Living Environment Comment: Pt does not have assistance during the day but  can assist at night.  Equipment Currently Used at Home: none  DME owned (not currently used):     Previous Level of Function:  Ambulation Skills: independent  Transfer Skills: independent  ADL Skills: independent  Work/Leisure Activity: independent    Subjective:  Communicated with nsg prior to session.    Chief Complaint: decreased functional mobility with (R) LE weakness  Patient goals: return home to [facility]    Pain Ratin/10   Location - Side: Right  Location - Orientation: anterior  Location: knee  Pain Addressed: Pre-medicate for activity, Nurse notified  Pain Rating Post-Intervention: 2/10    Objective:   Patient found with: blood pressure cuff, FCD, perineural catheter, oxygen, PCEA, peripheral IV, Polar ice, pulse ox (continuous), telemetry     Cognitive Exam:  Oriented to: Person, Place, Time and Situation    Follows Commands/attention: Follows multistep  commands  Communication: clear/fluent  Safety awareness/insight to disability: intact    Physical Exam:  Postural examination/scapula alignment: Rounded shoulder    Skin integrity: Visible skin intact  Edema: None noted     Sensation:   Intact  light/touch (B) LE    Lower Extremity Range of Motion:  Right Lower Extremity: Deficits: limitations into flex/ext  Left Lower Extremity: WNL    Lower Extremity Strength:  Right Lower Extremity: Deficits: 3+/5 grossly  Left Lower Extremity: WNL       Functional Mobility:  Bed Mobility:  Scooting/Bridging: Stand by Assistance  Supine to Sit: Minimum Assistance  Sit to Supine: Minimum Assistance    Transfers:  Sit <> Stand Assistance: Minimum Assistance  Sit <> Stand Assistive Device: Standard Walker    Gait:   Gait Distance: 6 steps fwd/back; 3 lateral steps  Assistance 1: Minimum assistance  Gait Assistive Device: Standard  walker  Gait Pattern: 3-point gait  Gait Deviation(s): decreased wayne, increased time in double stance, decreased velocity of limb motion, decreased step length, decreased stride length, decreased toe-to-floor clearance, decreased weight-shifting ability, foot flat    Balance:   Static Sit: FAIR+: Able to take MINIMAL challenges from all directions  Dynamic Sit: FAIR+: Maintains balance through MINIMAL excursions of active trunk motion  Static Stand: FAIR+: Takes MINIMAL challenges from all directions  Dynamic stand: FAIR: Needs CONTACT GUARD during gait    Therapeutic Activities and Exercises:  -Pt assisted to bedpan prior to sitting up at EOB    -Pt/dtr educated on:  A. PT POC and role of PT  B. Importance of OOB activity to improve functional outcomes    AM-PAC 6 CLICK MOBILITY  How much help from another person does this patient currently need?   1 = Unable, Total/Dependent Assistance  2 = A lot, Maximum/Moderate Assistance  3 = A little, Minimum/Contact Guard/Supervision  4 = None, Modified [place]/Independent    Turning over in bed (including adjusting bedclothes, sheets and blankets)?: 3  Sitting down on and standing up from a chair with arms (e.g., wheelchair, bedside commode, etc.): 3  Moving from lying on back to sitting on the side of the bed?: 3  Moving to and from a bed to a chair (including a wheelchair)?: 3  Need to walk in hospital room?: 3  Climbing 3-5 steps with a railing?: 3  Total Score: 18     AM-PAC Raw Score CMS G-Code Modifier Level of Impairment Assistance   6 % Total / Unable   7 - 9 CM 80 - 100% Maximal Assist   10 - 14 CL 60 - 80% Moderate Assist   15 - 19 CK 40 - 60% Moderate Assist   20 - 22 CJ 20 - 40% Minimal Assist   23 CI 1-20% SBA / CGA   24 CH 0% Independent/ Mod I     Patient left supine with all lines intact, call button in reach and nsg notified.    Assessment:   Elena Frances is a 76 y.o. female with a medical diagnosis of Primary osteoarthritis of  right knee and presents with impaired functional mobility with (R) LE weakness. Pt tolerated session well today. Pt with no LOB during ambulation and transfers today using SW. Pt will cont to benefit from skilled therapy services and is appropriate for HHPT upon d/c. Pt safe to t/f with nsg assistance x1 person using SW (min A)..    Rehab identified problem list/impairments: Rehab identified problem list/impairments: weakness, impaired endurance, impaired self care skills, impaired functional mobilty, gait instability, decreased safety awareness, decreased lower extremity function, decreased ROM, impaired joint extensibility, impaired muscle length, orthopedic precautions, pain, impaired skin    Rehab potential is good.    Activity tolerance: Good    Discharge recommendations: Discharge Facility/Level Of Care Needs: home health PT     Barriers to discharge: Barriers to Discharge: Decreased caregiver support    Equipment recommendations: Equipment Needed After Discharge: bedside commode, shower chair, walker, standard     GOALS:   Physical Therapy Goals        Problem: Physical Therapy Goal    Goal Priority Disciplines Outcome Goal Variances Interventions   Physical Therapy Goal     PT/OT, PT Ongoing (interventions implemented as appropriate)     Description:  Goals to be met by: 17     Patient will increase functional independence with mobility by performin. Supine to sit with Set-up [place]  2. Sit to supine with Set-up [place]  3. Sit to stand transfer with Supervision  4. Bed to chair transfer with Stand-by Assistance using appropriate AD  5. Gait  x 150 feet with Stand-by Assistance using appropriate AD.   6. Lower extremity exercise program x20-30 reps per handout, with independence                PLAN:    Patient to be seen BID to address the above listed problems via gait training, therapeutic activities, therapeutic exercises, neuromuscular re-education  Plan of Care expires:  03/07/17  Plan of Care reviewed with: patient, family          Alfonso Andre, PT  02/07/2017

## 2017-02-07 NOTE — ANESTHESIA PROCEDURE NOTES
Adductor Canal Catheter    Patient location during procedure: pre-op   Block not for primary anesthetic.  Reason for block: at surgeon's request and post-op pain management   Post-op Pain Location: R knee pain  Start time: 2/7/2017 6:29 AM  Timeout: 2/7/2017 6:29 AM   End time: 2/7/2017 6:50 AM  Staffing  Anesthesiologist: PRESTON MOISE  Resident/CRNA: YOLANDA CYR  Performed by: resident/CRNA   Preanesthetic Checklist  Completed: patient identified, site marked, surgical consent, pre-op evaluation, timeout performed, IV checked, risks and benefits discussed and monitors and equipment checked  Peripheral Block  Patient position: supine  Prep: ChloraPrep and site prepped and draped  Patient monitoring: heart rate, cardiac monitor, continuous pulse ox, continuous capnometry and frequent blood pressure checks  Block type: adductor canal  Laterality: right  Injection technique: continuous  Needle  Needle type: Tuohy   Needle gauge: 17 G  Needle length: 3.5 in  Needle localization: anatomical landmarks and ultrasound guidance  Catheter type: spring wound  Catheter size: 19 G  Test dose: lidocaine 1.5% with Epi 1-to-200,000 and negative   -ultrasound image captured on disc.  Assessment  Injection assessment: negative aspiration, negative parasthesia and local visualized surrounding nerve  Paresthesia pain: none  Heart rate change: no  Slow fractionated injection: yes  Medications:  Bolus administered: 15 mL of 0.25 ropivacaine  Epinephrine added: 3.75 mcg/mL (1/300,000)  Additional Notes  VSS.  [facility] RN monitoring vitals throughout procedure.  Patient tolerated procedure well.

## 2017-02-07 NOTE — NURSING TRANSFER
Nursing Transfer Note      2/7/2017     Transfer To: 542b    Transfer via stretcher    Transfer with IV pump, PNC    Transported by pct    Medicines sent: yes    Chart send with patient: Yes    Notified: daughter

## 2017-02-07 NOTE — ANESTHESIA PROCEDURE NOTES
Spinal    Diagnosis: Right Knee OA  Start time: 2/7/2017 7:08 AM  Timeout: 2/7/2017 7:06 AM  End time: 2/7/2017 7:13 AM  Staffing  Anesthesiologist: SHARI PADRON  Performed by: anesthesiologist   Preanesthetic Checklist  Completed: patient identified, site marked, surgical consent, pre-op evaluation, timeout performed, IV checked, risks and benefits discussed and monitors and equipment checked  Spinal Block  Patient position: sitting  Prep: ChloraPrep  Patient monitoring: heart rate, cardiac monitor and continuous pulse ox  Approach: midline  Location: L4-5  Injection technique: single shot  CSF Fluid: clear free-flowing CSF  Needle  Needle type: Nia   Needle gauge: 25 G  Needle length: 3.5 in  Additional Documentation: negative aspiration for heme and no paresthesia on injection  Needle localization: anatomical landmarks  Assessment  Sensory level: T8   Dermatomal levels determined by pinch or prick  Ease of block: easy  Patient's tolerance of the procedure: comfortable throughout block and no complaints  Medications:  Bolus administered: 3 mL of 1.5 mepivacaine  Additional Notes  VSS  No complications

## 2017-02-08 ENCOUNTER — NURSE TRIAGE (OUTPATIENT)
Dept: ADMINISTRATIVE | Facility: CLINIC | Age: 77
End: 2017-02-08

## 2017-02-08 PROCEDURE — 63600175 PHARM REV CODE 636 W HCPCS: Performed by: NURSE PRACTITIONER

## 2017-02-08 PROCEDURE — 25000003 PHARM REV CODE 250: Performed by: NURSE PRACTITIONER

## 2017-02-08 PROCEDURE — 11000001 HC ACUTE MED/SURG PRIVATE ROOM

## 2017-02-08 PROCEDURE — 25000003 PHARM REV CODE 250: Performed by: STUDENT IN AN ORGANIZED HEALTH CARE EDUCATION/TRAINING PROGRAM

## 2017-02-08 PROCEDURE — 97535 SELF CARE MNGMENT TRAINING: CPT

## 2017-02-08 PROCEDURE — 25000003 PHARM REV CODE 250

## 2017-02-08 PROCEDURE — 97116 GAIT TRAINING THERAPY: CPT

## 2017-02-08 PROCEDURE — 97530 THERAPEUTIC ACTIVITIES: CPT

## 2017-02-08 PROCEDURE — 25000003 PHARM REV CODE 250: Performed by: ANESTHESIOLOGY

## 2017-02-08 PROCEDURE — 97110 THERAPEUTIC EXERCISES: CPT

## 2017-02-08 PROCEDURE — 99231 SBSQ HOSP IP/OBS SF/LOW 25: CPT | Mod: ,,, | Performed by: ANESTHESIOLOGY

## 2017-02-08 PROCEDURE — 94799 UNLISTED PULMONARY SVC/PX: CPT

## 2017-02-08 RX ORDER — LIDOCAINE HYDROCHLORIDE AND EPINEPHRINE 15; 5 MG/ML; UG/ML
INJECTION, SOLUTION EPIDURAL
Status: COMPLETED
Start: 2017-02-08 | End: 2017-02-08

## 2017-02-08 RX ORDER — CELECOXIB 100 MG/1
200 CAPSULE ORAL DAILY
Status: DISCONTINUED | OUTPATIENT
Start: 2017-02-08 | End: 2017-02-09 | Stop reason: HOSPADM

## 2017-02-08 RX ADMIN — MORPHINE SULFATE 2 MG: 2 INJECTION, SOLUTION INTRAMUSCULAR; INTRAVENOUS at 04:02

## 2017-02-08 RX ADMIN — OXYCODONE HYDROCHLORIDE 15 MG: 5 TABLET ORAL at 08:02

## 2017-02-08 RX ADMIN — STANDARDIZED SENNA CONCENTRATE AND DOCUSATE SODIUM 1 TABLET: 8.6; 5 TABLET, FILM COATED ORAL at 09:02

## 2017-02-08 RX ADMIN — PREGABALIN 75 MG: 75 CAPSULE ORAL at 09:02

## 2017-02-08 RX ADMIN — VALSARTAN 320 MG: 160 TABLET, FILM COATED ORAL at 08:02

## 2017-02-08 RX ADMIN — MORPHINE SULFATE 2 MG: 2 INJECTION, SOLUTION INTRAMUSCULAR; INTRAVENOUS at 01:02

## 2017-02-08 RX ADMIN — PAROXETINE HYDROCHLORIDE 25 MG: 25 TABLET, FILM COATED, EXTENDED RELEASE ORAL at 08:02

## 2017-02-08 RX ADMIN — ASPIRIN 325 MG: 325 TABLET, DELAYED RELEASE ORAL at 09:02

## 2017-02-08 RX ADMIN — STANDARDIZED SENNA CONCENTRATE AND DOCUSATE SODIUM 1 TABLET: 8.6; 5 TABLET, FILM COATED ORAL at 08:02

## 2017-02-08 RX ADMIN — ASPIRIN 325 MG: 325 TABLET, DELAYED RELEASE ORAL at 08:02

## 2017-02-08 RX ADMIN — ATORVASTATIN CALCIUM 80 MG: 20 TABLET, FILM COATED ORAL at 08:02

## 2017-02-08 RX ADMIN — CELECOXIB 200 MG: 100 CAPSULE ORAL at 08:02

## 2017-02-08 RX ADMIN — MORPHINE SULFATE 2 MG: 2 INJECTION, SOLUTION INTRAMUSCULAR; INTRAVENOUS at 09:02

## 2017-02-08 RX ADMIN — FAMOTIDINE 20 MG: 20 TABLET, FILM COATED ORAL at 08:02

## 2017-02-08 RX ADMIN — OXYCODONE HYDROCHLORIDE 15 MG: 5 TABLET ORAL at 04:02

## 2017-02-08 RX ADMIN — OXYCODONE HYDROCHLORIDE 15 MG: 5 TABLET ORAL at 09:02

## 2017-02-08 RX ADMIN — ACETAMINOPHEN 1000 MG: 500 TABLET ORAL at 05:02

## 2017-02-08 RX ADMIN — ATENOLOL 25 MG: 25 TABLET ORAL at 08:02

## 2017-02-08 RX ADMIN — LIDOCAINE HYDROCHLORIDE AND EPINEPHRINE: 15; 5 INJECTION, SOLUTION EPIDURAL at 02:02

## 2017-02-08 RX ADMIN — OXYCODONE HYDROCHLORIDE 15 MG: 5 TABLET ORAL at 05:02

## 2017-02-08 RX ADMIN — ACETAMINOPHEN 1000 MG: 500 TABLET ORAL at 06:02

## 2017-02-08 RX ADMIN — ROPIVACAINE HYDROCHLORIDE 8 ML/HR: 2 INJECTION, SOLUTION EPIDURAL; INFILTRATION at 04:02

## 2017-02-08 RX ADMIN — OXYCODONE HYDROCHLORIDE 15 MG: 5 TABLET ORAL at 11:02

## 2017-02-08 RX ADMIN — MONTELUKAST SODIUM 10 MG: 10 TABLET, FILM COATED ORAL at 08:02

## 2017-02-08 RX ADMIN — FAMOTIDINE 20 MG: 20 TABLET, FILM COATED ORAL at 09:02

## 2017-02-08 RX ADMIN — ACETAMINOPHEN 1000 MG: 500 TABLET ORAL at 12:02

## 2017-02-08 RX ADMIN — OXYCODONE HYDROCHLORIDE 15 MG: 5 TABLET ORAL at 02:02

## 2017-02-08 RX ADMIN — POLYETHYLENE GLYCOL 3350 17 G: 17 POWDER, FOR SOLUTION ORAL at 08:02

## 2017-02-08 NOTE — PT/OT/SLP PROGRESS
Physical Therapy  Treatment    Elena Frances   MRN: [id number]   Admitting Diagnosis: Primary osteoarthritis of right knee    PT Received On: 17  PT Start Time: 1011     PT Stop Time: 1101    PT Total Time (min): 50 min       Billable Minutes:  Gait [id number], Therapeutic Activity 14 and Therapeutic Exercise 23    Treatment Type: Treatment  PT/PTA: PT             General Precautions: Standard, fall  Orthopedic Precautions: RLE weight bearing as tolerated   Braces: N/A    Do you have any cultural, spiritual, Latter day conflicts, given your current situation?: none    Subjective:  Communicated with nsg prior to session. Pt agreeable to PT session      Pain Ratin/10  Location - Side: Right  Location - Orientation: posterior  Location: knee  Pain Addressed: Pre-medicate for activity, Nurse notified, Cessation of Activity  Pain Rating Post-Intervention: 10/10    Objective:   Patient found with: Polar ice, FCD, perineural catheter, PCEA    Functional Mobility:  Bed Mobility:   Scooting/Bridging: Stand by Assistance  Supine to Sit: Minimum Assistance  Sit to Supine: Minimum Assistance    Transfers:  Sit <> Stand Assistance: Contact Guard Assistance x1 trial from EOB, x1 trial from chair, x1 trial from mat  Sit <> Stand Assistive Device: Standard Walker  Bed <> Chair Technique: Stand Pivot  Bed <> Chair Assistance: Contact Guard Assistance  Bed <> Chair Assistive Device: Standard Walker    Gait:   Gait Distance: 10' x3 trials  Assistance 1: Minimum assistance  Gait Assistive Device: Standard walker  Gait Pattern: 3-point gait  Gait Deviation(s): decreased wayne, increased time in double stance, decreased velocity of limb motion, decreased step length, decreased stride length, decreased toe-to-floor clearance, decreased weight-shifting ability, foot flat     -Pt given cueing for walker mgmt and upright posture during ambulation. Pt with short step length on (L) foot 2/2 increased pain with WBing on (R)  LE.    Balance:   Static Sit: FAIR+: Able to take MINIMAL challenges from all directions  Dynamic Sit: FAIR+: Maintains balance through MINIMAL excursions of active trunk motion  Static Stand: FAIR+: Takes MINIMAL challenges from all directions  Dynamic stand: FAIR: Needs CONTACT GUARD during gait     Therapeutic Activities and Exercises:  -Pt performed TKR protocol exercises x20 reps of:  A. AP  B. GS  C.QS  D.SAQ  E. Heel slides    -Pt unable to complete all exercises 2/2 increased pain    -Pt educated on:  A. Protecting surgical incision during transfers  B. S/s associated with TKR  C. DME mgmt  D. Performing HEP to prevent blood clots  E. Importance of OOB activity to improve functional outcomes after surgery    -Pt white board updated     AM-PAC 6 CLICK MOBILITY  How much help from another person does this patient currently need?   1 = Unable, Total/Dependent Assistance  2 = A lot, Maximum/Moderate Assistance  3 = A little, Minimum/Contact Guard/Supervision  4 = None, Modified [place]/Independent    Turning over in bed (including adjusting bedclothes, sheets and blankets)?: 3  Sitting down on and standing up from a chair with arms (e.g., wheelchair, bedside commode, etc.): 3  Moving from lying on back to sitting on the side of the bed?: 3  Moving to and from a bed to a chair (including a wheelchair)?: 3  Need to walk in hospital room?: 3  Climbing 3-5 steps with a railing?: 2  Total Score: 17    AM-PAC Raw Score CMS G-Code Modifier Level of Impairment Assistance   6 % Total / Unable   7 - 9 CM 80 - 100% Maximal Assist   10 - 14 CL 60 - 80% Moderate Assist   15 - 19 CK 40 - 60% Moderate Assist   20 - 22 CJ 20 - 40% Minimal Assist   23 CI 1-20% SBA / CGA   24 CH 0% Independent/ Mod I     Patient left up in chair with all lines intact, call button in reach and nsg notified.    Assessment:  Elena Frances is a 76 y.o. female with a medical diagnosis of Primary osteoarthritis of right knee and  presents with decreased activity tolerance and functional mobility with increased pain. Pt tolerated session fairly this morning. Pt limited in all activities by increased pain. Pt tolerated 3 trials of ambulation for 10' each with no LOB. Pt unable to complete exercises 2/2 increased pain. Pt will cont to benefit from skilled therapy services and is appropriate for HHPT upon d/c. Pt may need SNF placement pending pain control and assistance at home during the day.    Rehab identified problem list/impairments: Rehab identified problem list/impairments: weakness, impaired endurance, impaired self care skills, impaired functional mobilty, gait instability, impaired balance, pain, decreased safety awareness, decreased lower extremity function, decreased ROM, impaired joint extensibility, impaired muscle length, orthopedic precautions, impaired skin, edema    Rehab potential is good.    Activity tolerance: Fair    Discharge recommendations: Discharge Facility/Level Of Care Needs: home health PT  (possbile SNF pending pain control and assistance at home)    Barriers to discharge: Barriers to Discharge: Decreased caregiver support    Equipment recommendations: Equipment Needed After Discharge: bedside commode, shower chair, walker, standard     GOALS:   Physical Therapy Goals        Problem: Physical Therapy Goal    Goal Priority Disciplines Outcome Goal Variances Interventions   Physical Therapy Goal     PT/OT, PT Ongoing (interventions implemented as appropriate)     Description:  Goals to be met by: 17     Patient will increase functional independence with mobility by performin. Supine to sit with Set-up [place]-not met  2. Sit to supine with Set-up [place]-not met  3. Sit to stand transfer with Supervision-not met  4. Bed to chair transfer with Stand-by Assistance using appropriate AD-not met  5. Gait  x 150 feet with Stand-by Assistance using appropriate AD.-not met  6. Lower extremity exercise  program x20-30 reps per handout, with independence-not met                 PLAN:    Patient to be seen BID  to address the above listed problems via gait training, therapeutic activities, therapeutic exercises, neuromuscular re-education  Plan of Care expires: 03/07/17  Plan of Care reviewed with: patient         Alfonso Powell, PT  02/08/2017

## 2017-02-08 NOTE — PLAN OF CARE
SW sent home health orders, facesheet, and h&p to [facility] via [facility] for home health agency assignment.    SW awaiting DME recommendations from PT/OT.    Leonela Rivera LMSW  [id number]

## 2017-02-08 NOTE — PLAN OF CARE
Michael Tinoco MD  Future Appointments  Date Time Provider Department Center   2/21/2017 9:15 AM Yudith Maria NP [facility] [id number] - [place] - [address] AT [facility]  [address] [place] [address]  Phone: [phone] Fax: [phone]       02/08/17 1213   Discharge Assessment   Assessment Type Discharge Planning Assessment   Confirmed/corrected address and phone number on facesheet? Yes   Assessment information obtained from? Patient   Expected Length of Stay (days) 2   Communicated expected length of stay with patient/caregiver yes   Prior to hospitilization cognitive status: Alert/Oriented   Prior to hospitalization functional status: Independent   Current cognitive status: Alert/Oriented   Current Functional Status: Assistive Equipment   Arrived From home or self-care   Lives With spouse   Able to Return to Prior Arrangements yes   Is patient able to care for self after discharge? No   How many people do you have in your home that can help with your care after discharge? 1   Who are your caregiver(s) and their phone number(s)? Paulpillo Rivases     Patient's perception of discharge disposition home health   Readmission Within The Last 30 Days no previous admission in last 30 days   Patient currently being followed by outpatient case management? No   Patient currently receives home health services? No   Does the patient currently use HME? No   Patient currently receives private duty nursing? No   Patient currently receives any other outside agency services? No   Equipment Currently Used at Home none   Do you have any problems affording any of your prescribed medications? No   Is the patient taking medications as prescribed? yes   Do you have any financial concerns preventing you from receiving the healthcare you need? No   Does the patient have transportation to healthcare appointments? Yes   Transportation  Available family or friend will provide   On Dialysis? No   Does the patient receive services at the [facility]? No   Are there any open cases? No   Discharge Plan A Home Health   Discharge Plan B Skilled Nursing Facility   Patient/Family In Agreement With Plan yes

## 2017-02-08 NOTE — NURSING
Spoke to on call for the pain management team. Patient complains of pain being uncontrolled although receiving around the clock oxycodone 15mg every three hours and morphine for breakthrough pain. PNC infusing; site is clean and intact. MD rounded on patient with team about half an hour ago and will follow up with her again this afternoon.

## 2017-02-08 NOTE — ANESTHESIA POST-OP PAIN MANAGEMENT
Acute Pain Service and Perioperative Surgical Home Progress Note    HPI  Elena Frances is a 76 y.o., female,     Interval history      Surgery:  Procedure(s) (LRB):  REPLACEMENT-KNEE-TOTAL (Right)    Post Op Day #: 1    Catheter type: Perineural Adductor Canal    Infusion type: Ropivacaine 0.2%  8 ml/hr basal    Problem List:    Active Hospital Problems    Diagnosis  POA    *Primary osteoarthritis of right knee [M17.11]  Yes      Resolved Hospital Problems    Diagnosis Date Resolved POA   No resolved problems to display.       Subjective:       General appearance of alert, oriented, no complaints   Pain with rest: 3    Numbers   Pain with movement: 6    Numbers   Side Effects    1. Pruritis No    2. Nausea No    3. Motor Blockade No, 1=Ability to bend knees and ankles    4. Sedation No, 1=awake and alert    Schedule Medications:    acetaminophen  1,000 mg Oral Q6H    aspirin  325 mg Oral BID    atenolol  25 mg Oral Daily    atorvastatin  80 mg Oral Daily    celecoxib  200 mg Oral Daily    famotidine  20 mg Oral BID    montelukast  10 mg Oral Daily    paroxetine  25 mg Oral Daily    polyethylene glycol  17 g Oral Daily    pregabalin  75 mg Oral QHS    senna-docusate 8.6-50 mg  1 tablet Oral BID    valsartan  320 mg Oral Daily        Continuous Infusions:   ropivacaine (PF) 2 mg/ml (0.2%) 8 mL/hr (02/08/17 0458)    ropivacaine          PRN Medications:  albuterol sulfate, bisacodyl, hydrALAZINE, morphine, morphine, morphine, naloxone, ondansetron, oxycodone, oxycodone, oxycodone, promethazine (PHENERGAN) IVPB, ramelteon, ropivacaine, sodium chloride 0.9%       Antibiotics:  Antibiotics     None             Objective:     Catheter site clean, dry, intact          Vital Signs (Most Recent):  Temp: 36.4 °C (97.6 °F) (02/08/17 0719)  Pulse: 69 (02/08/17 0719)  Resp: 16 (02/08/17 0719)  BP: (!) 147/64 (02/08/17 0719)  SpO2: 98 % (02/08/17 0719) Vital Signs Range (Last 24H):  Temp:  [35.9 °C (96.7  °F)-36.9 °C (98.5 °F)]   Pulse:  [63-85]   Resp:  [12-26]   BP: (109-160)/(55-70)   SpO2:  [95 %-100 %]          I & O (Last 24H):  Intake/Output Summary (Last 24 hours) at 02/08/17 0839  Last data filed at 02/08/17 0600   Gross per 24 hour   Intake             2694 ml   Output              200 ml   Net             2494 ml       Physical Exam:    GA: Alert, comfortable, no acute distress.   Pulmonary: Clear to auscultation A/P/L. No wheezing, crackles, or rhonchi.  Cardiac: RRR S1 & S2 w/o rubs/murmurs/gallops.   Abdominal:Bowel sounds present. No tenderness to palpation or distension. No appreciable hepatosplenomegaly.   Skin: No jaundice, rashes, or visible lesions.         Laboratory:  CBC: No results for input(s): WBC, RBC, HGB, HCT, PLT, MCV, MCH, MCHC in the last 72 hours.    BMP: Recent Labs      02/07/17   0825   NA  141   K  4.2   CO2  26   CL  108   BUN  14   CREATININE  0.8   GLU  126*   CALCIUM  7.8*       No results for input(s): INR, PROTIME, APTT in the last 72 hours.    Invalid input(s): PT      Anticoagulant dose  mg.    Assessment:         Pain control adequate    Plan:     1) Pain:    Adductor canal perineural catheter in place and infusing. Good level. Multimodal pain regimen with acetaminophen, Celebrex, Lyrica, and prn oxycodone given  Will continue to monitor. Plan to D/C perineural catheter in AM or home with On-Q if patient discharged today.   2) HTN: Atenolol 25 mg daily, Valsartan 320 mg   3) Asthma: albuterol PRN and singular    4) FEN/GI: Tolerating liquids, advance diet as tolerated. + flatus. No BM yet.   5) Dispo: Pt working well with PT/OT. Case management and SW for placement. Plan for D/C tomorrow afternoon or possibly today if patient works well with PT and meets goals.    Evaluator John Paul Calhoun MD    I have seen the patient, reviewed the Resident's assessment and plan. I have personally interviewed and examined the patient at bedside and: agree with the findings.  Ms.  Juan Daniel lacks motivation.  She appears more comfortable than her stated VAS score.  Will continue to work with PT/OT.  Will f/u on her progress today     JAIME Crawford MD  Staff Anesthesiologist  Perioperative Surgical Home and Acute Pain Service

## 2017-02-08 NOTE — NURSING
Spoke to Dr. Martínez about patients pain being uncontrolled despite receiving oxy15 q3h and morphine for breakthrough pain. MD said to follow up with anesthesia. Livier is going to assess patient now. Will follow with plan of care.

## 2017-02-08 NOTE — PLAN OF CARE
Problem: Physical Therapy Goal  Goal: Physical Therapy Goal  Goals to be met by: 17     Patient will increase functional independence with mobility by performin. Supine to sit with Set-up [place]-not met  2. Sit to supine with Set-up [place]-not met  3. Sit to stand transfer with Supervision-not met  4. Bed to chair transfer with Stand-by Assistance using appropriate AD-not met  5. Gait x 150 feet with Stand-by Assistance using appropriate AD.-not met  6. Lower extremity exercise program x20-30 reps per handout, with independence-not met   Outcome: Ongoing (interventions implemented as appropriate)  Pt tolerated session fairly this morning. Pt limited in all activities by increased pain. Pt tolerated 3 trials of ambulation for 10' each with no LOB. Pt unable to complete exercises 2/2 increased pain. Pt will cont to benefit from skilled therapy services and is appropriate for HHPT upon d/c. Pt may need SNF placement pending pain control and assistance at home during the day.

## 2017-02-08 NOTE — TELEPHONE ENCOUNTER
Reason for Disposition   Information only question and nurse able to answer    Protocols used: ST NO PROTOCOL CALL: INFORMATION ONLY-A-[place]    Patient is currently a inpatient at [facility].  Patient is calling stating that she is in pain.  Patient is trying to reach Dr. Moran.  Patient was transferred to [facility] via . Spoke with patient's nurse Yudith, who stated that she thought the patient was calling a family member.  Please advise.

## 2017-02-08 NOTE — PLAN OF CARE
Patient AAOx3. VSS. No falls/injuries as pt calls for assistance. Fall precautions in place. No signs and symptoms of infection noted. No signs of skin breakdown noted.Surgical dressing clean, dry, and intact. IV intact and saline locked. PNC site clean and intact. Pain assessed during hourly rounds. Pt complains pain is a 12/10 each time assessed and says pain management isn't effective for her. Anesthesia team is aware. Bed in lowest position, wheels locked, call light in reach. Will continue to monitor.

## 2017-02-08 NOTE — PROGRESS NOTES
Orthopaedic Surgery  Progress Note    Subjective:  Patient examined at bedside  No acute events overnight  Pain currently controlled  Denies any chest pain / SOB / HA / Numbness / Paraesthesias / Nausea / Emesis    Objective:    Temp:  [97.4 °F (36.3 °C)-98.5 °F (36.9 °C)] 97.9 °F (36.6 °C)  Pulse:  [63-85] 82  Resp:  [12-26] 16  SpO2:  [95 %-100 %] 98 %  BP: ()/(47-70) 130/70    Physical Exam:    Gen:  No acute distress  Lungs:  Normal respiratory effort.  Abdomen:  Soft, non-tender, non-distended  Operative Site: Dressings Clean, Dry, Intact  SILT throughout  Distal Pulses Palpable    Lab Results   Component Value Date    WBC 7.29 01/27/2017    HGB 13.2 01/27/2017    HCT 39.0 01/27/2017    MCV 88 01/27/2017     01/27/2017         BMP  Lab Results   Component Value Date     02/07/2017    K 4.2 02/07/2017     02/07/2017    CO2 26 02/07/2017    BUN 14 02/07/2017    CREATININE 0.8 02/07/2017    CALCIUM 7.8 (L) 02/07/2017    ANIONGAP 7 (L) 02/07/2017    ESTGFRAFRICA >60.0 02/07/2017    EGFRNONAA >60.0 02/07/2017         A/P: Elena Frances is a 76 y.o. POD 1 s/p TKA    Weight Bearing Status: WBAT    Continue Current Pain Control Regimen  Continue with Current Physical Therapy Regimen  Continue DVT Prophylaxis -     Dispo: PT/OT with discharge home today versus tomorrow pending mobilization        Jai Martínez MD  Orthopaedic Surgery

## 2017-02-08 NOTE — PLAN OF CARE
SW went to meet with pt at bedside to discuss d/c disposition, pt was currently working with PT/OT. JOAQUINA will return shortly.    Leonela Rivera LMSW  [id number]

## 2017-02-08 NOTE — PLAN OF CARE
Patient is awake, alert, and oriented. Skin is warm and dry. Pulses are present in all extremities. Neurovascularly intact. No complaints of pain or discomfort at present.

## 2017-02-08 NOTE — PLAN OF CARE
Problem: Physical Therapy Goal  Goal: Physical Therapy Goal  Goals to be met by: 17     Patient will increase functional independence with mobility by performin. Supine to sit with Set-up [place]-not met  2. Sit to supine with Set-up [place]-not met  3. Sit to stand transfer with Supervision-not met  4. Bed to chair transfer with Stand-by Assistance using appropriate AD-not met  5. Gait x 150 feet with Stand-by Assistance using appropriate AD.-not met  6. Lower extremity exercise program x20-30 reps per handout, with independence-not met   Outcome: Ongoing (interventions implemented as appropriate)  Pt tolerated PM session well today. Pt able to walk 75' using SW with CGA and no LOB. Pt able to tolerate increased exercises but reported pain throughout exercises on mat. Pt will continue to benefit from skilled therapy services and is appropriate for HHPT pending assistance at home.

## 2017-02-08 NOTE — ADDENDUM NOTE
Addendum  created 02/08/17 1321 by Frantz Crawford MD    Charge Capture section accepted, Sign clinical note

## 2017-02-08 NOTE — PLAN OF CARE
Problem: Occupational Therapy Goal  Goal: Occupational Therapy Goal  Goals to be met by: 02/14/17    Patient will increase functional independence with ADLs by performing:    UE Dressing with Supervision.  LE Dressing with Stand-by Assistance.  Grooming while standing at sink with Supervision.  Toileting from bedside commode with Supervision for hygiene and clothing management.   Bathing from edge of bed with Stand-by Assistance.  Supine to sit with Modified [place].  Toilet transfer to bedside commode with Supervision.  Increased functional strength to 5/5 for BUE.  Upper extremity exercise program x15 reps per handout, with independence.   Continue POC.

## 2017-02-08 NOTE — PT/OT/SLP PROGRESS
Physical Therapy  Treatment    Elena Frances   MRN: [id number]   Admitting Diagnosis: Primary osteoarthritis of right knee    PT Received On: 17  PT Start Time: 1600     PT Stop Time: 1639    PT Total Time (min): 39 min       Billable Minutes:  Gait Training8, Therapeutic Activity 8 and Therapeutic Exercise 23    Treatment Type: Treatment  PT/PTA: PT             General Precautions: Standard, fall  Orthopedic Precautions: RLE weight bearing as tolerated   Braces: N/A    Do you have any cultural, spiritual, Jainism conflicts, given your current situation?: none    Subjective:  Communicated with nsg prior to session. Pt agreeable to PT session      Pain Ratin/10  Location - Side: Right  Location - Orientation: posterior  Location: knee  Pain Addressed: Pre-medicate for activity, Nurse notified, Cessation of Activity  Pain Rating Post-Intervention: 4/10    Objective:   Patient found with: FCD, perineural catheter, PCEA, Polar ice    Functional Mobility:  Bed Mobility:   Scooting/Bridging: Stand by Assistance  Supine to Sit: Contact Guard Assistance (leg lift) x2 trials  Sit to Supine: Contact Guard Assistance (leg lift) x2 trials    Transfers:  Sit <> Stand Assistance: Contact Guard Assistance x1 trial from EOB, x1 trial from chair, x1 trial from mat  Sit <> Stand Assistive Device: Standard Walker  Bed <> Chair Technique: Stand Pivot  Bed <> Chair Assistance: Contact Guard Assistance  Bed <> Chair Assistive Device: Standard Walker    Gait:   Gait Distance: 75' x1 trial, 15' x1 trial  Assistance 1: Contact Guard Assistance  Gait Assistive Device: Standard walker  Gait Pattern: 3-point gait  Gait Deviation(s): decreased wayne, increased time in double stance, decreased velocity of limb motion, decreased step length, decreased stride length, decreased toe-to-floor clearance, decreased weight-shifting ability, foot flat     -Pt given cueing for reinforcement of 3-point gait pattern and to maintain  upright posture during ambulation.    Balance:   Static Sit: FAIR+: Able to take MINIMAL challenges from all directions  Dynamic Sit: FAIR+: Maintains balance through MINIMAL excursions of active trunk motion  Static Stand: FAIR+: Takes MINIMAL challenges from all directions  Dynamic stand: FAIR: Needs CONTACT GUARD during gait     Therapeutic Activities and Exercises:  -Pt performed TKR protocol exercises on mat of:  A. AP  B.GS  C.QS  D. SAQ  E. Heel slides  F. Hip abd/add    -Pt unable to perform SLR and LAQ 2/2 increased pain. PT provided visual demonstration of exercises and pt verbalized understanding    -Pt/son educated on:  A. Car t/f  B. Proper breathing during exercises  C. DME mgmt  D. Protecting surgical incision during transfers  E.  Performing HEP to prevent blood clots    AM-PAC 6 CLICK MOBILITY  How much help from another person does this patient currently need?   1 = Unable, Total/Dependent Assistance  2 = A lot, Maximum/Moderate Assistance  3 = A little, Minimum/Contact Guard/Supervision  4 = None, Modified [place]/Independent    Turning over in bed (including adjusting bedclothes, sheets and blankets)?: 3  Sitting down on and standing up from a chair with arms (e.g., wheelchair, bedside commode, etc.): 3  Moving from lying on back to sitting on the side of the bed?: 3  Moving to and from a bed to a chair (including a wheelchair)?: 3  Need to walk in hospital room?: 3  Climbing 3-5 steps with a railing?: 2  Total Score: 17    AM-PAC Raw Score CMS G-Code Modifier Level of Impairment Assistance   6 % Total / Unable   7 - 9 CM 80 - 100% Maximal Assist   10 - 14 CL 60 - 80% Moderate Assist   15 - 19 CK 40 - 60% Moderate Assist   20 - 22 CJ 20 - 40% Minimal Assist   23 CI 1-20% SBA / CGA   24 CH 0% Independent/ Mod I     Patient left supine with all lines intact, call button in reach and nsg notified.    Assessment:  Elena Frances is a 76 y.o. female with a medical diagnosis of  Primary osteoarthritis of right knee and presents with increased activity tolerance and functional mobility. Pt tolerated PM session well today. Pt able to walk 75' using SW with CGA and no LOB. Pt able to tolerate increased exercises but reported pain throughout exercises on mat. Pt will continue to benefit from skilled therapy services and is appropriate for HHPT pending  assistance at home.    Rehab identified problem list/impairments: Rehab identified problem list/impairments: weakness, impaired endurance, impaired self care skills, impaired functional mobilty, gait instability, impaired balance, pain, decreased lower extremity function, decreased ROM, impaired joint extensibility, decreased safety awareness, impaired muscle length, orthopedic precautions, impaired skin    Rehab potential is good.    Activity tolerance: Good    Discharge recommendations: Discharge Facility/Level Of Care Needs: home health PT     Barriers to discharge: Barriers to Discharge: Decreased caregiver support    Equipment recommendations: Equipment Needed After Discharge: bedside commode, shower chair, walker, standard     GOALS:   Physical Therapy Goals        Problem: Physical Therapy Goal    Goal Priority Disciplines Outcome Goal Variances Interventions   Physical Therapy Goal     PT/OT, PT Ongoing (interventions implemented as appropriate)     Description:  Goals to be met by: 17     Patient will increase functional independence with mobility by performin. Supine to sit with Set-up [place]-not met  2. Sit to supine with Set-up [place]-not met  3. Sit to stand transfer with Supervision-not met  4. Bed to chair transfer with Stand-by Assistance using appropriate AD-not met  5. Gait  x 150 feet with Stand-by Assistance using appropriate AD.-not met  6. Lower extremity exercise program x20-30 reps per handout, with independence-not met                 PLAN:    Patient to be seen daily  to address the above  listed problems via gait training, therapeutic activities, therapeutic exercises, neuromuscular re-education  Plan of Care expires: 03/07/17  Plan of Care reviewed with: patient         Alfonso Powell, PT  02/08/2017

## 2017-02-08 NOTE — PT/OT/SLP PROGRESS
Occupational Therapy  Treatment    Elena Frances   MRN: [id number]   Admitting Diagnosis: Primary osteoarthritis of right knee    OT Date of Treatment: 02/08/17   OT Start Time: 1400  OT Stop Time: 1424  OT Total Time (min): 24 min    Billable Minutes:  Self Care/Home Management 24    General Precautions: Standard, fall  Orthopedic Precautions: RLE weight bearing as tolerated  Braces: N/A    Do you have any cultural, spiritual, Restorationism conflicts, given your current situation?: None    Subjective:  Communicated with RN prior to session.      Pain Rating:  (Pt did not rate pain however reported it had gone down since the first attempt to see her for therapy (10/10 pain).)  Location - Side: Right     Location: knee  Pain Addressed: Pre-medicate for activity, Reposition, Nurse notified  Pain Rating Post-Intervention: 0/10 (Did not rate)    Objective:  Patient found with: FCD, perineural catheter, PCEA, Polar ice     Functional Mobility:  Bed Mobility:  Supine to Sit: Moderate Assistance    Transfers:   Sit <> Stand Assistance: Minimum Assistance  Sit <> Stand Assistive Device: Rolling Walker  Toilet Transfer Technique: Stand Pivot  Toilet Transfer Assistance: Minimum Assistance    Functional Ambulation: Pt walked to bathroom from EOB using RW and min A-CGA to perform ADL tasks and returned to bedside chair. Pt tolerated functional mobility well.     Activities of Daily Living:       Grooming Position: Standing at sink (to brush teeth with set-up)  Grooming Level of Assistance: Stand by assistance  Toileting Where Assessed: Toilet  Toileting Level of Assistance: Minimum assistance            Balance:   Static Sit: FAIR+: Able to take MINIMAL challenges from all directions  Dynamic Sit: FAIR+: Maintains balance through MINIMAL excursions of active trunk motion  Static Stand: FAIR: Maintains without assist but unable to take challenges  Dynamic stand: FAIR: Needs CONTACT GUARD during gait        AM-PAC 6 CLICK ADL    How much help from another person does this patient currently need?   1 = Unable, Total/Dependent Assistance  2 = A lot, Maximum/Moderate Assistance  3 = A little, Minimum/Contact Guard/Supervision  4 = None, Modified [place]/Independent    Putting on and taking off regular lower body clothing? : 2  Bathing (including washing, rinsing, drying)?: 2  Toileting, which includes using toilet, bedpan, or urinal? : 3  Putting on and taking off regular upper body clothing?: 3  Taking care of personal grooming such as brushing teeth?: 3  Eating meals?: 3  Total Score: 16     AM-PAC Raw Score CMS G-Code Modifier Level of Impairment Assistance   6 % Total / Unable   7 - 9 CM 80 - 100% Maximal Assist   10 - 14 CL 60 - 80% Moderate Assist   15 - 19 CK 40 - 60% Moderate Assist   20 - 22 CJ 20 - 40% Minimal Assist   23 CI 1-20% SBA / CGA   24 CH 0% Independent/ Mod I     Patient left up in chair with all lines intact, call button in reach and RN notified    ASSESSMENT:  Elena Frances is a 76 y.o. female with a medical diagnosis of Primary osteoarthritis of right knee and presents with decreased strength, self care, and overall functional mobility. Pt progressing well with therapy. She completed bed mobility with mod A and sit <> stand min A. Pt completed functional mobility to bathroom to perform ADL tasks with min A- CGA utilizing RW for support. She completed toileting with min A and grooming task standing at sink with SBA with support of RW. Pt returned to beside chair following treatment session. She would benefit from continued OT to address goals and increase safety during ADL.    Rehab identified problem list/impairments: Rehab identified problem list/impairments: weakness, impaired endurance, impaired self care skills, impaired functional mobilty, pain, orthopedic precautions    Rehab potential is good.    Activity tolerance: Good    Discharge recommendations: Discharge Facility/Level Of Care Needs:  home health OT     Barriers to discharge: Barriers to Discharge: Decreased caregiver support    Equipment recommendations: bedside commode, shower chair, walker, standard     GOALS:   Occupational Therapy Goals        Problem: Occupational Therapy Goal    Goal Priority Disciplines Outcome Interventions   Occupational Therapy Goal     OT, PT/OT     Description:  Goals to be met by: 02/14/17    Patient will increase functional independence with ADLs by performing:    UE Dressing with Supervision.  LE Dressing with Stand-by Assistance.  Grooming while standing at sink with Supervision.  Toileting from bedside commode with Supervision for hygiene and clothing management.   Bathing from  edge of bed with Stand-by Assistance.  Supine to sit with Modified [place].  Toilet transfer to bedside commode with Supervision.  Increased functional strength to 5/5 for BUE.  Upper extremity exercise program x15 reps per handout, with independence.                Plan:  Patient to be seen daily to address the above listed problems via self-care/home management, therapeutic activities, therapeutic exercises  Plan of Care expires:    Plan of Care reviewed with: patient         SUSAN Swartz  02/08/2017

## 2017-02-09 VITALS
HEIGHT: 63 IN | TEMPERATURE: 98 F | WEIGHT: 150 LBS | OXYGEN SATURATION: 96 % | SYSTOLIC BLOOD PRESSURE: 140 MMHG | DIASTOLIC BLOOD PRESSURE: 64 MMHG | BODY MASS INDEX: 26.58 KG/M2 | HEART RATE: 73 BPM | RESPIRATION RATE: 16 BRPM

## 2017-02-09 PROCEDURE — 25000003 PHARM REV CODE 250: Performed by: STUDENT IN AN ORGANIZED HEALTH CARE EDUCATION/TRAINING PROGRAM

## 2017-02-09 PROCEDURE — 25000003 PHARM REV CODE 250: Performed by: NURSE PRACTITIONER

## 2017-02-09 PROCEDURE — 63600175 PHARM REV CODE 636 W HCPCS: Performed by: NURSE PRACTITIONER

## 2017-02-09 PROCEDURE — 97116 GAIT TRAINING THERAPY: CPT

## 2017-02-09 PROCEDURE — 97110 THERAPEUTIC EXERCISES: CPT

## 2017-02-09 PROCEDURE — 99231 SBSQ HOSP IP/OBS SF/LOW 25: CPT | Mod: ,,, | Performed by: ANESTHESIOLOGY

## 2017-02-09 PROCEDURE — 97530 THERAPEUTIC ACTIVITIES: CPT

## 2017-02-09 PROCEDURE — 25000003 PHARM REV CODE 250: Performed by: ANESTHESIOLOGY

## 2017-02-09 RX ADMIN — ASPIRIN 325 MG: 325 TABLET, DELAYED RELEASE ORAL at 08:02

## 2017-02-09 RX ADMIN — OXYCODONE HYDROCHLORIDE 15 MG: 5 TABLET ORAL at 12:02

## 2017-02-09 RX ADMIN — POLYETHYLENE GLYCOL 3350 17 G: 17 POWDER, FOR SOLUTION ORAL at 08:02

## 2017-02-09 RX ADMIN — MONTELUKAST SODIUM 10 MG: 10 TABLET, FILM COATED ORAL at 08:02

## 2017-02-09 RX ADMIN — ROPIVACAINE HYDROCHLORIDE 8 ML/HR: 2 INJECTION, SOLUTION EPIDURAL; INFILTRATION at 02:02

## 2017-02-09 RX ADMIN — ATORVASTATIN CALCIUM 80 MG: 20 TABLET, FILM COATED ORAL at 08:02

## 2017-02-09 RX ADMIN — OXYCODONE HYDROCHLORIDE 15 MG: 5 TABLET ORAL at 11:02

## 2017-02-09 RX ADMIN — ACETAMINOPHEN 1000 MG: 500 TABLET ORAL at 12:02

## 2017-02-09 RX ADMIN — OXYCODONE HYDROCHLORIDE 15 MG: 5 TABLET ORAL at 07:02

## 2017-02-09 RX ADMIN — ATENOLOL 25 MG: 25 TABLET ORAL at 08:02

## 2017-02-09 RX ADMIN — FAMOTIDINE 20 MG: 20 TABLET, FILM COATED ORAL at 08:02

## 2017-02-09 RX ADMIN — VALSARTAN 320 MG: 160 TABLET, FILM COATED ORAL at 08:02

## 2017-02-09 RX ADMIN — CELECOXIB 200 MG: 100 CAPSULE ORAL at 08:02

## 2017-02-09 RX ADMIN — STANDARDIZED SENNA CONCENTRATE AND DOCUSATE SODIUM 1 TABLET: 8.6; 5 TABLET, FILM COATED ORAL at 08:02

## 2017-02-09 RX ADMIN — PAROXETINE HYDROCHLORIDE 25 MG: 25 TABLET, FILM COATED, EXTENDED RELEASE ORAL at 08:02

## 2017-02-09 NOTE — PLAN OF CARE
Pt discharged to home via wheelchair. Pt denies pain at the present time. Condition stable. Follows commands. Pt given prescriptions and copy of discharge home care instructions. Pt verbalized understanding of activity limitations and s/s of when to call the Dr. Pt also given information on followup appointment. All belongings with the pt. Pt verbalized understanding of all discharge instructions.

## 2017-02-09 NOTE — ADDENDUM NOTE
Addendum  created 02/09/17 1245 by Frantz Crawford MD    Charge Capture section accepted, Sign clinical note

## 2017-02-09 NOTE — PLAN OF CARE
Problem: Patient Care Overview  Goal: Plan of Care Review  Outcome: Ongoing (interventions implemented as appropriate)  Patient AAO X3  VSS.  No falls noted fall precaution remain .  Skin intact  Pain assessed no pain noted at this time. Bed in low position, side rails up X2, call light within reach, bed wheels locked.  Safety maintained  NADN.  Will continue to monitor

## 2017-02-09 NOTE — DISCHARGE SUMMARY
[facility]  Discharge Summary      Admit Date: 2/7/2017    Discharge Date and Time:  02/09/2017 8:11 AM    Attending Physician: Rom Moran MD     Reason for Admission: knee oa    Procedures Performed: Procedure(s) (LRB):  REPLACEMENT-KNEE-TOTAL (Right)    Hospital Course:  On 2/7, the patient arrived to the [facility] on the second floor of [facility] for proper pre-operative management.  Upon completion of pre-operative preparation, the patient was taken back to the operative theatre.  A  TKA was performed without complication and the patient was transported to the post anesthesia care unit in stable condition.  After appropriate recovery from the anaesthetic agents used during the surgery, the patient was then transported to the hospital inpatient floor.  The interim of the hospital stay from arrival on the floor up to discharge has been uncomplicated, please see progress notes for daily details of the patient's inpatient stay.     Rodriguez:     Drain:     Pain Management:     - Regional Anesthetics: yes   - Pain Control Issues during IP Stay: Nil    Physical Therapy: Patient progress through expected milestones and currently meets discharge expectations for recommended discharge disposition.        Consults:     Significant Diagnostic Studies:     Final Diagnoses:    Principal Problem: Primary osteoarthritis of right knee   Secondary Diagnoses:   Active Hospital Problems    Diagnosis  POA    *Primary osteoarthritis of right knee [M17.11]  Yes      Resolved Hospital Problems    Diagnosis Date Resolved POA   No resolved problems to display.       Discharged Condition: good    Disposition: Home-Health Care [facility]    Follow Up/Patient Instructions:     Medications:  Reconciled Home Medications:   Current Discharge Medication List      START taking these medications    Details   aspirin 325 MG tablet Take 1 tablet (325 mg total) by mouth 2 (two) times daily.  Qty: 70 tablet,  Refills: 0      docusate sodium (COLACE) 100 MG capsule Take 1 capsule (100 mg total) by mouth 2 (two) times daily as needed for Constipation.  Qty: 60 capsule, Refills: 1      ondansetron (ZOFRAN) 8 MG tablet Take 1 tablet (8 mg total) by mouth every 12 (twelve) hours as needed for Nausea.  Qty: 60 tablet, Refills: 0      oxycodone-acetaminophen (PERCOCET)  mg per tablet Take 1 tablet by mouth every 4 (four) hours as needed for Pain.  Qty: 90 tablet, Refills: 0         CONTINUE these medications which have NOT CHANGED    Details   albuterol (PROVENTIL) 2.5 mg /3 mL (0.083 %) nebulizer solution Take 3 mLs (2.5 mg total) by nebulization every 6 (six) hours as needed for Wheezing.  Qty: 100 each, Refills: 0    Associated Diagnoses: Uncomplicated asthma, unspecified asthma severity      atenolol (TENORMIN) 25 MG tablet Take 1 tablet (25 mg total) by mouth once daily.  Qty: 90 tablet, Refills: 3    Associated Diagnoses: Tremor      atorvastatin (LIPITOR) 80 MG tablet Take 1 tablet (80 mg total) by mouth once daily.  Qty: 90 tablet, Refills: 2    Associated Diagnoses: Hyperlipidemia, unspecified hyperlipidemia type      CALCIUM CARBONATE/VITAMIN D3 (VITAMIN D-3 ORAL) Take by mouth.      CYANOCOBALAMIN, VITAMIN B-12, (VITAMIN B-12 ORAL) Take 2,500 mcg by mouth.      gabapentin (NEURONTIN) 100 MG capsule Take 2 capsules three times daily  Qty: 180 capsule, Refills: 11      omeprazole (PRILOSEC) 20 MG capsule TAKE 1 CAPSULE BY MOUTH TWICE DAILY  Qty: 120 capsule, Refills: 0      paroxetine (PAXIL-CR) 25 MG 24 hr tablet Take 1 tablet (25 mg total) by mouth once daily.  Qty: 90 tablet, Refills: 2    Associated Diagnoses: Other depression      valsartan (DIOVAN) 320 MG tablet Take 1 tablet (320 mg total) by mouth once daily.  Qty: 30 tablet, Refills: 6    Associated Diagnoses: Essential hypertension      albuterol 90 mcg/actuation inhaler Inhale 2 puffs into the lungs every 6 (six) hours as needed for Wheezing.  Qty: 1  "each, Refills: 11    Associated Diagnoses: Cough      blood-glucose meter ([employer] BLOOD GLUCOSE SYSTM) kit Use as instructed  Qty: 1 each, Refills: 0    Comments: [employer]S kit      diclofenac sodium (VOLTAREN) 1 % Gel Apply 2 g topically once daily.  Qty: 1 Tube, Refills: 2    Associated Diagnoses: Lumbar facet arthropathy; Sacroiliitis; Hip arthritis; DDD (degenerative disc disease)      ferrous sulfate 324 mg (65 mg iron) TbEC Take 325 mg by mouth once daily.      fluocinonide (LIDEX) 0.05 % external solution Apply topically 2 (two) times daily.  Qty: 60 mL, Refills: 3    Associated Diagnoses: Contact dermatitis, unspecified contact dermatitis type, unspecified trigger      lancets Misc 1 lancet by Misc.(Non-Drug; Combo Route) route 2 (two) times daily.  Qty: 200 each, Refills: 3    Comments: [employer]S lancets         STOP taking these medications       montelukast (SINGULAIR) 10 mg tablet Comments:   Reason for Stopping:               Discharge Procedure Orders  COMMODE FOR HOME USE   Order Specific Question Answer Comments   Type: Standard    Height: 5' 3" (1.6 m)    Weight: 68 kg (150 lb)    Length of need (1-99 months): 99      WALKER FOR HOME USE   Order Specific Question Answer Comments   Type of Walker: Adult (5'4"-6'6")    With wheels? No    Height: 5' 3" (1.6 m)    Weight: 68 kg (150 lb)    Length of need (1-99 months): 99      TRANSFER TUB BENCH FOR HOME USE   Order Specific Question Answer Comments   Type of Transfer Tub Bench: Padded    Height: 5' 3" (1.6 m)    Weight: 68 kg (150 lb)    Length of need (1-99 months): 99      WALKER FOR HOME USE   Order Specific Question Answer Comments   Type of Walker: Adult (5'4"-6'6")    With wheels? No    Height: 5' 3" (1.6 m)    Weight: 68 kg (150 lb)    Length of need (1-99 months): 99    Vendor: Other (use comments) Duplicate   Expected Date of Delivery: 2/7/2017      3 IN 1 COMMODE FOR HOME USE   Order Specific Question Answer Comments   Type: " "Standard    Height: 5' 3" (1.6 m)    Weight: 72.4 kg (159 lb 11.6 oz)    Does patient have medical equipment at home? none    Length of need (1-99 months): 99    Vendor: Other (use comments) Duplicate   Expected Date of Delivery: 2/7/2017      TRANSFER TUB BENCH FOR HOME USE   Order Specific Question Answer Comments   Type of Transfer Tub Bench: Unpadded    Height: 5' 3" (1.6 m)    Weight: 72.4 kg (159 lb 11.6 oz)    Does patient have medical equipment at home? none    Length of need (1-99 months): 99    Vendor: Other (use comments) Duplicate   Expected Date of Delivery: 2/7/2017      WALKER FOR HOME USE   Order Specific Question Answer Comments   Type of Walker: Adult (5'4"-6'6")    With wheels? No    Height: 5' 3" (1.6 m)    Weight: 72.4 kg (159 lb 11.6 oz)    Length of need (1-99 months): 99    Does patient have medical equipment at home? none    Please check all that apply: Walker will be used for gait training.    Vendor: Other (use comments) Duplicate   Expected Date of Delivery: 2/7/2017      Diet general     Activity as tolerated     Sponge bath only until clinic visit     Keep surgical extremity elevated     Ice to affected area     Lifting restrictions   Order Comments: No Strenuous Exercise or Lifting Greater Than 5 Pounds     Call MD for:  increased confusion or weakness     Call MD for:  persistent dizziness, light-headedness, or visual disturbances     Call MD for:  worsening rash     Call MD for:  severe persistent headache     Call MD for:  difficulty breathing or increased cough     Call MD for:  redness, tenderness, or signs of infection (pain, swelling, redness, odor or green/yellow discharge around incision site)     Call MD for:  severe uncontrolled pain     Call MD for:  persistent nausea and vomiting or diarrhea     Call MD for:  temperature >100.4     Leave dressing on - Keep it clean, dry, and intact until clinic visit       Follow-up Information     Follow up with Rom Moran MD In 2 " weeks.    Specialty:  Orthopedic Surgery    Why:  For wound re-check, For suture removal    Contact information:    [address]  [facility] [id number] [phone]

## 2017-02-09 NOTE — PROGRESS NOTES
Orthopaedic Surgery  Progress Note    Subjective:  Patient examined at bedside  No acute events overnight  Pain currently controlled  Denies any chest pain / SOB / HA / Numbness / Paraesthesias / Nausea / Emesis    Objective:    Temp:  [97.6 °F (36.4 °C)-99.3 °F (37.4 °C)] 98.9 °F (37.2 °C)  Pulse:  [69-77] 72  Resp:  [16-20] 16  SpO2:  [97 %-98 %] 97 %  BP: (112-147)/(54-64) 125/60    Physical Exam:    Gen:  No acute distress  Lungs:  Normal respiratory effort.  Abdomen:  Soft, non-tender, non-distended  Operative Site: Dressings Clean, Dry, Intact  SILT throughout  Distal Pulses Palpable    Lab Results   Component Value Date    WBC 7.29 01/27/2017    HGB 13.2 01/27/2017    HCT 39.0 01/27/2017    MCV 88 01/27/2017     01/27/2017         BMP  Lab Results   Component Value Date     02/07/2017    K 4.2 02/07/2017     02/07/2017    CO2 26 02/07/2017    BUN 14 02/07/2017    CREATININE 0.8 02/07/2017    CALCIUM 7.8 (L) 02/07/2017    ANIONGAP 7 (L) 02/07/2017    ESTGFRAFRICA >60.0 02/07/2017    EGFRNONAA >60.0 02/07/2017         A/P: Elena Frances is a 76 y.o. POD 2 s/p TKA    Weight Bearing Status: WBAT    Continue Current Pain Control Regimen  Continue with Current Physical Therapy Regimen  Continue DVT Prophylaxis -     Dispo: PT/OT with discharge home today        Jai Martínez MD  Orthopaedic Surgery

## 2017-02-09 NOTE — PLAN OF CARE
POD 2 s/p TKA. PT/OT ordered to eval and treat. PT/OT recommended HH PT and HME. Plans to discharge patient home today with home health and HME. Patient has good family support. Patient family to provide transportation after discharge. SW and CM will continue to follow for any additional needs.    Future Appointments  Date Time Provider Department Center   2/21/2017 9:15 AM Yudith Maria NP [facility] ORTHO [facility]      02/09/17 0932   Final Note   Assessment Type Final Discharge Note   Discharge Disposition Home-Health   Discharge planning education complete? Yes   What phone number can be called within the next 1-3 days to see how you are doing after discharge? ([phone])   Hospital Follow Up  Appt(s) scheduled? Yes   Discharge plans and expectations educations in teach back method with documentation complete? Yes   Offered [facility] -- Bedside Delivery? Yes   Discharge/Hospital Encounter Summary to ([facility]) PCP n/a   Referral to Outpatient Case Management complete? n/a   Referral to / orders for Home Health Complete? Yes   30 day supply of medicines given at discharge, if documented non-compliance / non-adherence? n/a   Any social issues identified prior to discharge? No   Did you assess the readiness or willingness of the family or caregiver to support self management of care? Yes

## 2017-02-09 NOTE — PT/OT/SLP PROGRESS
Physical Therapy  Treatment    Elena Frances   MRN: [id number]   Admitting Diagnosis: Primary osteoarthritis of right knee    PT Received On: 17  PT Start Time: 09     PT Stop Time: 1010    PT Total Time (min): 50 min       Billable Minutes:  Gait [id number], Therapeutic Activity 14 and Therapeutic Exercise 23    Treatment Type: Treatment  PT/PTA: PT             General Precautions: Standard, fall  Orthopedic Precautions: RLE weight bearing as tolerated   Braces: N/A    Do you have any cultural, spiritual, Mandaeism conflicts, given your current situation?: none    Subjective:  Communicated with nsg prior to session.      Pain Ratin/10  Location - Side: Right  Location - Orientation: posterior  Location: knee  Pain Addressed: Pre-medicate for activity, Reposition, Cessation of Activity  Pain Rating Post-Intervention: 4/10    Objective:   Patient found with: FCD, perineural catheter, PCEA, Polar ice    Functional Mobility:  Bed Mobility:   Scooting/Bridging: Stand by Assistance  Supine to Sit: Stand by Assistance, Contact Guard Assistance  Sit to Supine: Stand by Assistance    Transfers:  Sit <> Stand Assistance: Supervision x1 trial from EOB, x1 trial from commode, x1 trial from mat  Sit <> Stand Assistive Device: Standard Walker  Bed <> Chair Technique: Stand Pivot  Bed <> Chair Assistance: Stand By Assistance  Bed <> Chair Assistive Device: Standard Walker  Toilet t/f: SBA using SW    Gait:   Gait Distance: 85' x1 trial, 25'x1 trial  Assistance 1: Stand by Assistance  Gait Assistive Device: Standard walker  Gait Pattern: 3-point gait  Gait Deviation(s): decreased wayne, increased time in double stance, decreased velocity of limb motion, decreased step length, decreased stride length, decreased toe-to-floor clearance, decreased weight-shifting ability, foot flat     -Pt demo good adherence to 3-point gait pattern and maintained upright position of head during ambulation.     Balance:   Static  Sit: FAIR+: Able to take MINIMAL challenges from all directions  Dynamic Sit: FAIR+: Maintains balance through MINIMAL excursions of active trunk motion  Static Stand: FAIR+: Takes MINIMAL challenges from all directions  Dynamic stand: FAIR+: Needs CLOSE SUPERVISION during gait and is able to right self with minor LOB     Therapeutic Activities and Exercises:  -Pt performed TKR protocol exercises of:  A. AP  B. QS  C.GS  D. SAQ  E. Heel slides  F. Hip abd/add  G. SLR  H. LAQ    -Pt able to perform repetitions of all exercises with AAROM to complete more difficult exercises. Pt with pain during exercises and limited to 10-15 reps for more difficult exercises.    -Pt educated on:  1. Car t/f  2. Safety when mobilizing at home  3. Energy conservation   4. DME mgmt  5. Performing HEP at home to prevent blood clots  6. Coordinating care from family members/possibility of hiring help during the day    -Pt ROM meausred at -8 degrees ext to 80 degrees flexion      AM-PAC 6 CLICK MOBILITY  How much help from another person does this patient currently need?   1 = Unable, Total/Dependent Assistance  2 = A lot, Maximum/Moderate Assistance  3 = A little, Minimum/Contact Guard/Supervision  4 = None, Modified [place]/Independent    Turning over in bed (including adjusting bedclothes, sheets and blankets)?: 3  Sitting down on and standing up from a chair with arms (e.g., wheelchair, bedside commode, etc.): 4  Moving from lying on back to sitting on the side of the bed?: 3  Moving to and from a bed to a chair (including a wheelchair)?: 4  Need to walk in hospital room?: 4  Climbing 3-5 steps with a railing?: 3  Total Score: 21    AM-PAC Raw Score CMS G-Code Modifier Level of Impairment Assistance   6 % Total / Unable   7 - 9 CM 80 - 100% Maximal Assist   10 - 14 CL 60 - 80% Moderate Assist   15 - 19 CK 40 - 60% Moderate Assist   20 - 22 CJ 20 - 40% Minimal Assist   23 CI 1-20% SBA / CGA   24 CH 0% Independent/ Mod I      Patient left up in chair with all lines intact, call button in reach and nsg notified.    Assessment:  Elena Frances is a 76 y.o. female with a medical diagnosis of Primary osteoarthritis of right knee and presents with improved activity tolerance. Pt tolerated session well today. Pt performed all transfers without assistance from PT but was given leg lift assist to reach EOB sitting after performing supine exercises. Pt with no LOB during ambulation and transfers using SW. Pt verbalized understanding of car t/f and was given additional literature in [place] about home modification and safety when returning home. Pt safe to d/c with HHPT at this time.    Rehab identified problem list/impairments: Rehab identified problem list/impairments: weakness, impaired endurance, impaired self care skills, impaired functional mobilty, gait instability, impaired balance, pain, decreased safety awareness, decreased lower extremity function, decreased ROM, impaired joint extensibility, impaired muscle length, orthopedic precautions, impaired skin    Rehab potential is good.    Activity tolerance: Good    Discharge recommendations: Discharge Facility/Level Of Care Needs: home health PT     Barriers to discharge: Barriers to Discharge: Decreased caregiver support    Equipment recommendations: Equipment Needed After Discharge: bedside commode, shower chair, walker, standard     GOALS:   Physical Therapy Goals     Not on file      Multidisciplinary Problems (Resolved)        Problem: Physical Therapy Goal    Goal Priority Disciplines Outcome Goal Variances Interventions   Physical Therapy Goal   (Resolved)     PT/OT, PT Outcome(s) achieved     Description:  Goals to be met by: 17     Patient will increase functional independence with mobility by performin. Supine to sit with Set-up [place]- met  2. Sit to supine with Set-up [place]- met  3. Sit to stand transfer with Supervision- met  4. Bed to chair  transfer with Stand-by Assistance using appropriate AD- met  5. Gait  x 150 feet with Stand-by Assistance using appropriate AD.-not met  6. Lower extremity exercise program x20-30 reps per handout, with independence-not met                  PLAN:    Patient to be seen daily  to address the above listed problems via gait training, therapeutic activities, therapeutic exercises, neuromuscular re-education  Plan of Care expires: 03/07/17  Plan of Care reviewed with: patient         Alfonso Powell, PT  02/09/2017

## 2017-02-09 NOTE — ADDENDUM NOTE
Addendum  created 02/09/17 1548 by Livier Alatorre RN    Anesthesia Event edited, Procedure Event Log accessed

## 2017-02-09 NOTE — ADDENDUM NOTE
Addendum  created 02/09/17 1231 by Livier Alatorre RN    Flowsheet accepted, LDA properties accepted, Sign clinical note

## 2017-02-09 NOTE — PROGRESS NOTES
Pt resting comfortably.  Adductor PNC has been paused since this AM.  Reports adequate pain relief.  Catheter site CDI, removed without difficulty, tip intact.  Pt tolerated well.  Educated regarding continued pain management.  Understanding verbalized.

## 2017-02-09 NOTE — PLAN OF CARE
Problem: Physical Therapy Goal  Goal: Physical Therapy Goal  Goals to be met by: 17     Patient will increase functional independence with mobility by performin. Supine to sit with Set-up [place]- met  2. Sit to supine with Set-up [place]- met  3. Sit to stand transfer with Supervision- met  4. Bed to chair transfer with Stand-by Assistance using appropriate AD- met  5. Gait x 150 feet with Stand-by Assistance using appropriate AD.-not met  6. Lower extremity exercise program x20-30 reps per handout, with independence-not met   Outcome: Outcome(s) achieved Date Met:  17  Pt tolerated session well today. Pt performed all transfers without assistance from PT but was given leg lift assist to reach EOB sitting after performing supine exercises. Pt with no LOB during ambulation and transfers using SW. Pt verbalized understanding of car t/f and was given additional literature in [place] about home modification and safety when returning home. Pt safe to d/c with HHPT at this time.

## 2017-02-09 NOTE — PLAN OF CARE
Per PT/OT, pt will need a standard walker and bedside commode upon d/c. JOAQUINA Shaw at [facility] ([id number]) to coordinate delivery of DME to pt's room prior to d/c.     Leonela Rivera LMSW  [id number]

## 2017-02-09 NOTE — ANESTHESIA POST-OP PAIN MANAGEMENT
Acute Pain Service and Perioperative Surgical Home Progress Note    HPI  Elena Frances is a 76 y.o., female,     Interval history      Surgery:  Procedure(s) (LRB):  REPLACEMENT-KNEE-TOTAL (Right)    Post Op Day #: 2    Catheter type: Perineural Adductor Canal    Infusion type: Ropivacaine 0.2%  8 ml/hr basal    Problem List:    Active Hospital Problems    Diagnosis  POA    *Primary osteoarthritis of right knee [M17.11]  Yes      Resolved Hospital Problems    Diagnosis Date Resolved POA   No resolved problems to display.       Subjective:       General appearance of alert, oriented, no complaints   Pain with rest: 2    Numbers   Pain with movement: 4    Numbers   Side Effects    1. Pruritis No    2. Nausea No    3. Motor Blockade No, 0=Ability to raise lower extremities off bed    4. Sedation No, 1=awake and alert    Schedule Medications:    aspirin  325 mg Oral BID    atenolol  25 mg Oral Daily    atorvastatin  80 mg Oral Daily    celecoxib  200 mg Oral Daily    famotidine  20 mg Oral BID    montelukast  10 mg Oral Daily    paroxetine  25 mg Oral Daily    polyethylene glycol  17 g Oral Daily    pregabalin  75 mg Oral QHS    senna-docusate 8.6-50 mg  1 tablet Oral BID    valsartan  320 mg Oral Daily        Continuous Infusions:   ropivacaine (PF) 2 mg/ml (0.2%) 8 mL/hr (02/09/17 0206)    ropivacaine          PRN Medications:  albuterol sulfate, bisacodyl, hydrALAZINE, morphine, morphine, morphine, naloxone, ondansetron, oxycodone, oxycodone, oxycodone, promethazine (PHENERGAN) IVPB, ramelteon, ropivacaine, sodium chloride 0.9%       Antibiotics:  Antibiotics     None             Objective:     Catheter site clean, dry, intact          Vital Signs (Most Recent):  Temp: 37 °C (98.6 °F) (02/09/17 0435)  Pulse: 70 (02/09/17 0435)  Resp: 18 (02/09/17 0435)  BP: 121/71 (02/09/17 0435)  SpO2: 98 % (02/09/17 0435) Vital Signs Range (Last 24H):  Temp:  [36.6 °C (97.9 °F)-37.4 °C (99.3 °F)]   Pulse:   [70-77]   Resp:  [16-20]   BP: (112-129)/(54-71)   SpO2:  [97 %-98 %]          I & O (Last 24H):  Intake/Output Summary (Last 24 hours) at 02/09/17 0739  Last data filed at 02/08/17 1549   Gross per 24 hour   Intake             1612 ml   Output              725 ml   Net              887 ml       Physical Exam:    GA: Alert, comfortable, no acute distress.   Pulmonary: Clear to auscultation A/P/L. No wheezing, crackles, or rhonchi.  Cardiac: RRR S1 & S2 w/o rubs/murmurs/gallops.   Abdominal:Bowel sounds present. No tenderness to palpation or distension. No appreciable hepatosplenomegaly.   Skin: No jaundice, rashes, or visible lesions.         Laboratory:  CBC: No results for input(s): WBC, RBC, HGB, HCT, PLT, MCV, MCH, MCHC in the last 72 hours.    BMP: Recent Labs      02/07/17   0825   NA  141   K  4.2   CO2  26   CL  108   BUN  14   CREATININE  0.8   GLU  126*   CALCIUM  7.8*       No results for input(s): INR, PROTIME, APTT in the last 72 hours.    Invalid input(s): PT      Anticoagulant dose  mg.    Assessment:         Pain control adequate    Plan:     1) Pain: Adductor canal perineural catheter stopped this AM. Continue multimodal pain regimen with acetaminophen, Celebrex, Lyrica, and prn oxycodone given.  Will continue to monitor.    2) HTN: Atenolol 25 mg daily, Valsartan 320 mg   3) Asthma: albuterol PRN and singulair   4) FEN/GI: Tolerating full diet.  + flatus. No BM.   5) Dispo: Pt working well with PT/OT. Case management and SW for placement. Plan for D/C this afternoon.     Evaluator John Paul Calhoun MD      I have seen the patient, reviewed the Resident's assessment and plan. I have personally interviewed and examined the patient at bedside and: agree with the findings. She is doing much better this AM.  Pain still controlled after stopping PNC.  No other significant issues.  D/C home with  today    JAIME Crawford MD  Staff Anesthesiologist  Perioperative Surgical Home and Acute Pain  Service

## 2017-02-09 NOTE — PLAN OF CARE
Pt is progressing with plan of care. Frequent rounds made to assess pain and safety. Pt's pain controlled with pain medication ordered at this time. FCDs in place. Bed locked and at lowest position. Side rails up x 2. Call light within reach. Will continue to monitor.

## 2017-02-09 NOTE — PT/OT/SLP DISCHARGE
Physical Therapy Discharge Summary    Elena Frances  MRN: [id number]   Primary osteoarthritis of right knee   Patient Discharged from acute Physical Therapy on 17.  Please refer to prior PT noted date on 17 for functional status.     Assessment:   Patient was discharge unexpectedly.  Information required to complete and accurate discharge summary is unknown.  Refer to therapy initial evaluation and last progress note for initial and most recent functional status and goal achievement.  Recommendations made may be found in medical record.  GOALS:   Physical Therapy Goals     Not on file      Multidisciplinary Problems (Resolved)        Problem: Physical Therapy Goal    Goal Priority Disciplines Outcome Goal Variances Interventions   Physical Therapy Goal   (Resolved)     PT/OT, PT Outcome(s) achieved     Description:  Goals to be met by: 17     Patient will increase functional independence with mobility by performin. Supine to sit with Set-up [facility]- met  2. Sit to supine with Set-up [facility]- met  3. Sit to stand transfer with Supervision- met  4. Bed to chair transfer with Stand-by Assistance using appropriate AD- met  5. Gait  x 150 feet with Stand-by Assistance using appropriate AD.-not met  6. Lower extremity exercise program x20-30 reps per handout, with independence-not met                Reasons for Discontinuation of Therapy Services  Transfer to alternate level of care.      Plan:  Patient Discharged to: Home with Home Health Service   Alfonso Powell, PT  .

## 2017-02-09 NOTE — PLAN OF CARE
Received call from glenn [facility]ensen to deliver standard walker and bedside commode to room 542b, equipment delivered

## 2017-02-13 ENCOUNTER — PATIENT OUTREACH (OUTPATIENT)
Dept: ADMINISTRATIVE | Facility: CLINIC | Age: 77
End: 2017-02-13
Payer: MEDICARE

## 2017-02-13 NOTE — PATIENT INSTRUCTIONS
Total Knee Replacement  During total knee replacement surgery, your damaged knee joint is replaced with an artificial joint, called a prosthesis. This surgery almost always reduces joint pain and improves your quality of life.     The parts of the prosthesis are secured to the bones of the knee. Together they form the new joint.   Before your surgery  You will most likely arrive at the hospital on the morning of the surgery. Be sure to follow all of your doctors instructions on preparing for surgery:  · Stop eating or drinking 10 hours before surgery.  · If you take a daily medicine, ask if you should still take it the morning of surgery.  · At the hospital, your temperature, pulse, breathing, and blood pressure will be checked.  · An IV (intravenous) line will be started to provide fluids and medicines needed during surgery.  The surgical procedure  When the surgical team is ready, youll be taken to the operating room. There youll be given anesthesia to help you sleep through surgery, or to make you numb from the waist down. Then an incision is made on the front or side of your knee. Any damaged bone is cleaned away, and the new joint components are put into place. The incision is closed with surgical staples or stitches.  After your surgery  After surgery, youll be sent to the recovery room. When you are fully awake, youll be moved to your room. The nurses will give you medicines to ease your pain. You may have a catheter (small tube) in your bladder. A continuous passive motion machine may be used on your knee to keep it from getting stiff. A sequential compression machine may be used to prevent blood clots by gently squeezing then releasing your leg. You may be given medicine to prevent blood clots. Soon, healthcare providers will help you get up and moving.  When to call your doctor  Once at home, call your doctor if you have any of the symptoms below:  · An increase in knee pain  · Pain or swelling in the  calf or leg  · Unusual redness, heat, or drainage at the incision site  · Fever of 100.4°F (38°C) or higher  · Shaking chills  · Trouble breathing or chest pain (call 911)   © 8793-8135 [employer]. [address] [place] [address]. All rights reserved. This information is not intended as a substitute for professional medical care. Always follow your healthcare professional's instructions.

## 2017-02-15 ENCOUNTER — TELEPHONE (OUTPATIENT)
Dept: ORTHOPEDICS | Facility: CLINIC | Age: 77
End: 2017-02-15

## 2017-02-15 NOTE — TELEPHONE ENCOUNTER
I spoke with Kerri regarding call and I informed her per Dr. Moran that it'ss aquacel dressing and it should stay on until her post-op visit, that she an reinforce with 4x4's and ACE if needed. Kerri verbalized understanding.

## 2017-02-15 NOTE — TELEPHONE ENCOUNTER
----- Message from Rom Moran MD sent at 2/15/2017 10:30 AM CST -----  Contact: boris( [facility]) [phone]  It is aquacel dressing.  It should stay on until post-op visit.  Can reinforce with 4x4's and ACE if needed.    ----- Message -----     From: Zayda Ferguson LPN     Sent: 2/15/2017  10:24 AM       To: Rom Moran MD, Yudith Maria NP        ----- Message -----     From: Sujatha Gore     Sent: 2/15/2017   9:59 AM       To: Crow Zurita Staff, Dean ELDRIDGE Staff    She is calling to speak with a staff about the  Pt wound dressing it is saturated and she need further instructions due to the wound care order

## 2017-02-21 ENCOUNTER — OFFICE VISIT (OUTPATIENT)
Dept: ORTHOPEDICS | Facility: CLINIC | Age: 77
End: 2017-02-21
Payer: MEDICARE

## 2017-02-21 ENCOUNTER — HOSPITAL ENCOUNTER (OUTPATIENT)
Dept: RADIOLOGY | Facility: HOSPITAL | Age: 77
Discharge: HOME | End: 2017-02-21
Attending: NURSE PRACTITIONER
Payer: MEDICARE

## 2017-02-21 VITALS
HEART RATE: 75 BPM | WEIGHT: 158.81 LBS | BODY MASS INDEX: 28.14 KG/M2 | HEIGHT: 63 IN | SYSTOLIC BLOOD PRESSURE: 107 MMHG | DIASTOLIC BLOOD PRESSURE: 62 MMHG

## 2017-02-21 DIAGNOSIS — Z96.651 S/P TOTAL KNEE REPLACEMENT USING CEMENT, RIGHT: ICD-10-CM

## 2017-02-21 DIAGNOSIS — Z96.651 S/P TOTAL KNEE REPLACEMENT USING CEMENT, RIGHT: Primary | ICD-10-CM

## 2017-02-21 PROCEDURE — 99024 POSTOP FOLLOW-UP VISIT: CPT | Mod: S$GLB,,, | Performed by: NURSE PRACTITIONER

## 2017-02-21 PROCEDURE — 73560 X-RAY EXAM OF KNEE 1 OR 2: CPT | Mod: TC,59,LT

## 2017-02-21 PROCEDURE — 99999 PR PBB SHADOW E&M-EST. PATIENT-LVL IV: CPT | Mod: PBBFAC,,, | Performed by: NURSE PRACTITIONER

## 2017-02-21 PROCEDURE — 73562 X-RAY EXAM OF KNEE 3: CPT | Mod: 26,RT,, | Performed by: RADIOLOGY

## 2017-02-21 PROCEDURE — 73560 X-RAY EXAM OF KNEE 1 OR 2: CPT | Mod: 26,59,LT, | Performed by: RADIOLOGY

## 2017-02-21 RX ORDER — OXYCODONE AND ACETAMINOPHEN 10; 325 MG/1; MG/1
1 TABLET ORAL EVERY 4 HOURS PRN
Qty: 90 TABLET | Refills: 0 | Status: SHIPPED | OUTPATIENT
Start: 2017-02-21 | End: 2019-07-05

## 2017-02-21 NOTE — PROGRESS NOTES
Elena Frances presents for initial post-operative visit following a right total knee arthroplasty performed by Dr. Moran on 2/7/2017. Tolerating pain medication well.      Exam:   There were no vitals taken for this visit.   Ambulating well with assistive device.  Incision is clean and dry without drainage or erythema. ROM:0-90    Initial post-operative radiographs reviewed today revealing a well fixed and aligned prosthesis.    A/P:  2 weeks s/p right total knee replacement.   - The patient was advised to keep the incision clean and dry for the next 24 hours after which she may wash the area with antibacterial soap in the shower. Will not submerge until the incision is completely healed.   - Outpatient PT: orders entered for [facility]. Pt states that [employer] needs a referral for transportation to the therapy appointments, but she's not sure what the letter needs to say or where to send it. I told her to f/u with [facility] and let me know the information so I can take care of that for her  - Continue aspirin for 1 month from surgery.  - Pain medication refilled  - Follow up in 4 weeks with Dr. Moran. Pt will call clinic with problems/concerns.

## 2017-03-01 ENCOUNTER — TELEPHONE (OUTPATIENT)
Dept: ORTHOPEDICS | Facility: CLINIC | Age: 77
End: 2017-03-01

## 2017-03-01 NOTE — TELEPHONE ENCOUNTER
----- Message from Trino Curiel sent at 3/1/2017 10:14 AM CST -----  Contact: self/home  Pt would like to speak with you. This is all the information that was provided by the pt.

## 2017-03-06 RX ORDER — NABUMETONE 750 MG/1
750 TABLET, FILM COATED ORAL 2 TIMES DAILY
Qty: 60 TABLET | Refills: 3 | Status: SHIPPED | OUTPATIENT
Start: 2017-03-06 | End: 2017-05-18

## 2017-03-07 RX ORDER — OMEPRAZOLE 20 MG/1
CAPSULE, DELAYED RELEASE ORAL
Qty: 120 CAPSULE | Refills: 0 | Status: SHIPPED | OUTPATIENT
Start: 2017-03-07 | End: 2017-04-12 | Stop reason: SDUPTHER

## 2017-03-27 ENCOUNTER — OFFICE VISIT (OUTPATIENT)
Dept: ORTHOPEDICS | Facility: CLINIC | Age: 77
End: 2017-03-27
Payer: MEDICARE

## 2017-03-27 VITALS — WEIGHT: 154.63 LBS | TEMPERATURE: 98 F | HEIGHT: 63 IN | BODY MASS INDEX: 27.4 KG/M2

## 2017-03-27 DIAGNOSIS — Z98.890 POSTOPERATIVE STATE: Primary | ICD-10-CM

## 2017-03-27 PROCEDURE — 99024 POSTOP FOLLOW-UP VISIT: CPT | Mod: S$GLB,,, | Performed by: ORTHOPAEDIC SURGERY

## 2017-03-27 PROCEDURE — 99999 PR PBB SHADOW E&M-EST. PATIENT-LVL II: CPT | Mod: PBBFAC,,, | Performed by: ORTHOPAEDIC SURGERY

## 2017-03-27 NOTE — PROGRESS NOTES
Elena Frances presents for post-operative evaluation.  She is now 6 weeks s/p  right knee replacement. The wound is healing well with no signs of erythema or warmth.  There is no drainage.  No clinical signs or symptoms of infection are present.     ROM: 0-120    She has completed outpatient PT.  Future dental prophylaxis was discussed.    F/U in 1 year.

## 2017-04-12 ENCOUNTER — LAB VISIT (OUTPATIENT)
Dept: LAB | Facility: HOSPITAL | Age: 77
End: 2017-04-12
Attending: FAMILY MEDICINE
Payer: MEDICARE

## 2017-04-12 ENCOUNTER — OFFICE VISIT (OUTPATIENT)
Dept: FAMILY MEDICINE | Facility: CLINIC | Age: 77
End: 2017-04-12
Payer: MEDICARE

## 2017-04-12 VITALS
HEIGHT: 63 IN | WEIGHT: 154.31 LBS | HEART RATE: 84 BPM | TEMPERATURE: 98 F | DIASTOLIC BLOOD PRESSURE: 72 MMHG | BODY MASS INDEX: 27.34 KG/M2 | SYSTOLIC BLOOD PRESSURE: 126 MMHG

## 2017-04-12 DIAGNOSIS — Z23 NEED FOR PROPHYLACTIC VACCINATION AND INOCULATION AGAINST INFLUENZA: ICD-10-CM

## 2017-04-12 DIAGNOSIS — R53.83 FATIGUE, UNSPECIFIED TYPE: ICD-10-CM

## 2017-04-12 DIAGNOSIS — D50.9 IRON DEFICIENCY ANEMIA, UNSPECIFIED IRON DEFICIENCY ANEMIA TYPE: ICD-10-CM

## 2017-04-12 DIAGNOSIS — D50.9 IRON DEFICIENCY ANEMIA, UNSPECIFIED IRON DEFICIENCY ANEMIA TYPE: Primary | ICD-10-CM

## 2017-04-12 DIAGNOSIS — M25.561 RIGHT KNEE PAIN, UNSPECIFIED CHRONICITY: ICD-10-CM

## 2017-04-12 DIAGNOSIS — Z29.9 PREVENTIVE MEASURE: ICD-10-CM

## 2017-04-12 DIAGNOSIS — E11.9 DIABETES MELLITUS WITHOUT COMPLICATION: ICD-10-CM

## 2017-04-12 LAB
ANION GAP SERPL CALC-SCNC: 9 MMOL/L
BASOPHILS # BLD AUTO: 0.03 K/UL
BASOPHILS NFR BLD: 0.3 %
BUN SERPL-MCNC: 24 MG/DL
CALCIUM SERPL-MCNC: 9.8 MG/DL
CHLORIDE SERPL-SCNC: 101 MMOL/L
CO2 SERPL-SCNC: 25 MMOL/L
CREAT SERPL-MCNC: 1 MG/DL
DIFFERENTIAL METHOD: ABNORMAL
EOSINOPHIL # BLD AUTO: 0.2 K/UL
EOSINOPHIL NFR BLD: 1.8 %
ERYTHROCYTE [DISTWIDTH] IN BLOOD BY AUTOMATED COUNT: 14.8 %
EST. GFR  (AFRICAN AMERICAN): >60 ML/MIN/1.73 M^2
EST. GFR  (NON AFRICAN AMERICAN): 55.2 ML/MIN/1.73 M^2
GLUCOSE SERPL-MCNC: 106 MG/DL
HCT VFR BLD AUTO: 38.6 %
HGB BLD-MCNC: 12.3 G/DL
LYMPHOCYTES # BLD AUTO: 2.4 K/UL
LYMPHOCYTES NFR BLD: 27.6 %
MCH RBC QN AUTO: 28.8 PG
MCHC RBC AUTO-ENTMCNC: 31.9 %
MCV RBC AUTO: 90 FL
MONOCYTES # BLD AUTO: 0.6 K/UL
MONOCYTES NFR BLD: 7.1 %
NEUTROPHILS # BLD AUTO: 5.5 K/UL
NEUTROPHILS NFR BLD: 63 %
PLATELET # BLD AUTO: 279 K/UL
PMV BLD AUTO: 11.1 FL
POTASSIUM SERPL-SCNC: 5 MMOL/L
RBC # BLD AUTO: 4.27 M/UL
SODIUM SERPL-SCNC: 135 MMOL/L
TSH SERPL DL<=0.005 MIU/L-ACNC: 1.45 UIU/ML
WBC # BLD AUTO: 8.69 K/UL

## 2017-04-12 PROCEDURE — 4010F ACE/ARB THERAPY RXD/TAKEN: CPT | Mod: S$GLB,,, | Performed by: FAMILY MEDICINE

## 2017-04-12 PROCEDURE — 83036 HEMOGLOBIN GLYCOSYLATED A1C: CPT

## 2017-04-12 PROCEDURE — 3044F HG A1C LEVEL LT 7.0%: CPT | Mod: S$GLB,,, | Performed by: FAMILY MEDICINE

## 2017-04-12 PROCEDURE — 99499 UNLISTED E&M SERVICE: CPT | Mod: S$PBB,,, | Performed by: FAMILY MEDICINE

## 2017-04-12 PROCEDURE — 1125F AMNT PAIN NOTED PAIN PRSNT: CPT | Mod: S$GLB,,, | Performed by: FAMILY MEDICINE

## 2017-04-12 PROCEDURE — 85025 COMPLETE CBC W/AUTO DIFF WBC: CPT

## 2017-04-12 PROCEDURE — 3078F DIAST BP <80 MM HG: CPT | Mod: S$GLB,,, | Performed by: FAMILY MEDICINE

## 2017-04-12 PROCEDURE — G0009 ADMIN PNEUMOCOCCAL VACCINE: HCPCS | Mod: S$GLB,,, | Performed by: FAMILY MEDICINE

## 2017-04-12 PROCEDURE — 84443 ASSAY THYROID STIM HORMONE: CPT

## 2017-04-12 PROCEDURE — 36415 COLL VENOUS BLD VENIPUNCTURE: CPT | Mod: PO

## 2017-04-12 PROCEDURE — 80048 BASIC METABOLIC PNL TOTAL CA: CPT

## 2017-04-12 PROCEDURE — 99999 PR PBB SHADOW E&M-EST. PATIENT-LVL III: CPT | Mod: PBBFAC,,, | Performed by: FAMILY MEDICINE

## 2017-04-12 PROCEDURE — 3074F SYST BP LT 130 MM HG: CPT | Mod: S$GLB,,, | Performed by: FAMILY MEDICINE

## 2017-04-12 PROCEDURE — 90670 PCV13 VACCINE IM: CPT | Mod: S$GLB,,, | Performed by: FAMILY MEDICINE

## 2017-04-12 PROCEDURE — 1160F RVW MEDS BY RX/DR IN RCRD: CPT | Mod: S$GLB,,, | Performed by: FAMILY MEDICINE

## 2017-04-12 PROCEDURE — G0008 ADMIN INFLUENZA VIRUS VAC: HCPCS | Mod: S$GLB,,, | Performed by: FAMILY MEDICINE

## 2017-04-12 PROCEDURE — 1157F ADVNC CARE PLAN IN RCRD: CPT | Mod: S$GLB,,, | Performed by: FAMILY MEDICINE

## 2017-04-12 PROCEDURE — 90662 IIV NO PRSV INCREASED AG IM: CPT | Mod: S$GLB,,, | Performed by: FAMILY MEDICINE

## 2017-04-12 PROCEDURE — 99214 OFFICE O/P EST MOD 30 MIN: CPT | Mod: S$GLB,,, | Performed by: FAMILY MEDICINE

## 2017-04-12 PROCEDURE — 1159F MED LIST DOCD IN RCRD: CPT | Mod: S$GLB,,, | Performed by: FAMILY MEDICINE

## 2017-04-12 RX ORDER — CELECOXIB 200 MG/1
200 CAPSULE ORAL 2 TIMES DAILY
Qty: 60 CAPSULE | Refills: 0 | Status: SHIPPED | OUTPATIENT
Start: 2017-04-12 | End: 2017-05-18 | Stop reason: SDUPTHER

## 2017-04-12 RX ORDER — VALSARTAN 160 MG/1
TABLET ORAL
COMMUNITY
Start: 2017-03-30 | End: 2017-07-26 | Stop reason: SDUPTHER

## 2017-04-12 RX ORDER — OMEPRAZOLE 40 MG/1
40 CAPSULE, DELAYED RELEASE ORAL DAILY
Qty: 30 CAPSULE | Refills: 11 | Status: SHIPPED | OUTPATIENT
Start: 2017-04-12 | End: 2017-07-26

## 2017-04-12 NOTE — MR AVS SNAPSHOT
[facility]  [address] [facility], [address]  [place] [address]  Phone: [phone]  Fax: [phone]                  Elena Frances   2017 3:00 PM   Office Visit    Descripción:  Female : 1940   Personal Médico:  Michael Tinoco MD   Departamento:  [facility]           Razón de la jessee     Cough     Back Pain     Fatigue           Diagnósticos de Esta Visita        Comentarios    Iron deficiency anemia, unspecified iron deficiency anemia type    -  Primario     Right knee pain, unspecified chronicity         Fatigue, unspecified type         Diabetes mellitus without complication         Preventive measure                Lista de tareas           Metas (5 Years of Data)     Ninguna      Recetas para recoger        Disp Refills Start End    celecoxib (CELEBREX) 200 MG capsule 60 capsule 0 2017    Take 1 capsule (200 mg total) by mouth 2 (two) times daily. - Oral    [facility] [id number] - [place], [place] - [address] AT [facility] No. de tlfo: #: [phone]       omeprazole (PRILOSEC) 40 MG capsule 30 capsule 11 2017     Take 1 capsule (40 mg total) by mouth once daily. - Oral    [facility] [id number] [place] - [address] AT [facility] No. de tlfo: #: [phone]         Ochschinmay en Llamada     Kelsieschinmay En Llamada Línea de Enfermeras - Asistencia   Enfermeras registradas de [facility] pueden ayudarle a reservar genny jessee, proveer educación para la jennifer, asesoría clínica, y otros servicios de asesoramiento.   Llame para saul servicio gratuito a [phone].             Medicamentos           Mensaje sobre Medicamentos     Verificar los cambios y / o adiciones a montalvo régimen de medicación son los mismos que discutir con montalvo médico. Si cualquiera de estos cambios o adiciones son incorrectos, por favor notifique a montalvo proveedor de  atención médica.        EMPEZAR a lisa estos medicamentos NUEVOS        Refills    celecoxib (CELEBREX) 200 MG capsule 0    Sig: Take 1 capsule (200 mg total) by mouth 2 (two) times daily.    Categoría: Normal    Vía: Oral      CAMBIAR la forma en que está tomando estos medicamentos     Start Taking Instead of    omeprazole (PRILOSEC) 40 MG capsule omeprazole (PRILOSEC) 20 MG capsule    Dosage:  Take 1 capsule (40 mg total) by mouth once daily. Dosage:  TAKE 1 CAPSULE BY MOUTH TWICE DAILY    Reason for Change:  Reorder       DEJAR de lisa estos medicamentos     albuterol (PROVENTIL) 2.5 mg /3 mL (0.083 %) nebulizer solution Take 3 mLs (2.5 mg total) by nebulization every 6 (six) hours as needed for Wheezing.           Verifique que la siguiente lista de medicamentos es genny representación exacta de los medicamentos que está tomando actualmente. Si no hay ningunos reportados, la lista puede estar en bourgeois. Si no es correcta, por favor póngase en contacto con montalvo proveedor de atención médica. Lleve esta lista con usted en juancarlos de emergencia.           Medicamentos Actuales     albuterol 90 mcg/actuation inhaler Inhale 2 puffs into the lungs every 6 (six) hours as needed for Wheezing.    atenolol (TENORMIN) 25 MG tablet Take 1 tablet (25 mg total) by mouth once daily.    atorvastatin (LIPITOR) 80 MG tablet Take 1 tablet (80 mg total) by mouth once daily.    CALCIUM CARBONATE/VITAMIN D3 (VITAMIN D-3 ORAL) Take by mouth.    gabapentin (NEURONTIN) 100 MG capsule Take 2 capsules three times daily    nabumetone (RELAFEN) 750 MG tablet Take 1 tablet (750 mg total) by mouth 2 (two) times daily.    omeprazole (PRILOSEC) 40 MG capsule Take 1 capsule (40 mg total) by mouth once daily.    paroxetine (PAXIL-CR) 25 MG 24 hr tablet Take 1 tablet (25 mg total) by mouth once daily.    valsartan (DIOVAN) 160 MG tablet     aspirin 325 MG tablet Take 1 tablet (325 mg total) by mouth 2 (two) times daily.    blood-glucose meter  "([employer] BLOOD GLUCOSE SYSTM) kit Use as instructed    celecoxib (CELEBREX) 200 MG capsule Take 1 capsule (200 mg total) by mouth 2 (two) times daily.    CYANOCOBALAMIN, VITAMIN B-12, (VITAMIN B-12 ORAL) Take 2,500 mcg by mouth.    ferrous sulfate 324 mg (65 mg iron) TbEC Take 325 mg by mouth once daily.    fluocinonide (LIDEX) 0.05 % external solution Apply topically 2 (two) times daily.    lancets Misc 1 lancet by Misc.(Non-Drug; Combo Route) route 2 (two) times daily.    ondansetron (ZOFRAN) 8 MG tablet Take 1 tablet (8 mg total) by mouth every 12 (twelve) hours as needed for Nausea.    oxycodone-acetaminophen (PERCOCET)  mg per tablet Take 1 tablet by mouth every 4 (four) hours as needed for Pain.           Información de referencia clínica           [place] signos vitales willie     PS Pulso Temperatura [place] BMI (IMC)    126/72 (BP Location: Right arm, Patient Position: Sitting, BP Method: Manual) 84 97.8 °F (36.6 °C) (Oral) 5' 3" (1.6 m) 70 kg (154 lb 5.2 oz) 27.34 kg/m2      Blood Pressure          Most Recent Value    BP  126/72      Alergias     A partir del:  4/12/2017        Vicodin [Hydrocodone-acetaminophen]      Vacunas     Administradas en la fecha de la visita:  4/12/2017        Nombre Fecha Dosis Fecha del VIS Vía    Pneumococcal Conjugate - 13 Valent  Incomplete 0.5 mL 11/5/2015 Intramuscular      Orders Placed During Today's Visit      Órdenes normales de esta visita    Pneumococcal Conjugate Vaccine (13 Valent) (IM)     Exámenes/Procedimientos futuros Se espera el Vence    Basic metabolic panel  4/12/2017 4/12/2018    CBC auto differential  4/12/2017 7/11/2017    Hemoglobin A1c  4/12/2017 7/11/2017    TSH  4/12/2017 6/11/2018      Language Assistance Services     ATTENTION: Language assistance services are available, free of charge. Please call [phone].      ATENCIÓN: Si habla español, tiene a montalvo disposición servicios gratuitos de asistencia lingüística. Llame al " [phone].     [facility] Ý: N?u b?n nói Ti?ng Vi?t, có các d?ch v? h? tr? ngôn ng? mi?n phí dành cho b?n. G?i s? [phone].         [facility] cumple con las leyes federales aplicables de derechos civiles y no discrimina por motivos de laura, color, origen nacional, edad, discapacidad, o sexo.                 Elena Frances   2017 3:00 PM   Office Visit    Description:  Female : 1940   Provider:  Michael Tinoco MD   Department:  [facility]           Reason for Visit     Cough     Back Pain     Fatigue           Diagnoses this Visit        Comments    Iron deficiency anemia, unspecified iron deficiency anemia type    -  Primary     Right knee pain, unspecified chronicity         Fatigue, unspecified type         Diabetes mellitus without complication         Preventive measure                To Do List           Goals     None       These Medications        Disp Refills Start End    celecoxib (CELEBREX) 200 MG capsule 60 capsule 0 2017    Take 1 capsule (200 mg total) by mouth 2 (two) times daily. - Oral    Pharmacy: [facility] [address] [place] [address] [address] AT [facility] Ph #: [phone]       omeprazole (PRILOSEC) 40 MG capsule 30 capsule 11 2017     Take 1 capsule (40 mg total) by mouth once daily. - Oral    Pharmacy: [facility] [id number]  [place] [address] AT [facility] Ph #: [phone]         [facility] On [facility]     [facility] Nurse Care Line - 24/ Assistance  Unless otherwise directed by your provider, please contact [facility], our nurse care line that is available for 24/ assistance.     Registered nurses in the [facility] provide: appointment scheduling, clinical advisement, health education, and other advisory services.  Call: [phone] (toll free)               Medications           Message regarding  Medications     Verify the changes and/or additions to your medication regime listed below are the same as discussed with your clinician today.  If any of these changes or additions are incorrect, please notify your healthcare provider.        START taking these NEW medications        Refills    celecoxib (CELEBREX) 200 MG capsule 0    Sig: Take 1 capsule (200 mg total) by mouth 2 (two) times daily.    Class: Normal    Route: Oral      CHANGE how you are taking these medications     Start Taking Instead of    omeprazole (PRILOSEC) 40 MG capsule omeprazole (PRILOSEC) 20 MG capsule    Dosage:  Take 1 capsule (40 mg total) by mouth once daily. Dosage:  TAKE 1 CAPSULE BY MOUTH TWICE DAILY    Reason for Change:  Reorder       STOP taking these medications     albuterol (PROVENTIL) 2.5 mg /3 mL (0.083 %) nebulizer solution Take 3 mLs (2.5 mg total) by nebulization every 6 (six) hours as needed for Wheezing.           Verify that the below list of medications is an accurate representation of the medications you are currently taking.  If none reported, the list may be blank. If incorrect, please contact your healthcare provider. Carry this list with you in case of emergency.           Current Medications     albuterol 90 mcg/actuation inhaler Inhale 2 puffs into the lungs every 6 (six) hours as needed for Wheezing.    atenolol (TENORMIN) 25 MG tablet Take 1 tablet (25 mg total) by mouth once daily.    atorvastatin (LIPITOR) 80 MG tablet Take 1 tablet (80 mg total) by mouth once daily.    CALCIUM CARBONATE/VITAMIN D3 (VITAMIN D-3 ORAL) Take by mouth.    gabapentin (NEURONTIN) 100 MG capsule Take 2 capsules three times daily    nabumetone (RELAFEN) 750 MG tablet Take 1 tablet (750 mg total) by mouth 2 (two) times daily.    omeprazole (PRILOSEC) 40 MG capsule Take 1 capsule (40 mg total) by mouth once daily.    paroxetine (PAXIL-CR) 25 MG 24 hr tablet Take 1 tablet (25 mg total) by mouth once daily.    valsartan (DIOVAN) 160  "MG tablet     aspirin 325 MG tablet Take 1 tablet (325 mg total) by mouth 2 (two) times daily.    blood-glucose meter ([employer] BLOOD GLUCOSE SYSTM) kit Use as instructed    celecoxib (CELEBREX) 200 MG capsule Take 1 capsule (200 mg total) by mouth 2 (two) times daily.    CYANOCOBALAMIN, VITAMIN B-12, (VITAMIN B-12 ORAL) Take 2,500 mcg by mouth.    ferrous sulfate 324 mg (65 mg iron) TbEC Take 325 mg by mouth once daily.    fluocinonide (LIDEX) 0.05 % external solution Apply topically 2 (two) times daily.    lancets Misc 1 lancet by Misc.(Non-Drug; Combo Route) route 2 (two) times daily.    ondansetron (ZOFRAN) 8 MG tablet Take 1 tablet (8 mg total) by mouth every 12 (twelve) hours as needed for Nausea.    oxycodone-acetaminophen (PERCOCET)  mg per tablet Take 1 tablet by mouth every 4 (four) hours as needed for Pain.           Clinical Reference Information           Your Vitals Were     BP Pulse Temp Height Weight BMI    126/72 (BP Location: Right arm, Patient Position: Sitting, BP Method: Manual) 84 97.8 °F (36.6 °C) (Oral) 5' 3" (1.6 m) 70 kg (154 lb 5.2 oz) 27.34 kg/m2      Blood Pressure          Most Recent Value    BP  126/72      Allergies as of 4/12/2017     Vicodin [Hydrocodone-acetaminophen]      Immunizations Administered on Date of Encounter - 4/12/2017     Name Date Dose VIS Date Route    Pneumococcal Conjugate - 13 Valent  Incomplete 0.5 mL 11/5/2015 Intramuscular      Orders Placed During Today's Visit      Normal Orders This Visit    Pneumococcal Conjugate Vaccine (13 Valent) (IM)     Future Labs/Procedures Expected by Expires    Basic metabolic panel  4/12/2017 4/12/2018    CBC auto differential  4/12/2017 7/11/2017    Hemoglobin A1c  4/12/2017 7/11/2017    TSH  4/12/2017 6/11/2018      Language Assistance Services     ATTENTION: Language assistance services are available, free of charge. Please call [phone].      ATENCIÓN: Si veronicala calvin, tiene a montalvo disposición servicios " bozenaos de asistencia lingüística. Jason valenzuela [phone].     [facility] Ý: N?u b?n nói Ti?ng Vi?t, có các d?ch v? h? tr? ngôn ng? mi?n phí dành cho b?n. G?i s? [phone].         [facility] complies with applicable Federal civil rights laws and does not discriminate on the basis of race, color, national origin, age, disability, or sex.

## 2017-04-12 NOTE — PROGRESS NOTES
Two patient identifiers verified.  Allergies reviewed. Prevnar 13 IM administered to left deltoid and high dose flu to right deltoid per MD order.  Patient tolerated injections well; no redness, bleeding, or bruising noted to injection sites.  Patient instructed to remain in clinic setting for 15 minutes.  Verbalizes understanding.

## 2017-04-12 NOTE — PROGRESS NOTES
Subjective:       Patient ID: Elena Frances is a 77 y.o. female.    Chief Complaint: Cough; Back Pain; and Fatigue    HPI Comments: Disclaimer: This note has been generated using voice-recognition software. There may be typographical errors that have been missed during proof-reading    78 yo presents today with ongoing symptoms of fatigue.  S/p right knee replacement 1/17.  Has noted some weight loss, but has had decreased appetite recently    Cough   Pertinent negatives include no chest pain, chills, fever, headaches or shortness of breath.   Back Pain   Pertinent negatives include no chest pain, fever, headaches or weakness.   Fatigue   Associated symptoms include coughing and fatigue. Pertinent negatives include no chest pain, chills, fever, headaches or weakness.     Review of Systems   Constitutional: Positive for fatigue. Negative for chills, fever and unexpected weight change.   Respiratory: Positive for cough. Negative for choking, chest tightness and shortness of breath.    Cardiovascular: Negative for chest pain, palpitations and leg swelling.   Endocrine: Negative for polydipsia, polyphagia and polyuria.   Genitourinary: Negative for difficulty urinating.   Musculoskeletal: Positive for back pain.   Neurological: Negative for dizziness, weakness, light-headedness and headaches.   Hematological: Negative for adenopathy. Does not bruise/bleed easily.       Objective:      Physical Exam   Constitutional: She appears well-developed and well-nourished. No distress.   HENT:   Right Ear: Tympanic membrane and ear canal normal.   Left Ear: Tympanic membrane and ear canal normal.   Nose: Right sinus exhibits no maxillary sinus tenderness and no frontal sinus tenderness. Left sinus exhibits no maxillary sinus tenderness and no frontal sinus tenderness.   Mouth/Throat: No posterior oropharyngeal erythema.   Neck: Normal range of motion. No tracheal deviation present. No thyromegaly present.    Cardiovascular: Normal rate and regular rhythm.    No murmur heard.  Pulmonary/Chest: Effort normal and breath sounds normal. She has no wheezes. She has no rales.   Musculoskeletal: She exhibits no edema.       Assessment:       1. Iron deficiency anemia, unspecified iron deficiency anemia type    2. Right knee pain, unspecified chronicity    3. Fatigue, unspecified type    4. Diabetes mellitus without complication    5. Preventive measure        Plan:       1.  CBC, BMP, A1C, TSH  2.  Fergon bid  3.  Prevnar, flu vaccine today

## 2017-04-13 ENCOUNTER — TELEPHONE (OUTPATIENT)
Dept: FAMILY MEDICINE | Facility: CLINIC | Age: 77
End: 2017-04-13

## 2017-04-13 LAB
ESTIMATED AVG GLUCOSE: 131 MG/DL
HBA1C MFR BLD HPLC: 6.2 %

## 2017-04-13 NOTE — TELEPHONE ENCOUNTER
Please inform pt that labs are in good range without anemia.  Her A1C is 6.2, good control of diabetes.  However, the test shows that she is dehydrated, needs to increase fluid intake, thanks

## 2017-04-18 DIAGNOSIS — M46.1 SACROILIITIS: ICD-10-CM

## 2017-04-18 DIAGNOSIS — M47.816 LUMBAR FACET ARTHROPATHY: ICD-10-CM

## 2017-04-18 DIAGNOSIS — M16.10 HIP ARTHRITIS: ICD-10-CM

## 2017-04-18 RX ORDER — DICLOFENAC SODIUM 10 MG/G
2 GEL TOPICAL DAILY
Qty: 1 TUBE | Refills: 2 | Status: SHIPPED | OUTPATIENT
Start: 2017-04-18 | End: 2018-11-19 | Stop reason: SDUPTHER

## 2017-04-27 ENCOUNTER — OFFICE VISIT (OUTPATIENT)
Dept: FAMILY MEDICINE | Facility: CLINIC | Age: 77
End: 2017-04-27
Payer: MEDICARE

## 2017-04-27 VITALS
BODY MASS INDEX: 27.23 KG/M2 | WEIGHT: 153.69 LBS | TEMPERATURE: 99 F | HEIGHT: 63 IN | SYSTOLIC BLOOD PRESSURE: 128 MMHG | DIASTOLIC BLOOD PRESSURE: 78 MMHG

## 2017-04-27 DIAGNOSIS — J20.9 BRONCHITIS, ACUTE, WITH BRONCHOSPASM: Primary | ICD-10-CM

## 2017-04-27 PROCEDURE — 99214 OFFICE O/P EST MOD 30 MIN: CPT | Mod: S$GLB,,, | Performed by: NURSE PRACTITIONER

## 2017-04-27 PROCEDURE — 3078F DIAST BP <80 MM HG: CPT | Mod: S$GLB,,, | Performed by: NURSE PRACTITIONER

## 2017-04-27 PROCEDURE — 1159F MED LIST DOCD IN RCRD: CPT | Mod: S$GLB,,, | Performed by: NURSE PRACTITIONER

## 2017-04-27 PROCEDURE — 1160F RVW MEDS BY RX/DR IN RCRD: CPT | Mod: S$GLB,,, | Performed by: NURSE PRACTITIONER

## 2017-04-27 PROCEDURE — 99999 PR PBB SHADOW E&M-EST. PATIENT-LVL IV: CPT | Mod: PBBFAC,,, | Performed by: NURSE PRACTITIONER

## 2017-04-27 PROCEDURE — 3074F SYST BP LT 130 MM HG: CPT | Mod: S$GLB,,, | Performed by: NURSE PRACTITIONER

## 2017-04-27 RX ORDER — PREDNISONE 20 MG/1
TABLET ORAL
Qty: 10 TABLET | Refills: 0 | Status: SHIPPED | OUTPATIENT
Start: 2017-04-27 | End: 2017-05-03

## 2017-04-27 RX ORDER — MONTELUKAST SODIUM 10 MG/1
10 TABLET ORAL NIGHTLY
Qty: 30 TABLET | Refills: 0 | Status: SHIPPED | OUTPATIENT
Start: 2017-04-27 | End: 2017-07-30 | Stop reason: SDUPTHER

## 2017-04-27 NOTE — PATIENT INSTRUCTIONS
Take the Singulair at night     Prednisone once a day with food, take first dose today    Use your albuterol nebulizer or inhaler every 4-6 hours    TO ER for any worsening    Call for any fever over 100.4      Bronquitis Con Silbidos [Bronchitis With Wheezing, Bacterial, Adults]    La BRONQUITIS es genny infección de las vías respiratorias. Suele ocurrir joi un resfriado común y, por lo general, es ocasionada por un virus. Entre lulu síntomas se encuentran la tos con mucosidad y fiebre leve.  Si hay mucha inflamación, el paso del aire se restringe. Las vías respiratorias pueden contraerse espasmódicamente, en especial si usted tiene asma. Eso provoca silbidos y dificultad para respirar, incluso en personas que no tienen asma.  La bronquitis suele durar entre 7 y 14 joseluis. Los silbidos deberían mejorar con el tratamiento joi la primera semana. Es probable que le receten un inhalador para relajar las vías respiratorias y detener los silbidos. Montalvo médico le recetará antibióticos si kalina que también hay genny infección bacteriana secundaria.  Cuidados En [place]:  · Si los síntomas son severos, descanse en montalvo casa joi los primeros 2 ó 3 días. Cuando reanude lulu actividades, evite cansarse demasiado.  · No fume y evite exponerse al humo de los demás.  · Puede usar acetaminofén (Tylenol) o ibuprofeno (Motrin o Advil) para controlar la fiebre, a menos que le hayan recetado otro medicamento. [NOTA: Si tiene genny enfermedad hepática o renal crónica, o ha tenido alguna vez genny úlcera estomacal o sangrado gastrointestinal, consulte con montalvo médico antes de lisa estos medicamentos.] (La aspirina no debe usarse nunca en personas menores de 18 años que tengan fiebre, ya que puede causar daños graves al hígado.)  · Es posible que tenga poco apetito, por lo que genny dieta ligera es adecuada. Para evitar la deshidratación, paulo entre 6 y 8 vasos de líquido cada día (agua, refrescos, jugos de fruta, té, sopa etc.). La abundancia  de líquido ayuda a desprender las secreciones de los pulmones.  · Los medicamentos sin receta para la tos que contienen dextrometorfano (luna Robitussin DM) y los descongestionantes (luna Actifed o Sudafed) pueden ayudar a aliviar la tos y la congestión. [NOTA: No use descongestionantes si tiene la presión arterial chris.]  · Si le dieron un inhalador, úselo exactamente marilia luna le indicaron. Si necesita usarlo con mayor frecuencia que lo recetado, es posible que montalvo afección esté empeorando. En marilia juancarlos, comuníquese con montalvo médico o saul centro.  · Termine de lisa todos los antibióticos que le hayan recetado aunque ya se sienta mejor a los pocos días.  Programe genny VISITA DE CONTROL con montalvo médico, o según le indiquen, si no empieza a sentirse mejor después de david días.  [NOTA: Si usted tiene 65 años o más, o si tiene asma crónica o enfermedad pulmonar obstructiva crónica (EPOC), recomendamos genny VACUNA NEUMOCÓCICA cada oliva años y genny VACUNA CONTRA LA GRIPE [FLU-SHOT]) cada otoño. Hable con montalvo médico sobre esto. Si le keating hecho genny radiografía o un electrocardiograma (EKG), éstos serán evaluados por un especialista. Le informarán de los nuevos hallazgos que puedan afectar la atención médica que necesita.]  Busque Prontamente Atención Médica  si algo de lo siguiente ocurre:  · Más silbidos, falta de aire o dolor al respirar.  · Fiebre de 100.4°F (38°C) o más chris, tomada oralmente, que no mejora con los medicamentos.  · Tos con expulsión de cyndi o aumento en la cantidad de esputo de color.  · Debilidad, somnolencia, dolor de juliette, dolor en la aguilar o en los oídos, o rigidez en el barbie.  · Hinchazón, sensibilidad o enrojecimiento en la parte baja de las piernas.  Date Last Reviewed: 9/13/2015  © 1369-1900 [employer]. [address] [place] [address], [place] [address]. Todos los derechos reservados. Esta información no pretende sustituir la atención médica profesional. Sólo montalvo médico puede diagnosticar y  tratar un problema de jennifer.

## 2017-04-27 NOTE — MR AVS SNAPSHOT
[facility]  [address] [facility], [address]  [place] [address]  Phone: [phone]  Fax: [phone]                  Elena Frances   2017 4:00 PM   Office Visit    Descripción:  Female : 1940   Personal Médico:  Autumn Ibanez, FNP-C   Departamento:  [facility]           Razón de la jessee     Cough     Nasal Congestion           Diagnósticos de Esta Visita        Comentarios    Bronchitis, acute, with bronchospasm    -  Primario            Lista de tareas           Metas (5 Years of Data)     Ninguna      Recetas para recoger        Disp Refills Start End    montelukast (SINGULAIR) 10 mg tablet 30 tablet 0 2017    Take 1 tablet (10 mg total) by mouth every evening. - Oral    [facility]: [facility] - [place] - [address] AT [facility] No[address] de tlfo: #: [phone]       predniSONE (DELTASONE) 20 MG tablet 10 tablet 0 2017    Take 2 po with breakfast x 5d    [facility]: [facility] [address] - [employer], [place] - [address] AT [facility] de tlfo: #: [phone]       Notas para la farmacia: Take 2 po with breakfast x 5d      [facility]     [facility]ermeras - Asistencia   Enfermeras registradas de [facility] pueden ayudarle a reservar genny jessee, proveer educación para la jennifer, asesoría clínica, y otros servicios de asesoramiento.   Llame para saul servicio gratuito a [phone].             Medicamentos           Mensaje sobre Medicamentos     Verificar los cambios y / o adiciones a montalvo régimen de medicación son los mismos que discutir con montalvo médico. Si cualquiera de estos cambios o adiciones son incorrectos, por favor notifique a montalvo proveedor de atención médica.        EMPEZAR a lisa estos medicamentos NUEVOS        Refills    montelukast (SINGULAIR) 10 mg tablet 0    Sig: Take 1 tablet (10 mg  total) by mouth every evening.    Categoría: Normal    Vía: Oral    predniSONE (DELTASONE) 20 MG tablet 0    Sig: Take 2 po with breakfast x 5d    Categoría: Normal           Verifique que la siguiente lista de medicamentos es genny representación exacta de los medicamentos que está tomando actualmente. Si no hay ningunos reportados, la lista puede estar en bourgeois. Si no es correcta, por favor póngase en contacto con montalvo proveedor de atención médica. Lleve esta lista con usted en juancarlos de emergencia.           Medicamentos Actuales     albuterol 90 mcg/actuation inhaler Inhale 2 puffs into the lungs every 6 (six) hours as needed for Wheezing.    aspirin 325 MG tablet Take 1 tablet (325 mg total) by mouth 2 (two) times daily.    atenolol (TENORMIN) 25 MG tablet Take 1 tablet (25 mg total) by mouth once daily.    atorvastatin (LIPITOR) 80 MG tablet Take 1 tablet (80 mg total) by mouth once daily.    blood-glucose meter ([employer] BLOOD GLUCOSE SYSTM) kit Use as instructed    CALCIUM CARBONATE/VITAMIN D3 (VITAMIN D-3 ORAL) Take by mouth.    celecoxib (CELEBREX) 200 MG capsule Take 1 capsule (200 mg total) by mouth 2 (two) times daily.    CYANOCOBALAMIN, VITAMIN B-12, (VITAMIN B-12 ORAL) Take 2,500 mcg by mouth.    diclofenac sodium (VOLTAREN) 1 % Gel Apply 2 g topically once daily.    ferrous sulfate 324 mg (65 mg iron) TbEC Take 325 mg by mouth once daily.    fluocinonide (LIDEX) 0.05 % external solution Apply topically 2 (two) times daily.    gabapentin (NEURONTIN) 100 MG capsule Take 2 capsules three times daily    lancets Misc 1 lancet by Misc.(Non-Drug; Combo Route) route 2 (two) times daily.    montelukast (SINGULAIR) 10 mg tablet Take 1 tablet (10 mg total) by mouth every evening.    nabumetone (RELAFEN) 750 MG tablet Take 1 tablet (750 mg total) by mouth 2 (two) times daily.    omeprazole (PRILOSEC) 40 MG capsule Take 1 capsule (40 mg total) by mouth once daily.    ondansetron (ZOFRAN) 8 MG tablet Take 1 tablet (8  "mg total) by mouth every 12 (twelve) hours as needed for Nausea.    oxycodone-acetaminophen (PERCOCET)  mg per tablet Take 1 tablet by mouth every 4 (four) hours as needed for Pain.    paroxetine (PAXIL-CR) 25 MG 24 hr tablet Take 1 tablet (25 mg total) by mouth once daily.    predniSONE (DELTASONE) 20 MG tablet Take 2 po with breakfast x 5d    valsartan (DIOVAN) 160 MG tablet            Información de referencia clínica           [place] signos vitales willie     PS Temperatura [place] BMI (IMC)       128/78 98.7 °F (37.1 °C) (Oral) 5' 3" (1.6 m) 69.7 kg (153 lb 10.6 oz) 27.22 kg/m2       Blood Pressure          Most Recent Value    BP  128/78      Alergias     A partir del:  4/27/2017        Vicodin [Hydrocodone-acetaminophen]      Vacunas     Administradas en la fecha de la visita:  4/27/2017        None      Instrucciones    Take the Singulair at night     Prednisone once a day with food, take first dose today    Use your albuterol nebulizer or inhaler every 4-6 hours    TO ER for any worsening    Call for any fever over 100.4      Bronquitis Con Silbidos [Bronchitis With Wheezing, Bacterial, Adults]    La BRONQUITIS es genny infección de las vías respiratorias. Suele ocurrir joi un resfriado común y, por lo general, es ocasionada por un virus. Entre marbella síntomas se encuentran la tos con mucosidad y fiebre leve.  Si hay mucha inflamación, el paso del aire se restringe. Las vías respiratorias pueden contraerse espasmódicamente, en especial si usted tiene asma. Eso provoca silbidos y dificultad para respirar, incluso en personas que no tienen asma.  La bronquitis suele durar entre 7 y 14 joseluis. Los silbidos deberían mejorar con el tratamiento joi la primera semana. Es probable que le receten un inhalador para relajar las vías respiratorias y detener los silbidos. Bañuelos médico le recetará antibióticos si kalina que también hay genny infección bacteriana secundaria.  Cuidados En [place]:  · Si los síntomas son " severos, descanse en montalvo casa joi los primeros 2 ó 3 días. Cuando reanude lulu actividades, evite cansarse demasiado.  · No fume y evite exponerse al humo de los demás.  · Puede usar acetaminofén (Tylenol) o ibuprofeno (Motrin o Advil) para controlar la fiebre, a menos que le hayan recetado otro medicamento. [NOTA: Si tiene genny enfermedad hepática o renal crónica, o ha tenido alguna vez genny úlcera estomacal o sangrado gastrointestinal, consulte con montalvo médico antes de lisa estos medicamentos.] (La aspirina no debe usarse nunca en personas menores de 18 años que tengan fiebre, ya que puede causar daños graves al hígado.)  · Es posible que tenga poco apetito, por lo que genny dieta ligera es adecuada. Para evitar la deshidratación, paulo entre 6 y 8 vasos de líquido cada día (agua, refrescos, jugos de fruta, té, sopa etc.). La abundancia de líquido ayuda a desprender las secreciones de los pulmones.  · Los medicamentos sin receta para la tos que contienen dextrometorfano (luna Robitussin DM) y los descongestionantes (luna Actifed o Sudafed) pueden ayudar a aliviar la tos y la congestión. [NOTA: No use descongestionantes si tiene la presión arterial chris.]  · Si le dieron un inhalador, úselo exactamente marilia luna le indicaron. Si necesita usarlo con mayor frecuencia que lo recetado, es posible que montalvo afección esté empeorando. En marilia juancarlos, comuníquese con montalvo médico o saul centro.  · Termine de lisa todos los antibióticos que le hayan recetado aunque ya se sienta mejor a los pocos días.  Programe genny VISITA DE CONTROL con montalvo médico, o según le indiquen, si no empieza a sentirse mejor después de david días.  [NOTA: Si usted tiene 65 años o más, o si tiene asma crónica o enfermedad pulmonar obstructiva crónica (EPOC), recomendamos genny VACUNA NEUMOCÓCICA cada oliva años y genny VACUNA CONTRA LA GRIPE [FLU-SHOT]) cada otoño. Hable con montalvo médico sobre esto. Si le keating hecho genny radiografía o un electrocardiograma (EKG), éstos  serán evaluados por un especialista. Le informarán de los nuevos hallazgos que puedan afectar la atención médica que necesita.]  Busque Prontamente Atención Médica  si algo de lo siguiente ocurre:  · Más silbidos, falta de aire o dolor al respirar.  · Fiebre de 100.4°F (38°C) o más chris, tomada oralmente, que no mejora con los medicamentos.  · Tos con expulsión de cyndi o aumento en la cantidad de esputo de color.  · Debilidad, somnolencia, dolor de juliette, dolor en la aguilar o en los oídos, o rigidez en el barbie.  · Hinchazón, sensibilidad o enrojecimiento en la parte baja de las piernas.  Date Last Reviewed: 2015-2016 [employer]. [address] [place] [address]. Todos los derechos reservados. Esta información no pretende sustituir la atención médica profesional. Sólo montalvo médico puede diagnosticar y tratar un problema de jennifer.             Language Assistance Services     ATTENTION: Language assistance services are available, free of charge. Please call [phone].      ATENCIÓN: Si habla español, tiene a montalvo disposición servicios gratuitos de asistencia lingüística. Llame al [phone].     [facility] Ý: N?u b?n nói Ti?ng Vi?t, có các d?ch v? h? tr? ngôn ng? mi?n phí dành cho b?n. G?i s? [phone].         [facility] cumple con las leyes federales aplicables de derechos civiles y no discrimina por motivos de laura, color, origen nacional, edad, discapacidad, o sexo.                 Elena Frances   2017 4:00 PM   Office Visit    Description:  Female : 1940   Provider:  CURT Issa   Department:  [facility] Medicine           Reason for Visit     Cough     Nasal Congestion           Diagnoses this Visit        Comments    Bronchitis, acute, with bronchospasm    -  Primary            To Do List           Goals     None       These Medications        Disp Refills Start End    montelukast (SINGULAIR) 10 mg tablet  30 tablet 0 4/27/2017 5/27/2017    Take 1 tablet (10 mg total) by mouth every evening. - Oral    Pharmacy: [facility] [id number] AT [facility] #: [phone]       predniSONE (DELTASONE) 20 MG tablet 10 tablet 0 4/27/2017 5/2/2017    Take 2 po with breakfast x 5d    Pharmacy: [facility] [id number] AT [facility] Ph #: [phone]       Notes to Pharmacy: Take 2 po with breakfast x 5d      [facility]     [facility] On Call Nurse Care Line - 24/7 Assistance  Unless otherwise directed by your provider, please contact [facility], our nurse care line that is available for 24/7 assistance.     Registered nurses in the [facility] provide: appointment scheduling, clinical advisement, health education, and other advisory services.  Call: [phone] (toll free)               Medications           Message regarding Medications     Verify the changes and/or additions to your medication regime listed below are the same as discussed with your clinician today.  If any of these changes or additions are incorrect, please notify your healthcare provider.        START taking these NEW medications        Refills    montelukast (SINGULAIR) 10 mg tablet 0    Sig: Take 1 tablet (10 mg total) by mouth every evening.    Class: Normal    Route: Oral    predniSONE (DELTASONE) 20 MG tablet 0    Sig: Take 2 po with breakfast x 5d    Class: Normal           Verify that the below list of medications is an accurate representation of the medications you are currently taking.  If none reported, the list may be blank. If incorrect, please contact your healthcare provider. Carry this list with you in case of emergency.           Current Medications     albuterol 90 mcg/actuation inhaler Inhale 2 puffs into the lungs every 6 (six) hours as needed for Wheezing.    aspirin 325 MG tablet Take 1  "tablet (325 mg total) by mouth 2 (two) times daily.    atenolol (TENORMIN) 25 MG tablet Take 1 tablet (25 mg total) by mouth once daily.    atorvastatin (LIPITOR) 80 MG tablet Take 1 tablet (80 mg total) by mouth once daily.    blood-glucose meter ([employer] BLOOD GLUCOSE SYSTM) kit Use as instructed    CALCIUM CARBONATE/VITAMIN D3 (VITAMIN D-3 ORAL) Take by mouth.    celecoxib (CELEBREX) 200 MG capsule Take 1 capsule (200 mg total) by mouth 2 (two) times daily.    CYANOCOBALAMIN, VITAMIN B-12, (VITAMIN B-12 ORAL) Take 2,500 mcg by mouth.    diclofenac sodium (VOLTAREN) 1 % Gel Apply 2 g topically once daily.    ferrous sulfate 324 mg (65 mg iron) TbEC Take 325 mg by mouth once daily.    fluocinonide (LIDEX) 0.05 % external solution Apply topically 2 (two) times daily.    gabapentin (NEURONTIN) 100 MG capsule Take 2 capsules three times daily    lancets Misc 1 lancet by Misc.(Non-Drug; Combo Route) route 2 (two) times daily.    montelukast (SINGULAIR) 10 mg tablet Take 1 tablet (10 mg total) by mouth every evening.    nabumetone (RELAFEN) 750 MG tablet Take 1 tablet (750 mg total) by mouth 2 (two) times daily.    omeprazole (PRILOSEC) 40 MG capsule Take 1 capsule (40 mg total) by mouth once daily.    ondansetron (ZOFRAN) 8 MG tablet Take 1 tablet (8 mg total) by mouth every 12 (twelve) hours as needed for Nausea.    oxycodone-acetaminophen (PERCOCET)  mg per tablet Take 1 tablet by mouth every 4 (four) hours as needed for Pain.    paroxetine (PAXIL-CR) 25 MG 24 hr tablet Take 1 tablet (25 mg total) by mouth once daily.    predniSONE (DELTASONE) 20 MG tablet Take 2 po with breakfast x 5d    valsartan (DIOVAN) 160 MG tablet            Clinical Reference Information           Your Vitals Were     BP Temp Height Weight BMI       128/78 98.7 °F (37.1 °C) (Oral) 5' 3" (1.6 m) 69.7 kg (153 lb 10.6 oz) 27.22 kg/m2       Blood Pressure          Most Recent Value    BP  128/78      Allergies as of 4/27/2017     Vicodin " [Hydrocodone-acetaminophen]      Immunizations Administered on Date of Encounter - 4/27/2017     None      Instructions    Take the Singulair at night     Prednisone once a day with food, take first dose today    Use your albuterol nebulizer or inhaler every 4-6 hours    TO ER for any worsening    Call for any fever over 100.4      Bronquitis Con Silbidos [Bronchitis With Wheezing, Bacterial, Adults]    La BRONQUITIS es genny infección de las vías respiratorias. Suele ocurrir joi un resfriado común y, por lo general, es ocasionada por un virus. Entre lulu síntomas se encuentran la tos con mucosidad y fiebre leve.  Si hay mucha inflamación, el paso del aire se restringe. Las vías respiratorias pueden contraerse espasmódicamente, en especial si usted tiene asma. Eso provoca silbidos y dificultad para respirar, incluso en personas que no tienen asma.  La bronquitis suele durar entre 7 y 14 joseluis. Los silbidos deberían mejorar con el tratamiento joi la primera semana. Es probable que le receten un inhalador para relajar las vías respiratorias y detener los silbidos. Montalvo médico le recetará antibióticos si kalina que también hay genny infección bacteriana secundaria.  Cuidados En [place]:  · Si los síntomas son severos, descanse en montalvo casa joi los primeros 2 ó 3 días. Cuando reanude lulu actividades, evite cansarse demasiado.  · No fume y evite exponerse al humo de los demás.  · Puede usar acetaminofén (Tylenol) o ibuprofeno (Motrin o Advil) para controlar la fiebre, a menos que le hayan recetado otro medicamento. [NOTA: Si tiene genny enfermedad hepática o renal crónica, o ha tenido alguna vez genny úlcera estomacal o sangrado gastrointestinal, consulte con montalvo médico antes de lias estos medicamentos.] (La aspirina no debe usarse nunca en personas menores de 18 años que tengan fiebre, ya que puede causar daños graves al hígado.)  · Es posible que tenga poco apetito, por lo que genny dieta ligera es adecuada. Para evitar la  deshidratación, paulo entre 6 y 8 vasos de líquido cada día (agua, refrescos, jugos de fruta, té, sopa etc.). La abundancia de líquido ayuda a desprender las secreciones de los pulmones.  · Los medicamentos sin receta para la tos que contienen dextrometorfano (luna Robitussin DM) y los descongestionantes (luna Actifed o Sudafed) pueden ayudar a aliviar la tos y la congestión. [NOTA: No use descongestionantes si tiene la presión arterial chris.]  · Si le dieron un inhalador, úselo exactamente marilia luna le indicaron. Si necesita usarlo con mayor frecuencia que lo recetado, es posible que montalvo afección esté empeorando. En marilia juancarlos, comuníquese con montalvo médico o saul centro.  · Termine de lisa todos los antibióticos que le hayan recetado aunque ya se sienta mejor a los pocos días.  Programe genny VISITA DE CONTROL con montalvo médico, o según le indiquen, si no empieza a sentirse mejor después de david días.  [NOTA: Si usted tiene 65 años o más, o si tiene asma crónica o enfermedad pulmonar obstructiva crónica (EPOC), recomendamos genny VACUNA NEUMOCÓCICA cada oliva años y genny VACUNA CONTRA LA GRIPE [FLU-SHOT]) cada otoño. Hable con montalvo médico sobre esto. Si le keating hecho genny radiografía o un electrocardiograma (EKG), éstos serán evaluados por un especialista. Le informarán de los nuevos hallazgos que puedan afectar la atención médica que necesita.]  Busque Prontamente Atención Médica  si algo de lo siguiente ocurre:  · Más silbidos, falta de aire o dolor al respirar.  · Fiebre de 100.4°F (38°C) o más chris, tomada oralmente, que no mejora con los medicamentos.  · Tos con expulsión de cyndi o aumento en la cantidad de esputo de color.  · Debilidad, somnolencia, dolor de juliette, dolor en la aguilar o en los oídos, o rigidez en el barbie.  · Hinchazón, sensibilidad o enrojecimiento en la parte baja de las piernas.  Date Last Reviewed: 9/13/2015  © 8115-1859 [employer]. [address] [place] [address], [place] [address]. Todos los  derechos reservados. Esta información no pretende sustituir la atención médica profesional. Sólo montalvo médico puede diagnosticar y tratar un problema de jennifer.             Language Assistance Services     ATTENTION: Language assistance services are available, free of charge. Please call [phone].      ATENCIÓN: Si habla español, tiene a montalvo disposición servicios gratuitos de asistencia lingüística. Llame al [phone].     [employer] Ý: N?u b?n nói Ti?ng Vi?t, có các d?ch v? h? tr? ngôn ng? mi?n phí dành cho b?n. G?i s? [phone].         [facility] complies with applicable Federal civil rights laws and does not discriminate on the basis of race, color, national origin, age, disability, or sex.

## 2017-04-27 NOTE — PROGRESS NOTES
Subjective:       Patient ID: Elena Frances is a 77 y.o. female.    Chief Complaint: Cough and Nasal Congestion    HPI Comments: Pt here states that she has a cough and  runny nose/ nasal congestion.  Pt states that sx started Friday and have gotten progressively worse.  No fever, no n/v/d.  Pt states that her chest feels tight.  Pt states that she has noticed some wheezing.  Pt states that she has been using her nebulizer at home.  Pt states that she does feel short of breath at times    Pt communicated with  via interpretor service on speaker phone      Cough   Associated symptoms include postnasal drip, rhinorrhea and wheezing. Pertinent negatives include no chest pain, chills, fever, headaches, rash or sore throat.     Past Medical History:   Diagnosis Date    Anemia     Arthritis     Depression     Generalized headaches 2014    Goiter     MNG    HTN (hypertension), benign     Hyperlipidemia     Hyperparathyroidism     s/p surgery    Intestinal obstruction     resolved spontaneously    Lumbar disc disease     s/p epidural shots    Nuclear sclerosis - Both Eyes 3/11/2014    Osteoporosis, post-menopausal     with 3 rib fractures     Past Surgical History:   Procedure Laterality Date    APPENDECTOMY      BILATERAL OOPHORECTOMY      BREAST CYST EXCISION      left    CATARACT EXTRACTION W/  INTRAOCULAR LENS IMPLANT Right 2016    Dr. Fang ([place])    CATARACT EXTRACTION W/  INTRAOCULAR LENS IMPLANT Left 10/17/2016    Dr. Fang ([place])     SECTION, CLASSIC      x 1    CHOLECYSTECTOMY      COLONOSCOPY N/A 3/1/2016    Procedure: COLONOSCOPY;  Surgeon: Dony Bronson MD;  Location: [address]);  Service: Endoscopy;  Laterality: N/A;  PM Prep    HYSTERECTOMY      KNEE SURGERY Right 2017    [facility]    LUMBAR EPIDURAL INJECTION      x 4    MOUTH SURGERY      for abscess drainage of gum of frontal teeth    PARATHYROID GLAND SURGERY      for parathyroid adenoma  "    Social History     Social History Narrative     Family History   Problem Relation Age of Onset    Heart disease Mother     Hypertension Mother     Heart attack Mother     Heart disease Sister       in their 50's    Heart failure Sister     Heart disease Brother      CABG in age 60's    Hyperlipidemia Brother     Heart disease Sister      in her 50's    Heart disease Sister      in her 50's    Heart disease Sister     Hypertension Sister     Hyperlipidemia Sister     Heart disease Brother      CABG in age 60's    Hyperlipidemia Brother     Thyroid disease Daughter     Amblyopia Neg Hx     Blindness Neg Hx     Cancer Neg Hx     Cataracts Neg Hx     Diabetes Neg Hx     Glaucoma Neg Hx     Macular degeneration Neg Hx     Retinal detachment Neg Hx     Strabismus Neg Hx     Stroke Neg Hx      Vitals:    17 1547   BP: 128/78   Temp: 98.7 °F (37.1 °C)   TempSrc: Oral   Weight: 69.7 kg (153 lb 10.6 oz)   Height: 5' 3" (1.6 m)     Outpatient Encounter Prescriptions as of 2017   Medication Sig Dispense Refill    albuterol 90 mcg/actuation inhaler Inhale 2 puffs into the lungs every 6 (six) hours as needed for Wheezing. 1 each 11    aspirin 325 MG tablet Take 1 tablet (325 mg total) by mouth 2 (two) times daily. 70 tablet 0    atenolol (TENORMIN) 25 MG tablet Take 1 tablet (25 mg total) by mouth once daily. 90 tablet 3    atorvastatin (LIPITOR) 80 MG tablet Take 1 tablet (80 mg total) by mouth once daily. 90 tablet 2    celecoxib (CELEBREX) 200 MG capsule Take 1 capsule (200 mg total) by mouth 2 (two) times daily. 60 capsule 0    CYANOCOBALAMIN, VITAMIN B-12, (VITAMIN B-12 ORAL) Take 2,500 mcg by mouth.      diclofenac sodium (VOLTAREN) 1 % Gel Apply 2 g topically once daily. 1 Tube 2    omeprazole (PRILOSEC) 40 MG capsule Take 1 capsule (40 mg total) by mouth once daily. 30 capsule 11    paroxetine (PAXIL-CR) 25 MG 24 hr tablet Take 1 tablet (25 mg total) by mouth once " "daily. 90 tablet 2    valsartan (DIOVAN) 160 MG tablet       blood-glucose meter ([employer] BLOOD GLUCOSE SYSTM) kit Use as instructed 1 each 0    CALCIUM CARBONATE/VITAMIN D3 (VITAMIN D-3 ORAL) Take by mouth.      ferrous sulfate 324 mg (65 mg iron) TbEC Take 325 mg by mouth once daily.      fluocinonide (LIDEX) 0.05 % external solution Apply topically 2 (two) times daily. 60 mL 3    gabapentin (NEURONTIN) 100 MG capsule Take 2 capsules three times daily 180 capsule 11    lancets Misc 1 lancet by Misc.(Non-Drug; Combo Route) route 2 (two) times daily. 200 each 3    montelukast (SINGULAIR) 10 mg tablet Take 1 tablet (10 mg total) by mouth every evening. 30 tablet 0    nabumetone (RELAFEN) 750 MG tablet Take 1 tablet (750 mg total) by mouth 2 (two) times daily. 60 tablet 3    ondansetron (ZOFRAN) 8 MG tablet Take 1 tablet (8 mg total) by mouth every 12 (twelve) hours as needed for Nausea. 60 tablet 0    oxycodone-acetaminophen (PERCOCET)  mg per tablet Take 1 tablet by mouth every 4 (four) hours as needed for Pain. 90 tablet 0    predniSONE (DELTASONE) 20 MG tablet Take 2 po with breakfast x 5d 10 tablet 0     No facility-administered encounter medications on file as of 4/27/2017.      Wt Readings from Last 3 Encounters:   04/27/17 69.7 kg (153 lb 10.6 oz)   04/12/17 70 kg (154 lb 5.2 oz)   03/27/17 70.1 kg (154 lb 10.4 oz)     Last 3 sets of Vitals    Vitals - 1 value per visit 3/27/2017 4/12/2017 4/27/2017   SYSTOLIC - 126 128   DIASTOLIC - 72 78   PULSE - 84 -   TEMPERATURE 98.3 97.8 98.7   RESPIRATIONS - - -   Weight (lb) 154.65 154.32 153.66   Weight (kg) 70.15 70 69.7   HEIGHT 5' 3" 5' 3" 5' 3"   BODY MASS INDEX 27.4 27.34 27.22   VISIT REPORT - - -   Pain Score  1 5 -     No results found for: CBC  Review of Systems   Constitutional: Negative for chills, diaphoresis, fatigue and fever.   HENT: Positive for congestion, postnasal drip and rhinorrhea. Negative for ear discharge, sinus pressure, " sneezing, sore throat, tinnitus, trouble swallowing and voice change.    Respiratory: Positive for cough, chest tightness and wheezing.    Cardiovascular: Negative for chest pain and palpitations.   Gastrointestinal: Negative for abdominal pain, constipation, diarrhea, nausea and vomiting.   Musculoskeletal: Negative for arthralgias and back pain.   Skin: Negative for rash.   Neurological: Negative for dizziness, facial asymmetry and headaches.   Hematological: Negative for adenopathy.   Psychiatric/Behavioral: Negative for sleep disturbance.       Objective:      Physical Exam   Constitutional: She appears well-developed and well-nourished. No distress.   HENT:   Head: Normocephalic.   Right Ear: External ear normal.   Left Ear: External ear normal.   Nose: Rhinorrhea present.   Mouth/Throat: Uvula is midline, oropharynx is clear and moist and mucous membranes are normal. No oropharyngeal exudate or posterior oropharyngeal erythema.   Clear     Eyes: Conjunctivae are normal. Pupils are equal, round, and reactive to light. Right eye exhibits no discharge. Left eye exhibits no discharge.   Neck: Normal range of motion.   Pulmonary/Chest: Effort normal. No stridor. No respiratory distress. She has wheezes.   Abdominal: Soft.   Lymphadenopathy:     She has no cervical adenopathy.   Skin: Skin is warm and dry. No rash noted. She is not diaphoretic. No erythema. No pallor.   Psychiatric: She has a normal mood and affect. Her behavior is normal. Judgment and thought content normal.   Nursing note and vitals reviewed.      Assessment:       1. Bronchitis, acute, with bronchospasm        Plan:       All instructions given to pt by interpretor, pt given ER precautions and inst to take her temp if she felt feverish or had chills and to call if temp greater than 100.4  Pt verbalized understanding  Elena was seen today for cough and nasal congestion.    Diagnoses and all orders for this visit:    Bronchitis, acute, with  bronchospasm  -     montelukast (SINGULAIR) 10 mg tablet; Take 1 tablet (10 mg total) by mouth every evening.  -     predniSONE (DELTASONE) 20 MG tablet; Take 2 po with breakfast x 5d      Patient Instructions   Take the Singulair at night     Prednisone once a day with food, take first dose today    Use your albuterol nebulizer or inhaler every 4-6 hours    TO ER for any worsening    Call for any fever over 100.4      Bronquitis Con Silbidos [Bronchitis With Wheezing, Bacterial, Adults]    La BRONQUITIS es genny infección de las vías respiratorias. Suele ocurrir joi un resfriado común y, por lo general, es ocasionada por un virus. Entre lulu síntomas se encuentran la tos con mucosidad y fiebre leve.  Si hay mucha inflamación, el paso del aire se restringe. Las vías respiratorias pueden contraerse espasmódicamente, en especial si usted tiene asma. Eso provoca silbidos y dificultad para respirar, incluso en personas que no tienen asma.  La bronquitis suele durar entre 7 y 14 joseluis. Los silbidos deberían mejorar con el tratamiento joi la primera semana. Es probable que le receten un inhalador para relajar las vías respiratorias y detener los silbidos. Montalvo médico le recetará antibióticos si kalina que también hay genny infección bacteriana secundaria.  Cuidados En [place]:  · Si los síntomas son severos, descanse en montalvo casa joi los primeros 2 ó 3 días. Cuando reanude lulu actividades, evite cansarse demasiado.  · No fume y evite exponerse al humo de los demás.  · Puede usar acetaminofén (Tylenol) o ibuprofeno (Motrin o Advil) para controlar la fiebre, a menos que le hayan recetado otro medicamento. [NOTA: Si tiene genny enfermedad hepática o renal crónica, o ha tenido alguna vez genny úlcera estomacal o sangrado gastrointestinal, consulte con montalvo médico antes de lisa estos medicamentos.] (La aspirina no debe usarse nunca en personas menores de 18 años que tengan fiebre, ya que puede causar daños graves al hígado.)  · Es  posible que tenga poco apetito, por lo que genny dieta ligera es adecuada. Para evitar la deshidratación, paulo entre 6 y 8 vasos de líquido cada día (agua, refrescos, jugos de fruta, té, sopa etc.). La abundancia de líquido ayuda a desprender las secreciones de los pulmones.  · Los medicamentos sin receta para la tos que contienen dextrometorfano (luna Robitussin DM) y los descongestionantes (luna Actifed o Sudafed) pueden ayudar a aliviar la tos y la congestión. [NOTA: No use descongestionantes si tiene la presión arterial chris.]  · Si le dieron un inhalador, úselo exactamente marilia luna le indicaron. Si necesita usarlo con mayor frecuencia que lo recetado, es posible que montalvo afección esté empeorando. En marilia juancarlos, comuníquese con montalvo médico o saul centro.  · Termine de lisa todos los antibióticos que le hayan recetado aunque ya se sienta mejor a los pocos días.  Programe genny VISITA DE CONTROL con montalvo médico, o según le indiquen, si no empieza a sentirse mejor después de david días.  [NOTA: Si usted tiene 65 años o más, o si tiene asma crónica o enfermedad pulmonar obstructiva crónica (EPOC), recomendamos genny VACUNA NEUMOCÓCICA cada oliva años y genny VACUNA CONTRA LA GRIPE [FLU-SHOT]) cada otoño. Hable con montalvo médico sobre esto. Si le keating hecho genny radiografía o un electrocardiograma (EKG), éstos serán evaluados por un especialista. Le informarán de los nuevos hallazgos que puedan afectar la atención médica que necesita.]  Busque Prontamente Atención Médica  si algo de lo siguiente ocurre:  · Más silbidos, falta de aire o dolor al respirar.  · Fiebre de 100.4°F (38°C) o más chris, tomada oralmente, que no mejora con los medicamentos.  · Tos con expulsión de cyndi o aumento en la cantidad de esputo de color.  · Debilidad, somnolencia, dolor de juliette, dolor en la aguilar o en los oídos, o rigidez en el barbie.  · Hinchazón, sensibilidad o enrojecimiento en la parte baja de las piernas.  Date Last Reviewed: 9/13/2015  © 8649-5558  [employer]. [address] [place] [address], [place] [address]. Todos los derechos reservados. Esta información no pretende sustituir la atención médica profesional. Sólo montalvo médico puede diagnosticar y tratar un problema de jennifer.

## 2017-05-03 ENCOUNTER — HOSPITAL ENCOUNTER (OUTPATIENT)
Dept: RADIOLOGY | Facility: HOSPITAL | Age: 77
Discharge: HOME | End: 2017-05-03
Attending: FAMILY MEDICINE
Payer: MEDICARE

## 2017-05-03 ENCOUNTER — OFFICE VISIT (OUTPATIENT)
Dept: FAMILY MEDICINE | Facility: CLINIC | Age: 77
End: 2017-05-03
Payer: MEDICARE

## 2017-05-03 VITALS
SYSTOLIC BLOOD PRESSURE: 126 MMHG | HEART RATE: 92 BPM | WEIGHT: 152.75 LBS | DIASTOLIC BLOOD PRESSURE: 76 MMHG | TEMPERATURE: 98 F | BODY MASS INDEX: 27.07 KG/M2 | HEIGHT: 63 IN

## 2017-05-03 DIAGNOSIS — R05.9 COUGH: Primary | ICD-10-CM

## 2017-05-03 DIAGNOSIS — R05.9 COUGH: ICD-10-CM

## 2017-05-03 PROCEDURE — 99999 PR PBB SHADOW E&M-EST. PATIENT-LVL III: CPT | Mod: PBBFAC,,, | Performed by: FAMILY MEDICINE

## 2017-05-03 PROCEDURE — 1160F RVW MEDS BY RX/DR IN RCRD: CPT | Mod: S$GLB,,, | Performed by: FAMILY MEDICINE

## 2017-05-03 PROCEDURE — 3074F SYST BP LT 130 MM HG: CPT | Mod: S$GLB,,, | Performed by: FAMILY MEDICINE

## 2017-05-03 PROCEDURE — 1159F MED LIST DOCD IN RCRD: CPT | Mod: S$GLB,,, | Performed by: FAMILY MEDICINE

## 2017-05-03 PROCEDURE — 71020 XR CHEST PA AND LATERAL: CPT | Mod: TC,PO

## 2017-05-03 PROCEDURE — 99214 OFFICE O/P EST MOD 30 MIN: CPT | Mod: S$GLB,,, | Performed by: FAMILY MEDICINE

## 2017-05-03 PROCEDURE — 3078F DIAST BP <80 MM HG: CPT | Mod: S$GLB,,, | Performed by: FAMILY MEDICINE

## 2017-05-03 PROCEDURE — 1126F AMNT PAIN NOTED NONE PRSNT: CPT | Mod: S$GLB,,, | Performed by: FAMILY MEDICINE

## 2017-05-03 PROCEDURE — 71020 XR CHEST PA AND LATERAL: CPT | Mod: 26,,, | Performed by: RADIOLOGY

## 2017-05-03 RX ORDER — BENZONATATE 200 MG/1
200 CAPSULE ORAL 3 TIMES DAILY PRN
Qty: 30 CAPSULE | Refills: 0 | Status: SHIPPED | OUTPATIENT
Start: 2017-05-03 | End: 2017-05-10

## 2017-05-03 RX ORDER — DOXYCYCLINE 100 MG/1
100 CAPSULE ORAL 2 TIMES DAILY
Qty: 20 CAPSULE | Refills: 0 | Status: SHIPPED | OUTPATIENT
Start: 2017-05-03 | End: 2017-05-13

## 2017-05-03 RX ORDER — FLUOCINONIDE TOPICAL SOLUTION USP, 0.05% 0.5 MG/ML
SOLUTION TOPICAL NIGHTLY
COMMUNITY
Start: 2017-04-17 | End: 2019-08-08

## 2017-05-03 NOTE — MR AVS SNAPSHOT
[facility]  [address] [facility], [address]  [place] [address]  Phone: [phone]  Fax: [phone]                  Elena Frances   5/3/2017 10:00 AM   Office Visit    Descripción:  Female : 1940   Personal Médico:  Michael Tinoco MD   Departamento:  [facility]           Razón de la jessee     Cough     Sore Throat     Chest Congestion           Diagnósticos de Esta Visita        Comentarios    Cough    -  Primario            Lista de tareas           Citas próximas        Personal Médico Departamento Tfno del dpto    5/3/2017 10:30 AM [facility] XR1 300 LB LIMIT [facility] [phone]      Metas (5 Years of Data)     Ninguna      Recetas para recoger        Disp Refills Start End    doxycycline (VIBRAMYCIN) 100 MG Cap 20 capsule 0 5/3/2017 2017    Take 1 capsule (100 mg total) by mouth 2 (two) times daily. - Oral    [id number] - [employer], [place] - [address] AT [facility] & [facility] No. de tlfo: #: [phone]       benzonatate (TESSALON) 200 MG capsule 30 capsule 0 5/3/2017 5/10/2017    Take 1 capsule (200 mg total) by mouth 3 (three) times daily as needed for Cough. - Oral    [facility]: [id number] - [employer], [place] - [address] AT [facility] [place] [facility] No. de tlfo: #: [phone]         [facility]     [facility]ermeras - Asistencia   Enfermeras registradas de [facility] pueden ayudarle a reservar genny jessee, proveer educación para la jennifer, asesoría clínica, y otros servicios de asesoramiento.   Llame para saul servicio gratuito a [phone].             Medicamentos           Mensaje sobre Medicamentos     Verificar los cambios y / o adiciones a montalvo régimen de medicación son los mismos que discutir con montalvo médico. Si cualquiera de estos cambios o adiciones son incorrectos, por favor notifique a montalvo proveedor de atención  médica.        EMPEZAR a lisa estos medicamentos NUEVOS        Refills    doxycycline (VIBRAMYCIN) 100 MG Cap 0    Sig: Take 1 capsule (100 mg total) by mouth 2 (two) times daily.    Categoría: Normal    Vía: Oral    benzonatate (TESSALON) 200 MG capsule 0    Sig: Take 1 capsule (200 mg total) by mouth 3 (three) times daily as needed for Cough.    Categoría: Normal    Vía: Oral      DEJAR de lisa estos medicamentos     predniSONE (DELTASONE) 20 MG tablet Take 2 po with breakfast x 5d           Verifique que la siguiente lista de medicamentos es genny representación exacta de los medicamentos que está tomando actualmente. Si no hay ningunos reportados, la lista puede estar en bourgeois. Si no es correcta, por favor póngase en contacto con montalvo proveedor de atención médica. Lleve esta lista con usted en juancarlos de emergencia.           Medicamentos Actuales     albuterol 90 mcg/actuation inhaler Inhale 2 puffs into the lungs every 6 (six) hours as needed for Wheezing.    atenolol (TENORMIN) 25 MG tablet Take 1 tablet (25 mg total) by mouth once daily.    atorvastatin (LIPITOR) 80 MG tablet Take 1 tablet (80 mg total) by mouth once daily.    CALCIUM CARBONATE/VITAMIN D3 (VITAMIN D-3 ORAL) Take by mouth.    celecoxib (CELEBREX) 200 MG capsule Take 1 capsule (200 mg total) by mouth 2 (two) times daily.    CYANOCOBALAMIN, VITAMIN B-12, (VITAMIN B-12 ORAL) Take 2,500 mcg by mouth.    diclofenac sodium (VOLTAREN) 1 % Gel Apply 2 g topically once daily.    ferrous sulfate 324 mg (65 mg iron) TbEC Take 325 mg by mouth once daily.    fluocinonide (LIDEX) 0.05 % external solution     gabapentin (NEURONTIN) 100 MG capsule Take 2 capsules three times daily    montelukast (SINGULAIR) 10 mg tablet Take 1 tablet (10 mg total) by mouth every evening.    nabumetone (RELAFEN) 750 MG tablet Take 1 tablet (750 mg total) by mouth 2 (two) times daily.    omeprazole (PRILOSEC) 40 MG capsule Take 1 capsule (40 mg total) by mouth once daily.     "ondansetron (ZOFRAN) 8 MG tablet Take 1 tablet (8 mg total) by mouth every 12 (twelve) hours as needed for Nausea.    oxycodone-acetaminophen (PERCOCET)  mg per tablet Take 1 tablet by mouth every 4 (four) hours as needed for Pain.    paroxetine (PAXIL-CR) 25 MG 24 hr tablet Take 1 tablet (25 mg total) by mouth once daily.    valsartan (DIOVAN) 160 MG tablet     aspirin 325 MG tablet Take 1 tablet (325 mg total) by mouth 2 (two) times daily.    benzonatate (TESSALON) 200 MG capsule Take 1 capsule (200 mg total) by mouth 3 (three) times daily as needed for Cough.    blood-glucose meter (CAISULT BLOOD GLUCOSE SYSTM) kit Use as instructed    doxycycline (VIBRAMYCIN) 100 MG Cap Take 1 capsule (100 mg total) by mouth 2 (two) times daily.    lancets Misc 1 lancet by Misc.(Non-Drug; Combo Route) route 2 (two) times daily.           Información de referencia clínica           [place] signos vitales willie     PS Pulso Temperatura [place] Peso BMI (IMC)    126/76 (BP Location: Right arm, Patient Position: Sitting, BP Method: Manual) 92 98.2 °F (36.8 °C) (Oral) 5' 3" (1.6 m) 69.3 kg (152 lb 12.5 oz) 27.06 kg/m2      Blood Pressure          Most Recent Value    BP  126/76      Alergias     A partir del:  5/3/2017        Vicodin [Hydrocodone-acetaminophen]      Vacunas     Administradas en la fecha de la visita:  5/3/2017        None      Orders Placed During Today's Visit     Exámenes/Procedimientos futuros Se espera el Vence    X-Ray Chest PA And Lateral  5/3/2017 8/1/2017      Language Assistance Services     ATTENTION: Language assistance services are available, free of charge. Please call [phone].      ATENCIÓN: Si habla español, tiene a montalvo disposición servicios gratuitos de asistencia lingüística. Llame al [phone].     CHÚ Ý: N?u b?n nói Ti?ng Vi?t, có các d?ch v? h? tr? elier ng? mi?n phí dành cho b?n. G?i s? [phone].         [facility] cumple con las leyes federales aplicables de " derechos civiles y no discrimina por motivos de laura, color, origen nacional, edad, discapacidad, o sexo.                 Elena Frances   5/3/2017 10:00 AM   Office Visit    Description:  Female : 1940   Provider:  Michael Tinoco MD   Department:  [place] [facility]           Reason for Visit     Cough     Sore Throat     Chest Congestion           Diagnoses this Visit        Comments    Cough    -  Primary            To Do List           Future Appointments        Provider Department Dept Phone    5/3/2017 10:30 AM [facility] XR1 [address] LB LIMIT [facility] [phone]      Goals     None       These Medications        Disp Refills Start End    doxycycline (VIBRAMYCIN) 100 MG Cap 20 capsule 0 5/3/2017 2017    Take 1 capsule (100 mg total) by mouth 2 (two) times daily. - Oral    Pharmacy: [facility] [id number]  [place] - [address] AT [facility] & [facility] Ph #: [phone]       benzonatate (TESSALON) 200 MG capsule 30 capsule 0 5/3/2017 5/10/2017    Take 1 capsule (200 mg total) by mouth 3 (three) times daily as needed for Cough. - Oral    Pharmacy: [facility] [id number]  [facility] [address] AT [facility] & [facility] Ph #: [phone]         [facility]     [facility] Nurse Care Line -  Assistance  Unless otherwise directed by your provider, please contact [facility], our nurse care line that is available for  assistance.     Registered nurses in the [facility] provide: appointment scheduling, clinical advisement, health education, and other advisory services.  Call: [phone] (toll free)               Medications           Message regarding Medications     Verify the changes and/or additions to your medication regime listed below are the same as discussed with your clinician today.  If any of these changes or additions are incorrect, please notify your healthcare  provider.        START taking these NEW medications        Refills    doxycycline (VIBRAMYCIN) 100 MG Cap 0    Sig: Take 1 capsule (100 mg total) by mouth 2 (two) times daily.    Class: Normal    Route: Oral    benzonatate (TESSALON) 200 MG capsule 0    Sig: Take 1 capsule (200 mg total) by mouth 3 (three) times daily as needed for Cough.    Class: Normal    Route: Oral      STOP taking these medications     predniSONE (DELTASONE) 20 MG tablet Take 2 po with breakfast x 5d           Verify that the below list of medications is an accurate representation of the medications you are currently taking.  If none reported, the list may be blank. If incorrect, please contact your healthcare provider. Carry this list with you in case of emergency.           Current Medications     albuterol 90 mcg/actuation inhaler Inhale 2 puffs into the lungs every 6 (six) hours as needed for Wheezing.    atenolol (TENORMIN) 25 MG tablet Take 1 tablet (25 mg total) by mouth once daily.    atorvastatin (LIPITOR) 80 MG tablet Take 1 tablet (80 mg total) by mouth once daily.    CALCIUM CARBONATE/VITAMIN D3 (VITAMIN D-3 ORAL) Take by mouth.    celecoxib (CELEBREX) 200 MG capsule Take 1 capsule (200 mg total) by mouth 2 (two) times daily.    CYANOCOBALAMIN, VITAMIN B-12, (VITAMIN B-12 ORAL) Take 2,500 mcg by mouth.    diclofenac sodium (VOLTAREN) 1 % Gel Apply 2 g topically once daily.    ferrous sulfate 324 mg (65 mg iron) TbEC Take 325 mg by mouth once daily.    fluocinonide (LIDEX) 0.05 % external solution     gabapentin (NEURONTIN) 100 MG capsule Take 2 capsules three times daily    montelukast (SINGULAIR) 10 mg tablet Take 1 tablet (10 mg total) by mouth every evening.    nabumetone (RELAFEN) 750 MG tablet Take 1 tablet (750 mg total) by mouth 2 (two) times daily.    omeprazole (PRILOSEC) 40 MG capsule Take 1 capsule (40 mg total) by mouth once daily.    ondansetron (ZOFRAN) 8 MG tablet Take 1 tablet (8 mg total) by mouth every 12 (twelve)  "hours as needed for Nausea.    oxycodone-acetaminophen (PERCOCET)  mg per tablet Take 1 tablet by mouth every 4 (four) hours as needed for Pain.    paroxetine (PAXIL-CR) 25 MG 24 hr tablet Take 1 tablet (25 mg total) by mouth once daily.    valsartan (DIOVAN) 160 MG tablet     aspirin 325 MG tablet Take 1 tablet (325 mg total) by mouth 2 (two) times daily.    benzonatate (TESSALON) 200 MG capsule Take 1 capsule (200 mg total) by mouth 3 (three) times daily as needed for Cough.    blood-glucose meter ([employer] BLOOD GLUCOSE SYSTM) kit Use as instructed    doxycycline (VIBRAMYCIN) 100 MG Cap Take 1 capsule (100 mg total) by mouth 2 (two) times daily.    lancets Misc 1 lancet by Misc.(Non-Drug; Combo Route) route 2 (two) times daily.           Clinical Reference Information           Your Vitals Were     BP Pulse Temp Height Weight BMI    126/76 (BP Location: Right arm, Patient Position: Sitting, BP Method: Manual) 92 98.2 °F (36.8 °C) (Oral) 5' 3" (1.6 m) 69.3 kg (152 lb 12.5 oz) 27.06 kg/m2      Blood Pressure          Most Recent Value    BP  126/76      Allergies as of 5/3/2017     Vicodin [Hydrocodone-acetaminophen]      Immunizations Administered on Date of Encounter - 5/3/2017     None      Orders Placed During Today's Visit     Future Labs/Procedures Expected by Expires    X-Ray Chest PA And Lateral  5/3/2017 8/1/2017      Language Assistance Services     ATTENTION: Language assistance services are available, free of charge. Please call [phone].      ATENCIÓN: Si veronicala calvin, tiene a montalvo disposición servicios gratuitos de asistencia lingüística. Llame al [phone].     CHÚ Ý: N?u b?n nói Ti?ng Vi?t, có các d?ch v? h? tr? ngôn ng? mi?n phí dành cho b?n. G?i s? [phone].         [facility] complies with applicable Federal civil rights laws and does not discriminate on the basis of race, color, national origin, age, disability, or sex.          "

## 2017-05-11 RX ORDER — OMEPRAZOLE 20 MG/1
CAPSULE, DELAYED RELEASE ORAL
Qty: 120 CAPSULE | Refills: 0 | Status: SHIPPED | OUTPATIENT
Start: 2017-05-11 | End: 2017-07-30 | Stop reason: SDUPTHER

## 2017-05-18 DIAGNOSIS — M25.561 RIGHT KNEE PAIN, UNSPECIFIED CHRONICITY: ICD-10-CM

## 2017-05-18 RX ORDER — CELECOXIB 200 MG/1
200 CAPSULE ORAL 2 TIMES DAILY
Qty: 60 CAPSULE | Refills: 0 | Status: SHIPPED | OUTPATIENT
Start: 2017-05-18 | End: 2017-07-06 | Stop reason: SDUPTHER

## 2017-05-18 NOTE — TELEPHONE ENCOUNTER
Dr Tinoco is out of the office & pt requesting refills Celebrex    I did refill it for one month, but I noticed she has hx of iron deficiency anemia. EGD normal 2016    STOP taking Nabumetone which is in medcard    Don't take other NSAIDS - Advil, Alleve, ibuprofen, naproxen

## 2017-05-18 NOTE — TELEPHONE ENCOUNTER
Message left on voicemail informing patient that Rx was sent to pharmacy as requested and to return call for further instructions.

## 2017-06-23 ENCOUNTER — TELEPHONE (OUTPATIENT)
Dept: ENDOCRINOLOGY | Facility: CLINIC | Age: 77
End: 2017-06-23

## 2017-06-23 DIAGNOSIS — E78.5 DYSLIPIDEMIA: Primary | ICD-10-CM

## 2017-06-23 NOTE — TELEPHONE ENCOUNTER
Pt has an appointment 7/26/2017 please order appropriate lab work for scheduling.    ----- Message from Rivka Teresa sent at 6/21/2017 10:10 AM CDT -----  Contact: Self [phone]  Pt had to cancel her appointment on 6/22 due to severe weather in the area. pls contact pt at [phone] to reschedule appointment.

## 2017-06-26 DIAGNOSIS — I10 ESSENTIAL HYPERTENSION: ICD-10-CM

## 2017-06-26 RX ORDER — VALSARTAN 160 MG/1
TABLET ORAL
Qty: 90 TABLET | Refills: 0 | Status: SHIPPED | OUTPATIENT
Start: 2017-06-26 | End: 2017-09-26 | Stop reason: SDUPTHER

## 2017-07-06 DIAGNOSIS — M25.561 RIGHT KNEE PAIN, UNSPECIFIED CHRONICITY: ICD-10-CM

## 2017-07-06 RX ORDER — CELECOXIB 200 MG/1
CAPSULE ORAL
Qty: 60 CAPSULE | Refills: 0 | Status: SHIPPED | OUTPATIENT
Start: 2017-07-06 | End: 2017-10-17

## 2017-07-19 ENCOUNTER — LAB VISIT (OUTPATIENT)
Dept: LAB | Facility: HOSPITAL | Age: 77
End: 2017-07-19
Attending: INTERNAL MEDICINE
Payer: MEDICARE

## 2017-07-19 DIAGNOSIS — E78.5 DYSLIPIDEMIA: ICD-10-CM

## 2017-07-19 LAB
ALBUMIN SERPL BCP-MCNC: 3.4 G/DL
ALP SERPL-CCNC: 149 U/L
ALT SERPL W/O P-5'-P-CCNC: 12 U/L
ANION GAP SERPL CALC-SCNC: 7 MMOL/L
AST SERPL-CCNC: 14 U/L
BASOPHILS # BLD AUTO: 0.05 K/UL
BASOPHILS NFR BLD: 0.8 %
BILIRUB SERPL-MCNC: 0.3 MG/DL
BUN SERPL-MCNC: 16 MG/DL
CALCIUM SERPL-MCNC: 9.7 MG/DL
CHLORIDE SERPL-SCNC: 104 MMOL/L
CHOLEST/HDLC SERPL: 3 {RATIO}
CO2 SERPL-SCNC: 28 MMOL/L
CREAT SERPL-MCNC: 0.9 MG/DL
DIFFERENTIAL METHOD: ABNORMAL
EOSINOPHIL # BLD AUTO: 0.4 K/UL
EOSINOPHIL NFR BLD: 5.7 %
ERYTHROCYTE [DISTWIDTH] IN BLOOD BY AUTOMATED COUNT: 16.7 %
EST. GFR  (AFRICAN AMERICAN): >60 ML/MIN/1.73 M^2
EST. GFR  (NON AFRICAN AMERICAN): >60 ML/MIN/1.73 M^2
ESTIMATED AVG GLUCOSE: 126 MG/DL
GLUCOSE SERPL-MCNC: 104 MG/DL
HBA1C MFR BLD HPLC: 6 %
HCT VFR BLD AUTO: 38.5 %
HDL/CHOLESTEROL RATIO: 33.3 %
HDLC SERPL-MCNC: 177 MG/DL
HDLC SERPL-MCNC: 59 MG/DL
HGB BLD-MCNC: 12.7 G/DL
LDLC SERPL CALC-MCNC: 102 MG/DL
LYMPHOCYTES # BLD AUTO: 1.8 K/UL
LYMPHOCYTES NFR BLD: 26.5 %
MCH RBC QN AUTO: 28.5 PG
MCHC RBC AUTO-ENTMCNC: 33 %
MCV RBC AUTO: 87 FL
MONOCYTES # BLD AUTO: 0.6 K/UL
MONOCYTES NFR BLD: 9.5 %
NEUTROPHILS # BLD AUTO: 3.8 K/UL
NEUTROPHILS NFR BLD: 57.5 %
NONHDLC SERPL-MCNC: 118 MG/DL
PLATELET # BLD AUTO: 268 K/UL
PMV BLD AUTO: 10.6 FL
POTASSIUM SERPL-SCNC: 4.8 MMOL/L
PROT SERPL-MCNC: 7.1 G/DL
RBC # BLD AUTO: 4.45 M/UL
SODIUM SERPL-SCNC: 139 MMOL/L
TRIGL SERPL-MCNC: 80 MG/DL
TSH SERPL DL<=0.005 MIU/L-ACNC: 2.55 UIU/ML
WBC # BLD AUTO: 6.63 K/UL

## 2017-07-19 PROCEDURE — 80053 COMPREHEN METABOLIC PANEL: CPT

## 2017-07-19 PROCEDURE — 83036 HEMOGLOBIN GLYCOSYLATED A1C: CPT

## 2017-07-19 PROCEDURE — 80061 LIPID PANEL: CPT

## 2017-07-19 PROCEDURE — 36415 COLL VENOUS BLD VENIPUNCTURE: CPT | Mod: PO

## 2017-07-19 PROCEDURE — 84443 ASSAY THYROID STIM HORMONE: CPT

## 2017-07-19 PROCEDURE — 85025 COMPLETE CBC W/AUTO DIFF WBC: CPT

## 2017-07-26 ENCOUNTER — OFFICE VISIT (OUTPATIENT)
Dept: ENDOCRINOLOGY | Facility: CLINIC | Age: 77
End: 2017-07-26
Payer: MEDICARE

## 2017-07-26 VITALS
BODY MASS INDEX: 26.76 KG/M2 | WEIGHT: 156.75 LBS | HEART RATE: 78 BPM | SYSTOLIC BLOOD PRESSURE: 148 MMHG | DIASTOLIC BLOOD PRESSURE: 78 MMHG | HEIGHT: 64 IN

## 2017-07-26 DIAGNOSIS — E04.2 MULTINODULAR GOITER: ICD-10-CM

## 2017-07-26 DIAGNOSIS — I10 HTN (HYPERTENSION), BENIGN: ICD-10-CM

## 2017-07-26 DIAGNOSIS — M85.80 OSTEOPENIA, UNSPECIFIED LOCATION: ICD-10-CM

## 2017-07-26 DIAGNOSIS — R73.03 PREDIABETES: Primary | ICD-10-CM

## 2017-07-26 PROCEDURE — 1126F AMNT PAIN NOTED NONE PRSNT: CPT | Mod: S$GLB,,, | Performed by: INTERNAL MEDICINE

## 2017-07-26 PROCEDURE — 1159F MED LIST DOCD IN RCRD: CPT | Mod: S$GLB,,, | Performed by: INTERNAL MEDICINE

## 2017-07-26 PROCEDURE — 99999 PR PBB SHADOW E&M-EST. PATIENT-LVL III: CPT | Mod: PBBFAC,,, | Performed by: INTERNAL MEDICINE

## 2017-07-26 PROCEDURE — 99214 OFFICE O/P EST MOD 30 MIN: CPT | Mod: S$GLB,,, | Performed by: INTERNAL MEDICINE

## 2017-07-26 PROCEDURE — 99499 UNLISTED E&M SERVICE: CPT | Mod: S$PBB,,, | Performed by: INTERNAL MEDICINE

## 2017-07-26 NOTE — PROGRESS NOTES
Subjective:      Chief Complaint: Osteopenia      HPI: Elena Frances is a 77 y.o. female who is here for a follow-up evaluation for osteopenia.    HPI    Reviewed past medical, family, social history and updated as appropriate.    Review of Systems     Objective:   There were no vitals filed for this visit.    Physical Exam    Lab Results   Component Value Date    HGBA1C 6.0 (H) 07/19/2017     Lab Results   Component Value Date    CHOL 177 07/19/2017    HDL 59 07/19/2017    LDLCALC 102.0 07/19/2017    TRIG 80 07/19/2017    CHOLHDL 33.3 07/19/2017     Lab Results   Component Value Date     07/19/2017    K 4.8 07/19/2017     07/19/2017    CO2 28 07/19/2017     07/19/2017    BUN 16 07/19/2017    CREATININE 0.9 07/19/2017    CALCIUM 9.7 07/19/2017    PROT 7.1 07/19/2017    ALBUMIN 3.4 (L) 07/19/2017    BILITOT 0.3 07/19/2017    ALKPHOS 149 (H) 07/19/2017    AST 14 07/19/2017    ALT 12 07/19/2017    ANIONGAP 7 (L) 07/19/2017    ESTGFRAFRICA >60.0 07/19/2017    EGFRNONAA >60.0 07/19/2017    TSH 2.546 07/19/2017        Assessment/Plan:     No problem-specific Assessment & Plan notes found for this encounter.      I discussed the case with  *** and {HE, SHE:60267} is in agreement with the plan.

## 2017-07-26 NOTE — PROGRESS NOTES
Subjective:     Patient ID: Elena Frances is a 77 y.o. female.    Chief Complaint: Osteopenia    HPI:   Ms. Frances is a 77 y.o. female who is here for a follow-up visit for evaluation osteopenia, multinodular goiter and type 2 diabetes.     Osteopenia was diagnosed ten years. Denies fractures. Needs knee replacement.   Has used fosamax 70 mg once weekly for six years.    Currently not taking any anti-osteoporotic medications.   Has deficiency of Vitamin D levels, and was on replacement with 5,000 units three times a week, but stopped last year.   Underwent parathyroidectomy for hyperparathyroidism in 2009. Serum calcium has been normal.   Steroids is the past for back pain for herniated discs, last dose was 2015.  Denies any falls or fractures.    Prediabetes was diagnosed four years ago. A1Cs were elevated 6.5 - 6.7% but this was in the context of steroid use for her back pain. Since that time has been well controlled since 2016. Has tried metformin several times but due to diarrhea stopped. She is currently no on treatment.         Review of Systems   Constitutional: Negative for chills and fever.   HENT: Negative for congestion and sinus pressure.    Eyes: Negative for visual disturbance.   Respiratory: Negative for chest tightness and shortness of breath.    Cardiovascular: Negative for chest pain, palpitations and leg swelling.   Gastrointestinal: Negative for abdominal pain and vomiting.   Genitourinary: Negative for dysuria.   Musculoskeletal: Negative for arthralgias.   Skin: Negative for rash.   Neurological: Negative for weakness.   Hematological: Does not bruise/bleed easily.   Psychiatric/Behavioral: Negative for sleep disturbance.        Objective:     Physical Exam   Constitutional: She is oriented to person, place, and time. She appears well-developed and well-nourished. No distress.   HENT:   Head: Normocephalic and atraumatic.   Nose: Nose normal.   Mouth/Throat: Oropharynx is clear and  "moist. No oropharyngeal exudate.   Eyes: Conjunctivae and EOM are normal. Pupils are equal, round, and reactive to light. No scleral icterus.   Neck: Normal range of motion. Neck supple. No thyromegaly present.   Cardiovascular: Normal rate, regular rhythm, normal heart sounds and intact distal pulses.    Pulmonary/Chest: Effort normal and breath sounds normal.   Abdominal: Soft. Bowel sounds are normal. She exhibits no distension.   Musculoskeletal: Normal range of motion. She exhibits no edema or tenderness.   Lymphadenopathy:     She has no cervical adenopathy.   Neurological: She is alert and oriented to person, place, and time. She has normal reflexes.   Skin: Skin is warm and dry.   Psychiatric: She has a normal mood and affect.       Vitals:    07/26/17 0807   BP: (!) 148/78   BP Location: Left arm   Patient Position: Sitting   BP Method: Manual   Pulse: 78   Weight: 71.1 kg (156 lb 12 oz)   Height: 5' 4" (1.626 m)     Results for orders placed or performed in visit on 07/19/17   CBC auto differential   Result Value Ref Range    WBC 6.63 3.90 - 12.70 K/uL    RBC 4.45 4.00 - 5.40 M/uL    Hemoglobin 12.7 12.0 - 16.0 g/dL    Hematocrit 38.5 37.0 - 48.5 %    MCV 87 82 - 98 fL    MCH 28.5 27.0 - 31.0 pg    MCHC 33.0 32.0 - 36.0 %    RDW 16.7 (H) 11.5 - 14.5 %    Platelets 268 150 - 350 K/uL    MPV 10.6 9.2 - 12.9 fL    Gran # 3.8 1.8 - 7.7 K/uL    Lymph # 1.8 1.0 - 4.8 K/uL    Mono # 0.6 0.3 - 1.0 K/uL    Eos # 0.4 0.0 - 0.5 K/uL    Baso # 0.05 0.00 - 0.20 K/uL    Gran% 57.5 38.0 - 73.0 %    Lymph% 26.5 18.0 - 48.0 %    Mono% 9.5 4.0 - 15.0 %    Eosinophil% 5.7 0.0 - 8.0 %    Basophil% 0.8 0.0 - 1.9 %    Differential Method Automated    TSH   Result Value Ref Range    TSH 2.546 0.400 - 4.000 uIU/mL   Comprehensive metabolic panel   Result Value Ref Range    Sodium 139 136 - 145 mmol/L    Potassium 4.8 3.5 - 5.1 mmol/L    Chloride 104 95 - 110 mmol/L    CO2 28 23 - 29 mmol/L    Glucose 104 70 - 110 mg/dL    BUN, " Bld 16 8 - 23 mg/dL    Creatinine 0.9 0.5 - 1.4 mg/dL    Calcium 9.7 8.7 - 10.5 mg/dL    Total Protein 7.1 6.0 - 8.4 g/dL    Albumin 3.4 (L) 3.5 - 5.2 g/dL    Total Bilirubin 0.3 0.1 - 1.0 mg/dL    Alkaline Phosphatase 149 (H) 55 - 135 U/L    AST 14 10 - 40 U/L    ALT 12 10 - 44 U/L    Anion Gap 7 (L) 8 - 16 mmol/L    eGFR if African American >60.0 >60 mL/min/1.73 m^2    eGFR if non African American >60.0 >60 mL/min/1.73 m^2   Lipid panel   Result Value Ref Range    Cholesterol 177 120 - 199 mg/dL    Triglycerides 80 30 - 150 mg/dL    HDL 59 40 - 75 mg/dL    LDL Cholesterol 102.0 63.0 - 159.0 mg/dL    HDL/Chol Ratio 33.3 20.0 - 50.0 %    Total Cholesterol/HDL Ratio 3.0 2.0 - 5.0    Non-HDL Cholesterol 118 mg/dL   Hemoglobin A1c   Result Value Ref Range    Hemoglobin A1C 6.0 (H) 4.0 - 5.6 %    Estimated Avg Glucose 126 68 - 131 mg/dL     Independently reviewed bone density imges from 1/2016, significant decline 10% at the total hip and 3% at the lumbar spine. Lowest T score LS -1.4, TH -1.1. FRAX suggests no treatment.     BONE MINERAL DENSITY RESULTS:  Lumbar Spine: Lumbar bone mineral density L1-L4 is 0.896g/cm2, which is a t-score of -1.4. The z-score is 1.0.    Total Hip: The total hip bone mineral density is 0.814g/cm2.  The t-score is -1.1, and the z-score is 0.6.  Femoral neck BMD is 0.767g/cm2 and the t-score is -0.9.    Ultrasound 1/2015  The thyroid gland is prominent.  The right lobe measures 4.1 x 1.7 x 1.4 cm and the left lobe measures 4.5 x 1.3 x 1.3 cm.  Multiple small, subcentimeter complex thyroid nodules are identified which appear overall similar to the prior examination in size   and appearance.  No evidence for microcalcifications.  The largest nodule on the right measures up to 7 mm while the largest on the left measures up to 9 mm.  Vascularity of the thyroid gland is normal in appearance.    There are small lymph nodes in surrounding soft tissues of the neck and are benign in  appearance.    Assessment/Plan:     1. Prediabetes  - no need for medication at this time  - continue activity and healthy diet    2. Multinodular goiter  - last US was done in 2015 with nodules all less than 1 cm.   - repeat US next year    3. Osteopenia, unspecified location  - one year ago demonstrates osteopenia with significant decline this may reflect ongoing steroid injections which she was receiving up to 2015  - repeat next year    4. HTN (hypertension), benign  - took ibuprofen 800 mg last night for back pain - this may have caused increase in BP  - normal kidney function     Reordered labs, US and DXA in one year.   F/u one week later me.

## 2017-07-30 DIAGNOSIS — J20.9 BRONCHITIS, ACUTE, WITH BRONCHOSPASM: ICD-10-CM

## 2017-07-30 RX ORDER — OMEPRAZOLE 20 MG/1
CAPSULE, DELAYED RELEASE ORAL
Qty: 120 CAPSULE | Refills: 0 | Status: SHIPPED | OUTPATIENT
Start: 2017-07-30 | End: 2017-09-26 | Stop reason: SDUPTHER

## 2017-07-31 RX ORDER — MONTELUKAST SODIUM 10 MG/1
TABLET ORAL
Qty: 30 TABLET | Refills: 0 | Status: SHIPPED | OUTPATIENT
Start: 2017-07-31 | End: 2018-06-04 | Stop reason: SDUPTHER

## 2017-09-05 RX ORDER — NABUMETONE 750 MG/1
TABLET, FILM COATED ORAL
Qty: 60 TABLET | Refills: 0 | Status: SHIPPED | OUTPATIENT
Start: 2017-09-05 | End: 2017-10-17 | Stop reason: SDUPTHER

## 2017-09-26 DIAGNOSIS — I10 ESSENTIAL HYPERTENSION: ICD-10-CM

## 2017-09-26 RX ORDER — OMEPRAZOLE 20 MG/1
CAPSULE, DELAYED RELEASE ORAL
Qty: 120 CAPSULE | Refills: 0 | Status: SHIPPED | OUTPATIENT
Start: 2017-09-26 | End: 2017-10-28 | Stop reason: SDUPTHER

## 2017-09-26 RX ORDER — VALSARTAN 160 MG/1
TABLET ORAL
Qty: 90 TABLET | Refills: 0 | Status: SHIPPED | OUTPATIENT
Start: 2017-09-26 | End: 2018-02-13 | Stop reason: SDUPTHER

## 2017-09-29 ENCOUNTER — TELEPHONE (OUTPATIENT)
Dept: FAMILY MEDICINE | Facility: CLINIC | Age: 77
End: 2017-09-29

## 2017-09-29 RX ORDER — METOPROLOL SUCCINATE 25 MG/1
25 TABLET, EXTENDED RELEASE ORAL DAILY
Qty: 30 TABLET | Refills: 11 | Status: SHIPPED | OUTPATIENT
Start: 2017-09-29 | End: 2017-10-23 | Stop reason: SDUPTHER

## 2017-09-29 NOTE — TELEPHONE ENCOUNTER
----- Message from Meg Jarrell sent at 9/29/2017  9:36 AM CDT -----  Contact: pharmacy/sharon/[phone]  Pharmacy called in regards to changing the pt Rx for atenolol (TENORMIN) 25 MG tablet. Due to the pharmacy does not have the meds.      [facility]  Please advise

## 2017-09-29 NOTE — TELEPHONE ENCOUNTER
Patient informed regarding new medication. Patient had difficulty understanding and verbalizes that she has appointment to see provider on Tuesday. Voices that she will talk about it then.

## 2017-09-29 NOTE — TELEPHONE ENCOUNTER
Please inform pt that I have called in a prescription for Metoprolol, she needs to stop Atenolol, thanks

## 2017-10-03 ENCOUNTER — OFFICE VISIT (OUTPATIENT)
Dept: FAMILY MEDICINE | Facility: CLINIC | Age: 77
End: 2017-10-03
Payer: MEDICARE

## 2017-10-03 VITALS
HEIGHT: 63 IN | DIASTOLIC BLOOD PRESSURE: 60 MMHG | TEMPERATURE: 99 F | SYSTOLIC BLOOD PRESSURE: 110 MMHG | HEART RATE: 64 BPM | WEIGHT: 157.44 LBS | BODY MASS INDEX: 27.89 KG/M2

## 2017-10-03 DIAGNOSIS — Z23 NEED FOR PROPHYLACTIC VACCINATION AND INOCULATION AGAINST INFLUENZA: ICD-10-CM

## 2017-10-03 DIAGNOSIS — Z12.39 SCREENING BREAST EXAMINATION: ICD-10-CM

## 2017-10-03 DIAGNOSIS — J45.909 ASTHMA, UNSPECIFIED ASTHMA SEVERITY, UNSPECIFIED WHETHER COMPLICATED, UNSPECIFIED WHETHER PERSISTENT: Primary | ICD-10-CM

## 2017-10-03 DIAGNOSIS — H91.90 HEARING LOSS, UNSPECIFIED HEARING LOSS TYPE, UNSPECIFIED LATERALITY: ICD-10-CM

## 2017-10-03 DIAGNOSIS — R05.9 COUGH: ICD-10-CM

## 2017-10-03 PROCEDURE — G0008 ADMIN INFLUENZA VIRUS VAC: HCPCS | Mod: S$GLB,,, | Performed by: FAMILY MEDICINE

## 2017-10-03 PROCEDURE — 99499 UNLISTED E&M SERVICE: CPT | Mod: S$PBB,,, | Performed by: FAMILY MEDICINE

## 2017-10-03 PROCEDURE — 90662 IIV NO PRSV INCREASED AG IM: CPT | Mod: S$GLB,,, | Performed by: FAMILY MEDICINE

## 2017-10-03 PROCEDURE — 99999 PR PBB SHADOW E&M-EST. PATIENT-LVL III: CPT | Mod: PBBFAC,,, | Performed by: FAMILY MEDICINE

## 2017-10-03 PROCEDURE — 99214 OFFICE O/P EST MOD 30 MIN: CPT | Mod: S$GLB,,, | Performed by: FAMILY MEDICINE

## 2017-10-03 RX ORDER — ALBUTEROL SULFATE 90 UG/1
2 AEROSOL, METERED RESPIRATORY (INHALATION) EVERY 6 HOURS PRN
Qty: 1 EACH | Refills: 11 | Status: SHIPPED | OUTPATIENT
Start: 2017-10-03 | End: 2018-10-17 | Stop reason: SDUPTHER

## 2017-10-03 NOTE — PROGRESS NOTES
Flu (High dose) vaccine given as ordered. Two patient identifiers used, allergies reviewed, & vaccine verified. Administered to right deltoid.  Pt tolerated injection well; no swelling, redness, or bruising noted at injection site. Pt advised to remain in clinic 15 mins following injection for observation.

## 2017-10-03 NOTE — PROGRESS NOTES
Subjective:       Patient ID: Elena Frances is a 77 y.o. female.    Chief Complaint: Chest Congestion    Disclaimer: This note has been generated using voice-recognition software. There may be typographical errors that have been missed during proof-reading    78 yo presents today with several concerns.  First of all, she has had recurrent dyspnea on exertion and wheezing, which seems to be improved by using albuterol twice daily on a when necessary basis.  The symptoms seem to occur 3-4 times per week.  She has also noted an occasional dry cough.  No fever or chills.  No URI symptoms or rhinorrhea.  She has also noted some decreased hearing loss, which she feels may be mostly left-sided for some time.  No associated ear pain.  She is also noted to have recurrent symptoms of depression without suicidal ideation.  She is presently on Paxil CR 25 mg daily.  Denies anxiety symptoms.      Review of Systems   Constitutional: Negative for chills, fever and unexpected weight change.   HENT: Positive for hearing loss. Negative for congestion, ear discharge, ear pain, rhinorrhea and sinus pressure.    Respiratory: Positive for cough, chest tightness and wheezing. Negative for shortness of breath.    Cardiovascular: Negative for chest pain and leg swelling.   Psychiatric/Behavioral: Negative for agitation, behavioral problems, confusion, decreased concentration, self-injury, sleep disturbance and suicidal ideas. The patient is not hyperactive.        Objective:      Physical Exam   Constitutional: She appears well-developed and well-nourished. No distress.   HENT:   Right Ear: Tympanic membrane and ear canal normal.   Left Ear: Tympanic membrane and ear canal normal.   Nose: No mucosal edema or rhinorrhea. Right sinus exhibits no maxillary sinus tenderness and no frontal sinus tenderness. Left sinus exhibits no maxillary sinus tenderness and no frontal sinus tenderness.   Mouth/Throat: Uvula is midline, oropharynx is  clear and moist and mucous membranes are normal.   Neck: Normal range of motion.   Pulmonary/Chest: Effort normal and breath sounds normal. She has no wheezes. She has no rales.   Lymphadenopathy:     She has no cervical adenopathy.   Psychiatric: She has a normal mood and affect. Her behavior is normal. Thought content normal. She expresses no suicidal ideation.       Assessment:       1.  Asthma  2.  Hearing loss  3.  Pt needs mammogram  4.  Anxiety symptoms    Plan:       1.  PFTs, continue Albuterol for now  2.  Audiogram, mammogram  3.  RTC 2-3 weeks, plan to address change in Paxil, ? Further treatment of asthma based on PFTs

## 2017-10-06 ENCOUNTER — HOSPITAL ENCOUNTER (OUTPATIENT)
Dept: PULMONOLOGY | Facility: CLINIC | Age: 77
Discharge: HOME | End: 2017-10-06
Payer: MEDICARE

## 2017-10-06 ENCOUNTER — HOSPITAL ENCOUNTER (OUTPATIENT)
Dept: RADIOLOGY | Facility: HOSPITAL | Age: 77
Discharge: HOME | End: 2017-10-06
Attending: FAMILY MEDICINE
Payer: MEDICARE

## 2017-10-06 ENCOUNTER — CLINICAL SUPPORT (OUTPATIENT)
Dept: AUDIOLOGY | Facility: CLINIC | Age: 77
End: 2017-10-06
Payer: MEDICARE

## 2017-10-06 VITALS — WEIGHT: 157 LBS | HEIGHT: 63 IN | BODY MASS INDEX: 27.82 KG/M2

## 2017-10-06 DIAGNOSIS — H91.90 HEARING LOSS, UNSPECIFIED HEARING LOSS TYPE, UNSPECIFIED LATERALITY: ICD-10-CM

## 2017-10-06 DIAGNOSIS — Z12.39 SCREENING BREAST EXAMINATION: ICD-10-CM

## 2017-10-06 DIAGNOSIS — H90.3 SENSORINEURAL HEARING LOSS, BILATERAL: Primary | ICD-10-CM

## 2017-10-06 DIAGNOSIS — J45.909 ASTHMA, UNSPECIFIED ASTHMA SEVERITY, UNSPECIFIED WHETHER COMPLICATED, UNSPECIFIED WHETHER PERSISTENT: ICD-10-CM

## 2017-10-06 LAB
PRE FEV1 FVC: 78
PRE FEV1: 1.7
PRE FVC: 2.19
PREDICTED FEV1 FVC: 78
PREDICTED FEV1: 2.04
PREDICTED FVC: 2.66

## 2017-10-06 PROCEDURE — 94010 BREATHING CAPACITY TEST: CPT | Mod: S$GLB,,, | Performed by: INTERNAL MEDICINE

## 2017-10-06 PROCEDURE — 77067 SCR MAMMO BI INCL CAD: CPT | Mod: 26,,, | Performed by: RADIOLOGY

## 2017-10-06 PROCEDURE — 94729 DIFFUSING CAPACITY: CPT | Mod: S$GLB,,, | Performed by: INTERNAL MEDICINE

## 2017-10-06 PROCEDURE — 92557 COMPREHENSIVE HEARING TEST: CPT | Mod: S$GLB,,, | Performed by: AUDIOLOGIST

## 2017-10-06 PROCEDURE — 77067 SCR MAMMO BI INCL CAD: CPT | Mod: TC

## 2017-10-06 PROCEDURE — 77063 BREAST TOMOSYNTHESIS BI: CPT | Mod: 26,,, | Performed by: RADIOLOGY

## 2017-10-09 ENCOUNTER — TELEPHONE (OUTPATIENT)
Dept: FAMILY MEDICINE | Facility: CLINIC | Age: 77
End: 2017-10-09

## 2017-10-09 DIAGNOSIS — E78.5 HYPERLIPIDEMIA, UNSPECIFIED HYPERLIPIDEMIA TYPE: ICD-10-CM

## 2017-10-09 RX ORDER — ATORVASTATIN CALCIUM 80 MG/1
TABLET, FILM COATED ORAL
Qty: 90 TABLET | Refills: 0 | Status: SHIPPED | OUTPATIENT
Start: 2017-10-09 | End: 2018-01-19 | Stop reason: SDUPTHER

## 2017-10-10 NOTE — TELEPHONE ENCOUNTER
Attempted to contact pulmonary department to get results. No answer at lab number. Pulmonary clinic staff reports they are unable to retrieve results because patient is not linked to any pulmonary providers.

## 2017-10-10 NOTE — TELEPHONE ENCOUNTER
Could you please check with the Pulmonary department and see what happened with this patients pulmonary tests?  I asked for pre and post bronchodilator results, only received pre.  thanks

## 2017-10-13 ENCOUNTER — TELEPHONE (OUTPATIENT)
Dept: FAMILY MEDICINE | Facility: CLINIC | Age: 77
End: 2017-10-13

## 2017-10-13 NOTE — TELEPHONE ENCOUNTER
They still have not updated this patient's pulmonary tests; they were supposed to be complete with pre and post bronchodilator response, thanks

## 2017-10-17 ENCOUNTER — OFFICE VISIT (OUTPATIENT)
Dept: FAMILY MEDICINE | Facility: CLINIC | Age: 77
End: 2017-10-17
Payer: MEDICARE

## 2017-10-17 VITALS
TEMPERATURE: 98 F | HEART RATE: 68 BPM | HEIGHT: 63 IN | DIASTOLIC BLOOD PRESSURE: 80 MMHG | SYSTOLIC BLOOD PRESSURE: 122 MMHG | BODY MASS INDEX: 27.93 KG/M2 | WEIGHT: 157.63 LBS

## 2017-10-17 DIAGNOSIS — F41.1 GENERALIZED ANXIETY DISORDER: Primary | ICD-10-CM

## 2017-10-17 PROCEDURE — 99999 PR PBB SHADOW E&M-EST. PATIENT-LVL III: CPT | Mod: PBBFAC,,, | Performed by: FAMILY MEDICINE

## 2017-10-17 PROCEDURE — 99214 OFFICE O/P EST MOD 30 MIN: CPT | Mod: S$GLB,,, | Performed by: FAMILY MEDICINE

## 2017-10-17 RX ORDER — SERTRALINE HYDROCHLORIDE 50 MG/1
50 TABLET, FILM COATED ORAL DAILY
Qty: 90 TABLET | Refills: 3 | Status: SHIPPED | OUTPATIENT
Start: 2017-10-17 | End: 2018-07-16 | Stop reason: SDUPTHER

## 2017-10-17 RX ORDER — NABUMETONE 750 MG/1
750 TABLET, FILM COATED ORAL 2 TIMES DAILY
Qty: 90 TABLET | Refills: 2 | Status: SHIPPED | OUTPATIENT
Start: 2017-10-17 | End: 2018-08-03 | Stop reason: SDUPTHER

## 2017-10-17 NOTE — PROGRESS NOTES
Subjective:       Patient ID: Elena Frances is a 77 y.o. female.    Chief Complaint: Medication Management    Disclaimer: This note has been generated using voice-recognition software. There may be typographical errors that have been missed during proof-reading    78 yo presents today for follow up.  Seen earlier for recurrent cough which has improved but not resolved while on Albuterol Inhaler.  Recent PFTs are normal, audiogram shows high frequency hearing loss  She is mainly here today for recurrent anxiety symptoms, without depression.  Presently on Paxil 25mg CR with minimal improvement      Review of Systems   HENT: Positive for hearing loss. Negative for congestion, postnasal drip and rhinorrhea.    Respiratory: Positive for cough. Negative for chest tightness, shortness of breath and wheezing.    Musculoskeletal: Positive for neck pain.        History of recurrent left sided neck pain, DJD noted on previous x rays   Psychiatric/Behavioral: Negative for agitation, behavioral problems, confusion, decreased concentration, self-injury, sleep disturbance and suicidal ideas. The patient is nervous/anxious. The patient is not hyperactive.        Objective:      Physical Exam   Constitutional: She appears well-developed and well-nourished. No distress.   HENT:   Right Ear: Tympanic membrane and ear canal normal.   Left Ear: Tympanic membrane and ear canal normal.   Nose: Nose normal.   Mouth/Throat: Uvula is midline, oropharynx is clear and moist and mucous membranes are normal.   Neck: Normal range of motion. No JVD present.   Cardiovascular: Normal rate and regular rhythm.    No murmur heard.  Pulmonary/Chest: Effort normal and breath sounds normal. She has no wheezes. She has no rales. She exhibits no tenderness.   Lymphadenopathy:     She has no cervical adenopathy.   Psychiatric: Her speech is normal and behavior is normal. Judgment and thought content normal. Her mood appears anxious. Cognition and  memory are normal. She does not exhibit a depressed mood. She expresses no suicidal ideation.       Assessment:       1.  Cough  2.  anxiety  Plan:       1.  Continue Albuterol Inhaler  2.  Gradual taper of Paxil, then start Sertraline 25mg dailyx 3 days, then 50mg daily with f/u in 2-3 weeks  3. Refill Relafen

## 2017-10-17 NOTE — PATIENT INSTRUCTIONS
Take one half of your Paxil for 2 days, then stop the medicine  On third day, start taking 1/2 of the Zoloft (Sertraline) for 3 days, then increase to a whole tablet daily  Call for follow up in 2-3 weeks

## 2017-10-23 RX ORDER — METOPROLOL SUCCINATE 25 MG/1
25 TABLET, EXTENDED RELEASE ORAL DAILY
Qty: 90 TABLET | Refills: 3 | Status: SHIPPED | OUTPATIENT
Start: 2017-10-23 | End: 2018-06-04

## 2017-10-23 NOTE — TELEPHONE ENCOUNTER
----- Message from Meg Jarrell sent at 10/23/2017  9:18 AM CDT -----  Contact: self/[phone]  Pt called in regards to having questions about her rx for metoprolol succinate (TOPROL-XL) 25 MG 24 hr tablet.      Please advise

## 2017-10-28 RX ORDER — OMEPRAZOLE 20 MG/1
CAPSULE, DELAYED RELEASE ORAL
Qty: 120 CAPSULE | Refills: 0 | Status: SHIPPED | OUTPATIENT
Start: 2017-10-28 | End: 2017-10-30 | Stop reason: SDUPTHER

## 2017-10-30 ENCOUNTER — OFFICE VISIT (OUTPATIENT)
Dept: FAMILY MEDICINE | Facility: CLINIC | Age: 77
End: 2017-10-30
Payer: MEDICARE

## 2017-10-30 ENCOUNTER — TELEPHONE (OUTPATIENT)
Dept: FAMILY MEDICINE | Facility: CLINIC | Age: 77
End: 2017-10-30

## 2017-10-30 VITALS
TEMPERATURE: 98 F | HEIGHT: 63 IN | SYSTOLIC BLOOD PRESSURE: 136 MMHG | DIASTOLIC BLOOD PRESSURE: 68 MMHG | WEIGHT: 155.19 LBS | BODY MASS INDEX: 27.5 KG/M2 | HEART RATE: 84 BPM

## 2017-10-30 DIAGNOSIS — J45.20 INTERMITTENT ASTHMA, UNSPECIFIED ASTHMA SEVERITY, UNSPECIFIED WHETHER COMPLICATED: Primary | ICD-10-CM

## 2017-10-30 PROCEDURE — 99499 UNLISTED E&M SERVICE: CPT | Mod: S$PBB,,, | Performed by: FAMILY MEDICINE

## 2017-10-30 PROCEDURE — 99214 OFFICE O/P EST MOD 30 MIN: CPT | Mod: S$GLB,,, | Performed by: FAMILY MEDICINE

## 2017-10-30 PROCEDURE — 99999 PR PBB SHADOW E&M-EST. PATIENT-LVL III: CPT | Mod: PBBFAC,,, | Performed by: FAMILY MEDICINE

## 2017-10-30 RX ORDER — OMEPRAZOLE 20 MG/1
40 CAPSULE, DELAYED RELEASE ORAL 2 TIMES DAILY
Qty: 90 CAPSULE | Refills: 3 | Status: SHIPPED | OUTPATIENT
Start: 2017-10-30 | End: 2017-11-08

## 2017-10-30 RX ORDER — METHYLPREDNISOLONE 4 MG/1
TABLET ORAL
Qty: 30 TABLET | Refills: 0 | Status: SHIPPED | OUTPATIENT
Start: 2017-10-30 | End: 2017-11-06 | Stop reason: ALTCHOICE

## 2017-10-30 RX ORDER — PROMETHAZINE HYDROCHLORIDE AND CODEINE PHOSPHATE 6.25; 1 MG/5ML; MG/5ML
5 SOLUTION ORAL EVERY 4 HOURS PRN
Qty: 180 ML | Refills: 0 | Status: SHIPPED | OUTPATIENT
Start: 2017-10-30 | End: 2017-11-09

## 2017-10-30 NOTE — PROGRESS NOTES
Subjective:       Patient ID: Elena Frances is a 77 y.o. female.    Chief Complaint: Cough and Chest Congestion    Disclaimer: This note has been generated using voice-recognition software. There may be typographical errors that have been missed during proof-reading    76 yo presents today with persistent dry cough, occurs thru the day.  Symptoms minimally improved with Albuterol.  Leaving for [facility] in 2 weeks      Cough   Associated symptoms include wheezing. Pertinent negatives include no chest pain, chills, fever, postnasal drip, rhinorrhea, sore throat or shortness of breath.     Review of Systems   Constitutional: Negative for chills and fever.   HENT: Negative for congestion, facial swelling, postnasal drip, rhinorrhea and sore throat.    Respiratory: Positive for cough and wheezing. Negative for shortness of breath.    Cardiovascular: Negative for chest pain.       Objective:      Physical Exam   Constitutional: She appears well-developed and well-nourished. No distress.   HENT:   Right Ear: Tympanic membrane and ear canal normal.   Left Ear: Tympanic membrane and ear canal normal.   Nose: No mucosal edema or rhinorrhea.   Mouth/Throat: Uvula is midline, oropharynx is clear and moist and mucous membranes are normal. No posterior oropharyngeal erythema.   Neck: Normal range of motion. No JVD present. No thyromegaly present.   Pulmonary/Chest: Effort normal and breath sounds normal. No respiratory distress. She has no wheezes. She has no rales. She exhibits no tenderness.       Assessment:       1. Intermittent asthma, unspecified asthma severity, unspecified whether complicated        Plan:       1.  Gradual taper of Metoprolol, ? Symptoms started after Medication change  2.  Continue Albuterol  3.  Medrol Dosepak  4.  F/u one week

## 2017-10-30 NOTE — TELEPHONE ENCOUNTER
----- Message from Anuradha Acosta sent at 10/27/2017  4:03 PM CDT -----  Contact: Self/ [phone]   Type: Rx    Name of medication(s): omeprazole (PRILOSEC) 20 MG capsule    Is this a refill? New rx? Refill     Who prescribed medication? Dr. Area     [facility] Phone[facility] & [facility] [address] [facility], [place] - [address] AT [facility] [address] [phone] (Phone)  [phone] (Fax)    Comments: pt is calling to have a refill on the Rx above. Please call and advise     Thank you

## 2017-10-30 NOTE — PATIENT INSTRUCTIONS
Start taking one half of the Metoprolol daily for 3 days, then take one half every other day for 3 days, then stop

## 2017-11-03 ENCOUNTER — TELEPHONE (OUTPATIENT)
Dept: FAMILY MEDICINE | Facility: CLINIC | Age: 77
End: 2017-11-03

## 2017-11-03 NOTE — TELEPHONE ENCOUNTER
Message left on voicemail informing patient that Qvar is not covered by her insurance and that she should contact them to find out what is covered.

## 2017-11-06 ENCOUNTER — OFFICE VISIT (OUTPATIENT)
Dept: FAMILY MEDICINE | Facility: CLINIC | Age: 77
End: 2017-11-06
Payer: MEDICARE

## 2017-11-06 ENCOUNTER — TELEPHONE (OUTPATIENT)
Dept: FAMILY MEDICINE | Facility: CLINIC | Age: 77
End: 2017-11-06

## 2017-11-06 VITALS
BODY MASS INDEX: 27.54 KG/M2 | HEIGHT: 63 IN | WEIGHT: 155.44 LBS | SYSTOLIC BLOOD PRESSURE: 114 MMHG | HEART RATE: 88 BPM | DIASTOLIC BLOOD PRESSURE: 70 MMHG | TEMPERATURE: 98 F

## 2017-11-06 DIAGNOSIS — R05.9 COUGH: Primary | ICD-10-CM

## 2017-11-06 DIAGNOSIS — R49.0 HOARSENESS: ICD-10-CM

## 2017-11-06 PROCEDURE — 99999 PR PBB SHADOW E&M-EST. PATIENT-LVL IV: CPT | Mod: PBBFAC,,, | Performed by: FAMILY MEDICINE

## 2017-11-06 PROCEDURE — 99214 OFFICE O/P EST MOD 30 MIN: CPT | Mod: S$GLB,,, | Performed by: FAMILY MEDICINE

## 2017-11-06 NOTE — PROGRESS NOTES
Subjective:       Patient ID: Elena Frances is a 77 y.o. female.    Chief Complaint: Follow-up (1 week, mildy better)    Disclaimer: This note has been generated using voice-recognition software. There may be typographical errors that have been missed during proof-reading    78 yo presents today for recurrent non productive cough, present for several weeks.  Since last seen, she stopped Metoprolol without improvement of symptoms.  She has also finished a course of steroids without improvement.  She has recurrent hoarseness as well, without associated sore throat or neck pain.  Recent PFTs were normal      Review of Systems   Constitutional: Negative for chills, fever and unexpected weight change.   HENT: Positive for voice change. Negative for sinus pain and trouble swallowing.    Respiratory: Positive for cough, chest tightness and wheezing. Negative for shortness of breath.        Objective:      Physical Exam   Constitutional: She appears well-developed and well-nourished. No distress.   HENT:   Right Ear: Tympanic membrane and ear canal normal.   Left Ear: Tympanic membrane and ear canal normal.   Nose: No mucosal edema or rhinorrhea. Right sinus exhibits no maxillary sinus tenderness and no frontal sinus tenderness. Left sinus exhibits no maxillary sinus tenderness and no frontal sinus tenderness.   Neck: Normal range of motion. No JVD present. No thyromegaly present.   Cardiovascular: Normal rate and regular rhythm.    Pulmonary/Chest: Effort normal and breath sounds normal. She has no wheezes. She has no rales.   Lymphadenopathy:     She has no cervical adenopathy.       Assessment:       1. Cough    2. Hoarseness        Plan:    1.  Chest CT  2.  Consult Pulmonary, ENT  3.  Continue present medications

## 2017-11-06 NOTE — TELEPHONE ENCOUNTER
Please contact pt to set up chest CT.  I forgot to mention to her that I wanted this done, as soon as possible, thanks

## 2017-11-08 ENCOUNTER — TELEPHONE (OUTPATIENT)
Dept: FAMILY MEDICINE | Facility: CLINIC | Age: 77
End: 2017-11-08

## 2017-11-08 DIAGNOSIS — K21.9 GASTROESOPHAGEAL REFLUX DISEASE, ESOPHAGITIS PRESENCE NOT SPECIFIED: Primary | ICD-10-CM

## 2017-11-08 DIAGNOSIS — R05.9 COUGH: ICD-10-CM

## 2017-11-08 RX ORDER — PANTOPRAZOLE SODIUM 40 MG/1
40 TABLET, DELAYED RELEASE ORAL 2 TIMES DAILY
Qty: 60 TABLET | Refills: 11 | Status: SHIPPED | OUTPATIENT
Start: 2017-11-08 | End: 2017-12-08

## 2017-11-08 NOTE — TELEPHONE ENCOUNTER
----- Message from Kim Rockwell sent at 11/8/2017 10:45 AM CST -----  Contact: Patient [phone]   Patient would like to speak with you, she would not leave details.    Thank you

## 2018-01-18 DIAGNOSIS — R05.9 COUGH: Primary | ICD-10-CM

## 2018-01-19 DIAGNOSIS — E78.5 HYPERLIPIDEMIA, UNSPECIFIED HYPERLIPIDEMIA TYPE: ICD-10-CM

## 2018-01-19 RX ORDER — ATORVASTATIN CALCIUM 80 MG/1
80 TABLET, FILM COATED ORAL DAILY
Qty: 30 TABLET | Refills: 11 | Status: SHIPPED | OUTPATIENT
Start: 2018-01-19 | End: 2018-07-16 | Stop reason: SDUPTHER

## 2018-01-25 ENCOUNTER — TELEPHONE (OUTPATIENT)
Dept: FAMILY MEDICINE | Facility: CLINIC | Age: 78
End: 2018-01-25

## 2018-01-25 RX ORDER — OSELTAMIVIR PHOSPHATE 75 MG/1
75 CAPSULE ORAL 2 TIMES DAILY
Qty: 10 CAPSULE | Refills: 0 | Status: SHIPPED | OUTPATIENT
Start: 2018-01-25 | End: 2018-01-30

## 2018-01-25 NOTE — TELEPHONE ENCOUNTER
----- Message from Agustina Asya sent at 1/25/2018  9:25 AM CST -----  Contact: pt [phone]  Pt would like a call from the nurse pt said she has a cough and chest congestion,pt did not want to see another doctor,please advise pt

## 2018-01-25 NOTE — TELEPHONE ENCOUNTER
----- Message from Siobhan Meza sent at 1/25/2018  3:50 PM CST -----  Contact: [employer]/ self/[phone]  [employer]    Patient called and present influenza symptoms - Chills, body aches, sore throat, cough, chest congestion. (2nd message). [employer] [address] [facility] [address] AT [facility] [place] [phone] (Phone)  [phone] (Fax)     Please call and advise.       Thank you

## 2018-01-25 NOTE — TELEPHONE ENCOUNTER
Spoke with patient and advised that I called her back earlier this morning and left her a voicemail to call me backwhen I did not get an answer from her.  Has flu like symptoms; no fever.  Can you erx Tamiflu?

## 2018-02-01 ENCOUNTER — HOSPITAL ENCOUNTER (OUTPATIENT)
Dept: RADIOLOGY | Facility: HOSPITAL | Age: 78
Discharge: HOME | End: 2018-02-01
Attending: INTERNAL MEDICINE
Payer: MEDICARE

## 2018-02-01 ENCOUNTER — HOSPITAL ENCOUNTER (OUTPATIENT)
Dept: PULMONOLOGY | Facility: CLINIC | Age: 78
Discharge: HOME | End: 2018-02-01
Payer: MEDICARE

## 2018-02-01 ENCOUNTER — OFFICE VISIT (OUTPATIENT)
Dept: PULMONOLOGY | Facility: CLINIC | Age: 78
End: 2018-02-01
Payer: MEDICARE

## 2018-02-01 VITALS
HEIGHT: 62 IN | SYSTOLIC BLOOD PRESSURE: 136 MMHG | OXYGEN SATURATION: 97 % | DIASTOLIC BLOOD PRESSURE: 72 MMHG | WEIGHT: 153 LBS | HEART RATE: 76 BPM | BODY MASS INDEX: 28.16 KG/M2

## 2018-02-01 DIAGNOSIS — J44.1 ASTHMA EXACERBATION IN COPD: ICD-10-CM

## 2018-02-01 DIAGNOSIS — R05.9 COUGH: ICD-10-CM

## 2018-02-01 DIAGNOSIS — J45.901 ASTHMA EXACERBATION IN COPD: ICD-10-CM

## 2018-02-01 DIAGNOSIS — J45.21 MILD INTERMITTENT ASTHMATIC BRONCHITIS WITH ACUTE EXACERBATION: Primary | ICD-10-CM

## 2018-02-01 LAB
PRE FEV1 FVC: 69
PRE FEV1: 1.4
PRE FVC: 2.04
PREDICTED FEV1 FVC: 78
PREDICTED FEV1: 1.96
PREDICTED FVC: 2.55

## 2018-02-01 PROCEDURE — 1126F AMNT PAIN NOTED NONE PRSNT: CPT | Mod: S$GLB,,, | Performed by: INTERNAL MEDICINE

## 2018-02-01 PROCEDURE — 94010 BREATHING CAPACITY TEST: CPT | Mod: S$GLB,,, | Performed by: INTERNAL MEDICINE

## 2018-02-01 PROCEDURE — 71046 X-RAY EXAM CHEST 2 VIEWS: CPT | Mod: 26,,, | Performed by: RADIOLOGY

## 2018-02-01 PROCEDURE — 99999 PR PBB SHADOW E&M-EST. PATIENT-LVL III: CPT | Mod: PBBFAC,,, | Performed by: INTERNAL MEDICINE

## 2018-02-01 PROCEDURE — 1159F MED LIST DOCD IN RCRD: CPT | Mod: S$GLB,,, | Performed by: INTERNAL MEDICINE

## 2018-02-01 PROCEDURE — 99214 OFFICE O/P EST MOD 30 MIN: CPT | Mod: 25,S$GLB,, | Performed by: INTERNAL MEDICINE

## 2018-02-01 PROCEDURE — 71046 X-RAY EXAM CHEST 2 VIEWS: CPT | Mod: TC,FY

## 2018-02-01 PROCEDURE — 3008F BODY MASS INDEX DOCD: CPT | Mod: S$GLB,,, | Performed by: INTERNAL MEDICINE

## 2018-02-01 RX ORDER — PREDNISONE 10 MG/1
TABLET ORAL
Qty: 36 TABLET | Refills: 0 | Status: SHIPPED | OUTPATIENT
Start: 2018-02-01 | End: 2018-03-13

## 2018-02-02 NOTE — PROGRESS NOTES
Subjective:       Patient ID: Elena rFances is a 77 y.o. female.    Chief Complaint: Cough and Shortness of Breath    HPI  76 yo female non smoker, originally from [place] comes for assessment of a cough and the sensation of cats being her throat. She has albuterol HFA Qvar and singulair. Certainly being treated for mild persistent asthma. Her PFT;s today are compatible with mild obstruction with a Fev-1: 1.40 liters 69% of FVC,. Has  A normal FVC. Today's chest x-ray is clear.  Review of Systems   Constitutional: Negative.    HENT: Negative.    Eyes: Negative.    Respiratory: Positive for cough and wheezing.         Hx of asthma   Cardiovascular: Negative.    Genitourinary: Negative.    Endocrine: Hx of multi nodular goiter   Musculoskeletal: Positive for arthralgias.        DJD right knee   Skin: Negative.    Gastrointestinal: Negative.         DJD right knee   Neurological: Negative.    Psychiatric/Behavioral: Negative.        Objective:      Physical Exam   Constitutional: She is oriented to person, place, and time. She appears well-developed and well-nourished. No distress.   HENT:   Head: Normocephalic and atraumatic.   Right Ear: External ear normal.   Left Ear: External ear normal.   Nose: Nose normal.   Mouth/Throat: Oropharynx is clear and moist.   Eyes: Conjunctivae and EOM are normal. Pupils are equal, round, and reactive to light.   Neck: Normal range of motion. Neck supple. No JVD present. No thyromegaly present.   Cardiovascular: Normal rate, regular rhythm, normal heart sounds and intact distal pulses.  Exam reveals no gallop.    No murmur heard.  Pulmonary/Chest: Breath sounds normal. No stridor. No respiratory distress. She has no wheezes. She has no rales. She exhibits no tenderness.   Bilateral end expiratory wheezes  Throughout both lung fields.    Abdominal: Soft. Bowel sounds are normal. She exhibits no distension and no mass. There is no tenderness. There is no rebound and no  guarding.   Musculoskeletal: Normal range of motion. She exhibits no edema.   Lymphadenopathy:     She has no cervical adenopathy.   Neurological: She is alert and oriented to person, place, and time. She has normal reflexes. She displays normal reflexes. No cranial nerve deficit.   Skin: Skin is warm and dry. No rash noted.   Psychiatric: She has a normal mood and affect. Her behavior is normal. Judgment and thought content normal.   Nursing note and vitals reviewed.        Assessment:       1. Mild intermittent asthmatic bronchitis with acute exacerbation        Outpatient Encounter Prescriptions as of 2/1/2018   Medication Sig Dispense Refill    albuterol 90 mcg/actuation inhaler Inhale 2 puffs into the lungs every 6 (six) hours as needed for Wheezing. 1 each 11    aspirin 325 MG tablet Take 1 tablet (325 mg total) by mouth 2 (two) times daily. 70 tablet 0    atorvastatin (LIPITOR) 80 MG tablet Take 1 tablet (80 mg total) by mouth once daily. 30 tablet 11    beclomethasone (QVAR) 40 mcg/actuation Aero Inhale 1 puff into the lungs 2 (two) times daily. Controller 1 each 6    blood-glucose meter ([employer] BLOOD GLUCOSE SYSTM) kit Use as instructed 1 each 0    CYANOCOBALAMIN, VITAMIN B-12, (VITAMIN B-12 ORAL) Take 2,500 mcg by mouth.      diclofenac sodium (VOLTAREN) 1 % Gel Apply 2 g topically once daily. 1 Tube 2    fluocinonide (LIDEX) 0.05 % external solution every evening.       lancets Misc 1 lancet by Misc.(Non-Drug; Combo Route) route 2 (two) times daily. 200 each 3    metoprolol succinate (TOPROL-XL) 25 MG 24 hr tablet Take 1 tablet (25 mg total) by mouth once daily. 90 tablet 3    montelukast (SINGULAIR) 10 mg tablet TAKE 1 TABLET(10 MG) BY MOUTH EVERY EVENING 30 tablet 0    nabumetone (RELAFEN) 750 MG tablet Take 1 tablet (750 mg total) by mouth 2 (two) times daily. 90 tablet 2    oxycodone-acetaminophen (PERCOCET)  mg per tablet Take 1 tablet by mouth every 4 (four) hours as needed  for Pain. 90 tablet 0    pantoprazole (PROTONIX) 40 MG tablet Take 1 tablet (40 mg total) by mouth 2 (two) times daily. 60 tablet 11    paroxetine (PAXIL-CR) 25 MG 24 hr tablet Take 1 tablet (25 mg total) by mouth once daily. 90 tablet 2    predniSONE (DELTASONE) 10 MG tablet Three tablets Q AM x 7 days then 2 tablets x 7 days. 36 tablet 0    sertraline (ZOLOFT) 50 MG tablet Take 1 tablet (50 mg total) by mouth once daily. 90 tablet 3    valsartan (DIOVAN) 160 MG tablet TAKE 1 TABLET BY MOUTH EVERY DAY 90 tablet 0     No facility-administered encounter medications on file as of 2/1/2018.      No orders of the defined types were placed in this encounter.    Plan:             IMP: Exacerbation of Mild persistent  Asthma   Use albuterol tid and gave apulse of prednisone: 30 mg x  7 days and then 20 mg  Might benefit from the routine use of Symbicort BID

## 2018-02-12 DIAGNOSIS — I10 ESSENTIAL HYPERTENSION: ICD-10-CM

## 2018-02-13 RX ORDER — VALSARTAN 160 MG/1
TABLET ORAL
Qty: 90 TABLET | Refills: 0 | Status: SHIPPED | OUTPATIENT
Start: 2018-02-13 | End: 2018-02-14 | Stop reason: SDUPTHER

## 2018-02-14 DIAGNOSIS — I10 ESSENTIAL HYPERTENSION: ICD-10-CM

## 2018-02-14 RX ORDER — VALSARTAN 160 MG/1
160 TABLET ORAL DAILY
Qty: 90 TABLET | Refills: 0 | Status: SHIPPED | OUTPATIENT
Start: 2018-02-14 | End: 2018-05-17 | Stop reason: SDUPTHER

## 2018-02-14 RX ORDER — OMEPRAZOLE 40 MG/1
40 CAPSULE, DELAYED RELEASE ORAL DAILY
Qty: 90 CAPSULE | Refills: 0 | Status: SHIPPED | OUTPATIENT
Start: 2018-02-14 | End: 2018-05-02 | Stop reason: SDUPTHER

## 2018-02-16 ENCOUNTER — TELEPHONE (OUTPATIENT)
Dept: FAMILY MEDICINE | Facility: CLINIC | Age: 78
End: 2018-02-16

## 2018-02-16 NOTE — TELEPHONE ENCOUNTER
----- Message from Destiny Thomas sent at 2/16/2018  8:22 AM CST -----  AC     I spoke to the patient(with an ) she stated that [facility] gave her 320 mg on the Rx valsartan (DIOVAN) 160 MG tablet instead of 160 mg. I called and spoke to [facility] and they said that they filled the old Rx and that she could go get the 160 mg from [facility] or from them. Informed the patient and she said ok.    Thank You

## 2018-03-13 ENCOUNTER — OFFICE VISIT (OUTPATIENT)
Dept: FAMILY MEDICINE | Facility: CLINIC | Age: 78
End: 2018-03-13
Payer: MEDICARE

## 2018-03-13 VITALS
DIASTOLIC BLOOD PRESSURE: 68 MMHG | TEMPERATURE: 99 F | WEIGHT: 155 LBS | BODY MASS INDEX: 27.46 KG/M2 | SYSTOLIC BLOOD PRESSURE: 124 MMHG | HEART RATE: 76 BPM | HEIGHT: 63 IN

## 2018-03-13 DIAGNOSIS — J45.909 MODERATE ASTHMA, UNSPECIFIED WHETHER COMPLICATED, UNSPECIFIED WHETHER PERSISTENT: ICD-10-CM

## 2018-03-13 DIAGNOSIS — Z23 NEED FOR PROPHYLACTIC VACCINATION AND INOCULATION AGAINST INFLUENZA: Primary | ICD-10-CM

## 2018-03-13 PROCEDURE — 3074F SYST BP LT 130 MM HG: CPT | Mod: CPTII,S$GLB,, | Performed by: FAMILY MEDICINE

## 2018-03-13 PROCEDURE — G0008 ADMIN INFLUENZA VIRUS VAC: HCPCS | Mod: S$GLB,,, | Performed by: FAMILY MEDICINE

## 2018-03-13 PROCEDURE — 99999 PR PBB SHADOW E&M-EST. PATIENT-LVL III: CPT | Mod: PBBFAC,,, | Performed by: FAMILY MEDICINE

## 2018-03-13 PROCEDURE — 90662 IIV NO PRSV INCREASED AG IM: CPT | Mod: S$GLB,,, | Performed by: FAMILY MEDICINE

## 2018-03-13 PROCEDURE — 99214 OFFICE O/P EST MOD 30 MIN: CPT | Mod: S$GLB,,, | Performed by: FAMILY MEDICINE

## 2018-03-13 PROCEDURE — 3078F DIAST BP <80 MM HG: CPT | Mod: CPTII,S$GLB,, | Performed by: FAMILY MEDICINE

## 2018-03-13 RX ORDER — FLUTICASONE PROPIONATE AND SALMETEROL 100; 50 UG/1; UG/1
1 POWDER RESPIRATORY (INHALATION) 2 TIMES DAILY
Qty: 60 EACH | Refills: 11 | Status: SHIPPED | OUTPATIENT
Start: 2018-03-13 | End: 2018-11-19

## 2018-03-13 RX ORDER — BUDESONIDE AND FORMOTEROL FUMARATE DIHYDRATE 80; 4.5 UG/1; UG/1
2 AEROSOL RESPIRATORY (INHALATION)
Refills: 0 | Status: CANCELLED | OUTPATIENT
Start: 2018-03-13 | End: 2019-03-13

## 2018-03-13 RX ORDER — PANTOPRAZOLE SODIUM 40 MG/1
TABLET, DELAYED RELEASE ORAL
COMMUNITY
Start: 2018-02-12 | End: 2018-06-04 | Stop reason: SDUPTHER

## 2018-03-13 NOTE — PROGRESS NOTES
Subjective:       Patient ID: Elena Frances is a 78 y.o. female.    Chief Complaint: Asthma    Disclaimer: This note has been generated using voice-recognition software. There may be typographical errors that have been missed during proof-reading    78-year-old who presents today for follow-up of a recurrent cough.  Prior diagnosis of mild to moderate asthma, presently being treated with albuterol inhaler, Qvar was not covered by her insurance.  She felt symptomatically better after a steroid taper post consult with pulmonary department.  However, symptoms have improved now that she has stopped the steroids.  She denies any fever or chills, or chest discomfort.  She feels that her symptoms may be related to ALLERGIES since she feels somewhat better in the morning after she takes Allegra.      Asthma   She complains of cough. There is no shortness of breath or wheezing. Associated symptoms include rhinorrhea. Pertinent negatives include no chest pain or fever. Her past medical history is significant for asthma.     Review of Systems   Constitutional: Negative for chills and fever.   HENT: Positive for congestion and rhinorrhea.    Respiratory: Positive for cough. Negative for choking, chest tightness, shortness of breath and wheezing.    Cardiovascular: Negative for chest pain.       Objective:      Physical Exam   Constitutional: She appears well-developed and well-nourished. No distress.   HENT:   Right Ear: Tympanic membrane and ear canal normal.   Left Ear: Tympanic membrane and ear canal normal.   Nose: Rhinorrhea present. No mucosal edema.   Mouth/Throat: Uvula is midline, oropharynx is clear and moist and mucous membranes are normal.   Cardiovascular: Normal rate and regular rhythm.    No murmur heard.  Pulmonary/Chest: Effort normal and breath sounds normal. She has no wheezes. She has no rales.       Assessment:       1. Need for prophylactic vaccination and inoculation against influenza    2.  Moderate asthma, unspecified whether complicated, unspecified whether persistent        Plan:       1.  Start Advair bid  2.  Continue Albuterol prn  3.  Consult Allergy

## 2018-04-04 ENCOUNTER — OFFICE VISIT (OUTPATIENT)
Dept: ALLERGY | Facility: CLINIC | Age: 78
End: 2018-04-04
Payer: MEDICARE

## 2018-04-04 VITALS
OXYGEN SATURATION: 98 % | SYSTOLIC BLOOD PRESSURE: 124 MMHG | WEIGHT: 156.94 LBS | DIASTOLIC BLOOD PRESSURE: 88 MMHG | HEART RATE: 74 BPM | HEIGHT: 64 IN | BODY MASS INDEX: 26.79 KG/M2

## 2018-04-04 DIAGNOSIS — J31.0 OTHER CHRONIC RHINITIS: Chronic | ICD-10-CM

## 2018-04-04 DIAGNOSIS — J45.30 MILD PERSISTENT ASTHMA WITHOUT COMPLICATION: Primary | Chronic | ICD-10-CM

## 2018-04-04 DIAGNOSIS — R05.9 COUGH: Chronic | ICD-10-CM

## 2018-04-04 DIAGNOSIS — Z79.899 CURRENT USE OF BETA BLOCKER: Chronic | ICD-10-CM

## 2018-04-04 DIAGNOSIS — J45.909 CHILDHOOD ASTHMA, UNSPECIFIED ASTHMA SEVERITY, UNSPECIFIED WHETHER COMPLICATED, UNSPECIFIED WHETHER PERSISTENT: ICD-10-CM

## 2018-04-04 PROCEDURE — 99999 PR PBB SHADOW E&M-EST. PATIENT-LVL III: CPT | Mod: PBBFAC,,, | Performed by: ALLERGY & IMMUNOLOGY

## 2018-04-04 PROCEDURE — 3079F DIAST BP 80-89 MM HG: CPT | Mod: CPTII,S$GLB,, | Performed by: ALLERGY & IMMUNOLOGY

## 2018-04-04 PROCEDURE — 99204 OFFICE O/P NEW MOD 45 MIN: CPT | Mod: S$GLB,,, | Performed by: ALLERGY & IMMUNOLOGY

## 2018-04-04 PROCEDURE — 3074F SYST BP LT 130 MM HG: CPT | Mod: CPTII,S$GLB,, | Performed by: ALLERGY & IMMUNOLOGY

## 2018-04-04 RX ORDER — MONTELUKAST SODIUM 10 MG/1
10 TABLET ORAL NIGHTLY
Qty: 30 TABLET | Refills: 6 | Status: SHIPPED | OUTPATIENT
Start: 2018-04-04 | End: 2018-07-16 | Stop reason: SDUPTHER

## 2018-04-04 RX ORDER — FLUTICASONE PROPIONATE 50 MCG
2 SPRAY, SUSPENSION (ML) NASAL DAILY
Qty: 16 G | Refills: 11 | Status: SHIPPED | OUTPATIENT
Start: 2018-04-04 | End: 2019-07-16 | Stop reason: SDUPTHER

## 2018-04-04 NOTE — PROGRESS NOTES
Referring physician: Dr. Michael Tinoco    Primary Care Physician: Michael Tinoco MD    Chief Complaint: Allergies; Asthma; Cough; Other (runny nose, itchy watery eyes); and Wheezing    Patient is new to me.  Patient is not accompanied    Subjective:         SEBASTIAN Frances is a 78 y.o. female referred by her primary care doctor for evaluation of asthma and nasal symptoms.  Patient is an  female who speaks English as her second language and who I believe has a very unclear understanding of the questions during this medical visit.    Patient states that she had asthma as a child in [facility] and her last episode of asthma symptoms was probably around age 17.  Patient states that for the past year she has had some cough, and wheeze which remind her of her asthma symptoms in teenage years.  She also has rhinorrhea postnasal drip with a sore throat and itchy watery eyes.  She takes Allegra for these symptoms.  She was given Ventolin by her primary care doctor in October 2017.  She also was given Advair 100/50 recently which she is using 1 puff at bedtime.  She is currently using her Ventolin morning and night.  She has not using Singulair at this time she also has reflux and uses Protonix 40 mg every a.m. with benefit.  She also is on Toprol-XL 25 mg every 24 hours for her blood pressure.         Review of Systems  Constitutional: Negative for changes in appetite, unintentional weight loss, fever, chills and fatigue.   HENT: Negative for facial pain, nose bleeds, nasal congestion,  throat clearing, sinus pressure, and voice change. Negative for ear discharge, ear pain, facial swelling, and trouble swallowing.  Positive for rhinorrhea, postnasal drip, and sore throat.  Eyes: Negative for occular discharge, redness,and visual disturbance.  Positive for itchy watery eyes  Respiratory: Negative for chest tightness, shortness of breath, dyspnea on exertion, sputum production.  Positive for cough and  wheeze  Cardiovascular: Negative for chest pain, palpatations and leg swelling.  Gastrointestinal: Negative for abdominal distension, abdominal pain, constipation, diarrhea, nausea,and vomiting.   Genitourinary: Negative for difficulty urinating.   Musculoskeletal: Negative for arthralgias, gait problems, joint swelling, myalgias and back pain.   Neurological: Negative for dizziness, syncope, weakness, light-headedness, and headaches.   Hematological: Negative for adenopathy, does not bruise or bleed easily.  Psychiatric/Behavioral: Negative for agitation, anxiety, behavioral problems, confusion, and insomnia.  Skin: Negative for rash.     PMH:  Reviewed with the patient and current for this visit    Family History:  Reviewed with the patient and current for this visit    Social History:  Reviewed with the patient and current for this visit     Environmental History:  Reviewed with the patient and current for this visit          Objective:      Skin Test results: Patient has never had prick skin testing done    Immunotherapy: Patient has never been on allergen specific immunotherapy    Physical Exam  General:patient is well developed and well nourished, in no acute distress.  Mental Status:  Alert, oriented and cooperative  Head and Face: normocephalic   Allergic shiners: No  Eyes:   Pupils: ERRLA: Scleral conjunctiva: clear; Cornea: clear; Palprebal conjunctiva: normal: Eyelid Skin: normal  Ears:Tympanic membrane Left:intact and normal light reflex; Right:intact and normal light reflex; External canals normal bilaterally.  Nose:  Nares: patent; Mucosa :pink and moist; Nasal septum: midline;Turbinates are of normal size bilaterally and do not occlude the nasal airway.  Mouth/Pharynx: tonsils present; posterior pharyngeal wall normal; tongue normal; teeth normal; voice quality normal.  Neck:  Cervical lymph nodes: small, non-tender, freely moveable both anterior and posterior cervical chain; Trachea: midline; Masses:  none  Lungs: Air movement is good; respiratory effort is good; no respiratory distress; breath sounds are vesicular in all lung fields; no wheezing; normal expiratory time; no cough.  Heart: regular rate and rhythm with mild respiratory variation; A1 and P2 are normal; no murmurs or gallops.  Abdomen:exam not done  Extremities: no cyanosis, clubbing or edema  Skin:no rashes or lesions present; skin hydrated and supple.    Skin Test Results: Skin testing deferred patient is on Allegra.            Assessment:       1. Mild persistent asthma without complication     2. Cough     3. Chronic rhinitis     4. Childhood asthma, unspecified asthma severity, unspecified whether complicated, unspecified whether persistent     5. Current use of beta blocker for hypertension in an asthmatic             Plan:         Return for re-visit: Follow-up in about 4 weeks (around 5/2/2018).    Immunotherapy: No    Lab or X-ray: No    Outside medical records requested: No        Patient Instructions   1.  Patient is to continue her Advair discus 100/50 but she is to use one puff inhaled twice a day and rinse her mouth after use of the medicine.    2.  Patient is to use her Ventolin HFA 2 puffs every 4-6 hours on an as-needed basis.    3.  Patient is to start Singulair 10 mg 1 tablet every 24 hours.  4.  Patient is to use Allegra 180 mg every 24 hours as needed for runny itchy sneezy symptoms but she understands to stop this medicine 5 days prior to her revisit for skin testing.  5.  Patient is to start Flonase 2 sprays in each nostril every 24 hours.  6.  Patient is to continue her Protonix 40 mg every 24 hours.  7.  Since this patient is on a beta blocker and has a diagnosis of asthma I will consider talking to her primary care doctor just changed his story different class of medication.    40 minutes spent face to face with this patient. 50% of time spent counseling this patient about the medical conditions (pathophysiology), the  options and strategies for treatments, and the risks and benefits of treatments.    Patient education content included: Nasal sinus physiology reviewed.  The role of inflammatory changes in symptom production was reviewed.  The role of sinusitis in producing postnasal drip and cough was reviewed.  The role of sinobronchial pathways in producing cough was reviewed. The role of reflux disease or gastroesophageal or laryngeal pharyngeal in producing cough was reviewed.  The role of using anti-inflammatory drugs to reduce the level of inflammation and thus the symptoms was reviewed.  The handout for rhinitis and sinusitis was reviewed with the patient.  Patient expressed understanding of these concepts and questions were answered.   Asthma pathophysiology was reviewed.  The role of inflammatory changes in the mucosal lining in producing smooth muscle contraction and thus airway narrowing was reviewed.  The role of an inhaled corticosteroids in reducing this airway inflammation was reviewed. Reduction of airway inflammation results in the reduction smooth muscle contraction and thus eliminates airway narrowing and compromise.  The use of albuterol as a rescue inhaler was reviewed; if asthma is well controlled, the issues should be less than 2 puffs inhaled every 2 weeks.  If more albuterol than this is needed, it is an indication that asthma is not well controlled.  The various triggers for asthma symptoms was reviewed including allergen exposure, irritant exposure, viral infections, sinusitis, and reflux.  The handout for understanding asthma and asthma medications was reviewed with this patient.  The patient expressed understanding of these concepts and questions were answered.    Letter to referring physician after skin testing done            Clementina Valero MD

## 2018-04-04 NOTE — LETTER
April 4, 2018      Michael Tinoco MD  [address]  [address]  [facility] [address]           [place] [facility]  [address]  [place] [address]  Phone: [phone]          Patient: Elena Frances   MR Number: [id number]   YOB: 1940   Date of Visit: 4/4/2018       Dear Dr. Michael Tinoco:    Thank you for referring Elena Frances to me for evaluation. Attached you will find relevant portions of my assessment and plan of care.    If you have questions, please do not hesitate to call me. I look forward to following Elena Frances along with you.    Sincerely,    Clementina Valero MD    Enclosure  CC:  No Recipients    If you would like to receive this communication electronically, please contact [email or web address] or [phone] to request more information on [employer] Link access.    For providers and/or their staff who would like to refer a patient to [facility], please contact us through our one-stop-shop provider referral line, [facility], at [phone].    If you feel you have received this communication in error or would no longer like to receive these types of communications, please e-mail [email or web address]

## 2018-04-05 NOTE — PATIENT INSTRUCTIONS
1.  Patient is to continue her Advair discus 100/50 but she is to use one puff inhaled twice a day and rinse her mouth after use of the medicine.    2.  Patient is to use her Ventolin HFA 2 puffs every 4-6 hours on an as-needed basis.    3.  Patient is to start Singulair 10 mg 1 tablet every 24 hours.  4.  Patient is to use Allegra 180 mg every 24 hours as needed for runny itchy sneezy symptoms but she understands to stop this medicine 5 days prior to her revisit for skin testing.  5.  Patient is to start Flonase 2 sprays in each nostril every 24 hours.  6.  Patient is to continue her Protonix 40 mg every 24 hours.  7.  Since this patient is on a beta blocker and has a diagnosis of asthma I will consider talking to her primary care doctor just changed his story different class of medication.

## 2018-04-23 ENCOUNTER — TELEPHONE (OUTPATIENT)
Dept: FAMILY MEDICINE | Facility: CLINIC | Age: 78
End: 2018-04-23

## 2018-04-23 NOTE — TELEPHONE ENCOUNTER
Received fax from pharmacy requesting prior authorization for pantoprazole. Patient is currently taking omeprazole. Is patient supposed to be on both medications? Please advise.

## 2018-05-02 RX ORDER — OMEPRAZOLE 40 MG/1
40 CAPSULE, DELAYED RELEASE ORAL DAILY
Qty: 90 CAPSULE | Refills: 0 | Status: SHIPPED | OUTPATIENT
Start: 2018-05-02 | End: 2018-07-16 | Stop reason: SDUPTHER

## 2018-05-02 NOTE — TELEPHONE ENCOUNTER
----- Message from Lorenza Gamez sent at 5/1/2018  3:33 PM CDT -----  Contact:  [facility] [phone] ext [phone]  Yue would like a call back regarding some forms sent to office regarding the pt.

## 2018-05-02 NOTE — TELEPHONE ENCOUNTER
Patient has an order for omeprazole and pantoprazole. in a prior encounter you voiced that patient should not be taking both and that it was ok to take omeprazole. Continue to receive correspondence form [facility]. Patient reports that she is only taking pantoprazole which is requiring a prior authorization. Please advise.

## 2018-05-02 NOTE — TELEPHONE ENCOUNTER
Instruct pt to change to Omeprazole if Pantoprazole needs prior authorization.  She can also check with her insurance to find out what other medications are covered and let us know (Routing comment)

## 2018-05-17 DIAGNOSIS — I10 ESSENTIAL HYPERTENSION: ICD-10-CM

## 2018-05-17 RX ORDER — VALSARTAN 160 MG/1
160 TABLET ORAL DAILY
Qty: 90 TABLET | Refills: 0 | Status: SHIPPED | OUTPATIENT
Start: 2018-05-17 | End: 2018-06-04 | Stop reason: SDUPTHER

## 2018-06-04 ENCOUNTER — TELEPHONE (OUTPATIENT)
Dept: FAMILY MEDICINE | Facility: CLINIC | Age: 78
End: 2018-06-04

## 2018-06-04 ENCOUNTER — OFFICE VISIT (OUTPATIENT)
Dept: FAMILY MEDICINE | Facility: CLINIC | Age: 78
End: 2018-06-04
Payer: MEDICARE

## 2018-06-04 VITALS
BODY MASS INDEX: 27.97 KG/M2 | DIASTOLIC BLOOD PRESSURE: 68 MMHG | WEIGHT: 157.88 LBS | HEART RATE: 88 BPM | HEIGHT: 63 IN | TEMPERATURE: 98 F | SYSTOLIC BLOOD PRESSURE: 139 MMHG

## 2018-06-04 DIAGNOSIS — R05.9 COUGH: ICD-10-CM

## 2018-06-04 DIAGNOSIS — R73.03 PREDIABETES: Primary | ICD-10-CM

## 2018-06-04 DIAGNOSIS — I10 ESSENTIAL HYPERTENSION: ICD-10-CM

## 2018-06-04 PROCEDURE — 99999 PR PBB SHADOW E&M-EST. PATIENT-LVL III: CPT | Mod: PBBFAC,,, | Performed by: FAMILY MEDICINE

## 2018-06-04 PROCEDURE — 3078F DIAST BP <80 MM HG: CPT | Mod: CPTII,S$GLB,, | Performed by: FAMILY MEDICINE

## 2018-06-04 PROCEDURE — 99214 OFFICE O/P EST MOD 30 MIN: CPT | Mod: S$GLB,,, | Performed by: FAMILY MEDICINE

## 2018-06-04 PROCEDURE — 3075F SYST BP GE 130 - 139MM HG: CPT | Mod: CPTII,S$GLB,, | Performed by: FAMILY MEDICINE

## 2018-06-04 RX ORDER — VALSARTAN 320 MG/1
320 TABLET ORAL DAILY
Qty: 30 TABLET | Refills: 6 | Status: SHIPPED | OUTPATIENT
Start: 2018-06-04 | End: 2018-06-06

## 2018-06-04 RX ORDER — PROMETHAZINE HYDROCHLORIDE AND CODEINE PHOSPHATE 6.25; 1 MG/5ML; MG/5ML
5 SOLUTION ORAL EVERY 4 HOURS PRN
Qty: 180 ML | Refills: 0 | Status: SHIPPED | OUTPATIENT
Start: 2018-06-04 | End: 2018-06-14

## 2018-06-04 RX ORDER — AZITHROMYCIN 250 MG/1
TABLET, FILM COATED ORAL
Qty: 6 TABLET | Refills: 0 | Status: SHIPPED | OUTPATIENT
Start: 2018-06-04 | End: 2018-09-11 | Stop reason: ALTCHOICE

## 2018-06-04 NOTE — PROGRESS NOTES
Subjective:       Patient ID: Elena Frances is a 78 y.o. female.    Chief Complaint: Cough and Asthma    Disclaimer: This note has been generated using voice-recognition software. There may be typographical errors that have been missed during proof-reading    79 yo presents today for evaluation of cough, productive of yellow sputum, which started about 4 days ago.  Prior history of asthma, with recent evaluation by Allergy Dept.  She has not had skin testing yet.  Recommendations to stop pt's beta blocker have been reviewed.   Pt has been doing well on medications until recently.  She has been taking Tessalon perles routinely without improvement of cough      Cough   Pertinent negatives include no chest pain, chills, ear pain, fever, sore throat, shortness of breath or wheezing. Her past medical history is significant for asthma.   Asthma   She complains of cough. There is no shortness of breath or wheezing. Pertinent negatives include no chest pain, ear pain, fever, sore throat or trouble swallowing. Her past medical history is significant for asthma.     Review of Systems   Constitutional: Negative for activity change, chills, fatigue, fever and unexpected weight change.   HENT: Negative for congestion, ear pain, sinus pain, sinus pressure, sore throat and trouble swallowing.    Respiratory: Positive for cough. Negative for chest tightness, shortness of breath and wheezing.    Cardiovascular: Negative for chest pain.       Objective:      Physical Exam   Constitutional: She appears well-developed and well-nourished. No distress.   HENT:   Right Ear: Tympanic membrane and ear canal normal.   Left Ear: Tympanic membrane and ear canal normal.   Nose: No rhinorrhea. Right sinus exhibits no maxillary sinus tenderness and no frontal sinus tenderness. Left sinus exhibits no maxillary sinus tenderness and no frontal sinus tenderness.   Mouth/Throat: Uvula is midline. No posterior oropharyngeal erythema.   Neck:  Normal range of motion. No JVD present. No thyromegaly present.   Cardiovascular: Normal rate and regular rhythm.    No murmur heard.  Pulmonary/Chest: Effort normal.   Scattered coarse rales, ronchi   Lymphadenopathy:     She has no cervical adenopathy.       Assessment:       1. Prediabetes    2. Essential hypertension    3. Cough        Plan:       1.  Stop Metoprolol, increase Valsartan to 320mg daily  2.  Zpak, Phenergan/codeine syrup  3.  F/u one month with physical exam on return

## 2018-06-04 NOTE — TELEPHONE ENCOUNTER
----- Message from Margarita Corona sent at 6/4/2018  7:53 AM CDT -----  Contact: Pt  Pt would like to be called back regarding scheduling an appt for 6/05/ 2018 3pm    Pt can be reached at [phone]

## 2018-06-05 ENCOUNTER — TELEPHONE (OUTPATIENT)
Dept: FAMILY MEDICINE | Facility: CLINIC | Age: 78
End: 2018-06-05

## 2018-06-05 NOTE — TELEPHONE ENCOUNTER
Phone staff reports that she was unable to get call triaged. Patient's daughter reports that she received a call from her mother and that patient reported her blood pressure was low and that she was having difficulty speaking. Contacted patient. Patient reports that she had gotten out of the shower when she felt weak and her eyes went black. Patient reports waking up on the floor. Reports feeling better as of now. Patient didn't check blood pressure. Verbalizes that new medication lowered blood pressure too much. Denies SOB or chest discomfort. Dr. Trinh gave instruction to have patient hold medication and schedule office visit this week. Patient verbalizes understanding and scheduled office visit.

## 2018-06-06 ENCOUNTER — OFFICE VISIT (OUTPATIENT)
Dept: FAMILY MEDICINE | Facility: CLINIC | Age: 78
End: 2018-06-06
Payer: MEDICARE

## 2018-06-06 VITALS
BODY MASS INDEX: 27.65 KG/M2 | HEART RATE: 92 BPM | HEIGHT: 63 IN | TEMPERATURE: 98 F | WEIGHT: 156.06 LBS | SYSTOLIC BLOOD PRESSURE: 120 MMHG | DIASTOLIC BLOOD PRESSURE: 64 MMHG

## 2018-06-06 DIAGNOSIS — I10 HTN (HYPERTENSION), BENIGN: ICD-10-CM

## 2018-06-06 DIAGNOSIS — R05.9 COUGH: Primary | ICD-10-CM

## 2018-06-06 PROCEDURE — 3074F SYST BP LT 130 MM HG: CPT | Mod: CPTII,S$GLB,, | Performed by: FAMILY MEDICINE

## 2018-06-06 PROCEDURE — 99213 OFFICE O/P EST LOW 20 MIN: CPT | Mod: S$GLB,,, | Performed by: FAMILY MEDICINE

## 2018-06-06 PROCEDURE — 3078F DIAST BP <80 MM HG: CPT | Mod: CPTII,S$GLB,, | Performed by: FAMILY MEDICINE

## 2018-06-06 PROCEDURE — 99999 PR PBB SHADOW E&M-EST. PATIENT-LVL III: CPT | Mod: PBBFAC,,, | Performed by: FAMILY MEDICINE

## 2018-06-06 RX ORDER — VALSARTAN 160 MG/1
160 TABLET ORAL DAILY
Qty: 90 TABLET | Refills: 3 | Status: SHIPPED | OUTPATIENT
Start: 2018-06-06 | End: 2018-07-16 | Stop reason: SDUPTHER

## 2018-06-06 NOTE — PROGRESS NOTES
Subjective:       Patient ID: Elena Frances is a 78 y.o. female.    Chief Complaint: Blood Pressure Check    Disclaimer: This note has been generated using voice-recognition software. There may be typographical errors that have been missed during proof-reading    79 yo presents today for follow up of hypertension.  Seen 2 days ago and Metoprolol was stopped secondary to Allergy Dept's recommendation.  At the time, Valsartan increased.  Subsequently, pt had near syncopal episode at home with low blood pressure.  No history of trauma at the time.  She has now resumed prior dose of Valsartan 160mg along with Metoprolol       Review of Systems   Constitutional: Positive for fatigue. Negative for activity change, chills, fever and unexpected weight change.   Respiratory: Positive for cough. Negative for shortness of breath.    Cardiovascular: Negative for chest pain, palpitations and leg swelling.   Neurological: Positive for dizziness. Negative for weakness, light-headedness and headaches.       Objective:      Physical Exam   Constitutional: She appears well-developed and well-nourished. No distress.   HENT:   Right Ear: Tympanic membrane and ear canal normal.   Left Ear: Tympanic membrane and ear canal normal.   Nose: Right sinus exhibits no maxillary sinus tenderness and no frontal sinus tenderness. Left sinus exhibits no maxillary sinus tenderness and no frontal sinus tenderness.   Neck: Normal range of motion. No JVD present. No tracheal deviation present.   Cardiovascular: Normal rate and regular rhythm.    No murmur heard.  Pulmonary/Chest: Effort normal and breath sounds normal. She has no wheezes. She has no rales.   Musculoskeletal: She exhibits no edema.   Lymphadenopathy:     She has no cervical adenopathy.       Assessment:       1.  Essential hypertension  2.  Asthma  3.  Acute bronchitis improved  Plan:       1.  Continue Valsartan 160mg daily  2.  Taper off Metoprolol  3.  F/u one week

## 2018-07-09 ENCOUNTER — LAB VISIT (OUTPATIENT)
Dept: LAB | Facility: HOSPITAL | Age: 78
End: 2018-07-09
Attending: FAMILY MEDICINE
Payer: MEDICARE

## 2018-07-09 DIAGNOSIS — I10 ESSENTIAL HYPERTENSION: ICD-10-CM

## 2018-07-09 DIAGNOSIS — R73.03 PREDIABETES: ICD-10-CM

## 2018-07-09 LAB
ALBUMIN SERPL BCP-MCNC: 3.4 G/DL
ALP SERPL-CCNC: 160 U/L
ALT SERPL W/O P-5'-P-CCNC: 14 U/L
ANION GAP SERPL CALC-SCNC: 7 MMOL/L
AST SERPL-CCNC: 16 U/L
BASOPHILS # BLD AUTO: 0.06 K/UL
BASOPHILS NFR BLD: 0.7 %
BILIRUB SERPL-MCNC: 0.2 MG/DL
BUN SERPL-MCNC: 23 MG/DL
CALCIUM SERPL-MCNC: 9.8 MG/DL
CHLORIDE SERPL-SCNC: 104 MMOL/L
CHOLEST SERPL-MCNC: 179 MG/DL
CHOLEST/HDLC SERPL: 3 {RATIO}
CO2 SERPL-SCNC: 27 MMOL/L
CREAT SERPL-MCNC: 0.9 MG/DL
DIFFERENTIAL METHOD: ABNORMAL
EOSINOPHIL # BLD AUTO: 0.3 K/UL
EOSINOPHIL NFR BLD: 3.7 %
ERYTHROCYTE [DISTWIDTH] IN BLOOD BY AUTOMATED COUNT: 15.5 %
EST. GFR  (AFRICAN AMERICAN): >60 ML/MIN/1.73 M^2
EST. GFR  (NON AFRICAN AMERICAN): >60 ML/MIN/1.73 M^2
ESTIMATED AVG GLUCOSE: 131 MG/DL
GLUCOSE SERPL-MCNC: 107 MG/DL
HBA1C MFR BLD HPLC: 6.2 %
HCT VFR BLD AUTO: 38.8 %
HDLC SERPL-MCNC: 60 MG/DL
HDLC SERPL: 33.5 %
HGB BLD-MCNC: 12.4 G/DL
IMM GRANULOCYTES # BLD AUTO: 0.04 K/UL
IMM GRANULOCYTES NFR BLD AUTO: 0.5 %
LDLC SERPL CALC-MCNC: 100.8 MG/DL
LYMPHOCYTES # BLD AUTO: 2 K/UL
LYMPHOCYTES NFR BLD: 24.6 %
MCH RBC QN AUTO: 28.6 PG
MCHC RBC AUTO-ENTMCNC: 32 G/DL
MCV RBC AUTO: 89 FL
MONOCYTES # BLD AUTO: 0.6 K/UL
MONOCYTES NFR BLD: 7.6 %
NEUTROPHILS # BLD AUTO: 5.1 K/UL
NEUTROPHILS NFR BLD: 62.9 %
NONHDLC SERPL-MCNC: 119 MG/DL
NRBC BLD-RTO: 0 /100 WBC
PLATELET # BLD AUTO: 263 K/UL
PMV BLD AUTO: 11.6 FL
POTASSIUM SERPL-SCNC: 4.7 MMOL/L
PROT SERPL-MCNC: 7 G/DL
RBC # BLD AUTO: 4.34 M/UL
SODIUM SERPL-SCNC: 138 MMOL/L
TRIGL SERPL-MCNC: 91 MG/DL
TSH SERPL DL<=0.005 MIU/L-ACNC: 2.26 UIU/ML
WBC # BLD AUTO: 8.14 K/UL

## 2018-07-09 PROCEDURE — 84443 ASSAY THYROID STIM HORMONE: CPT

## 2018-07-09 PROCEDURE — 80061 LIPID PANEL: CPT

## 2018-07-09 PROCEDURE — 80053 COMPREHEN METABOLIC PANEL: CPT

## 2018-07-09 PROCEDURE — 83036 HEMOGLOBIN GLYCOSYLATED A1C: CPT

## 2018-07-09 PROCEDURE — 36415 COLL VENOUS BLD VENIPUNCTURE: CPT | Mod: PO

## 2018-07-09 PROCEDURE — 85025 COMPLETE CBC W/AUTO DIFF WBC: CPT

## 2018-07-16 ENCOUNTER — OFFICE VISIT (OUTPATIENT)
Dept: FAMILY MEDICINE | Facility: CLINIC | Age: 78
End: 2018-07-16
Payer: MEDICARE

## 2018-07-16 ENCOUNTER — HOSPITAL ENCOUNTER (OUTPATIENT)
Dept: RADIOLOGY | Facility: HOSPITAL | Age: 78
Discharge: HOME | End: 2018-07-16
Attending: FAMILY MEDICINE
Payer: MEDICARE

## 2018-07-16 VITALS
HEIGHT: 63 IN | HEART RATE: 69 BPM | DIASTOLIC BLOOD PRESSURE: 64 MMHG | TEMPERATURE: 98 F | BODY MASS INDEX: 28.32 KG/M2 | WEIGHT: 159.81 LBS | SYSTOLIC BLOOD PRESSURE: 130 MMHG

## 2018-07-16 DIAGNOSIS — I10 HTN (HYPERTENSION), BENIGN: ICD-10-CM

## 2018-07-16 DIAGNOSIS — G54.2 CERVICAL SYNDROME: ICD-10-CM

## 2018-07-16 DIAGNOSIS — M54.2 NECK PAIN: ICD-10-CM

## 2018-07-16 DIAGNOSIS — M25.552 LEFT HIP PAIN: ICD-10-CM

## 2018-07-16 DIAGNOSIS — M50.90 CERVICAL DISC DISEASE: ICD-10-CM

## 2018-07-16 DIAGNOSIS — R74.8 ALKALINE PHOSPHATASE RAISED: Primary | ICD-10-CM

## 2018-07-16 DIAGNOSIS — M47.812 FACET ARTHROPATHY, CERVICAL: ICD-10-CM

## 2018-07-16 DIAGNOSIS — F32.A DEPRESSION, UNSPECIFIED DEPRESSION TYPE: ICD-10-CM

## 2018-07-16 DIAGNOSIS — E78.5 HYPERLIPIDEMIA, UNSPECIFIED HYPERLIPIDEMIA TYPE: ICD-10-CM

## 2018-07-16 DIAGNOSIS — N39.3 STRESS INCONTINENCE: ICD-10-CM

## 2018-07-16 DIAGNOSIS — Z00.00 ROUTINE GENERAL MEDICAL EXAMINATION AT A HEALTH CARE FACILITY: ICD-10-CM

## 2018-07-16 PROCEDURE — 3078F DIAST BP <80 MM HG: CPT | Mod: CPTII,S$GLB,, | Performed by: FAMILY MEDICINE

## 2018-07-16 PROCEDURE — 72040 X-RAY EXAM NECK SPINE 2-3 VW: CPT | Mod: 26,,, | Performed by: RADIOLOGY

## 2018-07-16 PROCEDURE — 73502 X-RAY EXAM HIP UNI 2-3 VIEWS: CPT | Mod: 26,LT,, | Performed by: RADIOLOGY

## 2018-07-16 PROCEDURE — 99999 PR PBB SHADOW E&M-EST. PATIENT-LVL III: CPT | Mod: PBBFAC,,, | Performed by: FAMILY MEDICINE

## 2018-07-16 PROCEDURE — 73502 X-RAY EXAM HIP UNI 2-3 VIEWS: CPT | Mod: TC,FY,PO,LT

## 2018-07-16 PROCEDURE — 99397 PER PM REEVAL EST PAT 65+ YR: CPT | Mod: S$GLB,,, | Performed by: FAMILY MEDICINE

## 2018-07-16 PROCEDURE — 3075F SYST BP GE 130 - 139MM HG: CPT | Mod: CPTII,S$GLB,, | Performed by: FAMILY MEDICINE

## 2018-07-16 PROCEDURE — 72040 X-RAY EXAM NECK SPINE 2-3 VW: CPT | Mod: TC,FY,PO

## 2018-07-16 RX ORDER — ATORVASTATIN CALCIUM 80 MG/1
80 TABLET, FILM COATED ORAL DAILY
Qty: 90 TABLET | Refills: 3 | Status: SHIPPED | OUTPATIENT
Start: 2018-07-16 | End: 2019-07-29 | Stop reason: SDUPTHER

## 2018-07-16 RX ORDER — SERTRALINE HYDROCHLORIDE 50 MG/1
50 TABLET, FILM COATED ORAL DAILY
Qty: 90 TABLET | Refills: 3 | Status: SHIPPED | OUTPATIENT
Start: 2018-07-16 | End: 2019-07-22 | Stop reason: SDUPTHER

## 2018-07-16 RX ORDER — TOLTERODINE TARTRATE 2 MG/1
2 TABLET, EXTENDED RELEASE ORAL 2 TIMES DAILY
Qty: 60 TABLET | Refills: 11 | Status: SHIPPED | OUTPATIENT
Start: 2018-07-16 | End: 2018-09-11 | Stop reason: SDUPTHER

## 2018-07-16 RX ORDER — METOPROLOL SUCCINATE 25 MG/1
25 TABLET, EXTENDED RELEASE ORAL DAILY
Qty: 90 TABLET | Refills: 3 | Status: SHIPPED | OUTPATIENT
Start: 2018-07-16 | End: 2019-07-23 | Stop reason: SDUPTHER

## 2018-07-16 RX ORDER — VALSARTAN 320 MG/1
1 TABLET ORAL DAILY
Refills: 6 | COMMUNITY
Start: 2018-06-30 | End: 2018-07-16

## 2018-07-16 RX ORDER — TIZANIDINE HYDROCHLORIDE 6 MG/1
6 CAPSULE, GELATIN COATED ORAL 3 TIMES DAILY PRN
Qty: 60 CAPSULE | Refills: 6 | Status: SHIPPED | OUTPATIENT
Start: 2018-07-16 | End: 2019-06-11 | Stop reason: DRUGHIGH

## 2018-07-16 RX ORDER — MONTELUKAST SODIUM 10 MG/1
10 TABLET ORAL NIGHTLY
Qty: 90 TABLET | Refills: 3 | Status: SHIPPED | OUTPATIENT
Start: 2018-07-16 | End: 2019-07-10 | Stop reason: SDUPTHER

## 2018-07-16 RX ORDER — VALSARTAN 160 MG/1
160 TABLET ORAL DAILY
Qty: 90 TABLET | Refills: 3 | Status: SHIPPED | OUTPATIENT
Start: 2018-07-16 | End: 2018-11-19 | Stop reason: SDUPTHER

## 2018-07-16 RX ORDER — OMEPRAZOLE 40 MG/1
40 CAPSULE, DELAYED RELEASE ORAL DAILY
Qty: 90 CAPSULE | Refills: 3 | Status: SHIPPED | OUTPATIENT
Start: 2018-07-16 | End: 2019-07-29 | Stop reason: SDUPTHER

## 2018-07-16 NOTE — PROGRESS NOTES
Subjective:       Patient ID: Elena Frances is a 78 y.o. female.    Chief Complaint: Annual Exam; Shoulder Pain; and Headache    Disclaimer: This note has been generated using voice-recognition software. There may be typographical errors that have been missed during proof-reading    79 yo presents today for routine exam, last exam one year ago  Immunizations:  Needs Td, Shingrix  Colonoscopy:  2016, polyps x 3  BMD:  2017      Shoulder Pain    Associated symptoms include headaches. Pertinent negatives include no fever or numbness.   Headache    Associated symptoms include neck pain. Pertinent negatives include no abdominal pain, back pain, coughing, dizziness, ear pain, eye pain, fever, hearing loss, nausea, numbness, photophobia, rhinorrhea, sinus pressure, sore throat, tinnitus, vomiting or weakness.     Review of Systems   Constitutional: Negative.  Negative for activity change, appetite change, chills, diaphoresis, fatigue, fever and unexpected weight change.   HENT: Negative.  Negative for congestion, ear pain, hearing loss, rhinorrhea, sinus pressure, sore throat, tinnitus, trouble swallowing and voice change.    Eyes: Negative.  Negative for photophobia, pain and visual disturbance.   Respiratory: Negative.  Negative for cough, chest tightness and shortness of breath.    Cardiovascular: Negative.  Negative for chest pain, palpitations and leg swelling.   Gastrointestinal: Negative.  Negative for abdominal distention, abdominal pain, blood in stool, constipation, diarrhea, nausea and vomiting.   Genitourinary: Positive for enuresis. Negative for difficulty urinating, dysuria, frequency, hematuria, menstrual problem, pelvic pain, vaginal bleeding and vaginal discharge.        Recurrent stress incontinence   Musculoskeletal: Positive for arthralgias and neck pain. Negative for back pain, joint swelling and neck stiffness.        Recurrent bilateral neck pain x several months, no radiation to arms or  lower back  Has noted recurrent pain left hip x several months   Skin: Negative for color change, pallor and rash.   Neurological: Positive for headaches. Negative for dizziness, tremors, speech difficulty, weakness, light-headedness and numbness.   Hematological: Negative for adenopathy. Does not bruise/bleed easily.   Psychiatric/Behavioral: Negative for agitation, confusion, dysphoric mood, hallucinations and sleep disturbance. The patient is not nervous/anxious.        Objective:      Physical Exam   Constitutional: She is oriented to person, place, and time. She appears well-developed and well-nourished.   HENT:   Right Ear: Tympanic membrane and external ear normal.   Left Ear: Tympanic membrane and external ear normal.   Nose: Nose normal.   Mouth/Throat: Oropharynx is clear and moist.   Eyes: Conjunctivae and EOM are normal. Pupils are equal, round, and reactive to light.   Neck: Normal range of motion. Neck supple. No tracheal deviation present. No thyromegaly present.   Tender to palpation along lateral neck bilaterally, spasm over both trapezius  Decreased lateral gaze to 45 degrees bilaterally   Cardiovascular: Normal rate, regular rhythm, normal heart sounds and intact distal pulses.    Pulmonary/Chest: Effort normal. She has no wheezes. She has no rales. She exhibits no tenderness.   Abdominal: Soft. Bowel sounds are normal. She exhibits no distension and no mass. There is no rebound.   Musculoskeletal: Normal range of motion. She exhibits no edema or tenderness.   Lymphadenopathy:     She has no cervical adenopathy.   Neurological: She is alert and oriented to person, place, and time. She has normal reflexes. No cranial nerve deficit. Coordination normal.   Skin: Skin is warm and dry. No rash noted. No erythema.   Psychiatric: She has a normal mood and affect. Her behavior is normal.       Assessment:       1.  Physical exam  2.  Hypertension  3.  Hyperlipidemia  4.  Depression  5.  Elevated alkaline  phosphatase  6.  DJD  7.  Stress incontinence   Plan:       1.  Continue present medications  2.  Detrol bid  3.  Tizanidine, Relafen bid  4.  Further workup after repeating Alk Ptase

## 2018-07-17 ENCOUNTER — TELEPHONE (OUTPATIENT)
Dept: FAMILY MEDICINE | Facility: CLINIC | Age: 78
End: 2018-07-17

## 2018-07-17 NOTE — TELEPHONE ENCOUNTER
Pharmacy approved to change medication to 4 mg tablets and patient will take 1 1/2 tablets to equal 6 mg. Patients daughter notified and verbalizes understanding.

## 2018-07-17 NOTE — TELEPHONE ENCOUNTER
----- Message from Lesley Leyva sent at 7/17/2018 11:11 AM CDT -----  Contact: Alexandra/Daughter/[phone]  Patient is in for a office visit on 7/16 and was prescribed a muscle relaxer. The patient's insurance will not cover script. Pharmacy states the script has to be changed to another brand or sent a prior authorization to [employer] for approval. Please advise.

## 2018-07-19 ENCOUNTER — TELEPHONE (OUTPATIENT)
Dept: FAMILY MEDICINE | Facility: CLINIC | Age: 78
End: 2018-07-19

## 2018-07-19 RX ORDER — TIZANIDINE 4 MG/1
TABLET ORAL
Refills: 6 | COMMUNITY
Start: 2018-07-17 | End: 2019-07-05

## 2018-07-19 NOTE — TELEPHONE ENCOUNTER
----- Message from Sari Mejias sent at 7/19/2018 11:11 AM CDT -----  Contact: Joan/[employer] Office/[phone]  Pt needs the office  to call [employer] to get approval on metoprolol succinate (TOPROL-XL) 25 MG 24 hr tablet. They state that the medication was sent but they now need approval on it. Please advise.      Thanks

## 2018-07-19 NOTE — TELEPHONE ENCOUNTER
Patient's insurance is rejecting the refill because it is to soon. Patient received a 90 day supply on 05/21/18 from [facility] and the insurance will not cover the medication again until 08/10/18. Patient advised of this information, and stated that she thinks [facility] only gave her 60 days. Advised the patient that per [facility] system they show 90 days were dispensed. Advissed the patient that she should call the pharmacy if she fill there was an error, or if the medication was lost she can pay out of pocket for the medication to be filled early. Patient verbalized understanding.

## 2018-07-23 ENCOUNTER — TELEPHONE (OUTPATIENT)
Dept: FAMILY MEDICINE | Facility: CLINIC | Age: 78
End: 2018-07-23

## 2018-07-23 DIAGNOSIS — R74.8 ALKALINE PHOSPHATASE ELEVATION: Primary | ICD-10-CM

## 2018-07-26 ENCOUNTER — TELEPHONE (OUTPATIENT)
Dept: FAMILY MEDICINE | Facility: CLINIC | Age: 78
End: 2018-07-26

## 2018-07-26 NOTE — TELEPHONE ENCOUNTER
----- Message from Tegan Paige sent at 7/26/2018  9:02 AM CDT -----  Contact: Elena Frances [phone]  The patient would like a call back in regards to an ultrasound.

## 2018-07-30 ENCOUNTER — TELEPHONE (OUTPATIENT)
Dept: FAMILY MEDICINE | Facility: CLINIC | Age: 78
End: 2018-07-30

## 2018-07-30 NOTE — TELEPHONE ENCOUNTER
----- Message from Lauren Kemp sent at 7/30/2018 12:25 PM CDT -----  Contact: Self [phone]  Pt will like speak to the nurse in regarding appointment.

## 2018-08-03 RX ORDER — NABUMETONE 750 MG/1
TABLET, FILM COATED ORAL
Qty: 30 TABLET | Refills: 0 | Status: SHIPPED | OUTPATIENT
Start: 2018-08-03 | End: 2018-09-11 | Stop reason: SDUPTHER

## 2018-08-04 ENCOUNTER — HOSPITAL ENCOUNTER (OUTPATIENT)
Dept: RADIOLOGY | Facility: HOSPITAL | Age: 78
Discharge: HOME | End: 2018-08-04
Attending: FAMILY MEDICINE
Payer: MEDICARE

## 2018-08-04 DIAGNOSIS — R74.8 ALKALINE PHOSPHATASE ELEVATION: ICD-10-CM

## 2018-08-04 PROCEDURE — 76700 US EXAM ABDOM COMPLETE: CPT | Mod: 26,,, | Performed by: RADIOLOGY

## 2018-08-04 PROCEDURE — 76700 US EXAM ABDOM COMPLETE: CPT | Mod: TC

## 2018-08-06 ENCOUNTER — TELEPHONE (OUTPATIENT)
Dept: FAMILY MEDICINE | Facility: CLINIC | Age: 78
End: 2018-08-06

## 2018-09-11 ENCOUNTER — OFFICE VISIT (OUTPATIENT)
Dept: FAMILY MEDICINE | Facility: CLINIC | Age: 78
End: 2018-09-11
Payer: MEDICARE

## 2018-09-11 VITALS
DIASTOLIC BLOOD PRESSURE: 70 MMHG | WEIGHT: 159.19 LBS | RESPIRATION RATE: 16 BRPM | BODY MASS INDEX: 28.21 KG/M2 | TEMPERATURE: 98 F | SYSTOLIC BLOOD PRESSURE: 118 MMHG | HEIGHT: 63 IN

## 2018-09-11 DIAGNOSIS — I49.9 CARDIAC ARRHYTHMIA, UNSPECIFIED CARDIAC ARRHYTHMIA TYPE: Primary | ICD-10-CM

## 2018-09-11 DIAGNOSIS — Z23 NEED FOR PROPHYLACTIC VACCINATION AND INOCULATION AGAINST INFLUENZA: ICD-10-CM

## 2018-09-11 DIAGNOSIS — N39.3 STRESS INCONTINENCE: ICD-10-CM

## 2018-09-11 DIAGNOSIS — R79.89 ABNORMAL LFTS: ICD-10-CM

## 2018-09-11 PROCEDURE — 93010 ELECTROCARDIOGRAM REPORT: CPT | Mod: ,,, | Performed by: INTERNAL MEDICINE

## 2018-09-11 PROCEDURE — 99214 OFFICE O/P EST MOD 30 MIN: CPT | Mod: S$PBB,,, | Performed by: FAMILY MEDICINE

## 2018-09-11 PROCEDURE — 93005 ELECTROCARDIOGRAM TRACING: CPT | Mod: PBBFAC,PO | Performed by: FAMILY MEDICINE

## 2018-09-11 PROCEDURE — 99999 PR PBB SHADOW E&M-EST. PATIENT-LVL III: CPT | Mod: PBBFAC,,, | Performed by: FAMILY MEDICINE

## 2018-09-11 PROCEDURE — 1101F PT FALLS ASSESS-DOCD LE1/YR: CPT | Mod: CPTII,,, | Performed by: FAMILY MEDICINE

## 2018-09-11 PROCEDURE — 90662 IIV NO PRSV INCREASED AG IM: CPT | Mod: PBBFAC,PO

## 2018-09-11 PROCEDURE — 3078F DIAST BP <80 MM HG: CPT | Mod: CPTII,,, | Performed by: FAMILY MEDICINE

## 2018-09-11 PROCEDURE — 99213 OFFICE O/P EST LOW 20 MIN: CPT | Mod: PBBFAC,PO,25 | Performed by: FAMILY MEDICINE

## 2018-09-11 PROCEDURE — 3074F SYST BP LT 130 MM HG: CPT | Mod: CPTII,,, | Performed by: FAMILY MEDICINE

## 2018-09-11 RX ORDER — TOLTERODINE TARTRATE 2 MG/1
2 TABLET, EXTENDED RELEASE ORAL 2 TIMES DAILY
Qty: 60 TABLET | Refills: 11 | Status: SHIPPED | OUTPATIENT
Start: 2018-09-11 | End: 2020-10-07

## 2018-09-11 RX ORDER — NABUMETONE 750 MG/1
750 TABLET, FILM COATED ORAL 2 TIMES DAILY
Qty: 30 TABLET | Refills: 6 | Status: SHIPPED | OUTPATIENT
Start: 2018-09-11 | End: 2019-07-03 | Stop reason: SDUPTHER

## 2018-09-11 NOTE — PROGRESS NOTES
Subjective:       Patient ID: Elena Frances is a 78 y.o. female.    Chief Complaint: Cough and Medication Refill    Disclaimer: This note has been generated using voice-recognition software. There may be typographical errors that have been missed during proof-reading    79 yo presents with complaint of recurrent palpitations for several days.  Symptoms occur both at rest and exercise, will last for a few seconds and resolve.  No chest pains or dyspnea.  No dizziness or syncopal episodes.  Pt had normal NM Pharmacologic stress test 2 years ago:    This is a technically excellent study. Inspection of the transaxial images demonstrated no significant cranial, caudal, or lateral patient motion in the camera between rest and stress acquisitions. There is homogeneous uptake of radiotracer in all walls   of the myocardium on stress and rest images. The extracardiac distribution of radioactivity is normal. The left ventricular cavity is normal in size and does not increase with stress. On gated SPECT, left ventricular motion is normal at rest.     Nuclear Quantitative Functional Analysis:   Quantitative measurements of LV function are over estimated secondary to small ventricular volumes.   Visually estimated LV function is normal.     Impression: NORMAL MYOCARDIAL PERFUSION  1. The perfusion scan is free of evidence for myocardial ischemia or injury.   2. Resting wall motion is physiologic.   3. Visually estimated LV function is normal.   4. The ventricular volumes are normal at rest and stress.   5. The extracardiac distribution of radioactivity is normal.   6. There was no previous study available to compare.      Review of Systems   HENT: Negative for congestion.    Respiratory: Positive for cough. Negative for shortness of breath and wheezing.    Cardiovascular: Positive for palpitations. Negative for chest pain and leg swelling.       Objective:      Physical Exam   Constitutional: She appears well-developed  and well-nourished. No distress.   Neck: Normal range of motion. No JVD present. No thyromegaly present.   Cardiovascular: Normal rate and regular rhythm.   No murmur heard.  Pulmonary/Chest: Effort normal and breath sounds normal. She has no wheezes. She has no rales. She exhibits no tenderness.   Abdominal: Soft. Bowel sounds are normal. There is no tenderness.   Musculoskeletal: She exhibits no edema.   Lymphadenopathy:     She has no cervical adenopathy.       Assessment:       1. Cardiac arrhythmia, unspecified cardiac arrhythmia type    2. Abnormal LFTs    3. Need for prophylactic vaccination and inoculation against influenza        Plan:       1.  EKG done today normal  2.  Holter Monitor  3.  Return after test results for discussion  4.  Repeat LFTs (prior elevated Alk Ptase)

## 2018-09-12 ENCOUNTER — TELEPHONE (OUTPATIENT)
Dept: FAMILY MEDICINE | Facility: CLINIC | Age: 78
End: 2018-09-12

## 2018-09-12 NOTE — TELEPHONE ENCOUNTER
Patient scheduled lab appointment for hepatic function panel. Patient inquiring about having acupuncture for her neck. Verbalizes that it was discussed at an earlier time. Please advise.

## 2018-09-12 NOTE — TELEPHONE ENCOUNTER
Patient verbalizes that she will try physical therapy. Patient is interested in a facility on [address]. Patient will call back tomorrow with information.

## 2018-09-14 ENCOUNTER — LAB VISIT (OUTPATIENT)
Dept: LAB | Facility: HOSPITAL | Age: 78
End: 2018-09-14
Attending: FAMILY MEDICINE
Payer: MEDICARE

## 2018-09-14 DIAGNOSIS — R79.89 ABNORMAL LFTS: ICD-10-CM

## 2018-09-14 LAB
ALBUMIN SERPL BCP-MCNC: 3.6 G/DL
ALP SERPL-CCNC: 175 U/L
ALT SERPL W/O P-5'-P-CCNC: 15 U/L
AST SERPL-CCNC: 16 U/L
BILIRUB DIRECT SERPL-MCNC: 0.1 MG/DL
BILIRUB SERPL-MCNC: 0.2 MG/DL
PROT SERPL-MCNC: 7.5 G/DL

## 2018-09-14 PROCEDURE — 80076 HEPATIC FUNCTION PANEL: CPT

## 2018-09-14 PROCEDURE — 36415 COLL VENOUS BLD VENIPUNCTURE: CPT | Mod: PO

## 2018-09-17 ENCOUNTER — TELEPHONE (OUTPATIENT)
Dept: FAMILY MEDICINE | Facility: CLINIC | Age: 78
End: 2018-09-17

## 2018-09-17 DIAGNOSIS — R74.8 ELEVATED ALKALINE PHOSPHATASE LEVEL: Primary | ICD-10-CM

## 2018-09-17 NOTE — TELEPHONE ENCOUNTER
Please inform pt that the liver test is still elevated, she needs to return for further testing, thanks

## 2018-09-18 ENCOUNTER — LAB VISIT (OUTPATIENT)
Dept: LAB | Facility: HOSPITAL | Age: 78
End: 2018-09-18
Attending: FAMILY MEDICINE
Payer: MEDICARE

## 2018-09-18 DIAGNOSIS — R74.8 ELEVATED ALKALINE PHOSPHATASE LEVEL: ICD-10-CM

## 2018-09-18 PROCEDURE — 84075 ASSAY ALKALINE PHOSPHATASE: CPT

## 2018-09-18 PROCEDURE — 86235 NUCLEAR ANTIGEN ANTIBODY: CPT

## 2018-09-18 PROCEDURE — 36415 COLL VENOUS BLD VENIPUNCTURE: CPT | Mod: PO

## 2018-09-18 PROCEDURE — 84080 ASSAY ALKALINE PHOSPHATASES: CPT

## 2018-09-19 LAB — MITOCHONDRIA AB TITR SER IF: NORMAL {TITER}

## 2018-09-22 LAB
ALP BONE CFR SERPL: 21 % (ref 28–66)
ALP INTEST CFR SERPL: 0 % (ref 1–24)
ALP LIVER CFR SERPL: 79 % (ref 25–69)
ALP PLAC CFR SERPL: 0 %
ALP SERPL-CCNC: 152 U/L (ref 33–130)

## 2018-09-25 DIAGNOSIS — I10 ESSENTIAL HYPERTENSION: ICD-10-CM

## 2018-09-25 RX ORDER — VALSARTAN 160 MG/1
160 TABLET ORAL DAILY
Qty: 90 TABLET | Refills: 0 | Status: SHIPPED | OUTPATIENT
Start: 2018-09-25 | End: 2019-10-08 | Stop reason: SDUPTHER

## 2018-10-17 DIAGNOSIS — R05.9 COUGH: ICD-10-CM

## 2018-10-17 RX ORDER — ALBUTEROL SULFATE 90 UG/1
AEROSOL, METERED RESPIRATORY (INHALATION)
Qty: 18 INHALER | Refills: 1 | Status: SHIPPED | OUTPATIENT
Start: 2018-10-17 | End: 2019-12-23 | Stop reason: SDUPTHER

## 2018-11-19 ENCOUNTER — OFFICE VISIT (OUTPATIENT)
Dept: FAMILY MEDICINE | Facility: CLINIC | Age: 78
End: 2018-11-19
Payer: MEDICARE

## 2018-11-19 VITALS
WEIGHT: 160.94 LBS | SYSTOLIC BLOOD PRESSURE: 124 MMHG | TEMPERATURE: 98 F | HEIGHT: 63 IN | BODY MASS INDEX: 28.52 KG/M2 | HEART RATE: 76 BPM | DIASTOLIC BLOOD PRESSURE: 68 MMHG

## 2018-11-19 DIAGNOSIS — M47.816 LUMBAR FACET ARTHROPATHY: ICD-10-CM

## 2018-11-19 DIAGNOSIS — M46.1 SACROILIITIS: ICD-10-CM

## 2018-11-19 DIAGNOSIS — M16.10 HIP ARTHRITIS: ICD-10-CM

## 2018-11-19 DIAGNOSIS — H66.92 LEFT OTITIS MEDIA, UNSPECIFIED OTITIS MEDIA TYPE: ICD-10-CM

## 2018-11-19 DIAGNOSIS — J45.909 ASTHMA, UNSPECIFIED ASTHMA SEVERITY, UNSPECIFIED WHETHER COMPLICATED, UNSPECIFIED WHETHER PERSISTENT: Primary | ICD-10-CM

## 2018-11-19 PROCEDURE — 1101F PT FALLS ASSESS-DOCD LE1/YR: CPT | Mod: CPTII,S$GLB,, | Performed by: FAMILY MEDICINE

## 2018-11-19 PROCEDURE — 3074F SYST BP LT 130 MM HG: CPT | Mod: CPTII,S$GLB,, | Performed by: FAMILY MEDICINE

## 2018-11-19 PROCEDURE — 99999 PR PBB SHADOW E&M-EST. PATIENT-LVL III: CPT | Mod: PBBFAC,,, | Performed by: FAMILY MEDICINE

## 2018-11-19 PROCEDURE — 99214 OFFICE O/P EST MOD 30 MIN: CPT | Mod: S$GLB,,, | Performed by: FAMILY MEDICINE

## 2018-11-19 PROCEDURE — 3078F DIAST BP <80 MM HG: CPT | Mod: CPTII,S$GLB,, | Performed by: FAMILY MEDICINE

## 2018-11-19 RX ORDER — AZITHROMYCIN 250 MG/1
TABLET, FILM COATED ORAL
Qty: 6 TABLET | Refills: 0 | Status: SHIPPED | OUTPATIENT
Start: 2018-11-19 | End: 2018-11-24

## 2018-11-19 RX ORDER — DICLOFENAC SODIUM 10 MG/G
2 GEL TOPICAL DAILY
Qty: 1 TUBE | Refills: 6 | Status: SHIPPED | OUTPATIENT
Start: 2018-11-19 | End: 2019-02-04 | Stop reason: SDUPTHER

## 2018-11-19 RX ORDER — ATORVASTATIN CALCIUM 80 MG/1
TABLET, FILM COATED ORAL
Refills: 3 | COMMUNITY
Start: 2018-10-26 | End: 2019-10-08 | Stop reason: SDUPTHER

## 2018-11-19 RX ORDER — VALSARTAN 320 MG/1
TABLET ORAL
COMMUNITY
Start: 2018-11-15 | End: 2019-06-11 | Stop reason: DRUGHIGH

## 2018-11-19 NOTE — PROGRESS NOTES
Subjective:       Patient ID: Elena Frances is a 78 y.o. female.    Chief Complaint: Nasal Congestion (headache)    77 yo with prior history of asthma presents with one week history of facial discomfort and nasal stuffiness.  Over past 2 days she has developed left ear pain along with decreased hearing.  She is now off Advair since it is not covered by her Insurance.  Presently using Albuterol HFA on prn basis      Review of Systems   Constitutional: Negative for chills and fever.   HENT: Positive for congestion, ear pain, postnasal drip and sinus pressure. Negative for ear discharge and sore throat.    Respiratory: Positive for cough. Negative for chest tightness, shortness of breath and wheezing.        Objective:      Physical Exam   Constitutional: She appears well-developed and well-nourished. No distress.   HENT:   Right Ear: Tympanic membrane and ear canal normal.   Left Ear: Ear canal normal. Tympanic membrane is injected and retracted.   Nose: Rhinorrhea present. Right sinus exhibits maxillary sinus tenderness. Right sinus exhibits no frontal sinus tenderness. Left sinus exhibits maxillary sinus tenderness. Left sinus exhibits no frontal sinus tenderness.   Mouth/Throat: No posterior oropharyngeal erythema.   Neck: Normal range of motion. Neck supple.   Pulmonary/Chest: Effort normal and breath sounds normal. She has no wheezes. She has no rales.   Lymphadenopathy:     She has no cervical adenopathy.       Assessment:         1.  Left otitis media  2.  Asthma    Plan:       1.  Continue Albuterol prn, QVar bid  2.  Z adrian

## 2018-11-20 ENCOUNTER — TELEPHONE (OUTPATIENT)
Dept: FAMILY MEDICINE | Facility: CLINIC | Age: 78
End: 2018-11-20

## 2018-11-20 NOTE — TELEPHONE ENCOUNTER
Spoke with Alissa and the issue is that the patient is in the gap, so the Advair costs $108 instead of the usual $30.  She won't be out of the gap until 1/1/19, maybe she can apply for patient assistance.  All of the medications for asthma are in the same tier, so the cost would be the same for any of them.  I told her to go ahead and disregard the Qvar.

## 2018-11-20 NOTE — TELEPHONE ENCOUNTER
Message left on Alissa's voicemail to return call.  While awaiting return call, what is the alternative for Advair that I can tell insurance company?

## 2018-11-20 NOTE — TELEPHONE ENCOUNTER
"----- Message from Vineet Fu sent at 11/20/2018  9:36 AM CST -----  Contact: Olimpia Ravgen [facility] [phone]  [employer] calling stating is needing additional information for the "Q Bar" would like a response back from office.    Is needing alternative for the Rx advair, or other options for the patient.      Please call an advise  Thank you    "

## 2018-11-20 NOTE — TELEPHONE ENCOUNTER
Spoke with Leonie and verified that patient does have a dx of osteoarthritis.  States that she will call the patient to tyrone with approval of medication.

## 2018-11-20 NOTE — TELEPHONE ENCOUNTER
Spoke with patient and son, and patient has enough Advair to last her until January when she is out of the donut hole.  She will request refill at that time.  Dr. Area notified.

## 2018-11-20 NOTE — TELEPHONE ENCOUNTER
----- Message from Vineet Fu sent at 11/20/2018  4:08 PM CST -----  Contact: [facility] Leonie [phone]  [facility] is needing Diagnosis code for Rx that has been sent, diclofenac sodium (VOLTAREN) 1 % Gel, requesting a call back to inform of the diagnosis.       Please call an advise  Thank you

## 2019-01-22 ENCOUNTER — OFFICE VISIT (OUTPATIENT)
Dept: URGENT CARE | Facility: CLINIC | Age: 79
End: 2019-01-22
Payer: MEDICARE

## 2019-01-22 VITALS
OXYGEN SATURATION: 97 % | WEIGHT: 160 LBS | SYSTOLIC BLOOD PRESSURE: 130 MMHG | BODY MASS INDEX: 28.35 KG/M2 | HEART RATE: 94 BPM | HEIGHT: 63 IN | DIASTOLIC BLOOD PRESSURE: 70 MMHG | TEMPERATURE: 98 F

## 2019-01-22 DIAGNOSIS — K29.70 GASTRITIS WITHOUT BLEEDING, UNSPECIFIED CHRONICITY, UNSPECIFIED GASTRITIS TYPE: Primary | ICD-10-CM

## 2019-01-22 DIAGNOSIS — R42 DIZZINESS: ICD-10-CM

## 2019-01-22 DIAGNOSIS — R52 BODY ACHES: ICD-10-CM

## 2019-01-22 DIAGNOSIS — R11.2 NAUSEA AND VOMITING, INTRACTABILITY OF VOMITING NOT SPECIFIED, UNSPECIFIED VOMITING TYPE: ICD-10-CM

## 2019-01-22 LAB
CTP QC/QA: YES
FLUAV AG NPH QL: NEGATIVE
FLUBV AG NPH QL: NEGATIVE
GLUCOSE SERPL-MCNC: 140 MG/DL (ref 70–110)
POC ANION GAP: 17 MMOL/L (ref 10–20)
POC BUN: 18 MMOL/L (ref 8–26)
POC CHLORIDE: 98 MMOL/L (ref 98–109)
POC CREATININE: 0.7 MG/DL (ref 0.6–1.3)
POC HEMATOCRIT: 37 %PCV (ref 37–47)
POC HEMOGLOBIN: 12.6 G/DL (ref 12.5–16)
POC ICA: 1.22 MMOL/L (ref 1.12–1.32)
POC POTASSIUM: 3.8 MMOL/L (ref 3.5–4.9)
POC SODIUM: 139 MMOL/L (ref 138–146)
POC TCO2: 28 MMOL/L (ref 24–29)

## 2019-01-22 PROCEDURE — 87804 INFLUENZA ASSAY W/OPTIC: CPT | Mod: QW,S$GLB,, | Performed by: FAMILY MEDICINE

## 2019-01-22 PROCEDURE — 3075F PR MOST RECENT SYSTOLIC BLOOD PRESS GE 130-139MM HG: ICD-10-PCS | Mod: CPTII,S$GLB,, | Performed by: FAMILY MEDICINE

## 2019-01-22 PROCEDURE — 3078F DIAST BP <80 MM HG: CPT | Mod: CPTII,S$GLB,, | Performed by: FAMILY MEDICINE

## 2019-01-22 PROCEDURE — 3075F SYST BP GE 130 - 139MM HG: CPT | Mod: CPTII,S$GLB,, | Performed by: FAMILY MEDICINE

## 2019-01-22 PROCEDURE — 3078F PR MOST RECENT DIASTOLIC BLOOD PRESSURE < 80 MM HG: ICD-10-PCS | Mod: CPTII,S$GLB,, | Performed by: FAMILY MEDICINE

## 2019-01-22 PROCEDURE — 80047 BASIC METABLC PNL IONIZED CA: CPT | Mod: QW,S$GLB,, | Performed by: FAMILY MEDICINE

## 2019-01-22 PROCEDURE — 99214 PR OFFICE/OUTPT VISIT, EST, LEVL IV, 30-39 MIN: ICD-10-PCS | Mod: S$GLB,,, | Performed by: FAMILY MEDICINE

## 2019-01-22 PROCEDURE — 1101F PR PT FALLS ASSESS DOC 0-1 FALLS W/OUT INJ PAST YR: ICD-10-PCS | Mod: CPTII,S$GLB,, | Performed by: FAMILY MEDICINE

## 2019-01-22 PROCEDURE — 1101F PT FALLS ASSESS-DOCD LE1/YR: CPT | Mod: CPTII,S$GLB,, | Performed by: FAMILY MEDICINE

## 2019-01-22 PROCEDURE — 80047 POCT CHEMISTRY PANEL: ICD-10-PCS | Mod: QW,S$GLB,, | Performed by: FAMILY MEDICINE

## 2019-01-22 PROCEDURE — 99214 OFFICE O/P EST MOD 30 MIN: CPT | Mod: S$GLB,,, | Performed by: FAMILY MEDICINE

## 2019-01-22 PROCEDURE — 87804 POCT INFLUENZA A/B: ICD-10-PCS | Mod: QW,S$GLB,, | Performed by: FAMILY MEDICINE

## 2019-01-22 RX ORDER — SODIUM CHLORIDE 9 MG/ML
INJECTION, SOLUTION INTRAVENOUS
Status: COMPLETED | OUTPATIENT
Start: 2019-01-22 | End: 2019-01-22

## 2019-01-22 RX ORDER — ONDANSETRON 8 MG/1
8 TABLET, ORALLY DISINTEGRATING ORAL
Status: COMPLETED | OUTPATIENT
Start: 2019-01-22 | End: 2019-01-22

## 2019-01-22 RX ORDER — ONDANSETRON 4 MG/1
4 TABLET, ORALLY DISINTEGRATING ORAL EVERY 8 HOURS PRN
Qty: 20 TABLET | Refills: 0 | Status: SHIPPED | OUTPATIENT
Start: 2019-01-22 | End: 2019-01-29

## 2019-01-22 RX ORDER — MECLIZINE HYDROCHLORIDE 25 MG/1
25 TABLET ORAL 3 TIMES DAILY PRN
Qty: 30 TABLET | Refills: 0 | Status: SHIPPED | OUTPATIENT
Start: 2019-01-22 | End: 2019-08-08

## 2019-01-22 RX ORDER — SUCRALFATE 1 G/1
1 TABLET ORAL 4 TIMES DAILY
Qty: 60 TABLET | Refills: 0 | Status: SHIPPED | OUTPATIENT
Start: 2019-01-22 | End: 2019-07-05

## 2019-01-22 RX ADMIN — ONDANSETRON 8 MG: 8 TABLET, ORALLY DISINTEGRATING ORAL at 10:01

## 2019-01-22 RX ADMIN — SODIUM CHLORIDE: 9 INJECTION, SOLUTION INTRAVENOUS at 10:01

## 2019-01-22 NOTE — PATIENT INSTRUCTIONS
Understanding Gastritis    Gastritis is a painful inflammation of the stomach lining. It has a number of causes. Gastritis and its symptoms can be relieved with treatment. Work with your healthcare provider to find ways to treat your symptoms.  The Stomach  To digest the food you eat, your stomach makes strong acids and enzymes. A healthy stomach has built-in defenses that protect its lining from damage by these acids and enzymes.  When you have gastritis  Acids may damage the stomach lining when the built-in defenses of the stomach dont function as they should. The stomach lining can then become inflamed. When this happens, it is called gastritis.  Causes of gastritis  Gastritis has many causes. They may include:  · Aspirin and anti-inflammatory medicines  · Tobacco use  · Alcohol use  · Helicobacter pylori (H. pylori) bacteria  · Trauma from injuries, burns, or major surgery  · Critical illness or autoimmune disorders  Common symptoms  With gastritis, you may notice one or more of the following:  · A burning feeling in your upper belly  · Pain that happens after eating certain foods  · Gas or a bloated feeling in your stomach  · Frequent belching  · Nausea with or without vomiting  · Loss of appetite  · Feeling full quickly  · Fatigue  Date Last Reviewed: 7/1/2016  © 3705-8136 [employer]. [address] [place] [address], [place] [address]. All rights reserved. This information is not intended as a substitute for professional medical care. Always follow your healthcare professional's instructions.    Follow up with your doctor in a few days.  Go to the ER if symptoms get worse.    Joseph Cazares MD

## 2019-01-22 NOTE — PROGRESS NOTES
"Subjective:       Patient ID: Elena Frances is a 78 y.o. female.    Vitals:  height is 5' 3" (1.6 m) and weight is 72.6 kg (160 lb). Her temperature is 98.1 °F (36.7 °C). Her blood pressure is 130/70 and her pulse is 94. Her oxygen saturation is 97%.     Chief Complaint: Dizziness and Emesis    Dizziness:   Chronicity:  New  Onset:  Yesterday  Progression since onset:  Unchanged  Frequency:  Hourly  Severity:  Moderate  Duration:  1 minute  Dizziness characteristics:  Off-balance  Frequency of Spells:  Hourly   Associated symptoms: nausea and weakness.no ear pain, no fever, no vomiting and no diaphoresis.  Aggravated by:  Position changes  Risk factors:  Travel  Treatments tried:  Nothing  Improvements on treatment:  No relief      Constitution: Negative for chills, sweating, fatigue and fever.   HENT: Negative for ear pain, congestion, sinus pain, sinus pressure, sore throat and voice change.    Neck: Negative for painful lymph nodes.   Eyes: Negative for eye redness.   Respiratory: Positive for asthma. Negative for chest tightness, cough, sputum production, bloody sputum, COPD, shortness of breath, stridor and wheezing.    Gastrointestinal: Positive for nausea. Negative for vomiting.   Musculoskeletal: Negative for muscle ache.   Skin: Negative for rash and erythema.   Allergic/Immunologic: Positive for asthma. Negative for seasonal allergies.   Hematologic/Lymphatic: Negative for swollen lymph nodes.       Objective:      Physical Exam   Constitutional: She is oriented to person, place, and time. She appears well-developed and well-nourished.   HENT:   Head: Normocephalic and atraumatic.   Right Ear: External ear normal.   Left Ear: External ear normal.   Mouth/Throat: Oropharynx is clear and moist.   Eyes: EOM are normal. Pupils are equal, round, and reactive to light.   Neck: Normal range of motion. Neck supple. No JVD present. No tracheal deviation present. No thyromegaly present.   Cardiovascular: " Normal rate, regular rhythm and normal heart sounds. Exam reveals no gallop and no friction rub.   No murmur heard.  Pulmonary/Chest: Breath sounds normal. No respiratory distress. She has no wheezes. She has no rales. She exhibits no tenderness.   Abdominal: Soft. Bowel sounds are normal. She exhibits no distension and no mass. There is no hepatosplenomegaly, splenomegaly or hepatomegaly. There is tenderness in the epigastric area. There is no rigidity, no rebound, no guarding, no CVA tenderness, no tenderness at McBurney's point and negative Licea's sign. No hernia.   Musculoskeletal: Normal range of motion. She exhibits no edema, tenderness or deformity.   Lymphadenopathy:     She has no cervical adenopathy.   Neurological: She is alert and oriented to person, place, and time. She displays normal reflexes. No cranial nerve deficit. She exhibits normal muscle tone. Coordination normal.   Skin: Skin is warm. Capillary refill takes less than 2 seconds. No rash noted. No erythema. No pallor.   Psychiatric: She has a normal mood and affect. Her behavior is normal. Judgment and thought content normal.   Vitals reviewed.      Assessment:       1. Gastritis without bleeding, unspecified chronicity, unspecified gastritis type    2. Dizziness    3. Body aches    4. Nausea and vomiting, intractability of vomiting not specified, unspecified vomiting type        Plan:         Gastritis without bleeding, unspecified chronicity, unspecified gastritis type  -     GI cocktail (mylanta 30 mL, lidocaine 2 % viscous 10 mL, dicyclomine 10 mL) 50 mL  -     sucralfate (CARAFATE) 1 gram tablet; Take 1 tablet (1 g total) by mouth 4 (four) times daily.  Dispense: 60 tablet; Refill: 0    Dizziness  -     POCT Chemistry Panel  -     0.9%  NaCl infusion  -     meclizine (ANTIVERT) 25 mg tablet; Take 1 tablet (25 mg total) by mouth 3 (three) times daily as needed for Dizziness.  Dispense: 30 tablet; Refill: 0    Body aches  -     POCT  Influenza A/B    Nausea and vomiting, intractability of vomiting not specified, unspecified vomiting type  -     ondansetron disintegrating tablet 8 mg  -     0.9%  NaCl infusion  -     ondansetron (ZOFRAN-ODT) 4 MG TbDL; Take 1 tablet (4 mg total) by mouth every 8 (eight) hours as needed (nausea/vomit).  Dispense: 20 tablet; Refill: 0    all symptoms improved with zofran, gi cocktail and iv fluid.      Patient Instructions     Understanding Gastritis    Gastritis is a painful inflammation of the stomach lining. It has a number of causes. Gastritis and its symptoms can be relieved with treatment. Work with your healthcare provider to find ways to treat your symptoms.  The Stomach  To digest the food you eat, your stomach makes strong acids and enzymes. A healthy stomach has built-in defenses that protect its lining from damage by these acids and enzymes.  When you have gastritis  Acids may damage the stomach lining when the built-in defenses of the stomach dont function as they should. The stomach lining can then become inflamed. When this happens, it is called gastritis.  Causes of gastritis  Gastritis has many causes. They may include:  · Aspirin and anti-inflammatory medicines  · Tobacco use  · Alcohol use  · Helicobacter pylori (H. pylori) bacteria  · Trauma from injuries, burns, or major surgery  · Critical illness or autoimmune disorders  Common symptoms  With gastritis, you may notice one or more of the following:  · A burning feeling in your upper belly  · Pain that happens after eating certain foods  · Gas or a bloated feeling in your stomach  · Frequent belching  · Nausea with or without vomiting  · Loss of appetite  · Feeling full quickly  · Fatigue  Date Last Reviewed: 7/1/2016  © 5816-0631 [employer]. [address] [place] [address], [place] [address]. All rights reserved. This information is not intended as a substitute for professional medical care. Always follow your healthcare professional's  instructions.    Follow up with your doctor in a few days.  Go to the ER if symptoms get worse.    Joseph Cazares MD

## 2019-02-01 ENCOUNTER — HOSPITAL ENCOUNTER (EMERGENCY)
Facility: HOSPITAL | Age: 79
Discharge: HOME | End: 2019-02-02
Attending: EMERGENCY MEDICINE
Payer: MEDICARE

## 2019-02-01 DIAGNOSIS — K57.32 SIGMOID DIVERTICULITIS: Primary | ICD-10-CM

## 2019-02-01 LAB
BASOPHILS # BLD AUTO: 0.05 K/UL
BASOPHILS NFR BLD: 0.5 %
BUN SERPL-MCNC: 12 MG/DL (ref 6–30)
CHLORIDE SERPL-SCNC: 100 MMOL/L (ref 95–110)
CREAT SERPL-MCNC: 0.8 MG/DL
CREAT SERPL-MCNC: 0.8 MG/DL (ref 0.5–1.4)
DIFFERENTIAL METHOD: ABNORMAL
EOSINOPHIL # BLD AUTO: 0.3 K/UL
EOSINOPHIL NFR BLD: 3.1 %
ERYTHROCYTE [DISTWIDTH] IN BLOOD BY AUTOMATED COUNT: 16 %
EST. GFR  (AFRICAN AMERICAN): >60 ML/MIN/1.73 M^2
EST. GFR  (NON AFRICAN AMERICAN): >60 ML/MIN/1.73 M^2
GLUCOSE SERPL-MCNC: 99 MG/DL (ref 70–110)
HCT VFR BLD AUTO: 38.6 %
HCT VFR BLD CALC: 39 %PCV (ref 36–54)
HGB BLD-MCNC: 12.1 G/DL
IMM GRANULOCYTES # BLD AUTO: 0.03 K/UL
IMM GRANULOCYTES NFR BLD AUTO: 0.3 %
LYMPHOCYTES # BLD AUTO: 2.4 K/UL
LYMPHOCYTES NFR BLD: 24.3 %
MCH RBC QN AUTO: 26.7 PG
MCHC RBC AUTO-ENTMCNC: 31.3 G/DL
MCV RBC AUTO: 85 FL
MONOCYTES # BLD AUTO: 0.8 K/UL
MONOCYTES NFR BLD: 8.5 %
NEUTROPHILS # BLD AUTO: 6.1 K/UL
NEUTROPHILS NFR BLD: 63.3 %
NRBC BLD-RTO: 0 /100 WBC
PLATELET # BLD AUTO: 304 K/UL
PMV BLD AUTO: 9.9 FL
POC IONIZED CALCIUM: 1.21 MMOL/L (ref 1.06–1.42)
POC TCO2 (MEASURED): 27 MMOL/L (ref 23–29)
POTASSIUM BLD-SCNC: 4.2 MMOL/L (ref 3.5–5.1)
RBC # BLD AUTO: 4.54 M/UL
SAMPLE: NORMAL
SODIUM BLD-SCNC: 140 MMOL/L (ref 136–145)
WBC # BLD AUTO: 9.67 K/UL

## 2019-02-01 PROCEDURE — 96367 TX/PROPH/DG ADDL SEQ IV INF: CPT

## 2019-02-01 PROCEDURE — 82565 ASSAY OF CREATININE: CPT

## 2019-02-01 PROCEDURE — 25000003 PHARM REV CODE 250: Performed by: EMERGENCY MEDICINE

## 2019-02-01 PROCEDURE — 85025 COMPLETE CBC W/AUTO DIFF WBC: CPT

## 2019-02-01 PROCEDURE — 99285 EMERGENCY DEPT VISIT HI MDM: CPT | Mod: 25

## 2019-02-01 PROCEDURE — 96365 THER/PROPH/DIAG IV INF INIT: CPT

## 2019-02-01 PROCEDURE — 99284 PR EMERGENCY DEPT VISIT,LEVEL IV: ICD-10-PCS | Mod: ,,, | Performed by: EMERGENCY MEDICINE

## 2019-02-01 PROCEDURE — 63600175 PHARM REV CODE 636 W HCPCS: Performed by: EMERGENCY MEDICINE

## 2019-02-01 PROCEDURE — S0030 INJECTION, METRONIDAZOLE: HCPCS | Performed by: EMERGENCY MEDICINE

## 2019-02-01 PROCEDURE — 99284 EMERGENCY DEPT VISIT MOD MDM: CPT | Mod: ,,, | Performed by: EMERGENCY MEDICINE

## 2019-02-01 RX ORDER — METRONIDAZOLE 500 MG/100ML
500 INJECTION, SOLUTION INTRAVENOUS
Status: COMPLETED | OUTPATIENT
Start: 2019-02-01 | End: 2019-02-02

## 2019-02-01 RX ORDER — CIPROFLOXACIN 2 MG/ML
400 INJECTION, SOLUTION INTRAVENOUS
Status: COMPLETED | OUTPATIENT
Start: 2019-02-01 | End: 2019-02-01

## 2019-02-01 RX ADMIN — METRONIDAZOLE 500 MG: 500 SOLUTION INTRAVENOUS at 11:02

## 2019-02-01 RX ADMIN — CIPROFLOXACIN 400 MG: 2 INJECTION, SOLUTION INTRAVENOUS at 10:02

## 2019-02-02 VITALS
WEIGHT: 160 LBS | SYSTOLIC BLOOD PRESSURE: 153 MMHG | DIASTOLIC BLOOD PRESSURE: 70 MMHG | RESPIRATION RATE: 18 BRPM | TEMPERATURE: 98 F | HEART RATE: 74 BPM | BODY MASS INDEX: 27.31 KG/M2 | OXYGEN SATURATION: 100 % | HEIGHT: 64 IN

## 2019-02-02 PROCEDURE — 25500020 PHARM REV CODE 255: Performed by: EMERGENCY MEDICINE

## 2019-02-02 RX ORDER — METRONIDAZOLE 500 MG/1
500 TABLET ORAL 3 TIMES DAILY
Qty: 30 TABLET | Refills: 0 | Status: SHIPPED | OUTPATIENT
Start: 2019-02-02 | End: 2019-02-12

## 2019-02-02 RX ORDER — CIPROFLOXACIN 500 MG/1
500 TABLET ORAL 2 TIMES DAILY
Qty: 20 TABLET | Refills: 0 | Status: SHIPPED | OUTPATIENT
Start: 2019-02-02 | End: 2019-02-12

## 2019-02-02 RX ADMIN — IOHEXOL 75 ML: 350 INJECTION, SOLUTION INTRAVENOUS at 12:02

## 2019-02-02 NOTE — ED PROVIDER NOTES
Encounter Date: 2/1/2019    SCRIBE #1 NOTE: I, Josie Garza, am scribing for, and in the presence of,  Dr. Melara. I have scribed the following portions of the note - Other sections scribed: HPI, ROS, PE.       History     Chief Complaint   Patient presents with    Constipation     reports no bowel movement since 10 days, reports abdominal pain, nausea and vomiting      This is a 78 y.o. female with hx of HTN, HLD, bowel obstruction (patient's son unclear of exact date), and hysterectomy presents with complaint of constipation x 12 days. Patient's son reports patient has not had a normal bowel movement in approximately 12 days and complains of abdominal pain and distention. Patient has associated symptoms of abdominal pain x 10 days and 1 reported episode of emesis (Patient has not had another emesis episode in 10 days). Normal flatulence reported. Patient's son reports patient took MiraLAX and enema with no relief to symptoms. Patient denies any other concerning symptoms at this time. Patient has had hx of diverticulitis.       The history is provided by the patient and a relative. A  was used (Patient's son).     Review of patient's allergies indicates:   Allergen Reactions    Vicodin [hydrocodone-acetaminophen] Anxiety     Past Medical History:   Diagnosis Date    Allergy     Anemia     Arthritis     Asthma     Depression     Generalized headaches 4/1/2014    Goiter     MNG    HTN (hypertension), benign     Hyperlipidemia     Hyperparathyroidism     s/p surgery    Intestinal obstruction     resolved spontaneously    Lumbar disc disease     s/p epidural shots    Nuclear sclerosis - Both Eyes 3/11/2014    Osteoporosis, post-menopausal     with 3 rib fractures     Past Surgical History:   Procedure Laterality Date    APPENDECTOMY      BILATERAL OOPHORECTOMY      BREAST CYST EXCISION      left    CATARACT EXTRACTION W/  INTRAOCULAR LENS IMPLANT Right 09/26/2016     Dr. Fang ([facility])    CATARACT EXTRACTION W/  INTRAOCULAR LENS IMPLANT Left 10/17/2016    Dr. Fang ([facility])     SECTION, CLASSIC      x 1    CHOLECYSTECTOMY      COLONOSCOPY N/A 3/1/2016    [facility]formed by [facility] at [facility] ENDO (4TH FLR)    ESOPHAGOGASTRODUODENOSCOPY (EGD) N/A 3/1/2016    Performed by [facility] at [facility] ENDO (4TH FLR)    EXCISION-DUCT Left 2013    Performed by Jordan Oh MD at [facility] OR 2ND FLR    HYSTERECTOMY      INSERTION-INTRAOCULAR LENS (IOL) Left 10/17/2016    [facility]    INSERTION-INTRAOCULAR LENS (IOL) Right 2016    [facility]    KNEE SURGERY Right 2017    T[facility]    LUMBAR EPIDURAL INJECTION      x 4    MOUTH SURGERY      for abscess drainage of gum of frontal teeth    PARATHYROID GLAND SURGERY      for parathyroid adenoma    PHACOEMULSIFICATION-ASPIRATION-CATARACT Left 10/17/2016    [facility]    PHACOEMULSIFICATION-ASPIRATION-CATARACT Right 2016    [facility]    REPLACEMENT-KNEE-TOTAL Right 2017    Performed by Rom Moran MD at [facility] OR 2ND FLR     Family History   Problem Relation Age of Onset    Heart disease Mother     Hypertension Mother     Heart attack Mother     Heart disease Sister          in their 50's    Heart failure Sister     Heart disease Brother         CABG in age 60's    Hyperlipidemia Brother     Heart disease Sister         in her 50's    Heart disease Sister         in her 50's    Heart disease Sister     Hypertension Sister     Hyperlipidemia Sister     Heart disease Brother         CABG in age 60's    Hyperlipidemia Brother     Thyroid disease Daughter     Amblyopia Neg Hx     Blindness Neg Hx     Cancer Neg Hx     Cataracts Neg Hx     Diabetes Neg Hx     Glaucoma Neg Hx     Macular degeneration Neg Hx     Retinal detachment Neg Hx     Strabismus  Neg Hx     Stroke Neg Hx      Social History     Tobacco Use    Smoking status: Never Smoker    Smokeless tobacco: Never Used   Substance Use Topics    Alcohol use: No    Drug use: No     Review of Systems   Constitutional: Negative for chills and fever.   Respiratory: Negative for shortness of breath.    Cardiovascular: Negative for chest pain.   Gastrointestinal: Positive for abdominal distention, abdominal pain, constipation, nausea and vomiting.   All other systems reviewed and are negative.      Physical Exam     Initial Vitals [02/01/19 1933]   BP Pulse Resp Temp SpO2   (!) 142/67 82 18 97.7 °F (36.5 °C) 98 %      MAP       --         Physical Exam    Nursing note and vitals reviewed.  Constitutional: She appears well-developed and well-nourished. No distress.   HENT:   Head: Normocephalic and atraumatic.   Eyes: Conjunctivae and EOM are normal.   Neck: Normal range of motion. Neck supple.   Cardiovascular: Normal rate, regular rhythm and normal heart sounds.   Pulmonary/Chest: Breath sounds normal. No respiratory distress.   Abdominal: Soft. Bowel sounds are normal. There is tenderness. There is no rebound and no guarding.   Tenderness L lower quadrant.   Genitourinary:   Genitourinary Comments: Small amount of stool. No impaction. He negative.   Musculoskeletal: Normal range of motion.   Neurological: She is alert and oriented to person, place, and time.   Skin: No rash noted.   Psychiatric: She has a normal mood and affect. Her behavior is normal. Thought content normal.         ED Course   Procedures  Labs Reviewed   CBC W/ AUTO DIFFERENTIAL - Abnormal; Notable for the following components:       Result Value    MCH 26.7 (*)     MCHC 31.3 (*)     RDW 16.0 (*)     All other components within normal limits   CREATININE, SERUM   ISTAT PROCEDURE   ISTAT CHEM8          Imaging Results          CT Abdomen Pelvis With Contrast (Final result)     Abnormal  Result time 02/02/19 01:31:15    Final result by  Raoul Uriarte MD (02/02/19 01:31:15)                 Impression:      Sigmoid diverticulosis with circumferential wall thickening involving a segment of sigmoid colon left pelvis. Associated pericolic fat stranding but no abscess collection.  Findings suggest acute diverticulitis.  Recommend follow-up to ensure resolution and exclude potential sigmoid neoplasm.    Prior cholecystectomy and hysterectomy.    This report was flagged in Epic as abnormal.      Electronically signed by: Raoul Uriarte MD  Date:    02/02/2019  Time:    01:31             Narrative:    EXAMINATION:  CT ABDOMEN PELVIS WITH CONTRAST    CLINICAL HISTORY:  Abdominal pain, unspecified;LLQ tender, suspect diverticulitis;    TECHNIQUE:  Low dose axial images, sagittal and coronal reformations were obtained from the lung bases to the pubic symphysis following the IV administration of 75 mL of Omnipaque 350 .  Oral contrast was not administered.    COMPARISON:  May 17, 2012.    FINDINGS:  Abdomen:    - Lower thorax:    - Lung bases: No infiltrates.  Small pleural based nodule along the right hemidiaphragm, unchanged.    - Liver: No focal mass.    - Gallbladder: Absent.    - Bile Ducts: No evidence of intra or extra hepatic biliary ductal dilation.    - Spleen: Negative.    - Kidneys: No mass or hydronephrosis.    - Adrenals: Unremarkable.    - Pancreas: No mass or peripancreatic fat stranding.    - Retroperitoneum:  No significant adenopathy.    - Vascular: No abdominal aortic aneurysm.  Extensive atherosclerotic calcification.    - Abdominal wall:  Unremarkable.    Pelvis:    No pelvic mass, adenopathy, or free fluid.  The uterus is absent.    Bowel/Mesentery:    No bowel obstruction.  Sigmoid diverticulosis with circumferential wall thickening involving a segment of sigmoid colon left pelvis.  Associated pericolic fat stranding but no abscess collection.    Bones:  No acute osseous abnormality and no suspicious lytic or blastic lesion.                                  Medical Decision Making:   Initial Assessment:   Patient presentation is most consistent with sigmoid diverticulitis.  Will get a CT with IV contrast but will empirically treat with IV Flagyl and Cipro.  Differential Diagnosis:   Diverticulitis, ovarian cyst  Clinical Tests:   Lab Tests: Reviewed  The following lab test(s) were unremarkable: CBC and BMP  Radiological Study: Reviewed  ED Management:  CT scan shows sigmoid diverticulitis.  No perforation or abscess.  Will treat with Cipro and Flagyl times 10 days.  See above            Scribe Attestation:   Scribe #1: I performed the above scribed service and the documentation accurately describes the services I performed. I attest to the accuracy of the note.               Clinical Impression:     1. Sigmoid diverticulitis          Disposition:   Disposition: Discharged  Condition: Stable                        Howie Melara MD  02/02/19 0157       Howie Melara MD  02/02/19 0159

## 2019-02-04 ENCOUNTER — OFFICE VISIT (OUTPATIENT)
Dept: FAMILY MEDICINE | Facility: CLINIC | Age: 79
End: 2019-02-04
Payer: MEDICARE

## 2019-02-04 ENCOUNTER — LAB VISIT (OUTPATIENT)
Dept: LAB | Facility: HOSPITAL | Age: 79
End: 2019-02-04
Attending: FAMILY MEDICINE
Payer: MEDICARE

## 2019-02-04 VITALS
WEIGHT: 156.94 LBS | HEART RATE: 74 BPM | TEMPERATURE: 98 F | HEIGHT: 62 IN | DIASTOLIC BLOOD PRESSURE: 60 MMHG | SYSTOLIC BLOOD PRESSURE: 124 MMHG | BODY MASS INDEX: 28.88 KG/M2

## 2019-02-04 DIAGNOSIS — R73.03 PREDIABETES: ICD-10-CM

## 2019-02-04 DIAGNOSIS — M46.1 SACROILIITIS: ICD-10-CM

## 2019-02-04 DIAGNOSIS — M47.816 LUMBAR FACET ARTHROPATHY: ICD-10-CM

## 2019-02-04 DIAGNOSIS — K57.92 DIVERTICULITIS: Primary | ICD-10-CM

## 2019-02-04 DIAGNOSIS — M16.10 HIP ARTHRITIS: ICD-10-CM

## 2019-02-04 LAB
ESTIMATED AVG GLUCOSE: 137 MG/DL
HBA1C MFR BLD HPLC: 6.4 %

## 2019-02-04 PROCEDURE — 99499 RISK ADDL DX/OHS AUDIT: ICD-10-PCS | Mod: S$GLB,,, | Performed by: FAMILY MEDICINE

## 2019-02-04 PROCEDURE — 99214 OFFICE O/P EST MOD 30 MIN: CPT | Mod: S$GLB,,, | Performed by: FAMILY MEDICINE

## 2019-02-04 PROCEDURE — 99999 PR PBB SHADOW E&M-EST. PATIENT-LVL III: CPT | Mod: PBBFAC,,, | Performed by: FAMILY MEDICINE

## 2019-02-04 PROCEDURE — 99999 PR PBB SHADOW E&M-EST. PATIENT-LVL III: ICD-10-PCS | Mod: PBBFAC,,, | Performed by: FAMILY MEDICINE

## 2019-02-04 PROCEDURE — 83036 HEMOGLOBIN GLYCOSYLATED A1C: CPT

## 2019-02-04 PROCEDURE — 99499 UNLISTED E&M SERVICE: CPT | Mod: S$GLB,,, | Performed by: FAMILY MEDICINE

## 2019-02-04 PROCEDURE — 36415 COLL VENOUS BLD VENIPUNCTURE: CPT | Mod: PO

## 2019-02-04 PROCEDURE — 99214 PR OFFICE/OUTPT VISIT, EST, LEVL IV, 30-39 MIN: ICD-10-PCS | Mod: S$GLB,,, | Performed by: FAMILY MEDICINE

## 2019-02-04 RX ORDER — DICLOFENAC SODIUM 10 MG/G
2 GEL TOPICAL DAILY
Qty: 1 TUBE | Refills: 6 | Status: SHIPPED | OUTPATIENT
Start: 2019-02-04 | End: 2019-07-03 | Stop reason: SDUPTHER

## 2019-02-04 NOTE — PROGRESS NOTES
Subjective:       Patient ID: Elena Frances is a 78 y.o. female.    Chief Complaint: Transitional Care (ER visit)    79 yo presents with daughter today for follow up after initial evaluation in ED on 2/1/18 for diagnosis of diverticulitis.  She is presently on Cipro/Flagyl and soft diet with gradual improvement of symptoms.  She denies fever/chills or blood in stool.  She had constipation initially which has now improved.  Pt has had 2 previous similar episodes, once while visiting her country [facility].   Results of ED visit showed normal CBC, CT:Sigmoid diverticulosis with circumferential wall thickening involving a segment of sigmoid colon left pelvis. Associated pericolic fat stranding but no abscess collection.  Findings suggest acute diverticulitis.  Recommend follow-up to ensure resolution and exclude potential sigmoid neoplasm.  Pt had colonoscopy done 2016 with polyp removal      Review of Systems   Constitutional: Negative for chills and fever.   Gastrointestinal: Positive for abdominal distention and abdominal pain. Negative for anal bleeding, blood in stool, constipation, diarrhea, nausea, rectal pain and vomiting.   Neurological: Negative for dizziness and weakness.       Objective:      Physical Exam   Constitutional: She appears well-developed and well-nourished. No distress.   Neck: Normal range of motion. No JVD present. No thyromegaly present.   Cardiovascular: Normal rate and regular rhythm.   Pulses:       Dorsalis pedis pulses are 2+ on the right side, and 2+ on the left side.        Posterior tibial pulses are 2+ on the right side, and 2+ on the left side.   Pulmonary/Chest: Effort normal and breath sounds normal. She has no wheezes. She has no rales.   Abdominal: Soft. Bowel sounds are normal. She exhibits no distension and no mass. There is tenderness. There is no guarding. No hernia.   Tenderness to deep palpation LLQ with some rebound tenderness   Lymphadenopathy:     She has no  cervical adenopathy.       Assessment:       1.  Acute diverticulitis, improved  2.  History of pre diabetes  3.  Asthma   Plan:       1.  Continue present medications  2.  Consult CRS department  3.  A1C today

## 2019-02-06 ENCOUNTER — TELEPHONE (OUTPATIENT)
Dept: FAMILY MEDICINE | Facility: CLINIC | Age: 79
End: 2019-02-06

## 2019-02-06 DIAGNOSIS — R73.03 PREDIABETES: Primary | ICD-10-CM

## 2019-02-06 NOTE — TELEPHONE ENCOUNTER
Detailed VM left for the patient advising per Dr. Tinoco's message. Advised a return call with questions.

## 2019-02-06 NOTE — TELEPHONE ENCOUNTER
Patient & patient's daughter have been advised on the results & Dr. Tinoco's message below. Lab scheduled for June.

## 2019-02-06 NOTE — TELEPHONE ENCOUNTER
Please inform pt that her latest A1C is 6.4, which is very close to diabetes.  She needs to lose weight, increase her aerobic exercise when she is able, and plan to repeat labs in 4 months, thanks

## 2019-02-21 ENCOUNTER — OFFICE VISIT (OUTPATIENT)
Dept: SURGERY | Facility: CLINIC | Age: 79
End: 2019-02-21
Payer: MEDICARE

## 2019-02-21 ENCOUNTER — LAB VISIT (OUTPATIENT)
Dept: LAB | Facility: HOSPITAL | Age: 79
End: 2019-02-21
Payer: MEDICARE

## 2019-02-21 VITALS
HEART RATE: 84 BPM | WEIGHT: 152.13 LBS | SYSTOLIC BLOOD PRESSURE: 135 MMHG | BODY MASS INDEX: 27.99 KG/M2 | HEIGHT: 62 IN | DIASTOLIC BLOOD PRESSURE: 59 MMHG

## 2019-02-21 DIAGNOSIS — K57.92 DIVERTICULITIS: Primary | ICD-10-CM

## 2019-02-21 DIAGNOSIS — K57.92 DIVERTICULITIS: ICD-10-CM

## 2019-02-21 DIAGNOSIS — K59.09 CHRONIC CONSTIPATION: ICD-10-CM

## 2019-02-21 LAB — CRP SERPL-MCNC: 4.4 MG/L

## 2019-02-21 PROCEDURE — 3078F PR MOST RECENT DIASTOLIC BLOOD PRESSURE < 80 MM HG: ICD-10-PCS | Mod: CPTII,S$GLB,, | Performed by: COLON & RECTAL SURGERY

## 2019-02-21 PROCEDURE — 3078F DIAST BP <80 MM HG: CPT | Mod: CPTII,S$GLB,, | Performed by: COLON & RECTAL SURGERY

## 2019-02-21 PROCEDURE — 36415 COLL VENOUS BLD VENIPUNCTURE: CPT

## 2019-02-21 PROCEDURE — 99999 PR PBB SHADOW E&M-EST. PATIENT-LVL III: CPT | Mod: PBBFAC,,, | Performed by: COLON & RECTAL SURGERY

## 2019-02-21 PROCEDURE — 99204 OFFICE O/P NEW MOD 45 MIN: CPT | Mod: S$GLB,,, | Performed by: COLON & RECTAL SURGERY

## 2019-02-21 PROCEDURE — 3075F SYST BP GE 130 - 139MM HG: CPT | Mod: CPTII,S$GLB,, | Performed by: COLON & RECTAL SURGERY

## 2019-02-21 PROCEDURE — 99204 PR OFFICE/OUTPT VISIT, NEW, LEVL IV, 45-59 MIN: ICD-10-PCS | Mod: S$GLB,,, | Performed by: COLON & RECTAL SURGERY

## 2019-02-21 PROCEDURE — 1101F PT FALLS ASSESS-DOCD LE1/YR: CPT | Mod: CPTII,S$GLB,, | Performed by: COLON & RECTAL SURGERY

## 2019-02-21 PROCEDURE — 3075F PR MOST RECENT SYSTOLIC BLOOD PRESS GE 130-139MM HG: ICD-10-PCS | Mod: CPTII,S$GLB,, | Performed by: COLON & RECTAL SURGERY

## 2019-02-21 PROCEDURE — 99999 PR PBB SHADOW E&M-EST. PATIENT-LVL III: ICD-10-PCS | Mod: PBBFAC,,, | Performed by: COLON & RECTAL SURGERY

## 2019-02-21 PROCEDURE — 86140 C-REACTIVE PROTEIN: CPT

## 2019-02-21 PROCEDURE — 1101F PR PT FALLS ASSESS DOC 0-1 FALLS W/OUT INJ PAST YR: ICD-10-PCS | Mod: CPTII,S$GLB,, | Performed by: COLON & RECTAL SURGERY

## 2019-02-21 NOTE — PROGRESS NOTES
CRS Office Visit History and Physical    Referring Md:   Michael Tinoco Md  [address] [facility]  [address]  [place] [address]    SUBJECTIVE:     Chief Complaint: abdominal pain, diverticulitis    History of Present Illness:  The patient is new patient to this practice.   Course is as follows:  Patient is a 78 y.o. female presents with abdominal pain, constipation, and recent diverticulitis. Patient has a long standing history of constipation and takes miralax and fiber supplements. She has been off these meds for about a month with associated worsening of her constipation. She moves her bowels about once daily with some straining; the process usually takes her 10 minutes and she often feels that she hasn't completely evacuated. Denies bleeding or anorectal pain. She notes 2-3 episodes of diverticulitis in the past, always treated at home with oral antibiotics.     She now presents after being seen for LLQ pain. She was diagnosed with acute diverticulitis and took a course of oral antibiotics. She continues to have LLQ pain which usually occurs before having a bowel movement and is relieved after she moves her bowels. Denies constant pain.     Last Colonoscopy: 3/1/2016; melanosis, descending and sigmoid colon polyps    Review of patient's allergies indicates:   Allergen Reactions    Vicodin [hydrocodone-acetaminophen] Anxiety       Past Medical History:   Diagnosis Date    Allergy     Anemia     Arthritis     Asthma     Depression     Generalized headaches 4/1/2014    Goiter     MNG    HTN (hypertension), benign     Hyperlipidemia     Hyperparathyroidism     s/p surgery    Intestinal obstruction     resolved spontaneously    Lumbar disc disease     s/p epidural shots    Nuclear sclerosis - Both Eyes 3/11/2014    Osteoporosis, post-menopausal     with 3 rib fractures     Past Surgical History:   Procedure Laterality Date    APPENDECTOMY      BILATERAL OOPHORECTOMY      BREAST CYST EXCISION       left    CATARACT EXTRACTION W/  INTRAOCULAR LENS IMPLANT Right 2016    Dr. Fang ([facility])    CATARACT EXTRACTION W/  INTRAOCULAR LENS IMPLANT Left 10/17/2016    Dr. Fang ([facility])     SECTION, CLASSIC      x 1    CHOLECYSTECTOMY      COLONOSCOPY N/A 3/1/2016    [facility]formed by [facility] at [facility] ENDO (4TH FLR)    ESOPHAGOGASTRODUODENOSCOPY (EGD) N/A 3/1/2016    Performed by D[facility]onson MD at [facility] ENDO (4TH FLR)    EXCISION-DUCT Left 2013    Performed by Jordan Oh MD at [facility] OR 2ND FLR    HYSTERECTOMY      INSERTION-INTRAOCULAR LENS (IOL) Left 10/17/2016    [facility]formed by [facility] at [facility]    INSERTION-INTRAOCULAR LENS (IOL) Right 2016    [facility]formed by [facility] at [facility] OR    KNEE SURGERY Right 2017    [facility]    LUMBAR EPIDURAL INJECTION      x 4    MOUTH SURGERY      for abscess drainage of gum of frontal teeth    PARATHYROID GLAND SURGERY      for parathyroid adenoma    PHACOEMULSIFICATION-ASPIRATION-CATARACT Left 10/17/2016    [facility]formed by [facility] at [facility] OR    PHACOEMULSIFICATION-ASPIRATION-CATARACT Right 2016    [facility]formed by [facility] at [facility] OR    REPLACEMENT-KNEE-TOTAL Right 2017    Performed by Rom Moran MD at [facility] OR 2ND FLR     Family History   Problem Relation Age of Onset    Heart disease Mother     Hypertension Mother     Heart attack Mother     Heart disease Sister          in their 50's    Heart failure Sister     Heart disease Brother         CABG in age 60's    Hyperlipidemia Brother     Heart disease Sister         in her 50's    Heart disease Sister         in her 50's    Heart disease Sister     Hypertension Sister     Hyperlipidemia Sister     Heart disease Brother         CABG in age 60's    Hyperlipidemia Brother     Thyroid disease Daughter     Amblyopia Neg Hx     Blindness Neg Hx     Cancer Neg Hx     Cataracts Neg Hx     Diabetes Neg Hx     Glaucoma Neg  "Hx     Macular degeneration Neg Hx     Retinal detachment Neg Hx     Strabismus Neg Hx     Stroke Neg Hx      Social History     Tobacco Use    Smoking status: Never Smoker    Smokeless tobacco: Never Used   Substance Use Topics    Alcohol use: No    Drug use: No        Review of Systems:  Review of Systems   Constitutional: Negative for chills, fever and weight loss.   Gastrointestinal: Positive for abdominal pain and constipation. Negative for blood in stool, diarrhea, heartburn, melena, nausea and vomiting.   Genitourinary: Negative for dysuria, frequency and urgency.   Skin: Negative.  Negative for itching and rash.       OBJECTIVE:     Vital Signs (Most Recent)  BP (!) 135/59 (BP Location: Left arm, Patient Position: Sitting, BP Method: Large (Automatic))   Pulse 84   Ht 5' 2" (1.575 m)   Wt 69 kg (152 lb 1.6 oz)   BMI 27.82 kg/m²     Physical Exam:  General: White female in no distress   Neuro: alert and oriented x 4.  Moves all extremities.     HEENT: no icterus.  Trachea midline  Respiratory: respirations are even and unlabored  Cardiac: regular rate  Abdomen: soft, mild LLQ tenderness without r/g, ND  Extremities: Warm dry and intact  Skin: no rashes    Labs:   Lab Results   Component Value Date    WBC 9.67 02/01/2019    HGB 12.1 02/01/2019    HCT 39 02/01/2019    MCV 85 02/01/2019     02/01/2019        Imaging: CTAP 2/2/19:   Impression       Sigmoid diverticulosis with circumferential wall thickening involving a segment of sigmoid colon left pelvis. Associated pericolic fat stranding but no abscess collection.  Findings suggest acute diverticulitis.  Recommend follow-up to ensure resolution and exclude potential sigmoid neoplasm.           ASSESSMENT/PLAN:     Elena was seen today for diverticulitis.    Diagnoses and all orders for this visit:    Diverticulitis    Chronic constipation    Her diagnosis of acute diagnosis is questionable given her normal WBC, the mild stranding on her CT " scan, and pain pattern with relief with bowel movements.    Restart metamucil and miralax  Drink plenty of fluids  Check CRP to r/o acute inflammatory process  Follow up in 6-8 weeks; if CRP normal and continues to have pain associated with constipation can consider linzess etc at this time    Mike Lees MD  Colon and Rectal Surgery Fellow

## 2019-02-21 NOTE — LETTER
February 24, 2019      Michael Tinoco MD  [address] [facility]  [address]  [facility] [address]           [facility]  [address]  [facility] [address]  Phone: [phone]          Patient: Elena Frances   MR Number: [id number]   YOB: 1940   Date of Visit: 2/21/2019       Dear Dr. Michael Tinoco:    Thank you for referring Elena Frances to me for evaluation. Attached you will find relevant portions of my assessment and plan of care.    If you have questions, please do not hesitate to call me. I look forward to following Elena Frances along with you.    Sincerely,    Bryan Cortes MD    Enclosure  CC:  No Recipients    If you would like to receive this communication electronically, please contact [email or web address] or [phone] to request more information on [employer] access.    For providers and/or their staff who would like to refer a patient to [facility], please contact us through our one-stop-shop provider referral line, [facility], at [phone].    If you feel you have received this communication in error or would no longer like to receive these types of communications, please e-mail [email or web address]

## 2019-03-02 DIAGNOSIS — K29.70 GASTRITIS WITHOUT BLEEDING, UNSPECIFIED CHRONICITY, UNSPECIFIED GASTRITIS TYPE: ICD-10-CM

## 2019-03-05 RX ORDER — SUCRALFATE 1 G/1
1 TABLET ORAL 4 TIMES DAILY
Qty: 60 TABLET | Refills: 0 | OUTPATIENT
Start: 2019-03-05

## 2019-03-13 ENCOUNTER — TELEPHONE (OUTPATIENT)
Dept: FAMILY MEDICINE | Facility: CLINIC | Age: 79
End: 2019-03-13

## 2019-03-13 NOTE — TELEPHONE ENCOUNTER
----- Message from Meg López sent at 3/13/2019  2:18 PM CDT -----  Contact: patient [phone]  Pt called to schedule an appt with  but your next ep is 3/25/19. Pt speaks very little English and only wants to see  . She has cold sx.

## 2019-04-08 RX ORDER — CEPHALEXIN 250 MG/1
CAPSULE ORAL
Qty: 60 EACH | Refills: 3 | Status: SHIPPED | OUTPATIENT
Start: 2019-04-08 | End: 2019-07-20 | Stop reason: SDUPTHER

## 2019-04-09 NOTE — TELEPHONE ENCOUNTER
----- Message from Meg López sent at 4/9/2019  9:12 AM CDT -----  Contact: patient [phone]  Patsy, Patient called to confirm an appt for May 6 @ 10am. I don't see anything and I tried to ask if maybe she received the letter about the new clinic saying they were moving on May 6 but she was not aware of the move. She asked if I could ask the nurse about this appointment.  
Patient advised that I am unable to see where we schedule that appointment or where she is due to come back. Advised the last phone conversation that I show is for her to return on 06/03/19 for lab.   
To go home if I can

## 2019-05-09 NOTE — TELEPHONE ENCOUNTER
Pulmonary lab staff ([facility]) verbalizes that post was not done. Verbalizes that usually if the pre is normal a post is not performed. Verbalizes that if one is needed the patient can go back for test. It will have to be specified to do post regardless of pre results.    No

## 2019-06-03 ENCOUNTER — LAB VISIT (OUTPATIENT)
Dept: LAB | Facility: HOSPITAL | Age: 79
End: 2019-06-03
Attending: FAMILY MEDICINE
Payer: MEDICARE

## 2019-06-03 DIAGNOSIS — R73.03 PREDIABETES: ICD-10-CM

## 2019-06-03 LAB
ESTIMATED AVG GLUCOSE: 131 MG/DL (ref 68–131)
HBA1C MFR BLD HPLC: 6.2 % (ref 4–5.6)

## 2019-06-03 PROCEDURE — 83036 HEMOGLOBIN GLYCOSYLATED A1C: CPT

## 2019-06-03 PROCEDURE — 36415 COLL VENOUS BLD VENIPUNCTURE: CPT | Mod: PO

## 2019-06-11 ENCOUNTER — OFFICE VISIT (OUTPATIENT)
Dept: PRIMARY CARE CLINIC | Facility: CLINIC | Age: 79
End: 2019-06-11
Payer: MEDICARE

## 2019-06-11 VITALS
HEIGHT: 62 IN | TEMPERATURE: 98 F | RESPIRATION RATE: 16 BRPM | WEIGHT: 158.75 LBS | BODY MASS INDEX: 29.21 KG/M2 | SYSTOLIC BLOOD PRESSURE: 124 MMHG | DIASTOLIC BLOOD PRESSURE: 80 MMHG

## 2019-06-11 DIAGNOSIS — R07.9 CHEST PAIN, UNSPECIFIED TYPE: Primary | ICD-10-CM

## 2019-06-11 PROCEDURE — 93010 ELECTROCARDIOGRAM REPORT: CPT | Mod: S$GLB,,, | Performed by: INTERNAL MEDICINE

## 2019-06-11 PROCEDURE — 99214 PR OFFICE/OUTPT VISIT, EST, LEVL IV, 30-39 MIN: ICD-10-PCS | Mod: S$GLB,,, | Performed by: FAMILY MEDICINE

## 2019-06-11 PROCEDURE — 93005 EKG 12-LEAD: ICD-10-PCS | Mod: S$GLB,,, | Performed by: FAMILY MEDICINE

## 2019-06-11 PROCEDURE — 99214 OFFICE O/P EST MOD 30 MIN: CPT | Mod: S$GLB,,, | Performed by: FAMILY MEDICINE

## 2019-06-11 PROCEDURE — 93010 EKG 12-LEAD: ICD-10-PCS | Mod: S$GLB,,, | Performed by: INTERNAL MEDICINE

## 2019-06-11 PROCEDURE — 3288F FALL RISK ASSESSMENT DOCD: CPT | Mod: CPTII,S$GLB,, | Performed by: FAMILY MEDICINE

## 2019-06-11 PROCEDURE — 3074F SYST BP LT 130 MM HG: CPT | Mod: CPTII,S$GLB,, | Performed by: FAMILY MEDICINE

## 2019-06-11 PROCEDURE — 93005 ELECTROCARDIOGRAM TRACING: CPT | Mod: S$GLB,,, | Performed by: FAMILY MEDICINE

## 2019-06-11 PROCEDURE — 1100F PTFALLS ASSESS-DOCD GE2>/YR: CPT | Mod: CPTII,S$GLB,, | Performed by: FAMILY MEDICINE

## 2019-06-11 PROCEDURE — 1100F PR PT FALLS ASSESS DOC 2+ FALLS/FALL W/INJURY/YR: ICD-10-PCS | Mod: CPTII,S$GLB,, | Performed by: FAMILY MEDICINE

## 2019-06-11 PROCEDURE — 3288F PR FALLS RISK ASSESSMENT DOCUMENTED: ICD-10-PCS | Mod: CPTII,S$GLB,, | Performed by: FAMILY MEDICINE

## 2019-06-11 PROCEDURE — 3079F DIAST BP 80-89 MM HG: CPT | Mod: CPTII,S$GLB,, | Performed by: FAMILY MEDICINE

## 2019-06-11 PROCEDURE — 99999 PR PBB SHADOW E&M-EST. PATIENT-LVL III: CPT | Mod: PBBFAC,,, | Performed by: FAMILY MEDICINE

## 2019-06-11 PROCEDURE — 3079F PR MOST RECENT DIASTOLIC BLOOD PRESSURE 80-89 MM HG: ICD-10-PCS | Mod: CPTII,S$GLB,, | Performed by: FAMILY MEDICINE

## 2019-06-11 PROCEDURE — 99999 PR PBB SHADOW E&M-EST. PATIENT-LVL III: ICD-10-PCS | Mod: PBBFAC,,, | Performed by: FAMILY MEDICINE

## 2019-06-11 PROCEDURE — 3074F PR MOST RECENT SYSTOLIC BLOOD PRESSURE < 130 MM HG: ICD-10-PCS | Mod: CPTII,S$GLB,, | Performed by: FAMILY MEDICINE

## 2019-06-11 NOTE — PROGRESS NOTES
Subjective:       Patient ID: Eelna Frances is a 79 y.o. female.    Chief Complaint: Chest Congestion    78 yo presents with 2-3 week history of recurrent anterior chest pain, with one episode of radiation into neck area while lying down.  She has also noted recurrent dyspnea on exertion.  She has past medical history of asthma, has not noted any worsening of symptoms recently.  Pain lasts less than one minute, she has taken ASA and also TNG which she has at home.  Past medical history: hypertension and hyperlipidemia  Never smoked  Positive family history of heart disease    Review of Systems   Constitutional: Negative for fatigue and unexpected weight change.   Respiratory: Positive for chest tightness and shortness of breath. Negative for cough and wheezing.    Cardiovascular: Positive for chest pain. Negative for palpitations and leg swelling.       Objective:      Physical Exam    Assessment:       1. Chest pain, unspecified type        Plan:       1.  EKG shows no acute changes  2.  Schedule Stress Echo  3.  ED evaluation if pain persists/worsens

## 2019-06-18 ENCOUNTER — CLINICAL SUPPORT (OUTPATIENT)
Dept: CARDIOLOGY | Facility: CLINIC | Age: 79
End: 2019-06-18
Attending: FAMILY MEDICINE
Payer: MEDICARE

## 2019-06-18 VITALS — BODY MASS INDEX: 29.21 KG/M2 | HEIGHT: 62 IN | WEIGHT: 158.75 LBS

## 2019-06-18 DIAGNOSIS — R07.9 CHEST PAIN, UNSPECIFIED TYPE: ICD-10-CM

## 2019-06-18 LAB
ASCENDING AORTA: 3.53 CM
AV REGURGITATION PRESSURE HALF TIME: 600 MS
BSA FOR ECHO PROCEDURE: 1.77 M2
CV ECHO LV RWT: 0.38 CM
CV STRESS BASE HR: 65 BPM
DIASTOLIC BLOOD PRESSURE: 72 MMHG
DOP CALC LVOT AREA: 3.36 CM2
DOP CALC LVOT DIAMETER: 2.07 CM
DOP CALC LVOT PEAK VEL: 1.14 M/S
DOP CALC LVOT STROKE VOLUME: 99.77 CM3
DOP CALCLVOT PEAK VEL VTI: 29.66 CM
E WAVE DECELERATION TIME: 200.45 MSEC
E/A RATIO: 0.99
E/E' RATIO: 10.11
ECHO LV POSTERIOR WALL: 0.76 CM (ref 0.6–1.1)
FRACTIONAL SHORTENING: 29 % (ref 28–44)
INTERVENTRICULAR SEPTUM: 0.85 CM (ref 0.6–1.1)
IVRT: 0.1 MSEC
LA MAJOR: 5.53 CM
LA MINOR: 5.64 CM
LA WIDTH: 3.97 CM
LEFT ATRIUM SIZE: 3.77 CM
LEFT ATRIUM VOLUME INDEX: 41 ML/M2
LEFT ATRIUM VOLUME: 71.04 CM3
LEFT INTERNAL DIMENSION IN SYSTOLE: 2.87 CM (ref 2.1–4)
LEFT VENTRICLE DIASTOLIC VOLUME INDEX: 41.49 ML/M2
LEFT VENTRICLE DIASTOLIC VOLUME: 71.89 ML
LEFT VENTRICLE MASS INDEX: 55.5 G/M2
LEFT VENTRICLE SYSTOLIC VOLUME INDEX: 18.2 ML/M2
LEFT VENTRICLE SYSTOLIC VOLUME: 31.48 ML
LEFT VENTRICULAR INTERNAL DIMENSION IN DIASTOLE: 4.05 CM (ref 3.5–6)
LEFT VENTRICULAR MASS: 96.19 G
LV LATERAL E/E' RATIO: 10.11
LV SEPTAL E/E' RATIO: 10.11
MV PEAK A VEL: 0.92 M/S
MV PEAK E VEL: 0.91 M/S
OHS CV CPX 1 MINUTE RECOVERY HEART RATE: 92 BPM
OHS CV CPX 85 PERCENT MAX PREDICTED HEART RATE MALE: 118
OHS CV CPX MAX PREDICTED HEART RATE: 138
OHS CV CPX PATIENT IS FEMALE: 1
OHS CV CPX PATIENT IS MALE: 0
OHS CV CPX PEAK DIASTOLIC BLOOD PRESSURE: 71 MMHG
OHS CV CPX PEAK HEAR RATE: 92 BPM
OHS CV CPX PEAK RATE PRESSURE PRODUCT: NORMAL
OHS CV CPX PEAK SYSTOLIC BLOOD PRESSURE: 179 MMHG
OHS CV CPX PERCENT MAX PREDICTED HEART RATE ACHIEVED: 67
OHS CV CPX RATE PRESSURE PRODUCT PRESENTING: 9490
PULM VEIN S/D RATIO: 1.4
PV PEAK D VEL: 0.45 M/S
PV PEAK S VEL: 0.63 M/S
RA MAJOR: 4.82 CM
RA WIDTH: 2.82 CM
RIGHT VENTRICULAR END-DIASTOLIC DIMENSION: 3.61 CM
SINUS: 3.41 CM
STJ: 3.16 CM
SYSTOLIC BLOOD PRESSURE: 146 MMHG
TDI LATERAL: 0.09
TDI SEPTAL: 0.09
TDI: 0.09
TRICUSPID ANNULAR PLANE SYSTOLIC EXCURSION: 2.36 CM

## 2019-06-18 PROCEDURE — 93351 STRESS TTE COMPLETE: CPT | Mod: S$GLB,,, | Performed by: INTERNAL MEDICINE

## 2019-06-18 PROCEDURE — 99999 PR PBB SHADOW E&M-EST. PATIENT-LVL II: CPT | Mod: PBBFAC,,,

## 2019-06-18 PROCEDURE — 99999 PR PBB SHADOW E&M-EST. PATIENT-LVL II: ICD-10-PCS | Mod: PBBFAC,,,

## 2019-06-18 PROCEDURE — 93351 ECHOCARDIOGRAM STRESS TEST (CUPID ONLY): ICD-10-PCS | Mod: S$GLB,,, | Performed by: INTERNAL MEDICINE

## 2019-06-18 RX ORDER — DOBUTAMINE HYDROCHLORIDE 200 MG/100ML
10 INJECTION INTRAVENOUS CONTINUOUS
Status: DISCONTINUED | OUTPATIENT
Start: 2019-06-18 | End: 2020-10-07

## 2019-06-18 RX ADMIN — DOBUTAMINE HYDROCHLORIDE 10 MCG/KG/MIN: 200 INJECTION INTRAVENOUS at 08:06

## 2019-06-19 ENCOUNTER — TELEPHONE (OUTPATIENT)
Dept: PRIMARY CARE CLINIC | Facility: CLINIC | Age: 79
End: 2019-06-19

## 2019-07-03 ENCOUNTER — OFFICE VISIT (OUTPATIENT)
Dept: PRIMARY CARE CLINIC | Facility: CLINIC | Age: 79
End: 2019-07-03
Payer: MEDICARE

## 2019-07-03 ENCOUNTER — CLINICAL SUPPORT (OUTPATIENT)
Dept: CARDIOLOGY | Facility: CLINIC | Age: 79
End: 2019-07-03
Attending: FAMILY MEDICINE
Payer: MEDICARE

## 2019-07-03 ENCOUNTER — NURSE TRIAGE (OUTPATIENT)
Dept: ADMINISTRATIVE | Facility: CLINIC | Age: 79
End: 2019-07-03

## 2019-07-03 VITALS — SYSTOLIC BLOOD PRESSURE: 148 MMHG | DIASTOLIC BLOOD PRESSURE: 76 MMHG | TEMPERATURE: 98 F | HEART RATE: 68 BPM

## 2019-07-03 DIAGNOSIS — M79.605 LEFT LEG PAIN: Primary | ICD-10-CM

## 2019-07-03 DIAGNOSIS — M46.1 SACROILIITIS: ICD-10-CM

## 2019-07-03 DIAGNOSIS — M79.605 LEFT LEG PAIN: ICD-10-CM

## 2019-07-03 DIAGNOSIS — M25.562 ACUTE PAIN OF LEFT KNEE: Primary | ICD-10-CM

## 2019-07-03 DIAGNOSIS — M47.816 LUMBAR FACET ARTHROPATHY: ICD-10-CM

## 2019-07-03 DIAGNOSIS — M16.10 HIP ARTHRITIS: ICD-10-CM

## 2019-07-03 PROCEDURE — 3077F SYST BP >= 140 MM HG: CPT | Mod: CPTII,S$GLB,, | Performed by: FAMILY MEDICINE

## 2019-07-03 PROCEDURE — 93971 CV US DOPPLER VENOUS LEG LEFT (CUPID ONLY): ICD-10-PCS | Mod: LT,S$GLB,, | Performed by: INTERNAL MEDICINE

## 2019-07-03 PROCEDURE — 99999 PR PBB SHADOW E&M-EST. PATIENT-LVL III: CPT | Mod: PBBFAC,,, | Performed by: FAMILY MEDICINE

## 2019-07-03 PROCEDURE — 1101F PR PT FALLS ASSESS DOC 0-1 FALLS W/OUT INJ PAST YR: ICD-10-PCS | Mod: CPTII,S$GLB,, | Performed by: FAMILY MEDICINE

## 2019-07-03 PROCEDURE — 99214 OFFICE O/P EST MOD 30 MIN: CPT | Mod: S$GLB,,, | Performed by: FAMILY MEDICINE

## 2019-07-03 PROCEDURE — 3078F PR MOST RECENT DIASTOLIC BLOOD PRESSURE < 80 MM HG: ICD-10-PCS | Mod: CPTII,S$GLB,, | Performed by: FAMILY MEDICINE

## 2019-07-03 PROCEDURE — 3078F DIAST BP <80 MM HG: CPT | Mod: CPTII,S$GLB,, | Performed by: FAMILY MEDICINE

## 2019-07-03 PROCEDURE — 99999 PR PBB SHADOW E&M-EST. PATIENT-LVL III: ICD-10-PCS | Mod: PBBFAC,,, | Performed by: FAMILY MEDICINE

## 2019-07-03 PROCEDURE — 1101F PT FALLS ASSESS-DOCD LE1/YR: CPT | Mod: CPTII,S$GLB,, | Performed by: FAMILY MEDICINE

## 2019-07-03 PROCEDURE — 93971 EXTREMITY STUDY: CPT | Mod: LT,S$GLB,, | Performed by: INTERNAL MEDICINE

## 2019-07-03 PROCEDURE — 3077F PR MOST RECENT SYSTOLIC BLOOD PRESSURE >= 140 MM HG: ICD-10-PCS | Mod: CPTII,S$GLB,, | Performed by: FAMILY MEDICINE

## 2019-07-03 PROCEDURE — 99214 PR OFFICE/OUTPT VISIT, EST, LEVL IV, 30-39 MIN: ICD-10-PCS | Mod: S$GLB,,, | Performed by: FAMILY MEDICINE

## 2019-07-03 RX ORDER — DICLOFENAC SODIUM 10 MG/G
2 GEL TOPICAL DAILY
Qty: 1 TUBE | Refills: 6 | Status: SHIPPED | OUTPATIENT
Start: 2019-07-03 | End: 2019-07-05

## 2019-07-03 RX ORDER — NABUMETONE 750 MG/1
750 TABLET, FILM COATED ORAL 2 TIMES DAILY
Qty: 30 TABLET | Refills: 6 | Status: SHIPPED | OUTPATIENT
Start: 2019-07-03 | End: 2019-09-26 | Stop reason: SDUPTHER

## 2019-07-03 NOTE — TELEPHONE ENCOUNTER
Reason for Disposition   [1] Follow-up call to recent contact AND [2] information only call, no triage required    Protocols used: INFORMATION ONLY CALL-A-AH

## 2019-07-05 ENCOUNTER — OFFICE VISIT (OUTPATIENT)
Dept: ORTHOPEDICS | Facility: CLINIC | Age: 79
End: 2019-07-05
Payer: MEDICARE

## 2019-07-05 ENCOUNTER — HOSPITAL ENCOUNTER (OUTPATIENT)
Dept: RADIOLOGY | Facility: HOSPITAL | Age: 79
Discharge: HOME | End: 2019-07-05
Attending: PHYSICIAN ASSISTANT
Payer: MEDICARE

## 2019-07-05 VITALS
WEIGHT: 157.88 LBS | SYSTOLIC BLOOD PRESSURE: 151 MMHG | BODY MASS INDEX: 29.05 KG/M2 | DIASTOLIC BLOOD PRESSURE: 70 MMHG | HEART RATE: 66 BPM | HEIGHT: 62 IN

## 2019-07-05 DIAGNOSIS — M17.12 PRIMARY OSTEOARTHRITIS OF LEFT KNEE: Primary | ICD-10-CM

## 2019-07-05 DIAGNOSIS — M25.562 LEFT KNEE PAIN, UNSPECIFIED CHRONICITY: ICD-10-CM

## 2019-07-05 PROCEDURE — 73564 XR KNEE ORTHO LEFT WITH FLEXION: ICD-10-PCS | Mod: 26,LT,, | Performed by: RADIOLOGY

## 2019-07-05 PROCEDURE — 1101F PT FALLS ASSESS-DOCD LE1/YR: CPT | Mod: CPTII,S$GLB,, | Performed by: PHYSICIAN ASSISTANT

## 2019-07-05 PROCEDURE — 73562 X-RAY EXAM OF KNEE 3: CPT | Mod: 26,59,RT, | Performed by: RADIOLOGY

## 2019-07-05 PROCEDURE — 3077F SYST BP >= 140 MM HG: CPT | Mod: CPTII,S$GLB,, | Performed by: PHYSICIAN ASSISTANT

## 2019-07-05 PROCEDURE — 73564 X-RAY EXAM KNEE 4 OR MORE: CPT | Mod: 26,LT,, | Performed by: RADIOLOGY

## 2019-07-05 PROCEDURE — 3078F DIAST BP <80 MM HG: CPT | Mod: CPTII,S$GLB,, | Performed by: PHYSICIAN ASSISTANT

## 2019-07-05 PROCEDURE — 1101F PR PT FALLS ASSESS DOC 0-1 FALLS W/OUT INJ PAST YR: ICD-10-PCS | Mod: CPTII,S$GLB,, | Performed by: PHYSICIAN ASSISTANT

## 2019-07-05 PROCEDURE — 3077F PR MOST RECENT SYSTOLIC BLOOD PRESSURE >= 140 MM HG: ICD-10-PCS | Mod: CPTII,S$GLB,, | Performed by: PHYSICIAN ASSISTANT

## 2019-07-05 PROCEDURE — 3078F PR MOST RECENT DIASTOLIC BLOOD PRESSURE < 80 MM HG: ICD-10-PCS | Mod: CPTII,S$GLB,, | Performed by: PHYSICIAN ASSISTANT

## 2019-07-05 PROCEDURE — 73562 XR KNEE ORTHO LEFT WITH FLEXION: ICD-10-PCS | Mod: 26,59,RT, | Performed by: RADIOLOGY

## 2019-07-05 PROCEDURE — 99213 PR OFFICE/OUTPT VISIT, EST, LEVL III, 20-29 MIN: ICD-10-PCS | Mod: S$GLB,,, | Performed by: PHYSICIAN ASSISTANT

## 2019-07-05 PROCEDURE — 99999 PR PBB SHADOW E&M-EST. PATIENT-LVL IV: ICD-10-PCS | Mod: PBBFAC,,, | Performed by: PHYSICIAN ASSISTANT

## 2019-07-05 PROCEDURE — 99213 OFFICE O/P EST LOW 20 MIN: CPT | Mod: S$GLB,,, | Performed by: PHYSICIAN ASSISTANT

## 2019-07-05 PROCEDURE — 73562 X-RAY EXAM OF KNEE 3: CPT | Mod: TC,LT

## 2019-07-05 PROCEDURE — 99999 PR PBB SHADOW E&M-EST. PATIENT-LVL IV: CPT | Mod: PBBFAC,,, | Performed by: PHYSICIAN ASSISTANT

## 2019-07-05 RX ORDER — MELOXICAM 15 MG/1
15 TABLET ORAL DAILY
Qty: 30 TABLET | Refills: 1 | Status: SHIPPED | OUTPATIENT
Start: 2019-07-05 | End: 2019-08-29 | Stop reason: SDUPTHER

## 2019-07-05 NOTE — PROGRESS NOTES
SUBJECTIVE:     Chief Complaint & History of Present Illness:  Elena Frances is a Established patient 79 y.o. female who is seen here today with a complaint of    Chief Complaint   Patient presents with    Left Knee - Pain    .  She is a patient well-known to us is status post right TKA performed by Dr. heaton 02/07/2017 which she has recovered very well.  Concerns today revolved around pain and tenderness in the medial aspect of the left knee.  She has had episodic effusion and swelling with periods of prolonged standing and ambulation.  Was seen at an outside facility and has undergone a therapeutic diagnostic cortisone injection of that knee approximately 1 month ago.  Patient reports she got about 2 weeks of relief from the injection but has had return of pain soreness she has also developed a significant Baker cyst which is causing some radiation of pain down the posterior calf.  She was seen emergency room ultrasound demonstrates no evidence of DVT..  On a scale of 1-10, with 10 being worst pain imaginable, he rates this pain as 5 on good days and 8 on bad days.  she describes the pain as sore and grinding.    Review of patient's allergies indicates:   Allergen Reactions    Vicodin [hydrocodone-acetaminophen] Anxiety         Current Outpatient Medications   Medication Sig Dispense Refill    ADVAIR DISKUS 100-50 mcg/dose diskus inhaler INHALE 1 PUFF BY MOUTH TWICE A DAY 60 each 3    atorvastatin (LIPITOR) 80 MG tablet TAKE 1 TABLET (80 MG TOTAL) BY MOUTH ONCE DAILY.  3    beclomethasone (QVAR) 40 mcg/actuation Aero Inhale 1 puff into the lungs 2 (two) times daily. Controller 1 Inhaler 6    CYANOCOBALAMIN, VITAMIN B-12, (VITAMIN B-12 ORAL) Take 2,500 mcg by mouth.      fluocinonide (LIDEX) 0.05 % external solution every evening.       fluticasone (FLONASE) 50 mcg/actuation nasal spray 2 sprays (100 mcg total) by Each Nare route once daily. 16 g 11    meclizine (ANTIVERT) 25 mg tablet Take 1  tablet (25 mg total) by mouth 3 (three) times daily as needed for Dizziness. 30 tablet 0    montelukast (SINGULAIR) 10 mg tablet Take 1 tablet (10 mg total) by mouth every evening. 90 tablet 3    omeprazole (PRILOSEC) 40 MG capsule Take 1 capsule (40 mg total) by mouth once daily. 90 capsule 3    sertraline (ZOLOFT) 50 MG tablet Take 1 tablet (50 mg total) by mouth once daily. 90 tablet 3    tolterodine (DETROL) 2 MG Tab Take 1 tablet (2 mg total) by mouth 2 (two) times daily. 60 tablet 11    valsartan (DIOVAN) 160 MG tablet TAKE 1 TABLET (160 MG TOTAL) BY MOUTH ONCE DAILY. 90 tablet 0    VENTOLIN HFA 90 mcg/actuation inhaler INHALE 2 PUFFS INTO THE LUNGS EVERY 6 HOURS AS NEEDED FOR WHEEZING 18 Inhaler 1    aspirin 325 MG tablet Take 1 tablet (325 mg total) by mouth 2 (two) times daily. 70 tablet 0    blood-glucose meter (Watson BrownULT BLOOD GLUCOSE SYSTM) kit Use as instructed 1 each 0    lancets Misc 1 lancet by Misc.(Non-Drug; Combo Route) route 2 (two) times daily. 200 each 3    meloxicam (MOBIC) 15 MG tablet Take 1 tablet (15 mg total) by mouth once daily. 30 tablet 1    metoprolol succinate (TOPROL-XL) 25 MG 24 hr tablet Take 1 tablet (25 mg total) by mouth once daily. 90 tablet 3     Current Facility-Administered Medications   Medication Dose Route Frequency Provider Last Rate Last Dose    DOBUTamine 500mg in D5W 250mL infusion (premix)  10 mcg/kg/min Intravenous Continuous [facility] VERONIKA Tripp MD 21.6 mL/hr at 06/18/19 0845 10 mcg/kg/min at 06/18/19 0845       Past Medical History:   Diagnosis Date    Allergy     Anemia     Arthritis     Asthma     Depression     Generalized headaches 4/1/2014    Goiter     MNG    HTN (hypertension), benign     Hyperlipidemia     Hyperparathyroidism     s/p surgery    Intestinal obstruction     resolved spontaneously    Lumbar disc disease     s/p epidural shots    Nuclear sclerosis - Both Eyes 3/11/2014    Osteoporosis, post-menopausal     with 3 rib  fractures       Past Surgical History:   Procedure Laterality Date    APPENDECTOMY      BILATERAL OOPHORECTOMY      BREAST CYST EXCISION      left    CATARACT EXTRACTION W/  INTRAOCULAR LENS IMPLANT Right 2016    Dr. Fang ([place])    CATARACT EXTRACTION W/  INTRAOCULAR LENS IMPLANT Left 10/17/2016    Dr. Fang ([place])     SECTION, CLASSIC      x 1    CHOLECYSTECTOMY      COLONOSCOPY N/A 3/1/2016    [facility] ([facility] F[facility])    ESOPHAGOGASTRODUODENOSCOPY (EGD) N/A 3/1/2016    [facility] ENDO (4TH FLR)    EXCISION-DUCT Left 2013    [facility] by Jordan [facility] at [facility] OR 2ND FLR    HYSTERECTOMY      INSERTION-INTRAOCULAR LENS (IOL) Left 10/17/2016    [facility]    INSERTION-INTRAOCULAR LENS (IOL) Right 2016    [facility]    KNEE SURGERY Right 2017    TKR    LUMBAR EPIDURAL INJECTION      x 4    MOUTH SURGERY      for abscess drainage of gum of frontal teeth    PARATHYROID GLAND SURGERY      for parathyroid adenoma    PHACOEMULSIFICATION-ASPIRATION-CATARACT Left 10/17/2016    [facility]    PHACOEMULSIFICATION-ASPIRATION-CATARACT Right 2016    [facility]    REPLACEMENT-KNEE-TOTAL Right 2017    Performed by Rom Moran [facility] at [facility] OR 2ND FLR       Vital Signs (Most Recent)  Vitals:    19 1337   BP: (!) 151/70   Pulse: 66           Review of Systems:  ROS:  Constitutional: no fever or chills  Eyes: no visual changes  ENT: no nasal congestion or sore throat  Respiratory: no cough or shortness of breath, Positive for asthma  Cardiovascular: no chest pain or palpitations  Gastrointestinal: no nausea or vomiting, tolerating diet, Positive diverticulitis chronic constipation  Genitourinary: no hematuria or dysuria  Integument/Breast: no rash or  "pruritis  Hematologic/Lymphatic: no easy bruising or lymphadenopathy, Positive for iron deficiency anemia  Musculoskeletal: no arthralgias or myalgias, Positive for osteoporosis status post right TKA  Neurological: no seizures or tremors, Positive lumbar disc disease, cervical syndrome, generalized headaches, lumbar radiculopathy  Behavioral/Psych: no auditory or visual hallucinations, Positive for depression  Endocrine: no heat or cold intolerance, Positive for goiter, vitamin-D deficiency, type 2 diabetes without retinopathy hyperparathyroidism                 OBJECTIVE:     PHYSICAL EXAM:  Height: 5' 2" (157.5 cm) Weight: 71.6 kg (157 lb 13.6 oz), General Appearance: Well nourished, well developed, in no acute distress.  Neurological: Mood & affect are normal.  right  Knee Exam:  Knee Range of Motion:0-120 degrees flexion   Effusion:yes popliteal fossa  Condition of skin:intact  Location of tenderness:Medial joint line and popliteal fossa   Strength:limited by pain and 5 of 5  Stability:  Lachman: stable, LCL: stable, MCL: stable, PCL: stable and posteromedial (dial): stable  Varus /Valgus stress:  normal  Candace:   negative/negative    left  Knee Exam:  Knee Range of Motion:0-115 degrees flexion   Effusion:none  Condition of skin:intact  Location of tenderness:None   Strength:5 of 5  Stability:  stable to testing  Varus /Valgus stress:  normal  Candace:   negative/negative      Hip Examination:  normal    RADIOGRAPHS:  X-rays taken today films reviewed by me demonstrate right knee well-fixed well-aligned total knee arthroplasty in good alignment with no evidence of loosening wear damage or dislocation.  Left knee demonstrates mild-to-moderate degenerative joint disease significant chondrocalcinosis noted tricompartmentally no other evidence of fracture dislocation    ASSESSMENT/PLAN:       ICD-10-CM ICD-9-CM   1. Primary osteoarthritis of left knee M17.12 715.16   2. Left knee pain, unspecified chronicity " M25.562 719.46       Plan: We discussed with the patient at length all the different treatment options available for  the knee including anti-inflammatories, acetaminophen, rest, ice, knee strengthening exercise, occasional cortisone injections for temporary relief, Viscosupplimentation injections, arthroscopic debridement osteotomy, and finally knee arthroplasty.   Light of patient's fails conservative treatment she would like to consult to adult reconstructive surgery for right TKA  Meloxicam 15 mg q.d. with food

## 2019-07-05 NOTE — LETTER
July 5, 2019      Michael Tinoco MD  [address] [facility] [address]           [facility]  [address], [address]  [facility] [address]  Phone: [phone]          Patient: Elena Frances   MR Number: [id number]   YOB: 1940   Date of Visit: 7/5/2019       Dear Dr. Michael Tinoco:    Thank you for referring lEena Frances to me for evaluation. Attached you will find relevant portions of my assessment and plan of care.    If you have questions, please do not hesitate to call me. I look forward to following Elena Frances along with you.    Sincerely,    Nadir Hurd PA-C    Enclosure  CC:  No Recipients    If you would like to receive this communication electronically, please contact [email or web address] or [phone] to request more information on [employer] Link access.    For providers and/or their staff who would like to refer a patient to [facility], please contact us through our one-stop-shop provider referral line, [facility] , at [phone].    If you feel you have received this communication in error or would no longer like to receive these types of communications, please e-mail [email or web address]

## 2019-07-10 RX ORDER — MONTELUKAST SODIUM 10 MG/1
TABLET ORAL
Qty: 90 TABLET | Refills: 3 | Status: SHIPPED | OUTPATIENT
Start: 2019-07-10 | End: 2020-02-17

## 2019-07-16 ENCOUNTER — OFFICE VISIT (OUTPATIENT)
Dept: ORTHOPEDICS | Facility: CLINIC | Age: 79
End: 2019-07-16
Payer: MEDICARE

## 2019-07-16 VITALS — BODY MASS INDEX: 28.06 KG/M2 | WEIGHT: 158.38 LBS | HEIGHT: 63 IN

## 2019-07-16 DIAGNOSIS — M17.12 PRIMARY OSTEOARTHRITIS OF LEFT KNEE: Primary | ICD-10-CM

## 2019-07-16 PROCEDURE — 99999 PR PBB SHADOW E&M-EST. PATIENT-LVL III: ICD-10-PCS | Mod: PBBFAC,,, | Performed by: ORTHOPAEDIC SURGERY

## 2019-07-16 PROCEDURE — 1101F PR PT FALLS ASSESS DOC 0-1 FALLS W/OUT INJ PAST YR: ICD-10-PCS | Mod: CPTII,S$GLB,, | Performed by: ORTHOPAEDIC SURGERY

## 2019-07-16 PROCEDURE — 99999 PR PBB SHADOW E&M-EST. PATIENT-LVL III: CPT | Mod: PBBFAC,,, | Performed by: ORTHOPAEDIC SURGERY

## 2019-07-16 PROCEDURE — 99499 UNLISTED E&M SERVICE: CPT | Mod: S$GLB,,, | Performed by: ORTHOPAEDIC SURGERY

## 2019-07-16 PROCEDURE — 99214 OFFICE O/P EST MOD 30 MIN: CPT | Mod: S$GLB,,, | Performed by: ORTHOPAEDIC SURGERY

## 2019-07-16 PROCEDURE — 99499 RISK ADDL DX/OHS AUDIT: ICD-10-PCS | Mod: S$GLB,,, | Performed by: ORTHOPAEDIC SURGERY

## 2019-07-16 PROCEDURE — 1101F PT FALLS ASSESS-DOCD LE1/YR: CPT | Mod: CPTII,S$GLB,, | Performed by: ORTHOPAEDIC SURGERY

## 2019-07-16 PROCEDURE — 99214 PR OFFICE/OUTPT VISIT, EST, LEVL IV, 30-39 MIN: ICD-10-PCS | Mod: S$GLB,,, | Performed by: ORTHOPAEDIC SURGERY

## 2019-07-16 RX ORDER — FLUTICASONE PROPIONATE 50 MCG
2 SPRAY, SUSPENSION (ML) NASAL DAILY
Qty: 16 G | Refills: 6 | Status: SHIPPED | OUTPATIENT
Start: 2019-07-16 | End: 2020-01-14 | Stop reason: SDUPTHER

## 2019-07-16 RX ORDER — FLUTICASONE PROPIONATE 50 MCG
2 SPRAY, SUSPENSION (ML) NASAL DAILY
Qty: 16 G | Refills: 11 | OUTPATIENT
Start: 2019-07-16

## 2019-07-16 NOTE — PROGRESS NOTES
Subjective:     HPI:   Elena Frances is a 79 y.o. female who presents for evaluation of left knee pain by Nadir Hurd.  The visit was conducted through     She had a right total knee replacement by Dr. Moran February 7, 2017.  This was a 2 night hospital stay and home health physical therapy.  She had no perioperative or postoperative issues.  She is happy with her right knee replacement.    She says her left knee now feels exactly like the right knee did prior to surgery. She has global knee pain which goes down anterior shin.  No low back buttock radicular pain or paresthesias some occasional mild groin pain.  Pain is activity related and relieved with rest moderate to severe in nature.    She is on Mobic she has been on nabumetone and ibuprofen in the past.  She had a steroid injection June 19th by Etienne Bustos which provided 2 weeks of good relief but has worn off.  She has done physical therapy.  No bracing no assistive devices.  She can walk about a block.  Limitations include walking exercise and squatting and stairs.    Her son-in-law is here with her today she has a  home as well.    She is a prediabetic, A1c is 6.2.  Asthma depression history of lumbar epidural steroid injection. She is seen GI recently for diverticulitis and chronic constipation she says all of that is better now       ROS:  The updated medical history is in the chart and has been reviewed. A review of systems is updated and there is no reported vision changes, ear/nose/mouth/throat complaints,  chest pain, shortness of breath, abdominal pain, urological complaints, fevers or chills, psychiatric complaints. Musculoskeletal and neurologcial symptoms are as documented. All other systems are negative.      Objective:   Exam:  There were no vitals filed for this visit.  Body mass index is 28.51 kg/m².    Physical examination included assessment of the patient's general appearance with particular attention to  development, nutrition, body habitus, attention to grooming, and any evidence of distress.  Constitutional: The patient is a well-developed, well-nourished patient in no acute distress.   Cardiovascular: Vascular examination included warmth and capillary refill as well inspection for edema and assessment of pedal pulses. Pulses are palpable and regular.  Musculoskeletal: Gait was assessed as to whether it was steady, non-antalgic, and/or required the use of an assist device. The patient was also asked to walk independently and get onto the examination table.  Skin: The skin was examined for any obvious rashes or lesions in the extremity.  Neurologic: Sensation is intact to light touch in the extremity. The patient has good coordination without hyperreflexia and is alert and oriented to person, place and time and has normal mood and affect.     All of the above were examined and found to be within normal limits except for the following pertinent clinical findings:    Minimal limp minimal antalgic gait favoring the left knee.  Negative for to 130 degree knee range of motion 5° valgus alignment she is tender palpation medial lateral patellofemoral joint lines with moderate effusion.  Knee stable to anterior-posterior varus and valgus stresses no flexion contracture or extensor lag.  She has no groin pain with active straight leg raise and no groin pain with passive range of motion of the hip which is full          Imaging:  Indication:  Left knee pain  Exam Ordered: Radiographs of the left knee include a standing anteroposterior view, a standing posterioanterior view, a lateral view in full flexion, and a sunrise view  Details of Examination: Todays exam shows evidence of joint space narrowing, osteophyte formation, and subchondral sclerosis, all consistent with degenerative arthritis of the knee.  No other significant findings are noted.  Impression:  Degenerative Arthritis, Left Knee  Significant calcification of  medial and lateral meniscus similar to preop x-rays of the right knee      Assessment:     Primary osteoarthritis of left knee [M17.12]    Successful right total knee replacement Dr. Moran 2017  She is a prediabetic, A1c is 6.2.  Asthma depression history of lumbar epidural steroid injection. She is seen GI recently for diverticulitis and chronic constipation she says all of that is better now     Plan:       This patient has significant symptoms in their knee that are affecting their quality of life and daily activities.  They have tried non-operative treatment including analgesics, an exercise program, and activity modification, but the symptoms have persisted. I believe they make a good candidate for knee arthroplasty.     The risks and benefits of knee arthroplasty have been discussed with the patient which include, but are not limited to infections (including severe sequelae), potential component failure, fracture, DVT, pulmonary embolus, nerve palsy, poor range of motion, the possibility of bone grafting, persistent pain, wound healing complications, transfusions, medical complications and death.     Pre-operative planning will include the following:  A pre-surgical evaluation by will be arranged.  Pre-op orders will be placed.  We will make arrangements with the operating room for proper time and staffing.  We will make Social Service arrangements for the patient.    Location: [facility] 2nd floor    Implants:   Company: [employer]  System: [employer]    DVT prophylaxis: ASA 81mg BID x1 month  Dispo:  PT    Will proceed with left total knee replacement on August 21st.  Patient request home health therapy just like her after her right knee.  She requests a private room      No orders of the defined types were placed in this encounter.      Past Medical History:   Diagnosis Date    Allergy     Anemia     Arthritis     Asthma     Depression     Generalized headaches 4/1/2014    Goiter     MNG    HTN (hypertension),  benign     Hyperlipidemia     Hyperparathyroidism     s/p surgery    Intestinal obstruction     resolved spontaneously    Lumbar disc disease     s/p epidural shots    Nuclear sclerosis - Both Eyes 3/11/2014    Osteoporosis, post-menopausal     with 3 rib fractures       Past Surgical History:   Procedure Laterality Date    APPENDECTOMY      BILATERAL OOPHORECTOMY      BREAST CYST EXCISION      left    CATARACT EXTRACTION W/  INTRAOCULAR LENS IMPLANT Right 2016    Dr. Fang ([place])    CATARACT EXTRACTION W/  INTRAOCULAR LENS IMPLANT Left 10/17/2016    Dr. Fang ([place])     SECTION, CLASSIC      x 1    CHOLECYSTECTOMY      COLONOSCOPY N/A 3/1/2016    [facility] ENDO (4TH FLR)    ESOPHAGOGASTRODUODENOSCOPY (EGD) N/A 3/1/2016    [facility]formed by [facility] at [facility] ENDO (4TH FLR)    EXCISION-DUCT Left 2013    Performed by Jordan Oh MD at [facility] OR 2ND FLR    HYSTERECTOMY      INSERTION-INTRAOCULAR LENS (IOL) Left 10/17/2016    [facility]    INSERTION-INTRAOCULAR LENS (IOL) Right 2016    [facility]    KNEE SURGERY Right 2017    TKR    LUMBAR EPIDURAL INJECTION      x 4    MOUTH SURGERY      for abscess drainage of gum of frontal teeth    PARATHYROID GLAND SURGERY      for parathyroid adenoma    PHACOEMULSIFICATION-ASPIRATION-CATARACT Left 10/17/2016    [facility] OR    PHACOEMULSIFICATION-ASPIRATION-CATARACT Right 2016    [facility]    REPLACEMENT-KNEE-TOTAL Right 2017    Performed by Rom [facility] at [facility] OR 2ND FLR       Family History   Problem Relation Age of Onset    Heart disease Mother     Hypertension Mother     Heart attack Mother     Heart disease Sister          in their 50's    Heart failure Sister     Heart disease Brother         CABG in age 60's    Hyperlipidemia  Brother     Heart disease Sister         in her 50's    Heart disease Sister         in her 50's    Heart disease Sister     Hypertension Sister     Hyperlipidemia Sister     Heart disease Brother         CABG in age 60's    Hyperlipidemia Brother     Thyroid disease Daughter     Amblyopia Neg Hx     Blindness Neg Hx     Cancer Neg Hx     Cataracts Neg Hx     Diabetes Neg Hx     Glaucoma Neg Hx     Macular degeneration Neg Hx     Retinal detachment Neg Hx     Strabismus Neg Hx     Stroke Neg Hx        Social History     Socioeconomic History    Marital status:      Spouse name: Not on file    Number of children: Not on file    Years of education: Not on file    Highest education level: Not on file   Occupational History    Not on file   Social Needs    Financial resource strain: Not on file    Food insecurity:     Worry: Not on file     Inability: Not on file    Transportation needs:     Medical: Not on file     Non-medical: Not on file   Tobacco Use    Smoking status: Never Smoker    Smokeless tobacco: Never Used   Substance and Sexual Activity    Alcohol use: No    Drug use: No    Sexual activity: Not on file   Lifestyle    Physical activity:     Days per week: Not on file     Minutes per session: Not on file    Stress: Not on file   Relationships    Social connections:     Talks on phone: Not on file     Gets together: Not on file     Attends [employer] service: Not on file     Active member of club or organization: Not on file     Attends meetings of clubs or organizations: Not on file     Relationship status: Not on file   Other Topics Concern    Not on file   Social History Narrative    Not on file

## 2019-07-16 NOTE — LETTER
July 19, 2019      Nadir Hurd PA-C  [address]  [place] [address]           [facility] - Orthopedics  [address]  [place] [address]  Phone: [phone]          Patient: Elena Frances   MR Number: [id number]   YOB: 1940   Date of Visit: 7/16/2019       Dear Nadir Hurd:    Thank you for referring Elena Frances to me for evaluation. Attached you will find relevant portions of my assessment and plan of care.    If you have questions, please do not hesitate to call me. I look forward to following Elena Frances along with you.    Sincerely,    Newton Rodriguez III, MD    Enclosure  CC:  No Recipients    If you would like to receive this communication electronically, please contact [email or web address] or [phone] to request more information on [employer] Link access.    For providers and/or their staff who would like to refer a patient to [facility], please contact us through our one-stop-shop provider referral line, [facility], at [phone].    If you feel you have received this communication in error or would no longer like to receive these types of communications, please e-mail [email or web address]

## 2019-07-17 DIAGNOSIS — M17.12 PRIMARY OSTEOARTHRITIS OF LEFT KNEE: Primary | ICD-10-CM

## 2019-07-20 RX ORDER — CEPHALEXIN 250 MG/1
CAPSULE ORAL
Qty: 1 EACH | Refills: 11 | Status: SHIPPED | OUTPATIENT
Start: 2019-07-20 | End: 2020-02-17

## 2019-07-22 RX ORDER — SERTRALINE HYDROCHLORIDE 50 MG/1
TABLET, FILM COATED ORAL
Qty: 90 TABLET | Refills: 3 | Status: SHIPPED | OUTPATIENT
Start: 2019-07-22 | End: 2020-07-06

## 2019-07-23 DIAGNOSIS — M79.605 PAIN OF LEFT LOWER EXTREMITY: ICD-10-CM

## 2019-07-23 DIAGNOSIS — Z01.818 PRE-OP TESTING: Primary | ICD-10-CM

## 2019-07-23 RX ORDER — METOPROLOL SUCCINATE 25 MG/1
TABLET, EXTENDED RELEASE ORAL
Qty: 90 TABLET | Refills: 3 | Status: SHIPPED | OUTPATIENT
Start: 2019-07-23 | End: 2020-07-07 | Stop reason: SDUPTHER

## 2019-07-25 ENCOUNTER — TELEPHONE (OUTPATIENT)
Dept: PREADMISSION TESTING | Facility: HOSPITAL | Age: 79
End: 2019-07-25

## 2019-07-25 NOTE — TELEPHONE ENCOUNTER
----- Message from Ly Quesada LPN sent at 7/23/2019  4:20 PM CDT -----  8/21/19-Ortho (knee arthroplasty) Patient needs CBC,BMP,PT/INR,OPOC and POC appt.

## 2019-07-27 DIAGNOSIS — E78.5 HYPERLIPIDEMIA, UNSPECIFIED HYPERLIPIDEMIA TYPE: ICD-10-CM

## 2019-07-29 RX ORDER — TIZANIDINE 4 MG/1
TABLET ORAL
Qty: 135 TABLET | Refills: 6 | Status: SHIPPED | OUTPATIENT
Start: 2019-07-29 | End: 2020-09-08 | Stop reason: SDUPTHER

## 2019-07-29 RX ORDER — OMEPRAZOLE 40 MG/1
CAPSULE, DELAYED RELEASE ORAL
Qty: 90 CAPSULE | Refills: 3 | Status: SHIPPED | OUTPATIENT
Start: 2019-07-29 | End: 2020-06-11

## 2019-07-29 RX ORDER — ATORVASTATIN CALCIUM 80 MG/1
80 TABLET, FILM COATED ORAL DAILY
Qty: 90 TABLET | Refills: 3 | Status: SHIPPED | OUTPATIENT
Start: 2019-07-29 | End: 2020-07-09 | Stop reason: SDUPTHER

## 2019-08-05 RX ORDER — VALSARTAN 160 MG/1
160 TABLET ORAL DAILY
Qty: 90 TABLET | Refills: 3 | Status: SHIPPED | OUTPATIENT
Start: 2019-08-05 | End: 2019-10-08

## 2019-08-07 ENCOUNTER — ANESTHESIA EVENT (OUTPATIENT)
Dept: SURGERY | Facility: HOSPITAL | Age: 79
End: 2019-08-07
Payer: MEDICARE

## 2019-08-07 NOTE — ANESTHESIA PREPROCEDURE EVALUATION
"   Anesthesia Assessment: Preoperative EQUATION     Planned Procedure: Procedure(s) (LRB):  ARTHROPLASTY, KNEE, TOTAL-DEPUY-SIGMA (Left)  Requested Anesthesia Type:Regional  Surgeon: Newton Rodriguez III, MD  Service: Orthopedics  Known or anticipated Date of Surgery:8/21/2019     Surgeon notes: reviewed     Previous anesthesia record: R TKA 2/7/17      Tests already available:  A1C-6.2 (6/3/19), EKG (6/11/19), ECHO 6/18/19                            Plan:    Testing:  Hematology Profile, BMP and PT/INR   Pre-anesthesia  visit                                        Visit focus: possible regional anesthesia and/or nerve block                            Consultation:IM Perioperative Hospitalist                            Navigation: Tests Scheduled.                         Consults scheduled.                        Results will be tracked by Preop Clinic.      Scheduled for joint class 8/8/19.      Electronically signed by Aleyda Phelps RN at 8/7/2019 11:14 AM                                                                                                                 08/07/2019  Elena Frances is a 79 y.o., female.    Anesthesia Evaluation      I have reviewed the Medications.   Steroids Taken In Past Year:     Review of Systems  Anesthesia Hx:  History of prior surgery of interest to airway management or planning: Previous anesthesia: Spinal 2/7/17: right knee replacement with spinal.  Procedure performed at an [facility]. Denies Family Hx of Anesthesia complications.   Denies Personal Hx of Anesthesia complications.   Social:  Denies Tobacco Use. Denies Alcohol Use.   Hematology/Oncology:         -- Anemia: Iron Deficiency Anemia   EENT/Dental:   Throat Symptoms (occasional- " goes down wrong pipe" with liquids) include Swallowing difficulty or pain  Denies Jaw Problems   Cardiovascular:  Functional Capacity low / < 4 METS, Babysits; was able to walk from garage to POC: denies CP/SOB  Denies " Coronary Artery Disease.  Denies Deep Venous Thrombosis (DVT)  Hypertension , Recent typical clinic B/P of 181/63 @ POC visit    Pulmonary:  Asthma: (last episode 5 days ago; coughing)   Inhaler use is rescue inhaler PRN and maintenance inhaler daily.  Possible Obstructive Sleep Apnea , (STOP/BANG) Symptoms P - Pressure being treated for high BP and A - Age > 50    Renal/:  Denies Kidney Function/Disease    Hepatic/GI:  Hepatic/GI Symptoms: constipation.  Esophageal / Stomach Disorders Controlled by chronic antireflux medication.  Bowel Conditions:  Diverticulitis  Denies Liver Disease    Musculoskeletal:  Joint Disease:  Arthritis, Osteoarthritis  Bone Disorders: Osteoporosis  Spine Disorders: lumbar Disc disease and Degenerative disease  Cervical Spine Disorder, Cervical Disc Disease    Neurological:  Pain , location of neck pain; right knee , severity is a 6  Osteoarthritis  Denies Seizure Disorder  Denies CVA - Cerebrovasular Accident  Denies TIA - Transient Ischemic Attack    Endocrine:   Prediabetic Denies Diabetes  Thyroid Disease, Goiter, Multinodular Goiter  Parathyroid Disease, Hyperparathyroidism (2000), s/p parathyroidectomy    Psych:  Depression.          Physical Exam  General:  Well nourished    Airway/Jaw/Neck:  Airway Findings: Mouth Opening: Normal Jaw/Neck Findings:  Neck ROM: Normal Extension, Painful, Decreased Lateral Motion, to the right, to the left      Dental:  Dental Findings: In tact        Mental Status:  Mental Status Findings:  Cooperative, Alert and Oriented         Anesthesia Plan  Type of Anesthesia, risks & benefits discussed:  Anesthesia Type:  general, spinal  Patient's Preference:   Intra-op Monitoring Plan: standard ASA monitors  Intra-op Monitoring Plan Comments:   Post Op Pain Control Plan: multimodal analgesia and IV/PO Opioids PRN  Post Op Pain Control Plan Comments: Opioids and analgesic adjuvants as needed  Regional blocks if applicable/indicated  Induction:   IV  Beta  Blocker:  Patient is not currently on a Beta-Blocker (No further documentation required).       Informed Consent: Patient understands risks and agrees with Anesthesia plan.  Questions answered. Anesthesia consent signed with patient.  ASA Score: 3     Day of Surgery Review of History & Physical:    H&P update referred to the surgeon.     Anesthesia Plan Notes: I have personally evaluated the patient and discussed risk/benefits/alternatives of general anesthesia.        Ready For Surgery From Anesthesia Perspective.     The patient was seen by Perioperative Internal Medicine physician Dr. Peters on 8/8/19 , please see recommendations.    Discharge Plan: To home with  (Miguel Angel Vasquez: [phone])    Carine Dean RN

## 2019-08-07 NOTE — PRE ADMISSION SCREENING
Anesthesia Assessment: Preoperative EQUATION    Planned Procedure: Procedure(s) (LRB):  ARTHROPLASTY, KNEE, TOTAL-DEPUY-SIGMA (Left)  Requested Anesthesia Type:Regional  Surgeon: Newton Rodriguez III, MD  Service: Orthopedics  Known or anticipated Date of Surgery:8/21/2019    Surgeon notes: reviewed    Previous anesthesia record: R TKA 2/7/17      Tests already available:  A1C-6.2 (6/3/19), EKG (6/11/19), ECHO 6/18/19            Plan:    Testing:  Hematology Profile, BMP and PT/INR   Pre-anesthesia  visit       Visit focus: possible regional anesthesia and/or nerve block      Consultation:IM Perioperative Hospitalist      Navigation: Tests Scheduled.              Consults scheduled.             Results will be tracked by Preop Clinic.      Scheduled for joint class 8/8/19.

## 2019-08-08 ENCOUNTER — HOSPITAL ENCOUNTER (OUTPATIENT)
Dept: PREADMISSION TESTING | Facility: HOSPITAL | Age: 79
Discharge: HOME | End: 2019-08-08
Attending: ANESTHESIOLOGY
Payer: MEDICARE

## 2019-08-08 ENCOUNTER — INITIAL CONSULT (OUTPATIENT)
Dept: INTERNAL MEDICINE | Facility: CLINIC | Age: 79
End: 2019-08-08
Payer: MEDICARE

## 2019-08-08 VITALS — DIASTOLIC BLOOD PRESSURE: 70 MMHG | SYSTOLIC BLOOD PRESSURE: 136 MMHG

## 2019-08-08 VITALS
HEART RATE: 63 BPM | BODY MASS INDEX: 28.5 KG/M2 | SYSTOLIC BLOOD PRESSURE: 181 MMHG | TEMPERATURE: 98 F | WEIGHT: 160.88 LBS | HEIGHT: 63 IN | DIASTOLIC BLOOD PRESSURE: 77 MMHG | OXYGEN SATURATION: 99 %

## 2019-08-08 DIAGNOSIS — N28.9 RENAL IMPAIRMENT: ICD-10-CM

## 2019-08-08 DIAGNOSIS — E04.2 MULTINODULAR GOITER: ICD-10-CM

## 2019-08-08 DIAGNOSIS — J45.20 MILD INTERMITTENT ASTHMA WITHOUT COMPLICATION: ICD-10-CM

## 2019-08-08 DIAGNOSIS — D50.8 OTHER IRON DEFICIENCY ANEMIA: ICD-10-CM

## 2019-08-08 DIAGNOSIS — I10 HTN (HYPERTENSION), BENIGN: ICD-10-CM

## 2019-08-08 DIAGNOSIS — R73.03 PREDIABETES: ICD-10-CM

## 2019-08-08 DIAGNOSIS — E78.5 HYPERLIPIDEMIA, UNSPECIFIED HYPERLIPIDEMIA TYPE: ICD-10-CM

## 2019-08-08 DIAGNOSIS — F32.A DEPRESSION, UNSPECIFIED DEPRESSION TYPE: ICD-10-CM

## 2019-08-08 DIAGNOSIS — N64.52 NIPPLE DISCHARGE IN FEMALE: ICD-10-CM

## 2019-08-08 DIAGNOSIS — K59.09 CHRONIC CONSTIPATION: ICD-10-CM

## 2019-08-08 PROCEDURE — 99999 PR PBB SHADOW E&M-EST. PATIENT-LVL I: ICD-10-PCS | Mod: PBBFAC,,, | Performed by: HOSPITALIST

## 2019-08-08 PROCEDURE — 3077F PR MOST RECENT SYSTOLIC BLOOD PRESSURE >= 140 MM HG: ICD-10-PCS | Mod: CPTII,S$GLB,, | Performed by: HOSPITALIST

## 2019-08-08 PROCEDURE — 99214 PR OFFICE/OUTPT VISIT, EST, LEVL IV, 30-39 MIN: ICD-10-PCS | Mod: S$GLB,,, | Performed by: HOSPITALIST

## 2019-08-08 PROCEDURE — 1101F PR PT FALLS ASSESS DOC 0-1 FALLS W/OUT INJ PAST YR: ICD-10-PCS | Mod: CPTII,S$GLB,, | Performed by: HOSPITALIST

## 2019-08-08 PROCEDURE — 3077F SYST BP >= 140 MM HG: CPT | Mod: CPTII,S$GLB,, | Performed by: HOSPITALIST

## 2019-08-08 PROCEDURE — 99999 PR PBB SHADOW E&M-EST. PATIENT-LVL I: CPT | Mod: PBBFAC,,, | Performed by: HOSPITALIST

## 2019-08-08 PROCEDURE — 99214 OFFICE O/P EST MOD 30 MIN: CPT | Mod: S$GLB,,, | Performed by: HOSPITALIST

## 2019-08-08 PROCEDURE — 3078F PR MOST RECENT DIASTOLIC BLOOD PRESSURE < 80 MM HG: ICD-10-PCS | Mod: CPTII,S$GLB,, | Performed by: HOSPITALIST

## 2019-08-08 PROCEDURE — 3078F DIAST BP <80 MM HG: CPT | Mod: CPTII,S$GLB,, | Performed by: HOSPITALIST

## 2019-08-08 PROCEDURE — 1101F PT FALLS ASSESS-DOCD LE1/YR: CPT | Mod: CPTII,S$GLB,, | Performed by: HOSPITALIST

## 2019-08-08 PROCEDURE — 99499 UNLISTED E&M SERVICE: CPT | Mod: S$GLB,,, | Performed by: HOSPITALIST

## 2019-08-08 PROCEDURE — 99499 RISK ADDL DX/OHS AUDIT: ICD-10-PCS | Mod: S$GLB,,, | Performed by: HOSPITALIST

## 2019-08-08 RX ORDER — ACETAMINOPHEN 500 MG
500 TABLET ORAL
Status: ON HOLD | COMMUNITY
End: 2019-08-21 | Stop reason: HOSPADM

## 2019-08-08 RX ORDER — ASPIRIN 81 MG/1
81 TABLET ORAL DAILY
Status: ON HOLD | COMMUNITY
End: 2019-08-21 | Stop reason: HOSPADM

## 2019-08-08 RX ORDER — DICLOFENAC SODIUM 10 MG/G
GEL TOPICAL
Refills: 6 | Status: ON HOLD | COMMUNITY
Start: 2019-07-26 | End: 2019-08-21 | Stop reason: HOSPADM

## 2019-08-08 RX ORDER — NABUMETONE 500 MG/1
500 TABLET, FILM COATED ORAL 2 TIMES DAILY
COMMUNITY
End: 2019-08-29

## 2019-08-08 NOTE — ASSESSMENT & PLAN NOTE
Stages of CKD discussed  Deleterious effects NSAID's , Beneficial effects of Hydration discussed   Tylenol as needed for pain   Relafen use for 5 Years     I  suggest monitoring renal function, in put and out put status cici-operatively. I  suggest avoiding nephrotoxic medication including NSAIDs, COX2 inhibitors, intravenous contrast agent,avoiding hypotension to prevent further renal impairment.

## 2019-08-08 NOTE — HPI
History of present illness- I had the pleasure of meeting this pleasant 79 y.o. lady in the pre op clinic prior to her elective Orthopedic surgery. The patient is new to me .     I have obtained the history by speaking to the patient and by reviewing the electronic health records.    Used  service     Events leading up to surgery / History of presenting illness -    She has been troubled with moderate-severe  left knee   pain for 3 years , increased over time  . Pain increases with activity and decreases with resting.      Relevant health conditions of significance for the perioperative period/ History of presenting illness -    Health conditions of significance for the perioperative period - HTN, Asthma , Constipation    Not known to have heart disease , Diabetes Mellitus    Lives with  who can help

## 2019-08-08 NOTE — DISCHARGE INSTRUCTIONS
Your surgery has been scheduled for:__________________________________________    You should report to:  ____[facility], located on the [place] side of the first floor of the           [facility] ([phone])  ____[facility], located on the [facility] side of the            Second floor of the [facility] ([phone])  ____3rd Floor [facility] located on the [facility] side of the [facility] [phone]  Please Note   - Tell your doctor if you take Aspirin, products containing Aspirin, herbal medications  or blood thinners, such as Coumadin, Ticlid, or Plavix.  (Consult your provider regarding holding or stopping before surgery).  - Arrange for someone to drive you home following surgery.  You will not be allowed to leave the surgical facility alone or drive yourself home following sedation and anesthesia.  Before Surgery  - Stop taking all herbal medications 14days prior to surgery  - No Motrin/Advil (Ibuprofen) 7 days before surgery  - No Aleve (Naproxen) 7 days before surgery  - Stop Taking Asprin, products containing Asprin _____days before surgery  - Stop taking blood thinners_______days before surgery  - No Goody's/BC  Powder 7 days before surgery  - Refrain from drinking alcoholic beverages for 24hours before and after surgery  - Stop or limit smoking _________days before surgery  - You may take Tylenol for pain  Night before Surgery  - Do not eat or drink after midnight  - Take a shower or bath (shower is recommended).  Bathe with Hibiclens soap or an antibacterial soap from the neck down.  If not supplied by your surgeon, hibiclens soap will need to be purchased over the counter in pharmacy.  Rinse soap off thoroughly.  - Shampoo your hair with your regular shampoo  The Day of Surgery  - Take another bath or shower with hibiclens or any antibacterial soap, to reduce the chance of infection.  - Take heart  and blood pressure medications with a small sip of water, as advised by the perioperative team.  - Do not take fluid pills  - You may brush your teeth and rinse your mouth, but do not swall any additional water.   - Do not apply perfumes, powder, body lotions or deodorant on the day of surgery.  - Nail polish should be removed.  - Do not wear makeup or moisturizer  - Wear comfortable clothes, such as a button front shirt and loose fitting pants.  - Leave all jewelry, including body piercings, and valuables at home.    - Bring any devices you will neeed after surgery such as crutches or canes.  - If you have sleep apnea, please bring your CPAP machine  In the event that your physical condition changes including the onset of a cold or respiratory illness, or if you have to delay or cancel your surgery, please notify your surgeon.

## 2019-08-08 NOTE — ASSESSMENT & PLAN NOTE
Toprol xl  Valsartan    Home BP readings -130/80  Recent BP readings in the record-140/80  Hypertension-  Blood pressure is acceptable . I suggest continuation of -Toprol xl-- during the entire perioperative period. I suggest holding Valsartan- on the morning of the surgery and can continue that  post operatively under blood pressure, electrolyte and renal function monitoring as long as they are acceptable.I suggest addressing pain control as uncontrolled pain can increased blood pressure     Suggested checking the accuracy of  home BP machine with that of the PCP

## 2019-08-08 NOTE — PROGRESS NOTES
[facility] - Pre Op Consult  Progress Note    Patient Name: Elena Frances  MRN: [id number]  Date of Evaluation- 08/08/2019  PCP- Michael Tinoco MD    Future cases for Elena Frances [[id number]]     Case ID Status Date Time Ha Procedure Provider Location    [id number] [facility] 8/21/2019  2:45  ARTHROPLASTY, KNEE, TOTAL-DEPUY-SIGMA Newton Rodriguez III, MD [[id number]] [facility] OR 2ND FLR      Left     HPI:  History of present illness- I had the pleasure of meeting this pleasant 79 y.o. lady in the pre op clinic prior to her elective Orthopedic surgery. The patient is new to me .     I have obtained the history by speaking to the patient and by reviewing the electronic health records.    Used  service     Events leading up to surgery / History of presenting illness -    She has been troubled with moderate-severe  left knee   pain for 3 years , increased over time  . Pain increases with activity and decreases with resting.      Relevant health conditions of significance for the perioperative period/ History of presenting illness -    Health conditions of significance for the perioperative period - HTN, Asthma , Constipation    Not known to have heart disease , Diabetes Mellitus    Lives with  who can help        Subjective/ Objective:          Chief complaint-Preoperative evaluation, Perioperative Medical management, complication reduction plan     Active cardiac conditions- none    Revised cardiac risk index predictors- none    Functional capacity -Examples of physical activity, takes care of grand child, walks 2-3 blocks,   house work and can take 1 flight of stairs----- She can undertake all the above activities without  chest pain,chest tightness, Shortness of breath ,dizziness,lightheadedness making her exercise tolerance more  than 4 Mets.       Review of Systems   Constitutional: Negative for chills and fever.        No unusual weight changes     HENT:        STOPBANG score 2 / 8      HTN  Age  over 50        Eyes:        No new visual changes   Respiratory:        No cough , phlegm    No Hemoptysis   Cardiovascular:        As noted   Gastrointestinal:        No overt GI/ blood losses  Bowel movements- Regular , constipated   Endocrine:        Prednisone use > 20 mg daily for 3 weeks- none   Last steroid inj Left knee Allyson 15 th 2019   Genitourinary: Negative for dysuria.        No urinary hesitancy    Musculoskeletal:        As above      Skin: Negative for rash.   Neurological: Negative for syncope.        No unilateral weakness   Hematological:          Aspirin use for preventive reasons    Psychiatric/Behavioral:          Depression - Controlled        No vascular stenting             No anesthesia, bleeding, cardiac problems, PONV with previous surgeries/procedures.  Medications and Allergies reviewed in epic.  FH- No anesthesia,bleeding / venous thrombosis in family      Physical Exam      Physical Exam  Constitutional- Vitals   General appearance-Conscious,Coherent  Eyes- No conjunctival icterus,pupils  round  and  bilateral intra ocular lenses  ENT-Oral cavity- moist  , Hearing grossly normal   Neck- No thyromegaly ,Trachea -central, No jugular venous distension,   No Carotid Bruit   Cardiovascular -Heart Sounds-coffee reduction discussed ,  Normal ,  no murmur  and  occasional irregularity suggestive of ectopy   , No gallop rhythm   Respiratory - Normal Respiratory Effort, Normal breath sounds, crepitations bases,  no wheeze  and  no forced expiratory wheeze    Peripheral pitting pedal edema-- none , no calf pain   Gastrointestinal -Soft abdomen, No palpable masses, Non Tender,Liver,Spleen not palpable. No-- free fluid and shifting dullness  Musculoskeletal- No finger Clubbing. Strength grossly normal   Lymphatic-No Palpable cervical, axillary,Inguinal lymphadenopathy   Psychiatric - normal effect,Orientation  Rt Dorsalis pedis pulses-palpable    Lt Dorsalis pedis pulses- palpable   Rt Posterior  tibial pulses -palpable   Left posterior tibial pulses -palpable   Miscellaneous -  no renal bruit  Investigations  Lab and Imaging have been reviewed in epic.    Review of Medicine tests    Aug 2013-    No significant stenosis  June 2019     ·   · The stress echo portion of this study is negative for myocardial ischemia.  ·     EKG- I had independently reviewed the EKG from--6/11/2019   It was reported to be showing     Normal sinus rhythm with sinus arrhythmia  Nonspecific ST and/or T wave abnormalities  Abnormal ECG  When compared with ECG of 11-SEP-2018 16:53,  Nonspecific ST and/or T wave abnormalities in V3 and V4    Review of clinical lab tests:  Lab Results   Component Value Date    CREATININE 1.0 08/08/2019    HGB 11.4 (L) 08/08/2019     08/08/2019     Relafen affects discussed         Review of old records- Was done and information gathered regards to events leading to surgery and health conditions of significance in the perioperative period.        Preoperative cardiac risk assessment-  The patient does not have any active cardiac conditions . Revised cardiac risk index predictors- 0---.Functional capacity is more than 4 Mets. She will be undergoing a Orthopedic procedure that carries a intermediate risk     Risk of a major Cardiac event ( Defined as death, myocardial infarction, or cardiac arrest at 30 days after noncardiac surgery), based on RCRI score     3.9%       No further cardiac work up is indicated prior to proceeding with the surgery          American Society of Anesthesiologists Physical status classification ( ASA ) class: 3     Postoperative pulmonary complication risk assessment:     ARISCAT ( [place]et) risk index- risk class -  Low, if duration of surgery is under 3 hours, intermediate, if duration of surgery is over 3 hours          Assessment/Plan:     HTN (hypertension), benign  Toprol xl  Valsartan    Home BP readings -130/80  Recent BP readings in the record-140/80  Hypertension-   Blood pressure is acceptable . I suggest continuation of -Toprol xl-- during the entire perioperative period. I suggest holding Valsartan- on the morning of the surgery and can continue that  post operatively under blood pressure, electrolyte and renal function monitoring as long as they are acceptable.I suggest addressing pain control as uncontrolled pain can increased blood pressure     Suggested checking the accuracy of  home BP machine with that of the PCP    DDD (degenerative disc disease)  Has long standing neck pain , limitation of neck movement limitation      Degeneration of intervertebral disc, site unspecified  Long standing low back pain   Had Contralateral knee re[lacement about 3 years ago and had no problem with anesthesia       Depression  Family support   Not troubled with it   I suggest monitoring the sodium as SIADH from Zoloft I  use and hypersecretion of ADH associated with surgery can reduce sodium in the perioperative period    Asthma  Advair  Scheduled  Knows about rinsing   No recent problem     Hyperlipidemia  HLD-I  suggest continuation of statin during the entire perioperative period.    Nipple discharge in female  Had it around 2013   Had surgery   No longer a problem     Iron deficiency anemia  2016   Had colonoscopy - Polyps , EGD per history 2016   Follow up suggested    Multinodular goiter  Occasional swallow problem  on regular consistency diet and on thin liquids    Dysphagia- I suggest providing soft diet or other wise directed  in view of the preexisting dysphagia as medication used in the perioperative period can possibly increase the dysphagia. I suggest aspiration precautions       Prediabetes  A1c 6.2- June 2019     Hemoglobin A1c in the pre diabetic range   Weight loss with diet and exercise , if able helps lower A1c  Increased risk of becoming a diabetic   Suggested  follow up     Prediabetes- I suggest monitoring the glucose in the perioperative period as  glucose may be  high from stress hyperglycemia in which case Insulin may be required        Chronic constipation  Constipation- I suggest giving laxative regimen as opioid use,reduced ambulation  can increase the constipation     Renal impairment  Stages of CKD discussed  Deleterious effects NSAID's , Beneficial effects of Hydration discussed   Tylenol as needed for pain   Relafen use for 5 Years     I  suggest monitoring renal function, in put and out put status cici-operatively. I  suggest avoiding nephrotoxic medication including NSAIDs, COX2 inhibitors, intravenous contrast agent,avoiding hypotension to prevent further renal impairment.         Preventive perioperative care    Thromboembolic prophylaxis:  Her risk factors for thrombosis include surgical procedure and age.I suggest  thromboembolic prophylaxis ( mechanical/pharmacological, weighing the risk benefits of pharmacological agent use considering cici procedural bleeding )  during the perioperative period.I suggested being active in the post operative period. The patient is a candidate for extended DVT prophylaxis     Postoperative pulmonary complication prophylaxis-Risk factors for post operative pulmonary complications include age over 65 years and ASA class >2-asthma,  I suggest incentive spirometry use, early ambulation and end tidal carbon dioxide monitoring  , oral care , head end of bed elevation      Renal complication prophylaxis-Risk factors for renal complications include hypertension . I suggest keeping her well hydrated  in the perioperative period.I suggested drinking 2 litre's of fluid a day      Surgical site Infection Prophylaxis-I  suggest appropriate antibiotic for Prophylaxis against Surgical site infections     Delirium prophylaxis-Risk factors - Advanced Age - I suggest avoidance / minimizing the use of  Benzodiazepines ( unless the patient has been taking it on a regular basis ),Anticholinergic medication,Antihistamines ( like  Benadryl).I  suggest minimizing the use of opioid medication and use of IV tylenol,if it is appropriate. I suggest using the lowest possible dose of opioids for the shortest duration possible in the perioperative period. I suggest to Keep shades/blinds open during the day, lights off and shades closed at night to encourage normal sleep/wake cycle.I encourage the presence of the family member with the patient at all times, if at all possible as mental status changes can be picked up early by the family members and they help with reorientation. I encouraged the presence of family to help with orientation in the perioperative period. Benadryl avoidance suggested      In view of regional anesthesia  the patient  is at risk of postoperative urinary retention.  I suggest avoidance / minimizing the of  Benzodiazepines,Anticholinergic medication,antihistamines ( Benadryl) , if possible in the perioperative period. I suggest using the minimum possible use of opioids for the minimum period of time in the perioperative period. Benadryl avoidance suggested      This visit was focused on Preoperative evaluation, Perioperative Medical management, complication reduction plans. I suggest that the patient follows up with primary care or relevant sub specialists for ongoing health care.    I appreciate the opportunity to be involved in this patients care. Please feel free to contact me if there were any questions about this consultation.    Patient is optimized     Patientwas instructed to call and update me about any changes to health,  medication, office visits ,testing out side of the [facility] operative care center , hospitalizations between now and surgery     Abi Peters MD     [facility]   Pager [phone]  -  8/8- 18 39     /70 Comment: left   Pulse 63  Temp 98.1  Sat 99 %   Left knee steroid injection- ? Early June  Per her   Chart review indicates-  steroid injection June 19th   -  8/20- 15 14     Corresponded  with ortho surgeon about steroid injection - 8/12  Plan with proceeding with surgery .   -  8/20 - 15 18     Called to follow up , spoke to her , to address any concerns with the up coming surgery or any questions on Medication instructions -  Doing well ,No changes to Medication, Health -

## 2019-08-08 NOTE — ASSESSMENT & PLAN NOTE
A1c 6.2- June 2019     Hemoglobin A1c in the pre diabetic range   Weight loss with diet and exercise , if able helps lower A1c  Increased risk of becoming a diabetic   Suggested  follow up     Prediabetes- I suggest monitoring the glucose in the perioperative period as  glucose may be high from stress hyperglycemia in which case Insulin may be required

## 2019-08-08 NOTE — OUTPATIENT SUBJECTIVE & OBJECTIVE
Outpatient Subjective & Objective     Chief complaint-Preoperative evaluation, Perioperative Medical management, complication reduction plan     Active cardiac conditions- none    Revised cardiac risk index predictors- none    Functional capacity -Examples of physical activity, takes care of grand child, walks 2-3 blocks,   house work and can take 1 flight of stairs----- She can undertake all the above activities without  chest pain,chest tightness, Shortness of breath ,dizziness,lightheadedness making her exercise tolerance more  than 4 Mets.       Review of Systems   Constitutional: Negative for chills and fever.        No unusual weight changes     HENT:        STOPBANG score 2 / 8      HTN  Age over 50        Eyes:        No new visual changes   Respiratory:        No cough , phlegm    No Hemoptysis   Cardiovascular:        As noted   Gastrointestinal:        No overt GI/ blood losses  Bowel movements- Regular , constipated   Endocrine:        Prednisone use > 20 mg daily for 3 weeks- none   Last steroid inj Left knee Allyson 15 th 2019   Genitourinary: Negative for dysuria.        No urinary hesitancy    Musculoskeletal:        As above      Skin: Negative for rash.   Neurological: Negative for syncope.        No unilateral weakness   Hematological:          Aspirin use for preventive reasons    Psychiatric/Behavioral:          Depression - Controlled        No vascular stenting             No anesthesia, bleeding, cardiac problems, PONV with previous surgeries/procedures.  Medications and Allergies reviewed in epic.  FH- No anesthesia,bleeding / venous thrombosis in family      Physical Exam      Physical Exam  Constitutional- Vitals   General appearance-Conscious,Coherent  Eyes- No conjunctival icterus,pupils  round  and  bilateral intra ocular lenses  ENT-Oral cavity- moist  , Hearing grossly normal   Neck- No thyromegaly ,Trachea -central, No jugular venous distension,   No Carotid Bruit   Cardiovascular  -Heart Sounds-coffee reduction discussed ,  Normal ,  no murmur  and  occasional irregularity suggestive of ectopy   , No gallop rhythm   Respiratory - Normal Respiratory Effort, Normal breath sounds, crepitations bases,  no wheeze  and  no forced expiratory wheeze    Peripheral pitting pedal edema-- none , no calf pain   Gastrointestinal -Soft abdomen, No palpable masses, Non Tender,Liver,Spleen not palpable. No-- free fluid and shifting dullness  Musculoskeletal- No finger Clubbing. Strength grossly normal   Lymphatic-No Palpable cervical, axillary,Inguinal lymphadenopathy   Psychiatric - normal effect,Orientation  Rt Dorsalis pedis pulses-palpable    Lt Dorsalis pedis pulses- palpable   Rt Posterior tibial pulses -palpable   Left posterior tibial pulses -palpable   Miscellaneous -  no renal bruit  Investigations  Lab and Imaging have been reviewed in epic.    Review of Medicine tests    Aug 2013-    No significant stenosis  June 2019     ·   · The stress echo portion of this study is negative for myocardial ischemia.  ·     EKG- I had independently reviewed the EKG from--6/11/2019   It was reported to be showing     Normal sinus rhythm with sinus arrhythmia  Nonspecific ST and/or T wave abnormalities  Abnormal ECG  When compared with ECG of 11-SEP-2018 16:53,  Nonspecific ST and/or T wave abnormalities in V3 and V4    Review of clinical lab tests:  Lab Results   Component Value Date    CREATININE 1.0 08/08/2019    HGB 11.4 (L) 08/08/2019     08/08/2019     Relafen affects discussed         Review of old records- Was done and information gathered regards to events leading to surgery and health conditions of significance in the perioperative period.    Outpatient Subjective & Objective

## 2019-08-08 NOTE — ASSESSMENT & PLAN NOTE
Family support   Not troubled with it   I suggest monitoring the sodium as SIADH from Zoloft I  use and hypersecretion of ADH associated with surgery can reduce sodium in the perioperative period

## 2019-08-08 NOTE — ASSESSMENT & PLAN NOTE
Long standing low back pain   Had Contralateral knee re[lacement about 3 years ago and had no problem with anesthesia

## 2019-08-08 NOTE — ASSESSMENT & PLAN NOTE
Occasional swallow problem  on regular consistency diet and on thin liquids    Dysphagia- I suggest providing soft diet or other wise directed  in view of the preexisting dysphagia as medication used in the perioperative period can possibly increase the dysphagia. I suggest aspiration precautions

## 2019-08-12 ENCOUNTER — OFFICE VISIT (OUTPATIENT)
Dept: ORTHOPEDICS | Facility: CLINIC | Age: 79
End: 2019-08-12
Payer: MEDICARE

## 2019-08-12 ENCOUNTER — HOSPITAL ENCOUNTER (OUTPATIENT)
Dept: RADIOLOGY | Facility: HOSPITAL | Age: 79
Discharge: HOME | End: 2019-08-12
Attending: NURSE PRACTITIONER
Payer: MEDICARE

## 2019-08-12 DIAGNOSIS — M17.12 PRIMARY OSTEOARTHRITIS OF LEFT KNEE: ICD-10-CM

## 2019-08-12 DIAGNOSIS — Z96.652 S/P TKR (TOTAL KNEE REPLACEMENT) USING CEMENT, LEFT: ICD-10-CM

## 2019-08-12 DIAGNOSIS — M17.12 PRIMARY OSTEOARTHRITIS OF LEFT KNEE: Primary | ICD-10-CM

## 2019-08-12 PROCEDURE — 99499 NO LOS: ICD-10-PCS | Mod: S$GLB,,, | Performed by: NURSE PRACTITIONER

## 2019-08-12 PROCEDURE — 73560 XR KNEE 1 OR 2 VIEW LEFT: ICD-10-PCS | Mod: 26,LT,, | Performed by: RADIOLOGY

## 2019-08-12 PROCEDURE — 99999 PR PBB SHADOW E&M-EST. PATIENT-LVL II: ICD-10-PCS | Mod: PBBFAC,,, | Performed by: NURSE PRACTITIONER

## 2019-08-12 PROCEDURE — 99999 PR PBB SHADOW E&M-EST. PATIENT-LVL II: CPT | Mod: PBBFAC,,, | Performed by: NURSE PRACTITIONER

## 2019-08-12 PROCEDURE — 73560 X-RAY EXAM OF KNEE 1 OR 2: CPT | Mod: TC,LT

## 2019-08-12 PROCEDURE — 99499 UNLISTED E&M SERVICE: CPT | Mod: S$GLB,,, | Performed by: NURSE PRACTITIONER

## 2019-08-12 PROCEDURE — 73560 X-RAY EXAM OF KNEE 1 OR 2: CPT | Mod: 26,LT,, | Performed by: RADIOLOGY

## 2019-08-12 RX ORDER — MORPHINE SULFATE 10 MG/ML
2 INJECTION, SOLUTION INTRAMUSCULAR; INTRAVENOUS
Status: CANCELLED | OUTPATIENT
Start: 2019-08-12

## 2019-08-12 RX ORDER — OXYCODONE HYDROCHLORIDE 5 MG/1
10 TABLET ORAL
Status: CANCELLED | OUTPATIENT
Start: 2019-08-12

## 2019-08-12 RX ORDER — NALOXONE HCL 0.4 MG/ML
0.02 VIAL (ML) INJECTION
Status: CANCELLED | OUTPATIENT
Start: 2019-08-12

## 2019-08-12 RX ORDER — PREGABALIN 25 MG/1
75 CAPSULE ORAL
Status: CANCELLED | OUTPATIENT
Start: 2019-08-12

## 2019-08-12 RX ORDER — PREGABALIN 25 MG/1
75 CAPSULE ORAL NIGHTLY
Status: CANCELLED | OUTPATIENT
Start: 2019-08-12

## 2019-08-12 RX ORDER — ROPIVACAINE HYDROCHLORIDE 2 MG/ML
8 INJECTION, SOLUTION EPIDURAL; INFILTRATION; PERINEURAL CONTINUOUS
Status: CANCELLED | OUTPATIENT
Start: 2019-08-12

## 2019-08-12 RX ORDER — SODIUM CHLORIDE 9 MG/ML
INJECTION, SOLUTION INTRAVENOUS
Status: CANCELLED | OUTPATIENT
Start: 2019-08-12

## 2019-08-12 RX ORDER — BISACODYL 10 MG
10 SUPPOSITORY, RECTAL RECTAL EVERY 12 HOURS PRN
Status: CANCELLED | OUTPATIENT
Start: 2019-08-12

## 2019-08-12 RX ORDER — FENTANYL CITRATE 50 UG/ML
25 INJECTION, SOLUTION INTRAMUSCULAR; INTRAVENOUS EVERY 5 MIN PRN
Status: CANCELLED | OUTPATIENT
Start: 2019-08-12

## 2019-08-12 RX ORDER — RAMELTEON 8 MG/1
8 TABLET ORAL NIGHTLY PRN
Status: CANCELLED | OUTPATIENT
Start: 2019-08-12

## 2019-08-12 RX ORDER — AMOXICILLIN 250 MG
1 CAPSULE ORAL 2 TIMES DAILY
Status: CANCELLED | OUTPATIENT
Start: 2019-08-12

## 2019-08-12 RX ORDER — POLYETHYLENE GLYCOL 3350 17 G/17G
17 POWDER, FOR SOLUTION ORAL DAILY
Status: CANCELLED | OUTPATIENT
Start: 2019-08-13

## 2019-08-12 RX ORDER — CELECOXIB 100 MG/1
400 CAPSULE ORAL
Status: CANCELLED | OUTPATIENT
Start: 2019-08-12

## 2019-08-12 RX ORDER — ACETAMINOPHEN 500 MG
1000 TABLET ORAL EVERY 6 HOURS
Status: CANCELLED | OUTPATIENT
Start: 2019-08-12 | End: 2019-08-14

## 2019-08-12 RX ORDER — FAMOTIDINE 20 MG/1
20 TABLET, FILM COATED ORAL 2 TIMES DAILY
Status: CANCELLED | OUTPATIENT
Start: 2019-08-12

## 2019-08-12 RX ORDER — SODIUM CHLORIDE 0.9 % (FLUSH) 0.9 %
10 SYRINGE (ML) INJECTION
Status: CANCELLED | OUTPATIENT
Start: 2019-08-12

## 2019-08-12 RX ORDER — ASPIRIN 81 MG/1
81 TABLET ORAL 2 TIMES DAILY
Status: CANCELLED | OUTPATIENT
Start: 2019-08-12

## 2019-08-12 RX ORDER — MUPIROCIN 20 MG/G
1 OINTMENT TOPICAL 2 TIMES DAILY
Status: CANCELLED | OUTPATIENT
Start: 2019-08-12 | End: 2019-08-17

## 2019-08-12 RX ORDER — MIDAZOLAM HYDROCHLORIDE 1 MG/ML
1 INJECTION INTRAMUSCULAR; INTRAVENOUS EVERY 5 MIN PRN
Status: CANCELLED | OUTPATIENT
Start: 2019-08-12

## 2019-08-12 RX ORDER — CELECOXIB 100 MG/1
200 CAPSULE ORAL DAILY
Status: CANCELLED | OUTPATIENT
Start: 2019-08-13

## 2019-08-12 RX ORDER — OXYCODONE HYDROCHLORIDE 5 MG/1
15 TABLET ORAL
Status: CANCELLED | OUTPATIENT
Start: 2019-08-12

## 2019-08-12 RX ORDER — MUPIROCIN 20 MG/G
1 OINTMENT TOPICAL
Status: CANCELLED | OUTPATIENT
Start: 2019-08-12

## 2019-08-12 RX ORDER — LIDOCAINE HYDROCHLORIDE 10 MG/ML
1 INJECTION, SOLUTION EPIDURAL; INFILTRATION; INTRACAUDAL; PERINEURAL
Status: CANCELLED | OUTPATIENT
Start: 2019-08-12

## 2019-08-12 RX ORDER — ACETAMINOPHEN 10 MG/ML
1000 INJECTION, SOLUTION INTRAVENOUS ONCE
Status: CANCELLED | OUTPATIENT
Start: 2019-08-12 | End: 2019-08-12

## 2019-08-12 RX ORDER — OXYCODONE HYDROCHLORIDE 5 MG/1
5 TABLET ORAL
Status: CANCELLED | OUTPATIENT
Start: 2019-08-12

## 2019-08-12 RX ORDER — SODIUM CHLORIDE 9 MG/ML
INJECTION, SOLUTION INTRAVENOUS CONTINUOUS
Status: CANCELLED | OUTPATIENT
Start: 2019-08-12 | End: 2019-08-13

## 2019-08-12 RX ORDER — ONDANSETRON 2 MG/ML
4 INJECTION INTRAMUSCULAR; INTRAVENOUS EVERY 8 HOURS PRN
Status: CANCELLED | OUTPATIENT
Start: 2019-08-12

## 2019-08-12 NOTE — H&P (VIEW-ONLY)
CC: Left knee pain    Elena Frances is a 79 y.o. female with a history of Left knee pain. Pain is worse with activity and weight bearing.  Patient has experienced interference of activities of daily living due to decreased range of motion and an increase in joint pain and swelling.  Patient has failed non-operative treatment including NSAIDs, corticosteroid injections, viscosupplement injections, and activity modification.  Elena Frances currently ambulates independently.     Relevant medical conditions of significance in perioperative period:  HTN- on medication managed by pcp  Hyperlipidemia- on medication managed by pcp    Past Medical History:   Diagnosis Date    Allergy     Anemia     Arthritis     Asthma     Depression     Generalized headaches 2014    Goiter     MNG    HTN (hypertension), benign     Hyperlipidemia     Hyperparathyroidism     s/p surgery    Intestinal obstruction     resolved spontaneously    Lumbar disc disease     s/p epidural shots    Nuclear sclerosis - Both Eyes 3/11/2014    Osteoporosis, post-menopausal     with 3 rib fractures       Past Surgical History:   Procedure Laterality Date    APPENDECTOMY      BILATERAL OOPHORECTOMY      BREAST CYST EXCISION      left    CATARACT EXTRACTION W/  INTRAOCULAR LENS IMPLANT Right 2016    Dr. Fang ([place])    CATARACT EXTRACTION W/  INTRAOCULAR LENS IMPLANT Left 10/17/2016    Dr. Fang ([place])     SECTION, CLASSIC      x 1    CHOLECYSTECTOMY      COLONOSCOPY N/A 3/1/2016    [facility] at [facility] (4TH FLR)    ESOPHAGOGASTRODUODENOSCOPY (EGD) N/A 3/1/2016    [facility] by [facility] at [facility] ENDO (4TH FLR)    EXCISION-DUCT Left 2013    Performed by Jordan Oh MD at [facility] OR 2ND FLR    HYSTERECTOMY      INSERTION-INTRAOCULAR LENS (IOL) Left 10/17/2016    [facility]    INSERTION-INTRAOCULAR LENS (IOL) Right 2016     [facility]    KNEE SURGERY Right 2017    [facility]    LUMBAR EPIDURAL INJECTION      x 4    MOUTH SURGERY      for abscess drainage of gum of frontal teeth    PARATHYROID GLAND SURGERY      for parathyroid adenoma    PHACOEMULSIFICATION-ASPIRATION-CATARACT Left 10/17/2016    [facility] at [facility]    PHACOEMULSIFICATION-ASPIRATION-CATARACT Right 2016    [facility] at [facility]    REPLACEMENT-KNEE-TOTAL Right 2017    [facility] at [facility] OR [facility] F[facility]       Family History   Problem Relation Age of Onset    Heart disease Mother     Hypertension Mother     Heart attack Mother     Heart disease Sister          in their 50's    Heart failure Sister     Heart disease Brother         CABG in age 60's    Hyperlipidemia Brother     Heart disease Sister         in her 50's    Heart disease Sister         in her 50's    Heart disease Sister     Hypertension Sister     Hyperlipidemia Sister     Heart disease Brother         CABG in age 60's    Hyperlipidemia Brother     Thyroid disease Daughter     Amblyopia Neg Hx     Blindness Neg Hx     Cancer Neg Hx     Cataracts Neg Hx     Diabetes Neg Hx     Glaucoma Neg Hx     Macular degeneration Neg Hx     Retinal detachment Neg Hx     Strabismus Neg Hx     Stroke Neg Hx        Review of patient's allergies indicates:   Allergen Reactions    Vicodin [hydrocodone-acetaminophen] Anxiety         Current Outpatient Medications:     acetaminophen (TYLENOL) 500 MG tablet, Take 500 mg by mouth as needed for Pain., Disp: , Rfl:     ADVAIR DISKUS 100-50 mcg/dose diskus inhaler, TAKE 1 PUFF BY MOUTH TWICE A DAY, Disp: 1 each, Rfl: 11    aspirin (ECOTRIN) 81 MG EC tablet, Take 81 mg by mouth once daily., Disp: , Rfl:     atorvastatin (LIPITOR) 80 MG tablet, TAKE 1 TABLET (80 MG TOTAL) BY MOUTH ONCE DAILY., Disp: , Rfl: 3    atorvastatin (LIPITOR) 80 MG tablet,  TAKE 1 TABLET (80 MG TOTAL) BY MOUTH ONCE DAILY., Disp: 90 tablet, Rfl: 3    blood-glucose meter ([employer] BLOOD GLUCOSE SYSTM) kit, Use as instructed, Disp: 1 each, Rfl: 0    CYANOCOBALAMIN, VITAMIN B-12, (VITAMIN B-12 ORAL), Take 2,500 mcg by mouth., Disp: , Rfl:     diclofenac sodium (VOLTAREN) 1 % Gel, APPLY 2 GRAMS TO AFFECTED AREA TOPICALLY ONCE DAILY, Disp: , Rfl: 6    fluticasone propionate (FLONASE) 50 mcg/actuation nasal spray, 2 sprays (100 mcg total) by Each Nare route once daily., Disp: 16 g, Rfl: 6    lancets Misc, 1 lancet by Misc.(Non-Drug; Combo Route) route 2 (two) times daily., Disp: 200 each, Rfl: 3    metoprolol succinate (TOPROL-XL) 25 MG 24 hr tablet, TAKE 1 TABLET BY MOUTH EVERY DAY, Disp: 90 tablet, Rfl: 3    montelukast (SINGULAIR) 10 mg tablet, TAKE 1 TABLET BY MOUTH EVERY DAY IN THE EVENING, Disp: 90 tablet, Rfl: 3    nabumetone (RELAFEN ORAL), Take by mouth as needed (pain)., Disp: , Rfl:     nabumetone (RELAFEN) 500 MG tablet, Take 500 mg by mouth 2 (two) times daily., Disp: , Rfl:     omeprazole (PRILOSEC) 40 MG capsule, TAKE 1 CAPSULE BY MOUTH EVERY DAY (Patient taking differently: - pt. reports twice daily), Disp: 90 capsule, Rfl: 3    polyethylene glycol 3350 (MIRALAX ORAL), Take by mouth as needed., Disp: , Rfl:     sertraline (ZOLOFT) 50 MG tablet, TAKE 1 TABLET BY MOUTH EVERY DAY, Disp: 90 tablet, Rfl: 3    tiZANidine (ZANAFLEX) 4 MG tablet, TAKE 1.5 TABLETS (6MG) BY MOUTH 3 TIMES A DAY AS NEEDED FOR MUSCLE SPASM, Disp: 135 tablet, Rfl: 6    tolterodine (DETROL) 2 MG Tab, Take 1 tablet (2 mg total) by mouth 2 (two) times daily., Disp: 60 tablet, Rfl: 11    valsartan (DIOVAN) 160 MG tablet, TAKE 1 TABLET (160 MG TOTAL) BY MOUTH ONCE DAILY., Disp: 90 tablet, Rfl: 0    valsartan (DIOVAN) 160 MG tablet, TAKE 1 TABLET (160 MG TOTAL) BY MOUTH ONCE DAILY., Disp: 90 tablet, Rfl: 3    VENTOLIN HFA 90 mcg/actuation inhaler, INHALE 2 PUFFS INTO THE LUNGS EVERY 6 HOURS AS  NEEDED FOR WHEEZING, Disp: 18 Inhaler, Rfl: 1    Current Facility-Administered Medications:     DOBUTamine 500mg in D5W 250mL infusion (premix), 10 mcg/kg/min, Intravenous, Continuous, Homeyar VERONIKA Tripp MD, Last Rate: 21.6 mL/hr at 06/18/19 0845, 10 mcg/kg/min at 06/18/19 0845    Review of Systems:  Constitutional: no fever or chills  Eyes: no visual changes  ENT: no nasal congestion or sore throat  Respiratory: no cough or shortness of breath  Cardiovascular: no chest pain or palpitations  Gastrointestinal: no nausea or vomiting, tolerating diet  Genitourinary: no hematuria or dysuria  Integument/Breast: no rash or pruritis  Hematologic/Lymphatic: no easy bruising or lymphadenopathy  Musculoskeletal: positive for c/o left knee pain which is aching and worse with activity  Neurological: no seizures or tremors  Behavioral/Psych: no auditory or visual hallucinations  Endocrine: no heat or cold intolerance    PE:  There were no vitals taken for this visit.  General: Pleasant, cooperative, NAD   Gait: antalgic  HEENT: NCAT, sclera nonicteric   Lungs: Respirations clear bilaterally; equal and unlabored.   CV: S1S2; 2+ bilateral upper and lower extremity pulses.   Skin: Intact throughout with no rashes, erythema, or lesions  Extremities: No LE edema,  no erythema or warmth of the skin in either lower extremity.    Left knee exam:  Knee Range of Motion:normal, pain with passive range of motion  Effusion:none  Condition of skin:intact  Location of tenderness:Medial joint line   Strength:normal  Stability: stable to testing    Hip Examination:normal    Radiographs: Radiographs reveal Mild DJD and joint space narrowing involving the left knee.  No fracture or bone destruction identified.  Calcification in the menisci identified indicating chondrocalcinosis similar to the previous study    Knee Alignment: normal    Diagnosis: osteoarthritis Left knee    Plan: Left total knee arthroplasty    Due to the serious nature of  total joint infection and the high prevalence of community acquired MRSA, vancomycin will be used perioperatively.

## 2019-08-12 NOTE — H&P
CC: Left knee pain    Elena Frances is a 79 y.o. female with a history of Left knee pain. Pain is worse with activity and weight bearing.  Patient has experienced interference of activities of daily living due to decreased range of motion and an increase in joint pain and swelling.  Patient has failed non-operative treatment including NSAIDs, corticosteroid injections, viscosupplement injections, and activity modification.  Elena Frances currently ambulates independently.     Relevant medical conditions of significance in perioperative period:  HTN- on medication managed by pcp  Hyperlipidemia- on medication managed by pcp    Past Medical History:   Diagnosis Date    Allergy     Anemia     Arthritis     Asthma     Depression     Generalized headaches 2014    Goiter     MNG    HTN (hypertension), benign     Hyperlipidemia     Hyperparathyroidism     s/p surgery    Intestinal obstruction     resolved spontaneously    Lumbar disc disease     s/p epidural shots    Nuclear sclerosis - Both Eyes 3/11/2014    Osteoporosis, post-menopausal     with 3 rib fractures       Past Surgical History:   Procedure Laterality Date    APPENDECTOMY      BILATERAL OOPHORECTOMY      BREAST CYST EXCISION      left    CATARACT EXTRACTION W/  INTRAOCULAR LENS IMPLANT Right 2016    Dr. Fang ([place])    CATARACT EXTRACTION W/  INTRAOCULAR LENS IMPLANT Left 10/17/2016    Dr. Fang ([place])     SECTION, CLASSIC      x 1    CHOLECYSTECTOMY      COLONOSCOPY N/A 3/1/2016    [facility]formed [facility] ENDO (4TH FLR)    ESOPHAGOGASTRODUODENOSCOPY (EGD) N/A 3/1/2016    Performed by [facility] at [facility] ENDO (4TH FLR)    EXCISION-DUCT Left 2013    [facility]formed by Jordan [facility] at [facility] OR 2ND FLR    HYSTERECTOMY      INSERTION-INTRAOCULAR LENS (IOL) Left 10/17/2016    [facility]    INSERTION-INTRAOCULAR LENS (IOL) Right 2016     [facility]    KNEE SURGERY Right 2017    [facility]    LUMBAR EPIDURAL INJECTION      x 4    MOUTH SURGERY      for abscess drainage of gum of frontal teeth    PARATHYROID GLAND SURGERY      for parathyroid adenoma    PHACOEMULSIFICATION-ASPIRATION-CATARACT Left 10/17/2016    [facility] at [facility] OR    PHACOEMULSIFICATION-ASPIRATION-CATARACT Right 2016    [facility] by [facility] at [facility] OR    REPLACEMENT-KNEE-TOTAL Right 2017    Performed by [facility] at [facility] OR [facility]       Family History   Problem Relation Age of Onset    Heart disease Mother     Hypertension Mother     Heart attack Mother     Heart disease Sister          in their 50's    Heart failure Sister     Heart disease Brother         CABG in age 60's    Hyperlipidemia Brother     Heart disease Sister         in her 50's    Heart disease Sister         in her 50's    Heart disease Sister     Hypertension Sister     Hyperlipidemia Sister     Heart disease Brother         CABG in age 60's    Hyperlipidemia Brother     Thyroid disease Daughter     Amblyopia Neg Hx     Blindness Neg Hx     Cancer Neg Hx     Cataracts Neg Hx     Diabetes Neg Hx     Glaucoma Neg Hx     Macular degeneration Neg Hx     Retinal detachment Neg Hx     Strabismus Neg Hx     Stroke Neg Hx        Review of patient's allergies indicates:   Allergen Reactions    Vicodin [hydrocodone-acetaminophen] Anxiety         Current Outpatient Medications:     acetaminophen (TYLENOL) 500 MG tablet, Take 500 mg by mouth as needed for Pain., Disp: , Rfl:     ADVAIR DISKUS 100-50 mcg/dose diskus inhaler, TAKE 1 PUFF BY MOUTH TWICE A DAY, Disp: 1 each, Rfl: 11    aspirin (ECOTRIN) 81 MG EC tablet, Take 81 mg by mouth once daily., Disp: , Rfl:     atorvastatin (LIPITOR) 80 MG tablet, TAKE 1 TABLET (80 MG TOTAL) BY MOUTH ONCE DAILY., Disp: , Rfl: 3    atorvastatin (LIPITOR) 80 MG tablet,  TAKE 1 TABLET (80 MG TOTAL) BY MOUTH ONCE DAILY., Disp: 90 tablet, Rfl: 3    blood-glucose meter ([employer] BLOOD GLUCOSE SYSTM) kit, Use as instructed, Disp: 1 each, Rfl: 0    CYANOCOBALAMIN, VITAMIN B-12, (VITAMIN B-12 ORAL), Take 2,500 mcg by mouth., Disp: , Rfl:     diclofenac sodium (VOLTAREN) 1 % Gel, APPLY 2 GRAMS TO AFFECTED AREA TOPICALLY ONCE DAILY, Disp: , Rfl: 6    fluticasone propionate (FLONASE) 50 mcg/actuation nasal spray, 2 sprays (100 mcg total) by Each Nare route once daily., Disp: 16 g, Rfl: 6    lancets Misc, 1 lancet by Misc.(Non-Drug; Combo Route) route 2 (two) times daily., Disp: 200 each, Rfl: 3    metoprolol succinate (TOPROL-XL) 25 MG 24 hr tablet, TAKE 1 TABLET BY MOUTH EVERY DAY, Disp: 90 tablet, Rfl: 3    montelukast (SINGULAIR) 10 mg tablet, TAKE 1 TABLET BY MOUTH EVERY DAY IN THE EVENING, Disp: 90 tablet, Rfl: 3    nabumetone (RELAFEN ORAL), Take by mouth as needed (pain)., Disp: , Rfl:     nabumetone (RELAFEN) 500 MG tablet, Take 500 mg by mouth 2 (two) times daily., Disp: , Rfl:     omeprazole (PRILOSEC) 40 MG capsule, TAKE 1 CAPSULE BY MOUTH EVERY DAY (Patient taking differently: - pt. reports twice daily), Disp: 90 capsule, Rfl: 3    polyethylene glycol 3350 (MIRALAX ORAL), Take by mouth as needed., Disp: , Rfl:     sertraline (ZOLOFT) 50 MG tablet, TAKE 1 TABLET BY MOUTH EVERY DAY, Disp: 90 tablet, Rfl: 3    tiZANidine (ZANAFLEX) 4 MG tablet, TAKE 1.5 TABLETS (6MG) BY MOUTH 3 TIMES A DAY AS NEEDED FOR MUSCLE SPASM, Disp: 135 tablet, Rfl: 6    tolterodine (DETROL) 2 MG Tab, Take 1 tablet (2 mg total) by mouth 2 (two) times daily., Disp: 60 tablet, Rfl: 11    valsartan (DIOVAN) 160 MG tablet, TAKE 1 TABLET (160 MG TOTAL) BY MOUTH ONCE DAILY., Disp: 90 tablet, Rfl: 0    valsartan (DIOVAN) 160 MG tablet, TAKE 1 TABLET (160 MG TOTAL) BY MOUTH ONCE DAILY., Disp: 90 tablet, Rfl: 3    VENTOLIN HFA 90 mcg/actuation inhaler, INHALE 2 PUFFS INTO THE LUNGS EVERY 6 HOURS AS  NEEDED FOR WHEEZING, Disp: 18 Inhaler, Rfl: 1    Current Facility-Administered Medications:     DOBUTamine 500mg in D5W 250mL infusion (premix), 10 mcg/kg/min, Intravenous, Continuous, Homeyar VERONIKA Tripp MD, Last Rate: 21.6 mL/hr at 06/18/19 0845, 10 mcg/kg/min at 06/18/19 0845    Review of Systems:  Constitutional: no fever or chills  Eyes: no visual changes  ENT: no nasal congestion or sore throat  Respiratory: no cough or shortness of breath  Cardiovascular: no chest pain or palpitations  Gastrointestinal: no nausea or vomiting, tolerating diet  Genitourinary: no hematuria or dysuria  Integument/Breast: no rash or pruritis  Hematologic/Lymphatic: no easy bruising or lymphadenopathy  Musculoskeletal: positive for c/o left knee pain which is aching and worse with activity  Neurological: no seizures or tremors  Behavioral/Psych: no auditory or visual hallucinations  Endocrine: no heat or cold intolerance    PE:  There were no vitals taken for this visit.  General: Pleasant, cooperative, NAD   Gait: antalgic  HEENT: NCAT, sclera nonicteric   Lungs: Respirations clear bilaterally; equal and unlabored.   CV: S1S2; 2+ bilateral upper and lower extremity pulses.   Skin: Intact throughout with no rashes, erythema, or lesions  Extremities: No LE edema,  no erythema or warmth of the skin in either lower extremity.    Left knee exam:  Knee Range of Motion:normal, pain with passive range of motion  Effusion:none  Condition of skin:intact  Location of tenderness:Medial joint line   Strength:normal  Stability: stable to testing    Hip Examination:normal    Radiographs: Radiographs reveal Mild DJD and joint space narrowing involving the left knee.  No fracture or bone destruction identified.  Calcification in the menisci identified indicating chondrocalcinosis similar to the previous study    Knee Alignment: normal    Diagnosis: osteoarthritis Left knee    Plan: Left total knee arthroplasty    Due to the serious nature of  total joint infection and the high prevalence of community acquired MRSA, vancomycin will be used perioperatively.

## 2019-08-12 NOTE — PROGRESS NOTES
Elena Frances is a 79 y.o. year old here today for a pre-operative visit in preparation for a Left total knee arthroplasty to be performed by  Dr. Rodriguez on 8/21/19.  she was last seen and treated in the clinic on 7/16/2019. she will be medically optimized by the pre op center. There has been no significant change in medical status since last visit. No fever, chills, malaise, or unexplained weight change.      Allergies, Medications, past medical and surgical history reviewed.    Focused examination performed.    Patient declined to see Dr. Rodriguez today in clinic. All questions answered. Patient encouraged to call with questions. Contact information given.     Pre, cici, and post operative procedures and expectations discussed. Questions were answered. Elena Frances has been educated and is ready to proceed with surgery. Approximately 30 minutes was spent discussing surgical outcomes, plans, procedures pre, cici, and post operative expections and care.  Surgical consent signed.    Elena Frances will contact us if there are any questions, concerns, or changes in medical status prior to surgery.

## 2019-08-13 ENCOUNTER — NURSE TRIAGE (OUTPATIENT)
Dept: ADMINISTRATIVE | Facility: CLINIC | Age: 79
End: 2019-08-13

## 2019-08-13 NOTE — TELEPHONE ENCOUNTER
Spoke with patient, discussed Dr. Tinoco's recommendation to begin taking (2) Valsartan daily.  I also advised her to continue to record her BP readings and bring to appointment on Monday.  The patient read back the instructions and verbalized understanding.

## 2019-08-13 NOTE — TELEPHONE ENCOUNTER
Please ask pt to start taking 2 tablets of the Valsartan 160mg for a total daily dose of 320mg, thanks

## 2019-08-13 NOTE — TELEPHONE ENCOUNTER
Patient's daughter states that her BP has been fluctuating since last week. Last night SBP was 200. BP this morning was 146/82. BP at this time is 156/80. Denies any symptoms at this time. Appointment made for Monday; earlier appointment could have been made with other physician but patient declined. Daughter educated on when to call nurse triage line back. Verbalized understanding.     Reason for Disposition   Systolic BP >= 130 OR Diastolic >= 80, and is taking BP medications    Additional Information   Negative: Sounds like a life-threatening emergency to the triager   Negative: Pregnant > 20 weeks and new hand or face swelling   Negative: Pregnant > 20 weeks and BP > 140/90   Negative: Systolic BP >= 160 OR Diastolic >= 100, and any cardiac or neurologic symptoms (e.g., chest pain, difficulty breathing, unsteady gait, blurred vision)   Negative: Patient sounds very sick or weak to the triager   Negative: BP Systolic BP >= 140 OR Diastolic >= 90 and postpartum < 4 weeks   Negative: Systolic BP >= 180 OR Diastolic >= 110, and missed most recent dose of blood pressure medication   Negative: Systolic BP >= 180 OR Diastolic >= 110   Negative: Patient wants to be seen   Negative: Ran out of BP medications   Negative: Taking BP medications and feels is having side effects (e.g., impotence, cough, dizziness)   Negative: Systolic BP >= 160 OR Diastolic >= 100   Negative: Systolic BP >= 130 OR Diastolic >= 80, and pregnant    Protocols used: HIGH BLOOD PRESSURE-A-OH

## 2019-08-19 ENCOUNTER — OFFICE VISIT (OUTPATIENT)
Dept: PRIMARY CARE CLINIC | Facility: CLINIC | Age: 79
End: 2019-08-19
Payer: MEDICARE

## 2019-08-19 ENCOUNTER — TELEPHONE (OUTPATIENT)
Dept: ORTHOPEDICS | Facility: CLINIC | Age: 79
End: 2019-08-19

## 2019-08-19 VITALS
HEIGHT: 63 IN | BODY MASS INDEX: 28.39 KG/M2 | DIASTOLIC BLOOD PRESSURE: 66 MMHG | WEIGHT: 160.25 LBS | TEMPERATURE: 99 F | HEART RATE: 68 BPM | SYSTOLIC BLOOD PRESSURE: 123 MMHG

## 2019-08-19 DIAGNOSIS — I10 HTN (HYPERTENSION), BENIGN: Primary | ICD-10-CM

## 2019-08-19 PROCEDURE — 3074F PR MOST RECENT SYSTOLIC BLOOD PRESSURE < 130 MM HG: ICD-10-PCS | Mod: CPTII,S$GLB,, | Performed by: FAMILY MEDICINE

## 2019-08-19 PROCEDURE — 1101F PR PT FALLS ASSESS DOC 0-1 FALLS W/OUT INJ PAST YR: ICD-10-PCS | Mod: CPTII,S$GLB,, | Performed by: FAMILY MEDICINE

## 2019-08-19 PROCEDURE — 99214 OFFICE O/P EST MOD 30 MIN: CPT | Mod: S$GLB,,, | Performed by: FAMILY MEDICINE

## 2019-08-19 PROCEDURE — 99214 PR OFFICE/OUTPT VISIT, EST, LEVL IV, 30-39 MIN: ICD-10-PCS | Mod: S$GLB,,, | Performed by: FAMILY MEDICINE

## 2019-08-19 PROCEDURE — 99999 PR PBB SHADOW E&M-EST. PATIENT-LVL III: CPT | Mod: PBBFAC,,, | Performed by: FAMILY MEDICINE

## 2019-08-19 PROCEDURE — 3078F DIAST BP <80 MM HG: CPT | Mod: CPTII,S$GLB,, | Performed by: FAMILY MEDICINE

## 2019-08-19 PROCEDURE — 1101F PT FALLS ASSESS-DOCD LE1/YR: CPT | Mod: CPTII,S$GLB,, | Performed by: FAMILY MEDICINE

## 2019-08-19 PROCEDURE — 3074F SYST BP LT 130 MM HG: CPT | Mod: CPTII,S$GLB,, | Performed by: FAMILY MEDICINE

## 2019-08-19 PROCEDURE — 3078F PR MOST RECENT DIASTOLIC BLOOD PRESSURE < 80 MM HG: ICD-10-PCS | Mod: CPTII,S$GLB,, | Performed by: FAMILY MEDICINE

## 2019-08-19 PROCEDURE — 99999 PR PBB SHADOW E&M-EST. PATIENT-LVL III: ICD-10-PCS | Mod: PBBFAC,,, | Performed by: FAMILY MEDICINE

## 2019-08-19 NOTE — PROGRESS NOTES
Spoke with pt. Informed pt arrival time is 12 noon 2nd floor Sx center for Sx on 8/21. No food and cloudy liquids after midnight. Pt gave verbal understanding.

## 2019-08-19 NOTE — TELEPHONE ENCOUNTER
----- Message from Rico Adame sent at 8/19/2019 11:15 AM CDT -----  Contact: Self/ [phone]  Patient would like a call back to get a time for her surgery on 8/21/19.

## 2019-08-19 NOTE — PROGRESS NOTES
Subjective:       Patient ID: Elena Frances is a 79 y.o. female.    Chief Complaint: Hypertension    80 yo presents for follow up of hypertension.  Recently seen with elevated systolic blood pressures in 180s.  Since starting increased dose of Valsartan 320mg, she has noted gradual improvement of blood pressure.  She denies any side effects of medication.  Pt is scheduled for knee surgery on 8/21/19.  She had normal stress echo about 2 months ago    Review of Systems   Constitutional: Negative for activity change, fatigue and unexpected weight change.   Respiratory: Negative for chest tightness and shortness of breath.    Cardiovascular: Negative for chest pain, palpitations and leg swelling.   Neurological: Negative for dizziness, syncope, light-headedness and headaches.       Objective:      Physical Exam   Constitutional: She is oriented to person, place, and time. She appears well-developed and well-nourished. No distress.   Cardiovascular: Normal rate and regular rhythm.   No murmur heard.  Pulmonary/Chest: Effort normal and breath sounds normal. She has no wheezes. She exhibits no tenderness.   Few crackles over both bases   Musculoskeletal: She exhibits no edema.   Neurological: She is alert and oriented to person, place, and time. She displays normal reflexes.       Assessment:       Essential hypertension, improved  Plan:       1.  Continue current dose of Valsartan  2.  F/u 3 months

## 2019-08-21 ENCOUNTER — ANESTHESIA (OUTPATIENT)
Dept: SURGERY | Facility: HOSPITAL | Age: 79
End: 2019-08-21
Payer: MEDICARE

## 2019-08-21 ENCOUNTER — HOSPITAL ENCOUNTER (OUTPATIENT)
Facility: HOSPITAL | Age: 79
Discharge: HOME | End: 2019-08-22
Attending: ORTHOPAEDIC SURGERY | Admitting: ORTHOPAEDIC SURGERY
Payer: MEDICARE

## 2019-08-21 DIAGNOSIS — Z96.652 S/P TKR (TOTAL KNEE REPLACEMENT) USING CEMENT, LEFT: Primary | ICD-10-CM

## 2019-08-21 DIAGNOSIS — M17.12 PRIMARY OSTEOARTHRITIS OF LEFT KNEE: ICD-10-CM

## 2019-08-21 PROCEDURE — 63600175 PHARM REV CODE 636 W HCPCS

## 2019-08-21 PROCEDURE — 27447 PR TOTAL KNEE ARTHROPLASTY: ICD-10-PCS | Mod: LT,,, | Performed by: ORTHOPAEDIC SURGERY

## 2019-08-21 PROCEDURE — 27201423 OPTIME MED/SURG SUP & DEVICES STERILE SUPPLY: Performed by: ORTHOPAEDIC SURGERY

## 2019-08-21 PROCEDURE — 64448 NJX AA&/STRD FEM NRV NFS IMG: CPT | Performed by: SURGERY

## 2019-08-21 PROCEDURE — 25000003 PHARM REV CODE 250: Performed by: NURSE ANESTHETIST, CERTIFIED REGISTERED

## 2019-08-21 PROCEDURE — 88305 TISSUE EXAM BY PATHOLOGIST: CPT | Performed by: PATHOLOGY

## 2019-08-21 PROCEDURE — 36000710: Performed by: ORTHOPAEDIC SURGERY

## 2019-08-21 PROCEDURE — 37000008 HC ANESTHESIA 1ST 15 MINUTES: Performed by: ORTHOPAEDIC SURGERY

## 2019-08-21 PROCEDURE — 37000009 HC ANESTHESIA EA ADD 15 MINS: Performed by: ORTHOPAEDIC SURGERY

## 2019-08-21 PROCEDURE — 76942 ECHO GUIDE FOR BIOPSY: CPT | Mod: 26,,, | Performed by: ANESTHESIOLOGY

## 2019-08-21 PROCEDURE — 63600175 PHARM REV CODE 636 W HCPCS: Performed by: NURSE PRACTITIONER

## 2019-08-21 PROCEDURE — 63600175 PHARM REV CODE 636 W HCPCS: Performed by: NURSE ANESTHETIST, CERTIFIED REGISTERED

## 2019-08-21 PROCEDURE — 76942 ECHO GUIDE FOR BIOPSY: CPT | Performed by: SURGERY

## 2019-08-21 PROCEDURE — 94761 N-INVAS EAR/PLS OXIMETRY MLT: CPT

## 2019-08-21 PROCEDURE — C1776 JOINT DEVICE (IMPLANTABLE): HCPCS | Performed by: ORTHOPAEDIC SURGERY

## 2019-08-21 PROCEDURE — 64448 ADDUCTOR CANAL CATHETER: ICD-10-PCS | Mod: 59,LT,, | Performed by: ANESTHESIOLOGY

## 2019-08-21 PROCEDURE — 27447 TOTAL KNEE ARTHROPLASTY: CPT | Mod: LT,,, | Performed by: ORTHOPAEDIC SURGERY

## 2019-08-21 PROCEDURE — 36000711: Performed by: ORTHOPAEDIC SURGERY

## 2019-08-21 PROCEDURE — 63600175 PHARM REV CODE 636 W HCPCS: Performed by: ORTHOPAEDIC SURGERY

## 2019-08-21 PROCEDURE — 71000033 HC RECOVERY, INTIAL HOUR: Performed by: ORTHOPAEDIC SURGERY

## 2019-08-21 PROCEDURE — D9220A PRA ANESTHESIA: ICD-10-PCS | Mod: CRNA,,, | Performed by: NURSE ANESTHETIST, CERTIFIED REGISTERED

## 2019-08-21 PROCEDURE — D9220A PRA ANESTHESIA: ICD-10-PCS | Mod: ANES,,, | Performed by: ANESTHESIOLOGY

## 2019-08-21 PROCEDURE — 88311 DECALCIFY TISSUE: CPT | Mod: 26,,, | Performed by: PATHOLOGY

## 2019-08-21 PROCEDURE — 25000003 PHARM REV CODE 250: Performed by: NURSE PRACTITIONER

## 2019-08-21 PROCEDURE — 64448 NJX AA&/STRD FEM NRV NFS IMG: CPT | Mod: 59,LT,, | Performed by: ANESTHESIOLOGY

## 2019-08-21 PROCEDURE — 25000003 PHARM REV CODE 250: Performed by: STUDENT IN AN ORGANIZED HEALTH CARE EDUCATION/TRAINING PROGRAM

## 2019-08-21 PROCEDURE — D9220A PRA ANESTHESIA: Mod: ANES,,, | Performed by: ANESTHESIOLOGY

## 2019-08-21 PROCEDURE — D9220A PRA ANESTHESIA: Mod: CRNA,,, | Performed by: NURSE ANESTHETIST, CERTIFIED REGISTERED

## 2019-08-21 PROCEDURE — 63600175 PHARM REV CODE 636 W HCPCS: Performed by: ANESTHESIOLOGY

## 2019-08-21 PROCEDURE — 94799 UNLISTED PULMONARY SVC/PX: CPT

## 2019-08-21 PROCEDURE — C1713 ANCHOR/SCREW BN/BN,TIS/BN: HCPCS | Performed by: ORTHOPAEDIC SURGERY

## 2019-08-21 PROCEDURE — 88305 TISSUE SPECIMEN TO PATHOLOGY - SURGERY: ICD-10-PCS | Mod: 26,,, | Performed by: PATHOLOGY

## 2019-08-21 PROCEDURE — 25000003 PHARM REV CODE 250: Performed by: ORTHOPAEDIC SURGERY

## 2019-08-21 PROCEDURE — 27800517 HC TRAY,EPIDURAL-CONTINUOUS: Performed by: SURGERY

## 2019-08-21 PROCEDURE — 88311 TISSUE SPECIMEN TO PATHOLOGY - SURGERY: ICD-10-PCS | Mod: 26,,, | Performed by: PATHOLOGY

## 2019-08-21 PROCEDURE — 76942 ADDUCTOR CANAL CATHETER: ICD-10-PCS | Mod: 26,,, | Performed by: ANESTHESIOLOGY

## 2019-08-21 PROCEDURE — 63600175 PHARM REV CODE 636 W HCPCS: Performed by: STUDENT IN AN ORGANIZED HEALTH CARE EDUCATION/TRAINING PROGRAM

## 2019-08-21 PROCEDURE — 88305 TISSUE EXAM BY PATHOLOGIST: CPT | Mod: 26,,, | Performed by: PATHOLOGY

## 2019-08-21 DEVICE — COMPONENT FEM POST SZ 3 LEFT: Type: IMPLANTABLE DEVICE | Site: KNEE | Status: FUNCTIONAL

## 2019-08-21 DEVICE — INSERT PFC SIGMA TIB SZ3 10MM: Type: IMPLANTABLE DEVICE | Site: KNEE | Status: FUNCTIONAL

## 2019-08-21 DEVICE — PATELLA OVAL DOME 35MM: Type: IMPLANTABLE DEVICE | Site: KNEE | Status: FUNCTIONAL

## 2019-08-21 DEVICE — CEMENT BONE WHOLE BATCH: Type: IMPLANTABLE DEVICE | Site: KNEE | Status: FUNCTIONAL

## 2019-08-21 DEVICE — TRAY TIBIAL CEMENT SZ 3 COBAL: Type: IMPLANTABLE DEVICE | Site: KNEE | Status: FUNCTIONAL

## 2019-08-21 RX ORDER — LIDOCAINE HYDROCHLORIDE 10 MG/ML
1 INJECTION, SOLUTION EPIDURAL; INFILTRATION; INTRACAUDAL; PERINEURAL
Status: COMPLETED | OUTPATIENT
Start: 2019-08-21 | End: 2019-08-21

## 2019-08-21 RX ORDER — FLUTICASONE FUROATE AND VILANTEROL 100; 25 UG/1; UG/1
1 POWDER RESPIRATORY (INHALATION) DAILY
Status: DISCONTINUED | OUTPATIENT
Start: 2019-08-21 | End: 2019-08-22 | Stop reason: HOSPADM

## 2019-08-21 RX ORDER — RAMELTEON 8 MG/1
8 TABLET ORAL NIGHTLY PRN
Status: DISCONTINUED | OUTPATIENT
Start: 2019-08-21 | End: 2019-08-22 | Stop reason: HOSPADM

## 2019-08-21 RX ORDER — FENTANYL CITRATE 50 UG/ML
25 INJECTION, SOLUTION INTRAMUSCULAR; INTRAVENOUS EVERY 5 MIN PRN
Status: DISCONTINUED | OUTPATIENT
Start: 2019-08-21 | End: 2019-08-21 | Stop reason: HOSPADM

## 2019-08-21 RX ORDER — ALBUTEROL SULFATE 90 UG/1
2 AEROSOL, METERED RESPIRATORY (INHALATION) EVERY 6 HOURS PRN
Status: DISCONTINUED | OUTPATIENT
Start: 2019-08-21 | End: 2019-08-22 | Stop reason: HOSPADM

## 2019-08-21 RX ORDER — ATORVASTATIN CALCIUM 20 MG/1
80 TABLET, FILM COATED ORAL DAILY
Status: DISCONTINUED | OUTPATIENT
Start: 2019-08-22 | End: 2019-08-22 | Stop reason: HOSPADM

## 2019-08-21 RX ORDER — POLYETHYLENE GLYCOL 3350 17 G/17G
17 POWDER, FOR SOLUTION ORAL DAILY
Status: DISCONTINUED | OUTPATIENT
Start: 2019-08-22 | End: 2019-08-22 | Stop reason: HOSPADM

## 2019-08-21 RX ORDER — DEXTROMETHORPHAN HYDROBROMIDE, GUAIFENESIN 5; 100 MG/5ML; MG/5ML
650 LIQUID ORAL EVERY 6 HOURS PRN
Qty: 120 TABLET | Refills: 0 | Status: SHIPPED | OUTPATIENT
Start: 2019-08-21 | End: 2019-08-22 | Stop reason: SDUPTHER

## 2019-08-21 RX ORDER — TRANEXAMIC ACID 100 MG/ML
INJECTION, SOLUTION INTRAVENOUS
Status: DISCONTINUED | OUTPATIENT
Start: 2019-08-21 | End: 2019-08-23

## 2019-08-21 RX ORDER — ACETAMINOPHEN 10 MG/ML
1000 INJECTION, SOLUTION INTRAVENOUS ONCE
Status: COMPLETED | OUTPATIENT
Start: 2019-08-21 | End: 2019-08-21

## 2019-08-21 RX ORDER — MORPHINE SULFATE 2 MG/ML
2 INJECTION, SOLUTION INTRAMUSCULAR; INTRAVENOUS
Status: DISCONTINUED | OUTPATIENT
Start: 2019-08-21 | End: 2019-08-22 | Stop reason: HOSPADM

## 2019-08-21 RX ORDER — PROPOFOL 10 MG/ML
VIAL (ML) INTRAVENOUS
Status: DISCONTINUED | OUTPATIENT
Start: 2019-08-21 | End: 2019-08-23

## 2019-08-21 RX ORDER — ASPIRIN 81 MG/1
81 TABLET ORAL 2 TIMES DAILY
Qty: 60 TABLET | Refills: 0 | Status: SHIPPED | OUTPATIENT
Start: 2019-08-21 | End: 2019-08-22 | Stop reason: SDUPTHER

## 2019-08-21 RX ORDER — BACITRACIN 50000 [IU]/1
INJECTION, POWDER, FOR SOLUTION INTRAMUSCULAR
Status: DISCONTINUED | OUTPATIENT
Start: 2019-08-21 | End: 2019-08-21 | Stop reason: HOSPADM

## 2019-08-21 RX ORDER — BISACODYL 10 MG
10 SUPPOSITORY, RECTAL RECTAL EVERY 12 HOURS PRN
Status: DISCONTINUED | OUTPATIENT
Start: 2019-08-21 | End: 2019-08-22 | Stop reason: HOSPADM

## 2019-08-21 RX ORDER — PROMETHAZINE HYDROCHLORIDE 25 MG/1
25 TABLET ORAL EVERY 4 HOURS PRN
Qty: 61 TABLET | Refills: 0 | Status: SHIPPED | OUTPATIENT
Start: 2019-08-21 | End: 2019-08-22 | Stop reason: SDUPTHER

## 2019-08-21 RX ORDER — HYDROMORPHONE HYDROCHLORIDE 1 MG/ML
0.2 INJECTION, SOLUTION INTRAMUSCULAR; INTRAVENOUS; SUBCUTANEOUS EVERY 5 MIN PRN
Status: COMPLETED | OUTPATIENT
Start: 2019-08-21 | End: 2019-08-21

## 2019-08-21 RX ORDER — MONTELUKAST SODIUM 10 MG/1
10 TABLET ORAL NIGHTLY
Status: DISCONTINUED | OUTPATIENT
Start: 2019-08-21 | End: 2019-08-22 | Stop reason: HOSPADM

## 2019-08-21 RX ORDER — CEFAZOLIN SODIUM 1 G/3ML
2 INJECTION, POWDER, FOR SOLUTION INTRAMUSCULAR; INTRAVENOUS
Status: COMPLETED | OUTPATIENT
Start: 2019-08-22 | End: 2019-08-22

## 2019-08-21 RX ORDER — CANDESARTAN 4 MG/1
16 TABLET ORAL DAILY
Status: DISCONTINUED | OUTPATIENT
Start: 2019-08-22 | End: 2019-08-22 | Stop reason: HOSPADM

## 2019-08-21 RX ORDER — SERTRALINE HYDROCHLORIDE 50 MG/1
50 TABLET, FILM COATED ORAL DAILY
Status: DISCONTINUED | OUTPATIENT
Start: 2019-08-22 | End: 2019-08-22 | Stop reason: HOSPADM

## 2019-08-21 RX ORDER — ONDANSETRON 2 MG/ML
4 INJECTION INTRAMUSCULAR; INTRAVENOUS EVERY 8 HOURS PRN
Status: DISCONTINUED | OUTPATIENT
Start: 2019-08-21 | End: 2019-08-22 | Stop reason: HOSPADM

## 2019-08-21 RX ORDER — MIDAZOLAM HYDROCHLORIDE 1 MG/ML
1 INJECTION INTRAMUSCULAR; INTRAVENOUS EVERY 5 MIN PRN
Status: DISCONTINUED | OUTPATIENT
Start: 2019-08-21 | End: 2019-08-21 | Stop reason: HOSPADM

## 2019-08-21 RX ORDER — SODIUM CHLORIDE 0.9 % (FLUSH) 0.9 %
10 SYRINGE (ML) INJECTION
Status: DISCONTINUED | OUTPATIENT
Start: 2019-08-21 | End: 2019-08-21 | Stop reason: HOSPADM

## 2019-08-21 RX ORDER — VALSARTAN 160 MG/1
160 TABLET ORAL DAILY
Status: DISCONTINUED | OUTPATIENT
Start: 2019-08-21 | End: 2019-08-21

## 2019-08-21 RX ORDER — METOPROLOL SUCCINATE 25 MG/1
25 TABLET, EXTENDED RELEASE ORAL DAILY
Status: DISCONTINUED | OUTPATIENT
Start: 2019-08-22 | End: 2019-08-22 | Stop reason: HOSPADM

## 2019-08-21 RX ORDER — DEXAMETHASONE SODIUM PHOSPHATE 4 MG/ML
INJECTION, SOLUTION INTRA-ARTICULAR; INTRALESIONAL; INTRAMUSCULAR; INTRAVENOUS; SOFT TISSUE
Status: DISCONTINUED | OUTPATIENT
Start: 2019-08-21 | End: 2019-08-23

## 2019-08-21 RX ORDER — NALOXONE HCL 0.4 MG/ML
0.02 VIAL (ML) INJECTION
Status: DISCONTINUED | OUTPATIENT
Start: 2019-08-21 | End: 2019-08-22 | Stop reason: HOSPADM

## 2019-08-21 RX ORDER — OXYCODONE HYDROCHLORIDE 5 MG/1
5 TABLET ORAL
Status: DISCONTINUED | OUTPATIENT
Start: 2019-08-21 | End: 2019-08-22 | Stop reason: HOSPADM

## 2019-08-21 RX ORDER — MUPIROCIN 20 MG/G
1 OINTMENT TOPICAL
Status: COMPLETED | OUTPATIENT
Start: 2019-08-21 | End: 2019-08-21

## 2019-08-21 RX ORDER — SODIUM CHLORIDE 0.9 % (FLUSH) 0.9 %
10 SYRINGE (ML) INJECTION
Status: DISCONTINUED | OUTPATIENT
Start: 2019-08-21 | End: 2019-08-22 | Stop reason: HOSPADM

## 2019-08-21 RX ORDER — PHENYLEPHRINE HYDROCHLORIDE 10 MG/ML
INJECTION INTRAVENOUS
Status: DISCONTINUED | OUTPATIENT
Start: 2019-08-21 | End: 2019-08-23

## 2019-08-21 RX ORDER — CELECOXIB 200 MG/1
200 CAPSULE ORAL DAILY
Qty: 30 CAPSULE | Refills: 0 | Status: SHIPPED | OUTPATIENT
Start: 2019-08-21 | End: 2019-08-22 | Stop reason: SDUPTHER

## 2019-08-21 RX ORDER — GLYCOPYRROLATE 0.2 MG/ML
INJECTION INTRAMUSCULAR; INTRAVENOUS
Status: DISCONTINUED | OUTPATIENT
Start: 2019-08-21 | End: 2019-08-23

## 2019-08-21 RX ORDER — CEFAZOLIN SODIUM 1 G/3ML
2 INJECTION, POWDER, FOR SOLUTION INTRAMUSCULAR; INTRAVENOUS
Status: COMPLETED | OUTPATIENT
Start: 2019-08-21 | End: 2019-08-21

## 2019-08-21 RX ORDER — OXYCODONE HYDROCHLORIDE 5 MG/1
5 TABLET ORAL EVERY 6 HOURS PRN
Qty: 50 TABLET | Refills: 0 | Status: SHIPPED | OUTPATIENT
Start: 2019-08-21 | End: 2019-08-22 | Stop reason: SDUPTHER

## 2019-08-21 RX ORDER — ONDANSETRON 2 MG/ML
INJECTION INTRAMUSCULAR; INTRAVENOUS
Status: DISCONTINUED | OUTPATIENT
Start: 2019-08-21 | End: 2019-08-23

## 2019-08-21 RX ORDER — OXYCODONE HYDROCHLORIDE 10 MG/1
10 TABLET ORAL
Status: DISCONTINUED | OUTPATIENT
Start: 2019-08-21 | End: 2019-08-22 | Stop reason: HOSPADM

## 2019-08-21 RX ORDER — MUPIROCIN 20 MG/G
1 OINTMENT TOPICAL 2 TIMES DAILY
Status: DISCONTINUED | OUTPATIENT
Start: 2019-08-21 | End: 2019-08-22 | Stop reason: HOSPADM

## 2019-08-21 RX ORDER — VANCOMYCIN HCL IN 5 % DEXTROSE 1G/250ML
1000 PLASTIC BAG, INJECTION (ML) INTRAVENOUS
Status: COMPLETED | OUTPATIENT
Start: 2019-08-21 | End: 2019-08-21

## 2019-08-21 RX ORDER — ROPIVACAINE HYDROCHLORIDE 5 MG/ML
INJECTION, SOLUTION EPIDURAL; INFILTRATION; PERINEURAL
Status: COMPLETED | OUTPATIENT
Start: 2019-08-21 | End: 2019-08-21

## 2019-08-21 RX ORDER — PROPOFOL 10 MG/ML
VIAL (ML) INTRAVENOUS CONTINUOUS PRN
Status: DISCONTINUED | OUTPATIENT
Start: 2019-08-21 | End: 2019-08-23

## 2019-08-21 RX ORDER — FAMOTIDINE 20 MG/1
20 TABLET, FILM COATED ORAL 2 TIMES DAILY
Status: DISCONTINUED | OUTPATIENT
Start: 2019-08-21 | End: 2019-08-22 | Stop reason: HOSPADM

## 2019-08-21 RX ORDER — SODIUM CHLORIDE 9 MG/ML
INJECTION, SOLUTION INTRAVENOUS CONTINUOUS PRN
Status: DISCONTINUED | OUTPATIENT
Start: 2019-08-21 | End: 2019-08-23

## 2019-08-21 RX ORDER — ACETAMINOPHEN 500 MG
1000 TABLET ORAL EVERY 6 HOURS
Status: DISCONTINUED | OUTPATIENT
Start: 2019-08-22 | End: 2019-08-22 | Stop reason: HOSPADM

## 2019-08-21 RX ORDER — SODIUM CHLORIDE 9 MG/ML
INJECTION, SOLUTION INTRAVENOUS CONTINUOUS
Status: DISCONTINUED | OUTPATIENT
Start: 2019-08-21 | End: 2019-08-22 | Stop reason: HOSPADM

## 2019-08-21 RX ORDER — PANTOPRAZOLE SODIUM 40 MG/1
40 TABLET, DELAYED RELEASE ORAL 2 TIMES DAILY
Status: DISCONTINUED | OUTPATIENT
Start: 2019-08-21 | End: 2019-08-22 | Stop reason: HOSPADM

## 2019-08-21 RX ORDER — HYDROMORPHONE HYDROCHLORIDE 1 MG/ML
INJECTION, SOLUTION INTRAMUSCULAR; INTRAVENOUS; SUBCUTANEOUS
Status: COMPLETED
Start: 2019-08-21 | End: 2019-08-21

## 2019-08-21 RX ORDER — KETAMINE HCL IN 0.9 % NACL 50 MG/5 ML
SYRINGE (ML) INTRAVENOUS
Status: DISCONTINUED | OUTPATIENT
Start: 2019-08-21 | End: 2019-08-23

## 2019-08-21 RX ORDER — ROPIVACAINE HYDROCHLORIDE 2 MG/ML
INJECTION, SOLUTION EPIDURAL; INFILTRATION; PERINEURAL
Status: COMPLETED
Start: 2019-08-21 | End: 2019-08-21

## 2019-08-21 RX ORDER — PREGABALIN 75 MG/1
75 CAPSULE ORAL NIGHTLY
Status: DISCONTINUED | OUTPATIENT
Start: 2019-08-21 | End: 2019-08-22 | Stop reason: HOSPADM

## 2019-08-21 RX ORDER — PREGABALIN 75 MG/1
75 CAPSULE ORAL
Status: COMPLETED | OUTPATIENT
Start: 2019-08-21 | End: 2019-08-21

## 2019-08-21 RX ORDER — AMOXICILLIN 250 MG
1 CAPSULE ORAL 2 TIMES DAILY
Status: DISCONTINUED | OUTPATIENT
Start: 2019-08-21 | End: 2019-08-22 | Stop reason: HOSPADM

## 2019-08-21 RX ORDER — VANCOMYCIN HYDROCHLORIDE 1 G/20ML
INJECTION, POWDER, LYOPHILIZED, FOR SOLUTION INTRAVENOUS
Status: DISCONTINUED | OUTPATIENT
Start: 2019-08-21 | End: 2019-08-21 | Stop reason: HOSPADM

## 2019-08-21 RX ORDER — CELECOXIB 200 MG/1
400 CAPSULE ORAL
Status: COMPLETED | OUTPATIENT
Start: 2019-08-21 | End: 2019-08-21

## 2019-08-21 RX ORDER — ROPIVACAINE HYDROCHLORIDE 2 MG/ML
8 INJECTION, SOLUTION EPIDURAL; INFILTRATION; PERINEURAL CONTINUOUS
Status: DISCONTINUED | OUTPATIENT
Start: 2019-08-21 | End: 2019-08-22 | Stop reason: HOSPADM

## 2019-08-21 RX ORDER — SODIUM CHLORIDE 9 MG/ML
INJECTION, SOLUTION INTRAVENOUS
Status: COMPLETED | OUTPATIENT
Start: 2019-08-21 | End: 2019-08-21

## 2019-08-21 RX ORDER — TRANEXAMIC ACID 100 MG/ML
INJECTION, SOLUTION INTRAVENOUS
Status: DISCONTINUED | OUTPATIENT
Start: 2019-08-21 | End: 2019-08-21 | Stop reason: HOSPADM

## 2019-08-21 RX ORDER — ASPIRIN 81 MG/1
81 TABLET ORAL 2 TIMES DAILY
Status: DISCONTINUED | OUTPATIENT
Start: 2019-08-21 | End: 2019-08-22 | Stop reason: HOSPADM

## 2019-08-21 RX ORDER — FENTANYL CITRATE 50 UG/ML
INJECTION, SOLUTION INTRAMUSCULAR; INTRAVENOUS
Status: DISCONTINUED | OUTPATIENT
Start: 2019-08-21 | End: 2019-08-23

## 2019-08-21 RX ORDER — MIDAZOLAM HYDROCHLORIDE 1 MG/ML
INJECTION, SOLUTION INTRAMUSCULAR; INTRAVENOUS
Status: DISCONTINUED | OUTPATIENT
Start: 2019-08-21 | End: 2019-08-23

## 2019-08-21 RX ORDER — DOCUSATE SODIUM 100 MG/1
100 CAPSULE, LIQUID FILLED ORAL 2 TIMES DAILY
Qty: 60 CAPSULE | Refills: 0 | Status: SHIPPED | OUTPATIENT
Start: 2019-08-21 | End: 2019-08-22 | Stop reason: SDUPTHER

## 2019-08-21 RX ORDER — VALSARTAN 160 MG/1
160 TABLET ORAL DAILY
Status: DISCONTINUED | OUTPATIENT
Start: 2019-08-22 | End: 2019-08-21

## 2019-08-21 RX ORDER — LIDOCAINE HCL/PF 100 MG/5ML
SYRINGE (ML) INTRAVENOUS
Status: DISCONTINUED | OUTPATIENT
Start: 2019-08-21 | End: 2019-08-23

## 2019-08-21 RX ADMIN — MUPIROCIN 1 G: 20 OINTMENT TOPICAL at 08:08

## 2019-08-21 RX ADMIN — PREGABALIN 75 MG: 50 CAPSULE ORAL at 08:08

## 2019-08-21 RX ADMIN — GLYCOPYRROLATE 2 MCG: 0.2 INJECTION, SOLUTION INTRAMUSCULAR; INTRAVENOUS at 05:08

## 2019-08-21 RX ADMIN — HYDROMORPHONE HYDROCHLORIDE 0.2 MG: 1 INJECTION, SOLUTION INTRAMUSCULAR; INTRAVENOUS; SUBCUTANEOUS at 08:08

## 2019-08-21 RX ADMIN — PHENYLEPHRINE HYDROCHLORIDE 50 MCG: 10 INJECTION INTRAVENOUS at 06:08

## 2019-08-21 RX ADMIN — SENNOSIDES,DOCUSATE SODIUM 1 TABLET: 8.6; 5 TABLET, FILM COATED ORAL at 08:08

## 2019-08-21 RX ADMIN — MIDAZOLAM HYDROCHLORIDE 1 MG: 1 INJECTION, SOLUTION INTRAMUSCULAR; INTRAVENOUS at 05:08

## 2019-08-21 RX ADMIN — HYDROMORPHONE HYDROCHLORIDE 0.2 MG: 1 INJECTION, SOLUTION INTRAMUSCULAR; INTRAVENOUS; SUBCUTANEOUS at 09:08

## 2019-08-21 RX ADMIN — MIDAZOLAM HYDROCHLORIDE 1 MG: 1 INJECTION, SOLUTION INTRAMUSCULAR; INTRAVENOUS at 02:08

## 2019-08-21 RX ADMIN — FENTANYL CITRATE 50 MCG: 50 INJECTION INTRAMUSCULAR; INTRAVENOUS at 02:08

## 2019-08-21 RX ADMIN — VANCOMYCIN HYDROCHLORIDE 1000 MG: 1 INJECTION, POWDER, LYOPHILIZED, FOR SOLUTION INTRAVENOUS at 01:08

## 2019-08-21 RX ADMIN — CELECOXIB 400 MG: 200 CAPSULE ORAL at 01:08

## 2019-08-21 RX ADMIN — PREGABALIN 75 MG: 75 CAPSULE ORAL at 01:08

## 2019-08-21 RX ADMIN — HYDROMORPHONE HYDROCHLORIDE 0.2 MG: 1 INJECTION, SOLUTION INTRAMUSCULAR; INTRAVENOUS; SUBCUTANEOUS at 10:08

## 2019-08-21 RX ADMIN — CEFAZOLIN 2 G: 330 INJECTION, POWDER, FOR SOLUTION INTRAMUSCULAR; INTRAVENOUS at 05:08

## 2019-08-21 RX ADMIN — ACETAMINOPHEN 1000 MG: 10 INJECTION, SOLUTION INTRAVENOUS at 07:08

## 2019-08-21 RX ADMIN — ONDANSETRON 4 MG: 2 INJECTION INTRAMUSCULAR; INTRAVENOUS at 06:08

## 2019-08-21 RX ADMIN — ROPIVACAINE HYDROCHLORIDE 10 ML: 5 INJECTION, SOLUTION EPIDURAL; INFILTRATION; PERINEURAL at 02:08

## 2019-08-21 RX ADMIN — PROPOFOL 50 MG: 10 INJECTION, EMULSION INTRAVENOUS at 05:08

## 2019-08-21 RX ADMIN — TRANEXAMIC ACID 1000 MG: 100 INJECTION, SOLUTION INTRAVENOUS at 05:08

## 2019-08-21 RX ADMIN — LIDOCAINE HYDROCHLORIDE 40 MG: 20 INJECTION, SOLUTION INTRAVENOUS at 05:08

## 2019-08-21 RX ADMIN — SODIUM CHLORIDE: 0.9 INJECTION, SOLUTION INTRAVENOUS at 07:08

## 2019-08-21 RX ADMIN — SODIUM CHLORIDE, SODIUM GLUCONATE, SODIUM ACETATE, POTASSIUM CHLORIDE, MAGNESIUM CHLORIDE, SODIUM PHOSPHATE, DIBASIC, AND POTASSIUM PHOSPHATE: .53; .5; .37; .037; .03; .012; .00082 INJECTION, SOLUTION INTRAVENOUS at 06:08

## 2019-08-21 RX ADMIN — ASPIRIN 81 MG: 81 TABLET, COATED ORAL at 08:08

## 2019-08-21 RX ADMIN — LIDOCAINE HYDROCHLORIDE 10 MG: 10 INJECTION, SOLUTION EPIDURAL; INFILTRATION; INTRACAUDAL; PERINEURAL at 01:08

## 2019-08-21 RX ADMIN — ROPIVACAINE HYDROCHLORIDE 8 ML/HR: 2 INJECTION, SOLUTION EPIDURAL; INFILTRATION at 07:08

## 2019-08-21 RX ADMIN — PROPOFOL 100 MCG/KG/MIN: 10 INJECTION, EMULSION INTRAVENOUS at 05:08

## 2019-08-21 RX ADMIN — FAMOTIDINE 20 MG: 20 TABLET, FILM COATED ORAL at 08:08

## 2019-08-21 RX ADMIN — Medication 15 MG: at 05:08

## 2019-08-21 RX ADMIN — FENTANYL CITRATE 25 MCG: 50 INJECTION, SOLUTION INTRAMUSCULAR; INTRAVENOUS at 05:08

## 2019-08-21 RX ADMIN — Medication 5 MG: at 05:08

## 2019-08-21 RX ADMIN — PANTOPRAZOLE SODIUM 40 MG: 40 TABLET, DELAYED RELEASE ORAL at 08:08

## 2019-08-21 RX ADMIN — SODIUM CHLORIDE: 0.9 INJECTION, SOLUTION INTRAVENOUS at 05:08

## 2019-08-21 RX ADMIN — MONTELUKAST 10 MG: 10 TABLET, FILM COATED ORAL at 10:08

## 2019-08-21 RX ADMIN — MEPIVACAINE HYDROCHLORIDE 3 ML: 15 INJECTION, SOLUTION EPIDURAL; INFILTRATION at 05:08

## 2019-08-21 RX ADMIN — SODIUM CHLORIDE: 0.9 INJECTION, SOLUTION INTRAVENOUS at 01:08

## 2019-08-21 RX ADMIN — OXYCODONE HYDROCHLORIDE 5 MG: 5 TABLET ORAL at 07:08

## 2019-08-21 RX ADMIN — DEXAMETHASONE SODIUM PHOSPHATE 8 MG: 4 INJECTION, SOLUTION INTRAMUSCULAR; INTRAVENOUS at 05:08

## 2019-08-21 RX ADMIN — MUPIROCIN 1 G: 20 OINTMENT TOPICAL at 01:08

## 2019-08-21 NOTE — INTERVAL H&P NOTE
The patient has been examined and the H&P has been reviewed:    I concur with the findings and no changes have occurred since H&P was written.    Anesthesia/Surgery risks, benefits and alternative options discussed and understood by patient/family.          Active Hospital Problems    Diagnosis  POA    S/P TKR (total knee replacement) using cement, left [Z96.652]  Not Applicable      Resolved Hospital Problems   No resolved problems to display.

## 2019-08-21 NOTE — ANESTHESIA PROCEDURE NOTES
Spinal    Diagnosis: arthritis  Patient location during procedure: OR  Start time: 8/21/2019 3:19 PM  Timeout: 8/21/2019 3:18 PM  End time: 8/21/2019 3:22 PM    Staffing  Authorizing Provider: Eros Donis MD  Performing Provider: Eros Donis MD    Preanesthetic Checklist  Completed: patient identified, site marked, surgical consent, pre-op evaluation, timeout performed, IV checked, risks and benefits discussed and monitors and equipment checked  Spinal Block  Patient position: sitting  Prep: ChloraPrep  Patient monitoring: heart rate, cardiac monitor and continuous pulse ox  Approach: midline  Location: L4-5  Injection technique: single shot  CSF Fluid: clear free-flowing CSF  Needle  Needle type: pencil-tip   Needle gauge: 25 G  Needle length: 3.5 in  Additional Documentation: incremental injection, negative aspiration for heme and no paresthesia on injection  Needle localization: anatomical landmarks  Assessment  Ease of block: moderate  Patient's tolerance of the procedure: no complaints and comfortable throughout block

## 2019-08-21 NOTE — ANESTHESIA PROCEDURE NOTES
Adductor Canal Catheter    Patient location during procedure: pre-op   Block not for primary anesthetic.  Reason for block: at surgeon's request and post-op pain management   Post-op Pain Location: left knee pain  Start time: 8/21/2019 2:05 PM  Timeout: 8/21/2019 2:01 PM   End time: 8/21/2019 2:20 PM    Staffing  Authorizing Provider: Brett Urena MD  Performing Provider: Dom Dobson MD    Preanesthetic Checklist  Completed: patient identified, site marked, surgical consent, pre-op evaluation, timeout performed, IV checked, risks and benefits discussed and monitors and equipment checked  Peripheral Block  Patient position: supine  Prep: ChloraPrep and site prepped and draped  Patient monitoring: heart rate, cardiac monitor, continuous pulse ox, continuous capnometry and frequent blood pressure checks  Block type: adductor canal  Laterality: left  Injection technique: continuous  Needle  Needle type: Tuohy   Needle gauge: 17 G  Needle length: 3.5 in  Needle localization: anatomical landmarks and ultrasound guidance  Catheter type: spring wound  Catheter size: 19 G  Test dose: lidocaine 1.5% with Epi 1-to-200,000 and negative   -ultrasound image captured on disc.  Assessment  Injection assessment: negative aspiration, negative parasthesia and local visualized surrounding nerve  Paresthesia pain: none  Heart rate change: no  Slow fractionated injection: yes  Additional Notes  20 cc of 0.25% ropivacaine with epi.  VSS.  [facility] RN monitoring vitals throughout procedure.  Patient tolerated procedure well.

## 2019-08-22 VITALS
TEMPERATURE: 98 F | WEIGHT: 160 LBS | OXYGEN SATURATION: 100 % | BODY MASS INDEX: 28.35 KG/M2 | HEIGHT: 63 IN | SYSTOLIC BLOOD PRESSURE: 120 MMHG | HEART RATE: 66 BPM | DIASTOLIC BLOOD PRESSURE: 58 MMHG | RESPIRATION RATE: 18 BRPM

## 2019-08-22 PROCEDURE — 97116 GAIT TRAINING THERAPY: CPT

## 2019-08-22 PROCEDURE — 97165 OT EVAL LOW COMPLEX 30 MIN: CPT

## 2019-08-22 PROCEDURE — 97161 PT EVAL LOW COMPLEX 20 MIN: CPT

## 2019-08-22 PROCEDURE — 25000003 PHARM REV CODE 250: Performed by: STUDENT IN AN ORGANIZED HEALTH CARE EDUCATION/TRAINING PROGRAM

## 2019-08-22 PROCEDURE — 25000003 PHARM REV CODE 250: Performed by: ORTHOPAEDIC SURGERY

## 2019-08-22 PROCEDURE — 63600175 PHARM REV CODE 636 W HCPCS: Performed by: NURSE PRACTITIONER

## 2019-08-22 PROCEDURE — 99212 OFFICE O/P EST SF 10 MIN: CPT | Mod: ,,, | Performed by: ANESTHESIOLOGY

## 2019-08-22 PROCEDURE — 99212 PR OFFICE/OUTPT VISIT, EST, LEVL II, 10-19 MIN: ICD-10-PCS | Mod: ,,, | Performed by: ANESTHESIOLOGY

## 2019-08-22 PROCEDURE — 63600175 PHARM REV CODE 636 W HCPCS: Performed by: NURSE ANESTHETIST, CERTIFIED REGISTERED

## 2019-08-22 PROCEDURE — 97530 THERAPEUTIC ACTIVITIES: CPT

## 2019-08-22 PROCEDURE — 97535 SELF CARE MNGMENT TRAINING: CPT | Mod: 59

## 2019-08-22 PROCEDURE — 25000003 PHARM REV CODE 250: Performed by: NURSE PRACTITIONER

## 2019-08-22 RX ORDER — PROMETHAZINE HYDROCHLORIDE 25 MG/1
25 TABLET ORAL EVERY 4 HOURS PRN
Qty: 61 TABLET | Refills: 0 | Status: SHIPPED | OUTPATIENT
Start: 2019-08-22 | End: 2020-10-07

## 2019-08-22 RX ORDER — CELECOXIB 200 MG/1
200 CAPSULE ORAL DAILY
Status: DISCONTINUED | OUTPATIENT
Start: 2019-08-22 | End: 2019-08-22 | Stop reason: HOSPADM

## 2019-08-22 RX ORDER — DOCUSATE SODIUM 100 MG/1
100 CAPSULE, LIQUID FILLED ORAL 2 TIMES DAILY
Qty: 60 CAPSULE | Refills: 0 | Status: SHIPPED | OUTPATIENT
Start: 2019-08-22 | End: 2019-09-19

## 2019-08-22 RX ORDER — HYDRALAZINE HYDROCHLORIDE 25 MG/1
25 TABLET, FILM COATED ORAL ONCE
Status: COMPLETED | OUTPATIENT
Start: 2019-08-22 | End: 2019-08-22

## 2019-08-22 RX ORDER — CELECOXIB 200 MG/1
200 CAPSULE ORAL DAILY
Qty: 30 CAPSULE | Refills: 0 | Status: SHIPPED | OUTPATIENT
Start: 2019-08-22 | End: 2019-08-29

## 2019-08-22 RX ORDER — DEXTROMETHORPHAN HYDROBROMIDE, GUAIFENESIN 5; 100 MG/5ML; MG/5ML
650 LIQUID ORAL EVERY 6 HOURS PRN
Qty: 120 TABLET | Refills: 0 | Status: SHIPPED | OUTPATIENT
Start: 2019-08-22

## 2019-08-22 RX ORDER — OXYCODONE HYDROCHLORIDE 5 MG/1
5 TABLET ORAL EVERY 6 HOURS PRN
Qty: 50 TABLET | Refills: 0 | Status: SHIPPED | OUTPATIENT
Start: 2019-08-22 | End: 2019-08-27 | Stop reason: SDUPTHER

## 2019-08-22 RX ORDER — ASPIRIN 81 MG/1
81 TABLET ORAL 2 TIMES DAILY
Qty: 60 TABLET | Refills: 0 | Status: SHIPPED | OUTPATIENT
Start: 2019-08-22 | End: 2022-12-07

## 2019-08-22 RX ADMIN — ROPIVACAINE HYDROCHLORIDE 8 ML/HR: 2 INJECTION, SOLUTION EPIDURAL; INFILTRATION at 05:08

## 2019-08-22 RX ADMIN — ATORVASTATIN CALCIUM 80 MG: 20 TABLET, FILM COATED ORAL at 09:08

## 2019-08-22 RX ADMIN — METOPROLOL SUCCINATE 25 MG: 25 TABLET, EXTENDED RELEASE ORAL at 09:08

## 2019-08-22 RX ADMIN — SENNOSIDES,DOCUSATE SODIUM 1 TABLET: 8.6; 5 TABLET, FILM COATED ORAL at 09:08

## 2019-08-22 RX ADMIN — OXYCODONE HYDROCHLORIDE 5 MG: 5 TABLET ORAL at 01:08

## 2019-08-22 RX ADMIN — HYDRALAZINE HYDROCHLORIDE 25 MG: 25 TABLET, FILM COATED ORAL at 06:08

## 2019-08-22 RX ADMIN — CEFAZOLIN 2 G: 1 INJECTION, POWDER, FOR SOLUTION INTRAMUSCULAR; INTRAVENOUS at 02:08

## 2019-08-22 RX ADMIN — CELECOXIB 200 MG: 200 CAPSULE ORAL at 09:08

## 2019-08-22 RX ADMIN — CEFAZOLIN 2 G: 1 INJECTION, POWDER, FOR SOLUTION INTRAMUSCULAR; INTRAVENOUS at 09:08

## 2019-08-22 RX ADMIN — SODIUM CHLORIDE: 0.9 INJECTION, SOLUTION INTRAVENOUS at 05:08

## 2019-08-22 RX ADMIN — PANTOPRAZOLE SODIUM 40 MG: 40 TABLET, DELAYED RELEASE ORAL at 09:08

## 2019-08-22 RX ADMIN — ASPIRIN 81 MG: 81 TABLET, COATED ORAL at 09:08

## 2019-08-22 RX ADMIN — CANDESARTAN CILEXETIL 16 MG: 4 TABLET ORAL at 09:08

## 2019-08-22 RX ADMIN — POLYETHYLENE GLYCOL 3350 17 G: 17 POWDER, FOR SOLUTION ORAL at 09:08

## 2019-08-22 RX ADMIN — ACETAMINOPHEN 1000 MG: 500 TABLET ORAL at 05:08

## 2019-08-22 RX ADMIN — ACETAMINOPHEN 1000 MG: 500 TABLET ORAL at 01:08

## 2019-08-22 RX ADMIN — FAMOTIDINE 20 MG: 20 TABLET, FILM COATED ORAL at 09:08

## 2019-08-22 RX ADMIN — OXYCODONE HYDROCHLORIDE 5 MG: 5 TABLET ORAL at 09:08

## 2019-08-22 RX ADMIN — SERTRALINE HYDROCHLORIDE 50 MG: 50 TABLET ORAL at 09:08

## 2019-08-22 NOTE — NURSING
Pt family member states they received call from [facility] pharmacy and they stated they can not fill RX for pain medication. Pt family request to have pain med RX filled at [facility] in pt pharmacy. Paged on call MD for Dr. Rodriguez

## 2019-08-22 NOTE — SUBJECTIVE & OBJECTIVE
Principal Problem:S/P TKR (total knee replacement) using cement, left    Principal Orthopedic Problem: same    Interval History: The patient was seen and examined this morning at the bedside. Patient reports no acute issues overnight.  Pain controlled.  Ready to work with PT today.       Review of patient's allergies indicates:   Allergen Reactions    Vicodin [hydrocodone-acetaminophen] Anxiety       Current Facility-Administered Medications   Medication    0.9%  NaCl infusion    acetaminophen tablet 1,000 mg    albuterol inhaler 2 puff    aspirin EC tablet 81 mg    atorvastatin tablet 80 mg    bisacodyl suppository 10 mg    candesartan tablet 16 mg    ceFAZolin injection 2 g    famotidine tablet 20 mg    fluticasone furoate-vilanterol 100-25 mcg/dose diskus inhaler 1 puff    metoprolol succinate (TOPROL-XL) 24 hr tablet 25 mg    montelukast tablet 10 mg    morphine injection 2 mg    morphine injection 2 mg    morphine injection 2 mg    mupirocin 2 % ointment 1 g    naloxone 0.4 mg/mL injection 0.02 mg    ondansetron injection 4 mg    oxyCODONE immediate release tablet 10 mg    oxyCODONE immediate release tablet 15 mg    oxyCODONE immediate release tablet 5 mg    pantoprazole EC tablet 40 mg    polyethylene glycol packet 17 g    pregabalin capsule 75 mg    promethazine (PHENERGAN) 6.25 mg in dextrose 5 % 50 mL IVPB    ramelteon tablet 8 mg    ropivacaine (PF) 2 mg/ml (0.2%) infusion    ropivacaine 0.2% ON-Q C-BLOC 400 ML (SELECT A FLOW)    senna-docusate 8.6-50 mg per tablet 1 tablet    sertraline tablet 50 mg    sodium chloride 0.9% flush 10 mL     Facility-Administered Medications Ordered in Other Encounters   Medication    0.9%  NaCl infusion    dexamethasone injection    electrolyte-S (ISOLYTE)    fentaNYL injection    glycopyrrolate injection    ketamine in 0.9 % sod chloride 50 mg/5 mL (10 mg/mL) injection    lidocaine (cardiac) injection    midazolam injection     "ondansetron injection    phenylephrine injection    propofol (DIPRIVAN) 10 mg/mL infusion    propofol (DIPRIVAN) 10 mg/mL infusion    tranexamic acid injection Soln     Objective:     Vital Signs (Most Recent):  Temp: 98.7 °F (37.1 °C) (08/22/19 0437)  Pulse: 64 (08/22/19 0437)  Resp: 18 (08/22/19 0437)  BP: (!) 155/67 (08/22/19 0437)  SpO2: 100 % (08/22/19 0437) Vital Signs (24h Range):  Temp:  [97 °F (36.1 °C)-98.7 °F (37.1 °C)] 98.7 °F (37.1 °C)  Pulse:  [58-77] 64  Resp:  [12-24] 18  SpO2:  [97 %-100 %] 100 %  BP: (129-187)/(54-85) 155/67     Weight: 72.6 kg (160 lb)  Height: 5' 3" (160 cm)  Body mass index is 28.34 kg/m².      Intake/Output Summary (Last 24 hours) at 8/22/2019 0731  Last data filed at 8/22/2019 0511  Gross per 24 hour   Intake 3604 ml   Output 50 ml   Net 3554 ml       Ortho/SPM Exam    Awake/alert/oriented x3, No acute distress, Afebrile, Vital signs stable  Good inspiratory effort with unlaboured breathing  Dressings c/d/i  You posey in place  NVI in operative limb     "

## 2019-08-22 NOTE — PLAN OF CARE
08/22/19 1207   Post-Acute Status   Post-Acute Authorization Placement   Post-Acute Placement Status Referrals Sent     Patient will be discharging today with home health. JOAQUINA faxed a referral to [facility] and will follow up.    Yee Lovell LMSW  [facility]   [phone]

## 2019-08-22 NOTE — NURSING
OR nurse for Dr. Hanna returned MD beck states that they have already sent RX for oxycodone to [facility] pharmacy. Pt has to speak with [facility] concerning if medication is to soon to refill.

## 2019-08-22 NOTE — PT/OT/SLP EVAL
Physical Therapy Evaluation    Patient Name:  Elena Frances   MRN:  [id number]    Recommendations:     Discharge Recommendations:  home with home health   Discharge Equipment Recommendations: commode   Barriers to discharge: Decreased caregiver support    Assessment:       Elena Frances is a 79 y.o. female admitted with a medical diagnosis of S/P L TKR (total knee replacement) using cement, left and pertinent PMHx and surgical history including.  She presents with the following impairments/functional limitations:  weakness, impaired functional mobilty, orthopedic precautions, impaired self care skills, gait instability, pain, decreased ROM, impaired endurance.      Patient demonstrates good motivation and fair plus activity tolerance secondary pain.  Patient requires between stand by assistance and minimum assistance for bed mobility, transfers, ambulation and stair climbing secondary to pain and mild weakness.  Patient with good understanding of TkA protocol, exercises and safety with ambulation. Patient ambulated 120 ft with CGA and rolling walker.     Rehab Prognosis: Good; patient would benefit from acute skilled PT services BID to address these deficits and reach maximum level of function.  Patient is most appropriate to go to home with home health .  Recent Surgery: Procedure(s) (LRB):  ARTHROPLASTY, KNEE, TOTAL-DEPUY-SIGMA (Left) 1 Day Post-Op    Plan:     During this hospitalization, patient to be seen BID to address the identified rehab impairments via gait training, therapeutic activities, therapeutic exercises, neuromuscular re-education and progress toward the following goals:    · Plan of Care Expires:  09/05/19    Subjective     Pain/Comfort:  · Pain Rating 1: 3/10  · Location - Side 1: Left  · Location 1: knee  · Pain Rating Post-Intervention 1: 3/10  · Pain Addressed 2: Reposition, Cessation of Activity, Nurse notified    Patients cultural, spiritual, Taoist conflicts given the  "current situation:      Living Environment:  Living Environment: Pt. Lives with  in [facility]. No steps.  Previous level of function: Independent  Equipment Used at Home:  walker, rolling  Assistance upon Discharge:  and family are able to assist upon d/c.    Objective:     Communicated with RN prior to session.  Patient found up in chair with FCD, perineural catheter  upon PT entry to room.    General Precautions: Standard, fall   Orthopedic Precautions:LLE non weight bearing   Braces: N/A     Exams:  · RLE ROM: Deficits: 5-80  · LLE ROM: WFL  · LLE Strength: WFL  ·     Functional Mobility:  · Bed Mobility:     · Supine to Sit: stand by assistance  · Sit to Supine: minimum assistance  · Transfers:     · Sit to Stand:  stand by assistance with rolling walker  · Gait: patient ambulated 120 ft with CGA and rolling walker.    · Stairs:  Pt ascended/descended 6" curb step with Rolling Walker with no handrails with Stand-by Assistance.       Therapeutic Activities and Exercises:   TKA Exercises: Patient performed 1 set(s) of 10 repetitions of  ankle pumps, quad sets, glut sets, heel slides, supine hip abduction, long arc quads, short arc quads and straight leg raises for left LE.  Gait training:  Patient required cues for step through gait pattern, upright posture and weight bearing status to increase independence and safety.  Patient required cues ~ 25% of the time.    Patient Education:    Patient educated on car transfers, Fall risk, gait training, home safety, Home exercise program, TKA protocol and Weight bearing restrictions by explanation.  Patient was receptive to education and verbalizes understanding.     AM-PAC 6 CLICK MOBILITY  Total Score:21     Patient left supine with all lines intact and call button in reach.    GOALS:   Multidisciplinary Problems     Physical Therapy Goals        Problem: Physical Therapy Goal    Goal Priority Disciplines Outcome Goal Variances Interventions   Physical Therapy " Goal     PT, PT/OT Ongoing (interventions implemented as appropriate)     Description:  Goals to be met by: 2019    Patient will increase functional independence with mobility by performin. Supine to sit with Set-up [place]  2. Sit to supine with Set-up [place]  3. Sit to stand transfer with Supervision  4. Gait  x 150 feet with Supervision using Rolling Walker.   5. Ascend/descend 1 stair without Handrails Contact Guard Assistance using Rolling Walker.   6. Lower extremity exercise program x30 reps per handout, with independence                        History:     Past Medical History:   Diagnosis Date    Allergy     Anemia     Arthritis     Asthma     Depression     Generalized headaches 2014    Goiter     MNG    HTN (hypertension), benign     Hyperlipidemia     Hyperparathyroidism     s/p surgery    Intestinal obstruction     resolved spontaneously    Lumbar disc disease     s/p epidural shots    Nuclear sclerosis - Both Eyes 3/11/2014    Osteoporosis, post-menopausal     with 3 rib fractures       Past Surgical History:   Procedure Laterality Date    APPENDECTOMY      ARTHROPLASTY, KNEE, TOTAL-DEPUY-SIGMA Left 2019    Performed by Newton Rodriguez III, MD at [facility] OR 2ND FLR    BILATERAL OOPHORECTOMY      BREAST CYST EXCISION      left    CATARACT EXTRACTION W/  INTRAOCULAR LENS IMPLANT Right 2016    Dr. Fang ([place])    CATARACT EXTRACTION W/  INTRAOCULAR LENS IMPLANT Left 10/17/2016    Dr. Fang ([place])     SECTION, CLASSIC      x 1    CHOLECYSTECTOMY      COLONOSCOPY N/A 3/1/2016    Performed by Dony Bronson MD at [facility] ENDO (4TH FLR)    ESOPHAGOGASTRODUODENOSCOPY (EGD) N/A 3/1/2016    Performed by Dony Bronson MD at [facility] ENDO (4TH FLR)    EXCISION-DUCT Left 2013    Performed by Jordan Oh MD at [facility] OR 2ND FLR    HYSTERECTOMY      INSERTION-INTRAOCULAR LENS (IOL) Left 10/17/2016    [facility] at  [facility] OR    INSERTION-INTRAOCULAR LENS (IOL) Right 9/26/2016    Performed by Susana Fang MD at [facility] OR    KNEE SURGERY Right 02/07/2017    TKR    LUMBAR EPIDURAL INJECTION      x 4    MOUTH SURGERY      for abscess drainage of gum of frontal teeth    PARATHYROID GLAND SURGERY      for parathyroid adenoma    PHACOEMULSIFICATION-ASPIRATION-CATARACT Left 10/17/2016    Performed by Sus[facility] Fang [facility] at [facility] OR    PHACOEMULSIFICATION-ASPIRATION-CATARACT Right 9/26/2016    Performed by [facility] Fang MD at [facility] OR    REPLACEMENT-KNEE-TOTAL Right 2/7/2017    Performed by Rom Moran [facility] at [facility] OR [address] [place] [address]       Time Tracking:     PT Received On: 08/22/19  PT Start Time: 1107     PT Stop Time: 1135  PT Total Time (min): 28 min     Billable Minutes: Evaluation 10 min and Gait Training 18 min      Iglesia Bland, PT  08/22/2019

## 2019-08-22 NOTE — PT/OT/SLP EVAL
Occupational Therapy   Evaluation    Name: Elena Frances  MRN: [id number]  Admitting Diagnosis:  S/P TKR (total knee replacement) using cement, left 1 Day Post-Op    Recommendations:     Discharge Recommendations: home with home health  Discharge Equipment Recommendations:  commode  Barriers to discharge:  None    Assessment:     Elena Frances is a 79 y.o. female with a medical diagnosis of S/P TKR (total knee replacement) using cement, left.  She presents with the following Performance deficits affecting function: weakness, impaired endurance, impaired self care skills, impaired functional mobilty, impaired balance, pain.      Pt ambulated ~30ft SBA using RW with no LOB or SOB. Pt. Is SBA in adls and functional mobility and safe to d/c home with home health.     Rehab Prognosis: Good; patient would benefit from acute skilled OT services to address these deficits and reach maximum level of function.       Plan:     Patient to be seen daily to address the above listed problems via self-care/home management, therapeutic activities, therapeutic exercises  · Plan of Care Expires: 08/29/19  · Plan of Care Reviewed with: patient, family    Subjective     Chief Complaint: none stated  Patient/Family Comments/goals: to go home    Occupational Profile:  Living Environment: Pt. Lives with  in [facility]. No steps.  Previous level of function: Independent  Equipment Used at Home:  walker, rolling  Assistance upon Discharge:  and family are able to assist upon d/c.    Pain/Comfort:  · Pain Rating 1: 0/10    Patients cultural, spiritual, Temple conflicts given the current situation: no    Objective:     Communicated with: RN prior to session.  Patient found supine with perineural catheter, FCD upon OT entry to room.    General Precautions: Standard, fall   Orthopedic Precautions:LLE weight bearing as tolerated   Braces:       Occupational Performance:    Bed Mobility:    · Patient completed  Scooting/Bridging with stand by assistance  · Patient completed Supine to Sit with stand by assistance    Functional Mobility/Transfers:  · Patient completed Sit <> Stand Transfer with stand by assistance  with  rolling walker   · Patient completed Bed <> Chair Transfer using Stand Pivot technique with stand by assistance with rolling walker  · Patient completed Toilet Transfer Stand Pivot technique with stand by assistance with  rolling walker  · Functional Mobility: Pt ambulated ~30ft SBA using RW with no LOB or SOB. Pt. Is SBA in adls and functional mobility and safe to d/c home with home health.     Activities of Daily Living:  · Grooming: stand by assistance standing at sink with RW  · Upper Body Dressing: stand by assistance john overhead shirt  · Lower Body Dressing: stand by assistance john pants and socks  · Toileting: stand by assistance bsc over toilet    Cognitive/Visual Perceptual:  Cognitive/Psychosocial Skills:     -       Oriented to: Person, Place, Time and Situation   -       Safety awareness/insight to disability: intact     Physical Exam:  Upper Extremity Range of Motion:     -       Right Upper Extremity: WFL  -       Left Upper Extremity: WFL  Upper Extremity Strength:    -       Right Upper Extremity: WFL  -       Left Upper Extremity: WFL   Strength:    -       Right Upper Extremity: WFL  -       Left Upper Extremity: WFL    AMPAC 6 Click ADL:  AMPAC Total Score: 22    Treatment & Education:  - OT/POC-  - Importance of mobility to maximize recovery.  - Pt. Demonstrated understanding of LB dressing techniques and cryotherapy protocols    Education:    Patient left up in chair with all lines intact, call button in reach, RN notified and family present    GOALS:   Multidisciplinary Problems     Occupational Therapy Goals        Problem: Occupational Therapy Goal    Goal Priority Disciplines Outcome Interventions   Occupational Therapy Goal     OT, PT/OT Ongoing (interventions implemented as  appropriate)    Description:  Goals to be met by: 2019    Patient will increase functional independence with ADLs by performing:    UE Dressing with Stand-by Assistance.  LE Dressing with Stand-by Assistance.  Grooming while standing with Stand-by Assistance.  Toileting from toilet with Stand-by Assistance for hygiene and clothing management.     Radha Cobblow, OT  2019                        History:     Past Medical History:   Diagnosis Date    Allergy     Anemia     Arthritis     Asthma     Depression     Generalized headaches 2014    Goiter     MNG    HTN (hypertension), benign     Hyperlipidemia     Hyperparathyroidism     s/p surgery    Intestinal obstruction     resolved spontaneously    Lumbar disc disease     s/p epidural shots    Nuclear sclerosis - Both Eyes 3/11/2014    Osteoporosis, post-menopausal     with 3 rib fractures       Past Surgical History:   Procedure Laterality Date    APPENDECTOMY      BILATERAL OOPHORECTOMY      BREAST CYST EXCISION      left    CATARACT EXTRACTION W/  INTRAOCULAR LENS IMPLANT Right 2016    Dr. Fang ([place])    CATARACT EXTRACTION W/  INTRAOCULAR LENS IMPLANT Left 10/17/2016    Dr. Fang ([place])     SECTION, CLASSIC      x 1    CHOLECYSTECTOMY      COLONOSCOPY N/A 3/1/2016    Performed by D[facility] Bronson [facility] at [facility] ENDO (4TH FLR)    ESOPHAGOGASTRODUODENOSCOPY (EGD) N/A 3/1/2016    Performed by Dony Bronson MD at [facility] ENDO (4TH FLR)    EXCISION-DUCT Left 2013    Performed by Jordan Oh MD at [facility] OR 2ND FLR    HYSTERECTOMY      INSERTION-INTRAOCULAR LENS (IOL) Left 10/17/2016    [facility] [place]    INSERTION-INTRAOCULAR LENS (IOL) Right 2016    Performed by Sus[facility] at [facility] [place]    KNEE SURGERY Right 2017    TKR    LUMBAR EPIDURAL INJECTION      x 4    MOUTH SURGERY      for abscess drainage of gum of frontal teeth    PARATHYROID GLAND SURGERY      for  parathyroid adenoma    PHACOEMULSIFICATION-ASPIRATION-CATARACT Left 10/17/2016    Performed by Suasna Fang MD at [facility] OR    PHACOEMULSIFICATION-ASPIRATION-CATARACT Right 9/26/2016    Performed by Susana Fang MD at [facility] OR    REPLACEMENT-KNEE-TOTAL Right 2/7/2017    Performed by Rom Moran MD at [facility] OR [facility] FLR       Time Tracking:     OT Date of Treatment: 08/22/19  OT Start Time: 0847  OT Stop Time: 0924  OT Total Time (min): 37 min    Billable Minutes:Evaluation 10  Self Care/Home Management 17  Therapeutic Activity 10    Radha Salas, OT  8/22/2019

## 2019-08-22 NOTE — OP NOTE
Michael Left TKA  OPERATIVE NOTE    Date of Operation:   8/21/2019    Providers Performing:   Surgeon(s):  MD Oliverio Glez III, MD Assistant    Operative Procedure:   Left Total Knee Arthroplasty, CPT 99173    Preoperative Diagnosis:   Left Knee Osteoarthritis, ICD-10 M17.12    Postoperative Diagnosis:   SAME    Components Used:   Depuy  Femur: Sigma PS Size 3  Tibia: Sigma fixed bearing PS Size 3  Patella: Sigma Oval Dome 35 mm  Tibial Insert: Sigma fixed bearing PS inset size 10 mm  2 packs cement: Simplex    Implant Name Type Inv. Item Serial No.  Lot No. LRB No. Used Action   CEMENT BONE WHOLE BATCH - [id number]  CEMENT BONE WHOLE BATCH  [employer] [id number] Left 1 Implanted   CEMENT BONE WHOLE BATCH - [id number]  CEMENT BONE WHOLE BATCH  [employer] URB[employer]. [id number] Left 1 Implanted   COMPONENT FEM POST SZ 3 LEFT - [id number]  COMPONENT FEM POST SZ 3 LEFT  [employer] [id number] Left 1 Implanted   TRAY TIBIAL CEMENT SZ 3 COBAL - [id number]  TRAY TIBIAL CEMENT SZ 3 COBAL  [employer] [id number] Left 1 Implanted   PATELLA OVAL DOME 35MM - [id number]  PATELLA OVAL DOME 35MM  [employer] [id number] Left 1 Implanted   INSERT PFC SIGMA TIB SZ3 10MM - [id number]  INSERT PFC SIGMA TIB SZ3 10MM  [employer]PU[employer]. [id number] Left 1 Implanted       Anesthesia:   Spinal  Adductor canal block with catheter    Estimated Blood Loss:   200 cc    Specimens:   Tissue sent for pathology per protocol    Complications:   None    Indications:   The patient has failed non-operative measures including medications, injections and activity modifications for their knee.  After an explanation of risks and benefits of knee arthroplasty surgery, the patient wishes to proceed with surgery.    Operative Notes:   The patient was greeted in the pre-op holding area.  The patients knee was marked with a surgical marker by the surgeon.  Spinal-Epidural anesthesia was administered by the anesthesia team.  The patient was placed in  the supine position on the OR table with all bony prominences padded.  A tourniquet was not used.  IV antibiotics were administered.  The leg was prepped and draped in the usual sterile fashion.  A timeout was performed and the correct patient, site and procedure were identified.    A midline incision was made with a standard medical parapatellar arthrotomy.  The standard medial capsular release was performed along with the lateral gutter.  The patella was mobilized from the lateral parapatellar ligament and bony osteophytes were removed.  The intercondylar notch was cleared of the ACL/PCL.    The extramedullary tibial alignment guide was placed in the appropriate slope and varus/valgus orientation and the proximal cutting block secured by pins.  Measured resection with a 8mm cut was accounted for from the high side of the plateau, perpendicular to the ankle.  The cut was made with an oscillating saw.  We then obtained access to the femoral canal with the stepped drill.  The intramedullary kevin was placed in 6 degrees of valgus with a 9mm planned resection.  Our distal femoral cuts were made.  The knee was placed in extension and the medical and lateral meniscal remnants removed.  A spacer block was placed with the knee in extension checking for alignment and balance which we were happy with.    The knee was then brought into flexion and the distal femoral gap assessed for appropriate rotation.  Our distal femoral sizing guide was placed and sized appropriately.  Guide pins were placed in the appropriate external rotation.  This was assessed with the 4 in 1 cutting block and the flexion gap sizing block which was appropriate.  The distal femoral preparation was completed after the anterior, posterior and chamfer cuts were made.  The box cutting guide was placed and assessed.  The box cut was made.    At this time the trial implants were placed and knee assessed for stability, and flexion arc. The patella was prepared  using free-hand technique and the three-holed lug drill guide was sized and appropriately assessed. Patella tracking was found to be acceptable. The tibial component rotation was marked. The trials were removed. The tibial component was positioned and the keel was drilled and punched.    The wound was copiously irrigated with pulsitile lavage and the bony surfaces dried.  Cement was mixed on the back table and applied to all components.  Cementation of the femoral, tibial and patellar components was performed sequentially with removal of all excess cement. A trial insert was placed to provide compression while the cement dried and a 0.35% betadine solution was left to soak in the surgical field.     After the cement was dry, the trial poly insert was removed. Hemostasis was obtained with bovie electrocautery.  The knee was again copiously irrigated with pulsatile lavage. The final polyethylene insert was placed and the knee reduced.      1 gram of vancomycin powder was placed in the knee. The arthrotomy was closed with interrupted #1 vicryl suture and #2 quill, the subcuticular layer was closed with 2-0 vicryl suture and a running 4-0 monocryl was used to closed the skin surface.  Surgicel sealant was placed over the top of the incision and sterile dressing placed over the wound.    Sponge and needle counts were correct.    The first-assistant was critical to all steps of the operation, including retraction and leg stabilization during exposure and bone preparation, as well as the deep and superficial wound closure.  Dr. Rodriguez performed and/or supervised the key portions of this surgical procedure, including evaluation of the bone cuts, reshaping of the bony elements, and insertion of the provisional and final components.    Postoperative Plan:  The patient will be anticoagulated with 81mg aspirin twice daily for one month.   They will receive 24 hours of post-operative antibiotics.    Activity will be weight  bearing as tolerated.    Signed by: Newton Rodriguez III, MD

## 2019-08-22 NOTE — PROGRESS NOTES
Patient  and Son at bedside, reviewed Plan of Care, answered family questions. Patient AAOx3 VSS on room air. Wctm

## 2019-08-22 NOTE — PROGRESS NOTES
Patient AAOx3, VSS on room air, min pain, perineural infusing, polar ice in place. Daughter and  at bedside, updated Patient and Family on Plan of care, answered family questions. Beba

## 2019-08-22 NOTE — NURSING
Spoke with Ortho resident on call informed that pt and family does not want RX home medications filled here and she would like RX sent to her personal [facility] pharmacy

## 2019-08-22 NOTE — ANESTHESIA POST-OP PAIN MANAGEMENT
Acute Pain Service and Perioperative Surgical Home Progress Note    HPI  Elena Frances is a 79 y.o., female,     Interval history    Surgery:  Procedure(s) (LRB):  ARTHROPLASTY, KNEE, TOTAL-DEPUY-SIGMA (Left)    Post Op Day #: 1    Catheter type: Perineural Adductor Canal    Infusion type: Ropivacaine 0.2%  8 ml/hr basal    Problem List:    Active Hospital Problems    Diagnosis  POA    *S/P TKR (total knee replacement) using cement, left [Z96.652]  Not Applicable    Chronic constipation [K59.09]  Yes    Asthma [J45.909]  Yes    Prediabetes [R73.03]  Yes    Multinodular goiter [E04.2]  Yes    Diverticulitis [K57.92]  Yes    Hyperlipidemia [E78.5]  Yes    HTN (hypertension), benign [I10]  Yes    Depression [F32.9]  Yes      Resolved Hospital Problems   No resolved problems to display.       Subjective:     General appearance of alert, oriented, no complaints   Pain with rest: 2    Numbers   Pain with movement: 4    Numbers   Side Effects    1. Pruritis No    2. Nausea No    3. Motor Blockade No, 0=Ability to raise lower extremities off bed    4. Sedation No, 1=awake and alert    Schedule Medications:    acetaminophen  1,000 mg Oral Q6H    aspirin  81 mg Oral BID    atorvastatin  80 mg Oral Daily    candesartan  16 mg Oral Daily    ceFAZolin (ANCEF) IVPB  2 g Intravenous Q8H    celecoxib  200 mg Oral Daily    famotidine  20 mg Oral BID    fluticasone furoate-vilanterol  1 puff Inhalation Daily    metoprolol succinate  25 mg Oral Daily    montelukast  10 mg Oral QHS    mupirocin  1 g Nasal BID    pantoprazole  40 mg Oral BID    polyethylene glycol  17 g Oral Daily    pregabalin  75 mg Oral QHS    senna-docusate 8.6-50 mg  1 tablet Oral BID    sertraline  50 mg Oral Daily        Continuous Infusions:   sodium chloride 0.9% 150 mL/hr at 08/21/19 1947    ropivacaine (PF) 2 mg/ml (0.2%) 8 mL/hr (08/22/19 0511)    ropivacaine          PRN Medications:  albuterol, bisacodyl, morphine,  morphine, morphine, naloxone, ondansetron, oxyCODONE, oxyCODONE, oxyCODONE, promethazine (PHENERGAN) IVPB, ramelteon, ropivacaine, sodium chloride 0.9%       Antibiotics:  Antibiotics (From admission, onward)    Start     Stop Route Frequency Ordered    08/22/19 0200  ceFAZolin injection 2 g      08/22 1759 IV Every 8 hours (non-standard times) 08/21/19 1925 08/21/19 2100  mupirocin 2 % ointment 1 g      08/26 2059 Nasl 2 times daily 08/21/19 1930           Objective:     Catheter site clean, dry, intact    Vital Signs (Most Recent):  Temp: 36.4 °C (97.6 °F) (08/22/19 0735)  Pulse: 63 (08/22/19 0735)  Resp: 20 (08/22/19 0735)  BP: (!) 142/66 (08/22/19 0735)  SpO2: 100 % (08/22/19 0735) Vital Signs Range (Last 24H):  Temp:  [36.1 °C (97 °F)-37.1 °C (98.7 °F)]   Pulse:  [58-77]   Resp:  [12-24]   BP: (129-187)/(54-85)   SpO2:  [97 %-100 %]      I & O (Last 24H):    Intake/Output Summary (Last 24 hours) at 8/22/2019 0906  Last data filed at 8/22/2019 0511  Gross per 24 hour   Intake 3604 ml   Output 50 ml   Net 3554 ml       Physical Exam:    GA: Alert, comfortable, no acute distress.   Pulmonary: Clear to auscultation A/P/L. No wheezing, crackles, or rhonchi.  Cardiac: RRR S1 & S2 w/o rubs/murmurs/gallops.   Abdominal:Bowel sounds present. No tenderness to palpation or distension. No appreciable hepatosplenomegaly.   Skin: No jaundice, rashes, or visible lesions.    Laboratory:  CBC: No results for input(s): WBC, RBC, HGB, HCT, PLT, MCV, MCH, MCHC in the last 72 hours.    BMP: No results for input(s): NA, K, CO2, CL, BUN, CREATININE, GLU, MG, PHOS, CALCIUM in the last 72 hours.    No results for input(s): PT, INR, PROTIME, APTT in the last 72 hours.    Assessment:    Pain control adequate    Plan:    1. Pain:  Adductor canal perineural catheter in place and infusing. Good level.  Multimodal pain regimen with intraoperative ketamine and dexamethasone, postoperative acetaminophen, celecoxib, pregabalin, and PRN  oxycodone as indicated unless contraindicated.  Will continue to monitor. Plan to discontinue perineural catheter this morning or home with On-Q if patient discharged today.  2. HTN: Mildly HTN this morning. Administered PO hydralazine 25 mg x1. Resume home medication regimen.   3. DM: Pre diabetic. Not on insulin or anti hyperglycemic medications. Sugar on pre op BMP appropriate. No need for close monitoring at this time.   4. Depression/anxiety: Stable. C/w home medication regimen.   5. FEN/GI:  Tolerating liquids, advance diet as tolerated.  6. DVT prophylaxis: per ortho  7. Dispo:  Pt working well with PT/OT. Case management and SW for placement. Plan for discharge tomorrow or possibly today if patient works well with PT and meets goals.    Tyler Bradshaw MD PGY 4  Pager: [phone]

## 2019-08-22 NOTE — PROGRESS NOTES
[facility]  Orthopedics  Progress Note    Patient Name: Elena Frances  MRN: [id number]  Admission Date: 8/21/2019  Hospital Length of Stay: 0 days  Attending Provider: Newton Rodriguez III, MD  Primary Care Provider: Michael Tinoco MD  Follow-up For: Procedure(s) (LRB):  ARTHROPLASTY, KNEE, TOTAL-DEPUY-SIGMA (Left)    Post-Operative Day: 1 Day Post-Op  Subjective:     Principal Problem:S/P TKR (total knee replacement) using cement, left    Principal Orthopedic Problem: same    Interval History: The patient was seen and examined this morning at the bedside. Patient reports no acute issues overnight.  Pain controlled.  Ready to work with PT today.       Review of patient's allergies indicates:   Allergen Reactions    Vicodin [hydrocodone-acetaminophen] Anxiety       Current Facility-Administered Medications   Medication    0.9%  NaCl infusion    acetaminophen tablet 1,000 mg    albuterol inhaler 2 puff    aspirin EC tablet 81 mg    atorvastatin tablet 80 mg    bisacodyl suppository 10 mg    candesartan tablet 16 mg    ceFAZolin injection 2 g    famotidine tablet 20 mg    fluticasone furoate-vilanterol 100-25 mcg/dose diskus inhaler 1 puff    metoprolol succinate (TOPROL-XL) 24 hr tablet 25 mg    montelukast tablet 10 mg    morphine injection 2 mg    morphine injection 2 mg    morphine injection 2 mg    mupirocin 2 % ointment 1 g    naloxone 0.4 mg/mL injection 0.02 mg    ondansetron injection 4 mg    oxyCODONE immediate release tablet 10 mg    oxyCODONE immediate release tablet 15 mg    oxyCODONE immediate release tablet 5 mg    pantoprazole EC tablet 40 mg    polyethylene glycol packet 17 g    pregabalin capsule 75 mg    promethazine (PHENERGAN) 6.25 mg in dextrose 5 % 50 mL IVPB    ramelteon tablet 8 mg    ropivacaine (PF) 2 mg/ml (0.2%) infusion    ropivacaine 0.2% ON-Q C-BLOC 400 ML (SELECT A FLOW)    senna-docusate 8.6-50 mg per tablet 1 tablet     "sertraline tablet 50 mg    sodium chloride 0.9% flush 10 mL     Facility-Administered Medications Ordered in Other Encounters   Medication    0.9%  NaCl infusion    dexamethasone injection    electrolyte-S (ISOLYTE)    fentaNYL injection    glycopyrrolate injection    ketamine in 0.9 % sod chloride 50 mg/5 mL (10 mg/mL) injection    lidocaine (cardiac) injection    midazolam injection    ondansetron injection    phenylephrine injection    propofol (DIPRIVAN) 10 mg/mL infusion    propofol (DIPRIVAN) 10 mg/mL infusion    tranexamic acid injection Soln     Objective:     Vital Signs (Most Recent):  Temp: 98.7 °F (37.1 °C) (08/22/19 0437)  Pulse: 64 (08/22/19 0437)  Resp: 18 (08/22/19 0437)  BP: (!) 155/67 (08/22/19 0437)  SpO2: 100 % (08/22/19 0437) Vital Signs (24h Range):  Temp:  [97 °F (36.1 °C)-98.7 °F (37.1 °C)] 98.7 °F (37.1 °C)  Pulse:  [58-77] 64  Resp:  [12-24] 18  SpO2:  [97 %-100 %] 100 %  BP: (129-187)/(54-85) 155/67     Weight: 72.6 kg (160 lb)  Height: 5' 3" (160 cm)  Body mass index is 28.34 kg/m².      Intake/Output Summary (Last 24 hours) at 8/22/2019 0731  Last data filed at 8/22/2019 0511  Gross per 24 hour   Intake 3604 ml   Output 50 ml   Net 3554 ml       Ortho/SPM Exam    Awake/alert/oriented x3, No acute distress, Afebrile, Vital signs stable  Good inspiratory effort with unlaboured breathing  Dressings c/d/i  You posey in place  NVI in operative limb       Assessment/Plan:     * S/P TKR (total knee replacement) using cement, left  Elena Frances is a 79 y.o. female s/p left TKA on 8-    - WBAT  - PT/OT  - Pain control  - abx: post op  - DVT PPx: asa 81 bid  - Sequential Foot Compression Devices to be worn at all times when not ambulating.     Dispo: home likely today     Chronic constipation  Bowel regimen    Asthma  Home meds    Prediabetes  Home meds    Multinodular goiter  Home meds    Diverticulitis  Home meds    Depression  Home meds    HTN (hypertension), " benign  Home meds    Hyperlipidemia  Home meds          Oliverio Hanna MD  Orthopedics  [facility]

## 2019-08-22 NOTE — PLAN OF CARE
POD # 1 L TKA. This CM met with patient at bedside. Patient lives with daughter who will provide transport at discharge. Spoke with team physician on this am who states patient will need HH, mikey bsc ordered.  Refused sc.    [id number] [place] - [address]  [address] [place] [address]  Phone: [phone] Fax: [phone]    Payor: [employer] MEDICARE / Plan: [employer] CHOICES 65 / Product Type: Medicare Advantage /         08/22/19 0843   Discharge Assessment   Assessment Type Discharge Planning Assessment   Confirmed/corrected address and phone number on facesheet? Yes   Assessment information obtained from? Patient   Expected Length of Stay (days) 1   Communicated expected length of stay with patient/caregiver yes   Prior to hospitilization cognitive status: Alert/Oriented   Prior to hospitalization functional status: Independent   Current cognitive status: Alert/Oriented   Current Functional Status: Assistive Equipment;Needs Assistance   Facility Arrived From:   (brought in by daughter)   Lives With child(simon), adult   Able to Return to Prior Arrangements yes   Is patient able to care for self after discharge? Yes   Who are your caregiver(s) and their phone number(s)?   (daughter Alexandra [phone])   Patient's perception of discharge disposition home or selfcare   Readmission Within the Last 30 Days no previous admission in last 30 days   Patient currently being followed by outpatient case management? No   Patient currently receives any other outside agency services? No   Equipment Currently Used at Home walker, rolling   Do you have any problems affording any of your prescribed medications? No   Is the patient taking medications as prescribed? yes   Does the patient have transportation home? Yes   Transportation Anticipated family or friend will provide   Does the patient receive services at the [facility]? No   Discharge Plan A Home;Home Health    DME Needed Upon Discharge  bedside commode   Patient/Family in Agreement with Plan yes     Courtney Alcaraz RN/BSN/CM  [facility]  [phone]  Ortho/[facility]

## 2019-08-22 NOTE — NURSING TRANSFER
Nursing Transfer Note      8/21/2019     Transfer To: 501 from PACU    Transfer via stretcher    Transfer with Polar Ice    Transported by Transport    Medicines sent: Ropivacaine perineural infusing, NS infusing    Chart send with patient: Yes    Notified: daughter    Patient reassessed at: 8/21/19 22:20

## 2019-08-22 NOTE — PROGRESS NOTES
Pt and family present, consent to converting adductor PNC to On Q.  Site CDI.  Educated regarding continued pain management, fall risk, signs of complications, continued monitoring, as well as discontinuing catheter on 8/24.  Two numbers obtained for APS to follow up.  Understanding verbalized.

## 2019-08-22 NOTE — ASSESSMENT & PLAN NOTE
Elena Frances is a 79 y.o. female s/p left TKA on 8-    - WBAT  - PT/OT  - Pain control  - abx: post op  - DVT PPx: asa 81 bid  - Sequential Foot Compression Devices to be worn at all times when not ambulating.     Dispo: home likely today

## 2019-08-22 NOTE — PLAN OF CARE
Problem: Adult Inpatient Plan of Care  Goal: Patient-Specific Goal (Individualization)  Outcome: Ongoing (interventions implemented as appropriate)  PT ARRIVED TO FLOOR WITH NO DISTRESS NOTED ACCOMPANIED BY HER FAMILY.  PT ABLE TO TRANSFER FROM STRETCHER TO BED WITH MINIMAL ASSISTANCE.  LEFT KNEE WITH ACE AND POLAR ICE.  SCDS ON.  PT WITH NO C/O PAIN OR DISCOMFORT.  PULSES, COLOR AND TEMP TO LLE WNL.  IV PATENT AND INFUSING NS @ 150CC/HR.  ORIENTED PT AND SPOUSE TO ROOM.  NO NEEDS VOICED AT THIS TIME.  VS STABLE.  CALL LIGHT WITHIN REACH.  BED LOCKED AND LOWERED FOR SAFETY.

## 2019-08-22 NOTE — PLAN OF CARE
Problem: Physical Therapy Goal  Goal: Physical Therapy Goal  Goals to be met by: 2019    Patient will increase functional independence with mobility by performin. Supine to sit with Set-up Renovo  2. Sit to supine with Set-up Renovo  3. Sit to stand transfer with Supervision  4. Gait  x 150 feet with Supervision using Rolling Walker.   5. Ascend/descend 1 stair without Handrails Contact Guard Assistance using Rolling Walker.   6. Lower extremity exercise program x30 reps per handout, with independence      Outcome: Ongoing (interventions implemented as appropriate)  PT evaluation completed, goals established and plan of care reviewed with patient.  Patient with good understanding of TKA protocol, exercises and safety with ambulation.  Patient is appropriate to discharge home when medically ready.  Will continue to see patient while in the hospital for strengthening and mobility training.        Iglesia Bland, PT, DPT  2019  Pager #: [phone]

## 2019-08-22 NOTE — PLAN OF CARE
Patient to discharge home with daughter. PHN to provide HH. dme patient has rw, not eligible for bsc received one on 2/9/2017, daughter states she will purchase one.    Future Appointments   Date Time Provider Department Center   9/4/2019  1:15 PM Yudith Maria NP [facility] ORTHO [address]     Payor: [employer] MEDICARE / Plan: [employer] CHOICES 65 / Product Type: Medicare Advantage /         08/22/19 1506   Final Note   Assessment Type Final Discharge Note   Anticipated Discharge Disposition Home-Health   What phone number can be called within the next 1-3 days to see how you are doing after discharge?   (daughter Alexandra [phone])   Hospital Follow Up  Appt(s) scheduled? Yes   Discharge plans and expectations educations in teach back method with documentation complete? Yes   Right Care Referral Info   Post Acute Recommendation Home-care   Referral Type   (HH)   Facility Name   (PHN to provide)     Courtney Alcaraz RN/BSN/CM  [facility]  [phone]  Jose Armando/MARIA E/IMMAYANK

## 2019-08-22 NOTE — PLAN OF CARE
08/22/19 1515   Post-Acute Status   Post-Acute Authorization Placement   Post-Acute Placement Status Set-up Complete     SW contacted PHN to follow up on the assigned home health agency and she has been assigned [facility]. JOAQUINA informed patient of the home health agency.    Yee Lovell LMSW  [facility]   [id number]

## 2019-08-22 NOTE — PLAN OF CARE
Problem: Occupational Therapy Goal  Goal: Occupational Therapy Goal  Goals to be met by: 8/29/2019    Patient will increase functional independence with ADLs by performing:    UE Dressing with Stand-by Assistance.  LE Dressing with Stand-by Assistance.  Grooming while standing with Stand-by Assistance.  Toileting from toilet with Stand-by Assistance for hygiene and clothing management.     Radha Salas OT  8/22/2019      Outcome: Ongoing (interventions implemented as appropriate)  OT eval complete. Goals established. Pt ambulated ~30ft SBA using RW with no LOB or SOB. Pt. Is SBA in adls and functional mobility and safe to d/c home with home health.     Radha Salas OT  8/22/2019

## 2019-08-22 NOTE — PLAN OF CARE
[facility]    HOME HEALTH ORDERS  FACE TO FACE ENCOUNTER    Patient Name: Elena Frances  YOB: 1940    PCP: Michael Tinoco MD   PCP Address: [address] [facility] [address] / [facility] [address]  PCP Phone Number: [phone]  PCP Fax: [phone]    Encounter Date: 08/22/2019    Admit to Home Health    Diagnoses:  Active Hospital Problems    Diagnosis  POA    *S/P TKR (total knee replacement) using cement, left [Z96.652]  Not Applicable     Priority: 1 - High    Chronic constipation [K59.09]  Yes    Asthma [J45.909]  Yes    Prediabetes [R73.03]  Yes    Multinodular goiter [E04.2]  Yes    Diverticulitis [K57.92]  Yes    Hyperlipidemia [E78.5]  Yes    HTN (hypertension), benign [I10]  Yes    Depression [F32.9]  Yes      Resolved Hospital Problems   No resolved problems to display.       Future Appointments   Date Time Provider Department Center   9/4/2019  1:15 PM Yudith Maria NP [facility]           I have seen and examined this patient face to face today. My clinical findings that support the need for the home health skilled services and home bound status are the following:  Weakness/numbness causing balance and gait disturbance due to Joint Replacement making it taxing to leave home.    Allergies:  Review of patient's allergies indicates:   Allergen Reactions    Vicodin [hydrocodone-acetaminophen] Anxiety       Diet: regular diet    Activities: activity as tolerated    Home Health Admitting Clinician:   SN/PT to complete comprehensive assessment including routine vital signs. Instruct on disease process and s/s of complications to report to MD. Follow specific home health arthoplasty protocol. Review/verify medication list sent home with the patient at time of discharge  and instruct patient/caregiver as needed. If coumadin ordered, coumadin clinic to manage INR with INR draws 2x per week with a goal to maintain INR between 1.8 and 2.2.  Frequency may be adjusted depending on start of care date.    Notify MD if SBP > 160 or < 90; DBP > 90 or < 50; HR > 120 or < 50; Temp > 101    Home Medical Equipment:  Walker, 3-1 bedside commode, transfer tub bench    CONSULTS:    Physical Therapy may admit if patient not on coumadin, PT to perform comprehensive assessment if performing admit visit and generate therapy plan of care. Evaluate for home safety and equipment needs; Establish/upgrade home exercise program. Perform/instruct on therapeutic exercises, gait training, transfer training, and Range of Motion.      OTHER: (only select if patient needs other therapy disciplines)  Occupational Therapy to evaluate and treat. Evaluate home environment for safety and equipment needs. Perform/Instruct on transfers, ADL training, ROM, and therapeutic exercises.   to evaluate for community resources/long-range planning.  Aide to provide assistance with personal care, ADLs, and vital signs.    MISCELLANEOUS CARE:      WOUND CARE ORDERS:  Assess Surgical Incision/DSRG each TX  Aquacel AG drsg applied post-op leave on 14 days post op. Call MD if any drainage reaches border to border of drsg horizontally, s/s of infection, temp >101, induration, swelling or redness.  If dressing is removed per MD order, then apply island dressing, change/teach caregiver to perform daily dressing change if island dressing present.    Medications: Review discharge medications with patient and family and provide education.      Current Discharge Medication List      START taking these medications    Details   acetaminophen (TYLENOL) 650 MG TbSR Take 1 tablet (650 mg total) by mouth every 6 (six) hours as needed (pain).  Qty: 120 tablet, Refills: 0      celecoxib (CELEBREX) 200 MG capsule Take 1 capsule (200 mg total) by mouth once daily.  Qty: 30 capsule, Refills: 0      docusate sodium (COLACE) 100 MG capsule Take 1 capsule (100 mg total) by mouth 2 (two) times daily.  Qty: 60  capsule, Refills: 0      oxyCODONE (ROXICODONE) 5 MG immediate release tablet Take 1 tablet (5 mg total) by mouth every 6 (six) hours as needed for Pain (1-2 as needed for pain).  Qty: 50 tablet, Refills: 0      promethazine (PHENERGAN) 25 MG tablet Take 1 tablet (25 mg total) by mouth every 4 (four) hours as needed for Nausea.  Qty: 61 tablet, Refills: 0         CONTINUE these medications which have CHANGED    Details   aspirin (ECOTRIN) 81 MG EC tablet Take 1 tablet (81 mg total) by mouth 2 (two) times daily.  Qty: 60 tablet, Refills: 0         CONTINUE these medications which have NOT CHANGED    Details   ADVAIR DISKUS 100-50 mcg/dose diskus inhaler TAKE 1 PUFF BY MOUTH TWICE A DAY  Qty: 1 each, Refills: 11      !! atorvastatin (LIPITOR) 80 MG tablet TAKE 1 TABLET (80 MG TOTAL) BY MOUTH ONCE DAILY.  Refills: 3      CYANOCOBALAMIN, VITAMIN B-12, (VITAMIN B-12 ORAL) Take 2,500 mcg by mouth.      fluticasone propionate (FLONASE) 50 mcg/actuation nasal spray 2 sprays (100 mcg total) by Each Nare route once daily.  Qty: 16 g, Refills: 6      metoprolol succinate (TOPROL-XL) 25 MG 24 hr tablet TAKE 1 TABLET BY MOUTH EVERY DAY  Qty: 90 tablet, Refills: 3    Comments: PATIENT NEEDS REFILL ON MEDICATION      montelukast (SINGULAIR) 10 mg tablet TAKE 1 TABLET BY MOUTH EVERY DAY IN THE EVENING  Qty: 90 tablet, Refills: 3      !! nabumetone (RELAFEN ORAL) Take by mouth as needed (pain).      !! nabumetone (RELAFEN) 500 MG tablet Take 500 mg by mouth 2 (two) times daily.      omeprazole (PRILOSEC) 40 MG capsule TAKE 1 CAPSULE BY MOUTH EVERY DAY  Qty: 90 capsule, Refills: 3      polyethylene glycol 3350 (MIRALAX ORAL) Take by mouth as needed.      sertraline (ZOLOFT) 50 MG tablet TAKE 1 TABLET BY MOUTH EVERY DAY  Qty: 90 tablet, Refills: 3      tiZANidine (ZANAFLEX) 4 MG tablet TAKE 1.5 TABLETS (6MG) BY MOUTH 3 TIMES A DAY AS NEEDED FOR MUSCLE SPASM  Qty: 135 tablet, Refills: 6      !! valsartan (DIOVAN) 160 MG tablet TAKE 1  TABLET (160 MG TOTAL) BY MOUTH ONCE DAILY.  Qty: 90 tablet, Refills: 0    Associated Diagnoses: Essential hypertension      VENTOLIN HFA 90 mcg/actuation inhaler INHALE 2 PUFFS INTO THE LUNGS EVERY 6 HOURS AS NEEDED FOR WHEEZING  Qty: 18 Inhaler, Refills: 1    Associated Diagnoses: Cough      !! atorvastatin (LIPITOR) 80 MG tablet TAKE 1 TABLET (80 MG TOTAL) BY MOUTH ONCE DAILY.  Qty: 90 tablet, Refills: 3    Associated Diagnoses: Hyperlipidemia, unspecified hyperlipidemia type      blood-glucose meter ([employer] BLOOD GLUCOSE SYSTM) kit Use as instructed  Qty: 1 each, Refills: 0    Comments: [employer]S kit      lancets Misc 1 lancet by Misc.(Non-Drug; Combo Route) route 2 (two) times daily.  Qty: 200 each, Refills: 3    Comments: [employer]S lancets      tolterodine (DETROL) 2 MG Tab Take 1 tablet (2 mg total) by mouth 2 (two) times daily.  Qty: 60 tablet, Refills: 11    Associated Diagnoses: Stress incontinence      !! valsartan (DIOVAN) 160 MG tablet TAKE 1 TABLET (160 MG TOTAL) BY MOUTH ONCE DAILY.  Qty: 90 tablet, Refills: 3       !! - Potential duplicate medications found. Please discuss with provider.      STOP taking these medications       acetaminophen (TYLENOL) 500 MG tablet Comments:   Reason for Stopping:         diclofenac sodium (VOLTAREN) 1 % Gel Comments:   Reason for Stopping:               I certify that this patient is confined to her home and needs intermittent skilled nursing care, physical therapy and occupational therapy.

## 2019-08-23 ENCOUNTER — TELEPHONE (OUTPATIENT)
Dept: PHARMACY | Facility: CLINIC | Age: 79
End: 2019-08-23

## 2019-08-23 NOTE — DISCHARGE SUMMARY
[facility]  Orthopedics  Discharge Summary      Patient Name: Elena Frances  MRN: [id number]  Admission Date: 8/21/2019  Hospital Length of Stay: 0 days  Discharge Date and Time: 8/22/2019  4:45 PM  Attending Physician: No att. providers found   Discharging Provider: Oliverio Hanna MD  Primary Care Provider: Michael Tinoco MD    HPI:   No notes on file    Procedure(s) (LRB):  ARTHROPLASTY, KNEE, TOTAL-DEPUY-SIGMA (Left)      Hospital Course:  Hospital Course:  On 8/21/2019, the patient arrived to the [facility] on the second floor of [facility] for proper pre-operative management.  Upon completion of pre-operative preparation, the patient was taken back to the operative theatre.  A TKA was performed without complication and the patient was transported to the post anesthesia care unit in stable condition.  After appropriate recovery from the anaesthetic agents used during the surgery, the patient was then transported to the hospital inpatient floor.  The interim of the hospital stay from arrival on the floor up to discharge has been uncomplicated. The patient has experienced minimal electrolyte imbalances that have been repleated accordingly.  The patient's diet has progressed to a regular diet with no nausea or vomiting.  The patient has transitioned from a perineural anesthesia unit and IV medication to an oral pain regimen and is currently pleased with the pain control on this regimen.  The patient began participation in physical therapy on post-operative day zero and has progressed throughout the entire hospital stay.  Currently the patient is ambulating with moderate assistance and the physical therapy team feels that the patient's progress is sufficient to allow the patient to be discharged home safely.  The patient agrees with this assessment and desires a discharge to home today.           Significant Diagnostic Studies:     Pending Diagnostic Studies:     None  "       Final Active Diagnoses:    Diagnosis Date Noted POA    PRINCIPAL PROBLEM:  S/P TKR (total knee replacement) using cement, left [Z96.652] 08/21/2019 Not Applicable    Chronic constipation [K59.09] 02/21/2019 Yes    Asthma [J45.909]  Yes    Prediabetes [R73.03] 02/03/2016 Yes    Multinodular goiter [E04.2] 03/03/2014 Yes    Diverticulitis [K57.92] 08/14/2013 Yes    Hyperlipidemia [E78.5] 09/10/2012 Yes    HTN (hypertension), benign [I10] 09/10/2012 Yes    Depression [F32.9] 09/10/2012 Yes      Problems Resolved During this Admission:      Discharged Condition: stable    Disposition: Home or Self Care    Follow Up:    Patient Instructions:      TRANSFER TUB BENCH FOR HOME USE     Order Specific Question Answer Comments   Type of Transfer Tub Bench: Padded    Height: 5' 3" (1.6 m)    Weight: 72.6 kg (160 lb)    Does patient have medical equipment at home? walker, rolling    Length of need (1-99 months): 99    Vendor: Other (use comments)    Expected Date of Delivery: 8/22/2019      WALKER FOR HOME USE     Order Specific Question Answer Comments   Type of Walker: Adult (5'4"-6'6")    With wheels? Yes    Height: 5' 3" (1.6 m)    Weight: 72.6 kg (160 lb)    Length of need (1-99 months): 99    Does patient have medical equipment at home? walker, rolling    Please check all that apply: Patient's condition impairs ambulation.    Vendor: Other (use comments)    Expected Date of Delivery: 8/22/2019      COMMODE FOR HOME USE     Order Specific Question Answer Comments   Type: Standard    Height: 5' 3" (1.6 m)    Weight: 72.6 kg (160 lb)    Does patient have medical equipment at home? walker, rolling    Length of need (1-99 months): 99    Vendor: Other (use comments)    Expected Date of Delivery: 8/22/2019      Call MD for:  temperature >100.4     Call MD for:  persistent nausea and vomiting or diarrhea     Call MD for:  severe uncontrolled pain     Call MD for:  redness, tenderness, or signs of infection (pain, " swelling, redness, odor or green/yellow discharge around incision site)     Call MD for:  difficulty breathing or increased cough     Call MD for:  severe persistent headache     Call MD for:  worsening rash     Call MD for:  persistent dizziness, light-headedness, or visual disturbances     Call MD for:  increased confusion or weakness     Leave dressing on - Keep it clean, dry, and intact until clinic visit     Keep surgical extremity elevated     Ice to affected area     Other restrictions (specify):   Order Comments: OK to shower when at home.  Let water run over dressing and gently pat dry.  No baths.  Call Dr Rodriguez's office if there is any drainage     Activity as tolerated     Call MD for:  difficulty breathing, headache or visual disturbances     Call MD for:  extreme fatigue     Call MD for:  hives     Call MD for:  persistent dizziness or light-headedness     Call MD for:  persistent nausea and vomiting     Call MD for:  redness, tenderness, or signs of infection (pain, swelling, redness, odor or green/yellow discharge around incision site)     Call MD for:  severe uncontrolled pain     Call MD for:  temperature >100.4     Keep surgical extremity elevated     Leave dressing on - Keep it clean, dry, and intact until clinic visit   Order Comments: Do not remove surgical dressing for 2 weeks post-op. This will be done only by MD at initial post-op visit. If dressing is completely saturated, replace with identical dressing - silver-impregnated hydrocolloid dressing.     Do not get dressings wet. Do not shower.     If dressing continues to be saturated or there are signs of infection, please call [facility] [phone] for further instructions and to make appt to be seen.     Lifting restrictions   Order Comments: No strenuous exercise or lifting of > 10 lbs     No driving, operating heavy equipment or signing legal documents while taking pain medication     Sponge bath only until clinic visit     Weight  bearing as tolerated     Activity as tolerated     Medications:  Reconciled Home Medications:      Medication List      START taking these medications    acetaminophen 650 MG Tbsr  Commonly known as:  TYLENOL  Take 1 tablet (650 mg total) by mouth every 6 (six) hours as needed (pain).  Replaces:  acetaminophen 500 MG tablet     celecoxib 200 MG capsule  Commonly known as:  CeleBREX  Seboyeta genny cápsula (200 mg en total) por vía oral diariamente.  (Take 1 capsule (200 mg total) by mouth once daily.)     docusate sodium 100 MG capsule  Commonly known as:  COLACE  Seboyeta genny cápsula (100 mg en total) por vía oral 2 veces al día.  (Take 1 capsule (100 mg total) by mouth 2 (two) times daily.)     oxyCODONE 5 MG immediate release tablet  Commonly known as:  ROXICODONE  Take 1 tablet (5 mg total) by mouth every 6 (six) hours as needed for Pain (1-2 as needed for pain).     promethazine 25 MG tablet  Commonly known as:  PHENERGAN  Take 1 tablet (25 mg total) by mouth every 4 (four) hours as needed for Nausea.        CHANGE how you take these medications    aspirin 81 MG EC tablet  Commonly known as:  ECOTRIN  Take 1 tablet (81 mg total) by mouth 2 (two) times daily.  What changed:  when to take this     omeprazole 40 MG capsule  Commonly known as:  PRILOSEC  TAKE 1 CAPSULE BY MOUTH EVERY DAY  What changed:    · how much to take  · how to take this  · when to take this        CONTINUE taking these medications    ADVAIR DISKUS 100-50 mcg/dose diskus inhaler  Generic drug:  fluticasone-salmeterol 100-50 mcg/dose  TAKE 1 PUFF BY MOUTH TWICE A DAY     * atorvastatin 80 MG tablet  Commonly known as:  LIPITOR  TAKE 1 TABLET (80 MG TOTAL) BY MOUTH ONCE DAILY.     * atorvastatin 80 MG tablet  Commonly known as:  LIPITOR  TAKE 1 TABLET (80 MG TOTAL) BY MOUTH ONCE DAILY.     blood-glucose meter kit  Commonly known as:  [employer] BLOOD GLUCOSE SYSTM  Use as instructed     fluticasone propionate 50 mcg/actuation nasal spray  Commonly known  as:  FLONASE  2 sprays (100 mcg total) by Each Nare route once daily.     lancets Misc  1 lancet by Misc.(Non-Drug; Combo Route) route 2 (two) times daily.     metoprolol succinate 25 MG 24 hr tablet  Commonly known as:  TOPROL-XL  TAKE 1 TABLET BY MOUTH EVERY DAY     MIRALAX ORAL  Take by mouth as needed.     montelukast 10 mg tablet  Commonly known as:  SINGULAIR  TAKE 1 TABLET BY MOUTH EVERY DAY IN THE EVENING     * nabumetone 500 MG tablet  Commonly known as:  RELAFEN  Take 500 mg by mouth 2 (two) times daily.     * RELAFEN ORAL  Take by mouth as needed (pain).     sertraline 50 MG tablet  Commonly known as:  ZOLOFT  TAKE 1 TABLET BY MOUTH EVERY DAY     tiZANidine 4 MG tablet  Commonly known as:  ZANAFLEX  TAKE 1.5 TABLETS (6MG) BY MOUTH 3 TIMES A DAY AS NEEDED FOR MUSCLE SPASM     tolterodine 2 MG Tab  Commonly known as:  DETROL  Take 1 tablet (2 mg total) by mouth 2 (two) times daily.     * valsartan 160 MG tablet  Commonly known as:  DIOVAN  TAKE 1 TABLET (160 MG TOTAL) BY MOUTH ONCE DAILY.     * valsartan 160 MG tablet  Commonly known as:  DIOVAN  TAKE 1 TABLET (160 MG TOTAL) BY MOUTH ONCE DAILY.     VENTOLIN HFA 90 mcg/actuation inhaler  Generic drug:  albuterol  INHALE 2 PUFFS INTO THE LUNGS EVERY 6 HOURS AS NEEDED FOR WHEEZING     VITAMIN B-12 ORAL  Take 2,500 mcg by mouth.         * This list has 6 medication(s) that are the same as other medications prescribed for you. Read the directions carefully, and ask your doctor or other care provider to review them with you.            STOP taking these medications    acetaminophen 500 MG tablet  Commonly known as:  TYLENOL  Replaced by:  acetaminophen 650 MG Tbsr     diclofenac sodium 1 % Gel  Commonly known as:  VELVET Hanna MD  Orthopedics  [facility]

## 2019-08-23 NOTE — HOSPITAL COURSE
Hospital Course:  On 8/21/2019, the patient arrived to the [facility] on the second floor of [facility] for proper pre-operative management.  Upon completion of pre-operative preparation, the patient was taken back to the operative theatre.  A TKA was performed without complication and the patient was transported to the post anesthesia care unit in stable condition.  After appropriate recovery from the anaesthetic agents used during the surgery, the patient was then transported to the hospital inpatient floor.  The interim of the hospital stay from arrival on the floor up to discharge has been uncomplicated. The patient has experienced minimal electrolyte imbalances that have been repleated accordingly.  The patient's diet has progressed to a regular diet with no nausea or vomiting.  The patient has transitioned from a perineural anesthesia unit and IV medication to an oral pain regimen and is currently pleased with the pain control on this regimen.  The patient began participation in physical therapy on post-operative day zero and has progressed throughout the entire hospital stay.  Currently the patient is ambulating with moderate assistance and the physical therapy team feels that the patient's progress is sufficient to allow the patient to be discharged home safely.  The patient agrees with this assessment and desires a discharge to home today.

## 2019-08-23 NOTE — TRANSFER OF CARE
"Anesthesia Transfer of Care Note    Patient: Elena Frances    Procedure(s) Performed: Procedure(s) (LRB):  ARTHROPLASTY, KNEE, TOTAL-DEPUY-SIGMA (Left)    Patient location: PACU    Anesthesia Type: general    Transport from OR: Transported from OR on 6-10 L/min O2 by face mask with adequate spontaneous ventilation    Post pain: adequate analgesia    Post assessment: no apparent anesthetic complications and tolerated procedure well    Post vital signs: stable    Level of consciousness: awake    Nausea/Vomiting: no nausea/vomiting    Complications: none    Transfer of care protocol was followed      Last vitals:   Visit Vitals  BP (!) 120/58   Pulse 66   Temp 36.4 °C (97.5 °F)   Resp 18   Ht 5' 3" (1.6 m)   Wt 72.6 kg (160 lb)   SpO2 100%   Breastfeeding? No   BMI 28.34 kg/m²     "

## 2019-08-23 NOTE — ANESTHESIA POST-OP PAIN MANAGEMENT
08/23/2019    Called and spoke to patient about OnQ PNC.  She reports continued pain in the posterior knee with anterior knee pain well controlled.  Discussed continuing to treat posterior knee pain with pain medications along with elevation and ice.  Denies tinnitus, metallic taste in mouth, weakness in extremity.  Denies erythema, warmth, tenderness, bleeding at site of catheter entry.  Dressing c/d/i.  All questions answered.  Encouraged them to call the provided number if any issues arise.  Will call again tomorrow.      Dom Dobson MD   Resident Physician  Anesthesiology   [facility]

## 2019-08-23 NOTE — PROGRESS NOTES
Unable to reach Mrs. Frances via spectralink to assess onq system and analgesia. Will reattempt to call this afternoon.     Tyler Bradshaw MD PGY 4  Pager: [phone]

## 2019-08-25 NOTE — PROGRESS NOTES
Called regarding PNC. Patient successfully removed catheter with tip intact 8/24. No questions or concerns at this time.    Ward Donaldson MD PGY4

## 2019-08-25 NOTE — ANESTHESIA POSTPROCEDURE EVALUATION
Anesthesia Post Evaluation    Patient: Elena Frances    Procedure(s) Performed: Procedure(s) (LRB):  ARTHROPLASTY, KNEE, TOTAL-DEPUY-SIGMA (Left)    Final Anesthesia Type: general  Patient location: home.  Patient participation: Yes- Able to Participate  Level of consciousness: awake and alert and oriented  Post-procedure vital signs: reviewed and stable  Pain management: adequate  Airway patency: patent  PONV status at discharge: No PONV  Anesthetic complications: no      Cardiovascular status: blood pressure returned to baseline and hemodynamically stable  Respiratory status: unassisted, spontaneous ventilation and room air  Hydration status: euvolemic  Follow-up not needed.          Vitals Value Taken Time   /58 8/22/2019 11:51 AM   Temp 36.4 °C (97.5 °F) 8/22/2019 11:51 AM   Pulse 66 8/22/2019 11:51 AM   Resp 18 8/22/2019 11:51 AM   SpO2 100 % 8/22/2019 11:51 AM         Event Time     Out of Recovery 08/21/2019 20:15:00          Pain/Elkin Score: No data recorded

## 2019-08-27 ENCOUNTER — TELEPHONE (OUTPATIENT)
Dept: ORTHOPEDICS | Facility: CLINIC | Age: 79
End: 2019-08-27

## 2019-08-27 DIAGNOSIS — Z96.652 S/P TKR (TOTAL KNEE REPLACEMENT) USING CEMENT, LEFT: Primary | ICD-10-CM

## 2019-08-27 RX ORDER — OXYCODONE HYDROCHLORIDE 5 MG/1
5 TABLET ORAL
Qty: 30 TABLET | Refills: 0 | Status: SHIPPED | OUTPATIENT
Start: 2019-08-27 | End: 2019-11-29

## 2019-08-27 NOTE — PROGRESS NOTES
Spoke with pt daughter. Informed her that Lesli REZA is working to get someone out there tomorrow. She has contacted social work and home health. Pt daughter gave verbal understanding.

## 2019-08-27 NOTE — TELEPHONE ENCOUNTER
Pt called stating that they have not had HH since returning home last week.  I called [facility] and they do not have any information from [facility]. I reached out to social work. Will try to have home health out to see patient tomorrow.     ----- Message from Pamella Lopes MA sent at 8/27/2019  2:05 PM CDT -----  Contact: pt daughter/Alexandra      ----- Message -----  From: Swathi Altamirano  Sent: 8/27/2019   1:44 PM  To: Michael GARCIA Staff    Please call pt daughter at [phone]    Patient is still waiting for home hlth nurse to come and to start home physical therapy    Patient is ok at present    Thank you

## 2019-08-27 NOTE — TELEPHONE ENCOUNTER
----- Message from Lesli Villalba PA-C sent at 8/27/2019  2:35 PM CDT -----  Contact: pt daughter/Alexandra  Pls call Alexandra back and let her know that we are working to get someone out there tomorrow. I have contacted social work and home health.     ----- Message -----  From: Pamella Lopes MA  Sent: 8/27/2019   2:05 PM  To: Lesli Villalba PA-C        ----- Message -----  From: Swathi Altamirano  Sent: 8/27/2019   1:44 PM  To: Michael GARCIA Staff    Please call pt daughter at [phone]    Patient is still waiting for home hlth nurse to come and to start home physical therapy    Patient is ok at present    Thank you

## 2019-08-27 NOTE — PLAN OF CARE
08/27/19 1448   Post-Acute Status   Post-Acute Authorization Placement;Other   Other Status See Comments     Patient is in need of home health services and was assigned [facility] services. SW received a message from  reporting that patient home health has not began yet. SW contact [facility] and spoke to rep Lovell and she reports that are needing the orders to be resent. SW will contact N again to follow up in regard to patient home health.    Yee Lovell, KEYON  [facility]   [id number]

## 2019-08-29 RX ORDER — MELOXICAM 15 MG/1
TABLET ORAL
Qty: 30 TABLET | Refills: 1 | Status: SHIPPED | OUTPATIENT
Start: 2019-08-29 | End: 2020-10-07

## 2019-09-04 ENCOUNTER — OFFICE VISIT (OUTPATIENT)
Dept: ORTHOPEDICS | Facility: CLINIC | Age: 79
End: 2019-09-04
Payer: MEDICARE

## 2019-09-04 VITALS — HEIGHT: 63 IN | BODY MASS INDEX: 28.36 KG/M2 | WEIGHT: 160.06 LBS

## 2019-09-04 DIAGNOSIS — Z96.652 S/P TKR (TOTAL KNEE REPLACEMENT) USING CEMENT, LEFT: Primary | ICD-10-CM

## 2019-09-04 PROCEDURE — 99024 PR POST-OP FOLLOW-UP VISIT: ICD-10-PCS | Mod: S$GLB,,, | Performed by: NURSE PRACTITIONER

## 2019-09-04 PROCEDURE — 99999 PR PBB SHADOW E&M-EST. PATIENT-LVL III: CPT | Mod: PBBFAC,,, | Performed by: NURSE PRACTITIONER

## 2019-09-04 PROCEDURE — 99024 POSTOP FOLLOW-UP VISIT: CPT | Mod: S$GLB,,, | Performed by: NURSE PRACTITIONER

## 2019-09-04 PROCEDURE — 99999 PR PBB SHADOW E&M-EST. PATIENT-LVL III: ICD-10-PCS | Mod: PBBFAC,,, | Performed by: NURSE PRACTITIONER

## 2019-09-06 NOTE — PROGRESS NOTES
"Elena Frances presents for initial post-operative visit following a left total knee arthroplasty performed by Dr. Rodriguez on 8//21/2019. Tolerating pain medication well.      Exam:   Height 5' 3" (1.6 m), weight 72.6 kg (160 lb 0.9 oz).   Ambulating well with assistive device.  Incision is clean and dry without drainage or erythema. ROM:0-90    Initial post-operative radiographs reviewed today revealing a well fixed and aligned prosthesis.    A/P:  2 weeks s/p left total knee replacement  - The patient was advised to keep the incision clean and dry for the next 24 hours after which she may wash the area with antibacterial soap in the shower. Will not submerge until the incision is completely healed.   - Outpatient PT: orders entered for [facility]  - Continue aspirin for 1 month from surgery.  - Pain medication refilled 8/27/19 by LIBIA Soriano  - Follow up in 4 weeks with Dr. Rodriguez. Pt will call clinic with problems/concerns.     "

## 2019-09-10 ENCOUNTER — TELEPHONE (OUTPATIENT)
Dept: HOME HEALTH SERVICES | Facility: HOSPITAL | Age: 79
End: 2019-09-10

## 2019-09-18 ENCOUNTER — TELEPHONE (OUTPATIENT)
Dept: HOME HEALTH SERVICES | Facility: HOSPITAL | Age: 79
End: 2019-09-18

## 2019-09-23 ENCOUNTER — TELEPHONE (OUTPATIENT)
Dept: HOME HEALTH SERVICES | Facility: HOSPITAL | Age: 79
End: 2019-09-23

## 2019-09-25 ENCOUNTER — EXTERNAL HOME HEALTH (OUTPATIENT)
Dept: HOME HEALTH SERVICES | Facility: HOSPITAL | Age: 79
End: 2019-09-25
Payer: MEDICARE

## 2019-09-25 DIAGNOSIS — M25.562 LEFT KNEE PAIN, UNSPECIFIED CHRONICITY: Primary | ICD-10-CM

## 2019-09-26 RX ORDER — NABUMETONE 750 MG/1
TABLET, FILM COATED ORAL
Qty: 180 TABLET | Refills: 1 | Status: SHIPPED | OUTPATIENT
Start: 2019-09-26 | End: 2020-03-24 | Stop reason: SDUPTHER

## 2019-10-03 ENCOUNTER — HOSPITAL ENCOUNTER (OUTPATIENT)
Dept: RADIOLOGY | Facility: HOSPITAL | Age: 79
Discharge: HOME | End: 2019-10-03
Attending: ORTHOPAEDIC SURGERY
Payer: MEDICARE

## 2019-10-03 ENCOUNTER — OFFICE VISIT (OUTPATIENT)
Dept: ORTHOPEDICS | Facility: CLINIC | Age: 79
End: 2019-10-03
Payer: MEDICARE

## 2019-10-03 VITALS — HEIGHT: 63 IN | BODY MASS INDEX: 27.85 KG/M2 | WEIGHT: 157.19 LBS

## 2019-10-03 DIAGNOSIS — Z96.652 PRESENCE OF LEFT ARTIFICIAL KNEE JOINT: Primary | ICD-10-CM

## 2019-10-03 DIAGNOSIS — M25.562 LEFT KNEE PAIN, UNSPECIFIED CHRONICITY: ICD-10-CM

## 2019-10-03 PROCEDURE — 73562 X-RAY EXAM OF KNEE 3: CPT | Mod: 26,LT,, | Performed by: RADIOLOGY

## 2019-10-03 PROCEDURE — 99999 PR PBB SHADOW E&M-EST. PATIENT-LVL III: CPT | Mod: PBBFAC,,, | Performed by: ORTHOPAEDIC SURGERY

## 2019-10-03 PROCEDURE — 99024 PR POST-OP FOLLOW-UP VISIT: ICD-10-PCS | Mod: S$GLB,,, | Performed by: ORTHOPAEDIC SURGERY

## 2019-10-03 PROCEDURE — 99024 POSTOP FOLLOW-UP VISIT: CPT | Mod: S$GLB,,, | Performed by: ORTHOPAEDIC SURGERY

## 2019-10-03 PROCEDURE — 99999 PR PBB SHADOW E&M-EST. PATIENT-LVL III: ICD-10-PCS | Mod: PBBFAC,,, | Performed by: ORTHOPAEDIC SURGERY

## 2019-10-03 PROCEDURE — 73562 X-RAY EXAM OF KNEE 3: CPT | Mod: TC,LT

## 2019-10-03 PROCEDURE — 73562 XR KNEE 3 VIEW LEFT: ICD-10-PCS | Mod: 26,LT,, | Performed by: RADIOLOGY

## 2019-10-03 NOTE — PROGRESS NOTES
Subjective:     HPI:   Elena Frances is a 79 y.o. female who presents for evaluation 6 weeks out from left total knee replacement August 21, 2019    She is smiling today. She says she is very happy with her knee.  She says she has no issues.  She says the left knee feels as good as the right knee.  She says her family is doing well on everything is good in they have no issues.  She says she is back cooking and cleaning and doing the things that she needs to do around the house.  She says she only took pain medicines for about 2 weeks after surgery. She says her home therapy is ongoing but should in tomorrow.  No assistive devices and no pain     Objective:   Exam:  Walking well no limp nonantalgic gait 0-125 degree knee range of motion 5° valgus alignment her incisions well-healed knee stable to anterior-posterior varus valgus stresses no flexion contracture or extensor lag      Imaging:  Indication:  Exam status post left total knee arthroplasty  Exam Ordered: Radiographs of the left knee include a standing anteroposterior view, a lateral view in full flexion, and a sunrise view  Details of Examination: Todays exam show a well fixed, well positioned total knee arthroplasty with no evidence of wear, osteolysis, or loosening.  Impression:  Status post left total knee arthroplasty, implant in good position with no abnormality        Assessment:     Presence of left artificial knee joint [Z96.652]       Plan:       We received a patient complaint which was apparently from a family member's the patient has no complaints today.  We again discussed that I am sorry it was a late surgery day and the patient says it is okay everything is perfect    We will see her back in 6 weeks for three-month follow-up standard x-rays    No orders of the defined types were placed in this encounter.      Past Medical History:   Diagnosis Date    Allergy     Anemia     Arthritis     Asthma     Depression     Generalized  headaches 2014    Goiter     MNG    HTN (hypertension), benign     Hyperlipidemia     Hyperparathyroidism     s/p surgery    Intestinal obstruction     resolved spontaneously    Lumbar disc disease     s/p epidural shots    Nuclear sclerosis - Both Eyes 3/11/2014    Osteoporosis, post-menopausal     with 3 rib fractures       Past Surgical History:   Procedure Laterality Date    APPENDECTOMY      BILATERAL OOPHORECTOMY      BREAST CYST EXCISION      left    CATARACT EXTRACTION W/  INTRAOCULAR LENS IMPLANT Right 2016    Dr. Fang ([place])    CATARACT EXTRACTION W/  INTRAOCULAR LENS IMPLANT Left 10/17/2016    Dr. Fang ([place])     SECTION, CLASSIC      x 1    CHOLECYSTECTOMY      COLONOSCOPY N/A 3/1/2016    Procedure: COLONOSCOPY;  Surgeon: Dony Bronson MD;  Location: [facility] (4TH FLR);  Service: Endoscopy;  Laterality: N/A;  PM Prep    HYSTERECTOMY      JOINT REPLACEMENT      KNEE SURGERY Right 2017    TKR    LUMBAR EPIDURAL INJECTION      x 4    MOUTH SURGERY      for abscess drainage of gum of frontal teeth    PARATHYROID GLAND SURGERY      for parathyroid adenoma    TOTAL KNEE ARTHROPLASTY Left 2019    Procedure: ARTHROPLASTY, KNEE, TOTAL-DEPUY-SIGMA;  Surgeon: Newton Rodriguez III, MD;  [facility];  Service: Orthopedics;  Laterality: Left;       Family History   Problem Relation Age of Onset    Heart disease Mother     Hypertension Mother     Heart attack Mother     Heart disease Sister          in their 50's    Heart failure Sister     Heart disease Brother         CABG in age 60's    Hyperlipidemia Brother     Heart disease Sister         in her 50's    Heart disease Sister         in her 50's    Heart disease Sister     Hypertension Sister     Hyperlipidemia Sister     Heart disease Brother         CABG in age 60's    Hyperlipidemia Brother     Thyroid disease Daughter     Amblyopia Neg Hx     Blindness Neg Hx      Cancer Neg Hx     Cataracts Neg Hx     Diabetes Neg Hx     Glaucoma Neg Hx     Macular degeneration Neg Hx     Retinal detachment Neg Hx     Strabismus Neg Hx     Stroke Neg Hx        Social History     Socioeconomic History    Marital status:      Spouse name: Not on file    Number of children: Not on file    Years of education: Not on file    Highest education level: Not on file   Occupational History    Not on file   Social Needs    Financial resource strain: Not on file    Food insecurity:     Worry: Not on file     Inability: Not on file    Transportation needs:     Medical: Not on file     Non-medical: Not on file   Tobacco Use    Smoking status: Never Smoker    Smokeless tobacco: Never Used   Substance and Sexual Activity    Alcohol use: No    Drug use: No    Sexual activity: Not on file   Lifestyle    Physical activity:     Days per week: Not on file     Minutes per session: Not on file    Stress: Not on file   Relationships    Social connections:     Talks on phone: Not on file     Gets together: Not on file     Attends Yazdanism service: Not on file     Active member of club or organization: Not on file     Attends meetings of clubs or organizations: Not on file     Relationship status: Not on file   Other Topics Concern    Not on file   Social History Narrative    Not on file

## 2019-10-08 ENCOUNTER — OFFICE VISIT (OUTPATIENT)
Dept: PRIMARY CARE CLINIC | Facility: CLINIC | Age: 79
End: 2019-10-08
Payer: MEDICARE

## 2019-10-08 ENCOUNTER — IMMUNIZATION (OUTPATIENT)
Dept: PHARMACY | Facility: CLINIC | Age: 79
End: 2019-10-08
Payer: MEDICARE

## 2019-10-08 VITALS
HEIGHT: 63 IN | TEMPERATURE: 98 F | SYSTOLIC BLOOD PRESSURE: 160 MMHG | HEART RATE: 64 BPM | BODY MASS INDEX: 28.39 KG/M2 | DIASTOLIC BLOOD PRESSURE: 80 MMHG | WEIGHT: 160.25 LBS

## 2019-10-08 DIAGNOSIS — I10 HTN (HYPERTENSION), BENIGN: ICD-10-CM

## 2019-10-08 DIAGNOSIS — R07.9 CHEST PAIN, UNSPECIFIED TYPE: Primary | ICD-10-CM

## 2019-10-08 PROCEDURE — 3079F PR MOST RECENT DIASTOLIC BLOOD PRESSURE 80-89 MM HG: ICD-10-PCS | Mod: CPTII,S$GLB,, | Performed by: FAMILY MEDICINE

## 2019-10-08 PROCEDURE — 93010 EKG 12-LEAD: ICD-10-PCS | Mod: S$GLB,,, | Performed by: INTERNAL MEDICINE

## 2019-10-08 PROCEDURE — 93005 EKG 12-LEAD: ICD-10-PCS | Mod: S$GLB,,, | Performed by: FAMILY MEDICINE

## 2019-10-08 PROCEDURE — 1101F PT FALLS ASSESS-DOCD LE1/YR: CPT | Mod: CPTII,S$GLB,, | Performed by: FAMILY MEDICINE

## 2019-10-08 PROCEDURE — 93010 ELECTROCARDIOGRAM REPORT: CPT | Mod: S$GLB,,, | Performed by: INTERNAL MEDICINE

## 2019-10-08 PROCEDURE — 3077F PR MOST RECENT SYSTOLIC BLOOD PRESSURE >= 140 MM HG: ICD-10-PCS | Mod: CPTII,S$GLB,, | Performed by: FAMILY MEDICINE

## 2019-10-08 PROCEDURE — 99214 PR OFFICE/OUTPT VISIT, EST, LEVL IV, 30-39 MIN: ICD-10-PCS | Mod: S$GLB,,, | Performed by: FAMILY MEDICINE

## 2019-10-08 PROCEDURE — 1101F PR PT FALLS ASSESS DOC 0-1 FALLS W/OUT INJ PAST YR: ICD-10-PCS | Mod: CPTII,S$GLB,, | Performed by: FAMILY MEDICINE

## 2019-10-08 PROCEDURE — 99999 PR PBB SHADOW E&M-EST. PATIENT-LVL III: ICD-10-PCS | Mod: PBBFAC,,, | Performed by: FAMILY MEDICINE

## 2019-10-08 PROCEDURE — 93005 ELECTROCARDIOGRAM TRACING: CPT | Mod: S$GLB,,, | Performed by: FAMILY MEDICINE

## 2019-10-08 PROCEDURE — 99999 PR PBB SHADOW E&M-EST. PATIENT-LVL III: CPT | Mod: PBBFAC,,, | Performed by: FAMILY MEDICINE

## 2019-10-08 PROCEDURE — 3079F DIAST BP 80-89 MM HG: CPT | Mod: CPTII,S$GLB,, | Performed by: FAMILY MEDICINE

## 2019-10-08 PROCEDURE — 99214 OFFICE O/P EST MOD 30 MIN: CPT | Mod: S$GLB,,, | Performed by: FAMILY MEDICINE

## 2019-10-08 PROCEDURE — 3077F SYST BP >= 140 MM HG: CPT | Mod: CPTII,S$GLB,, | Performed by: FAMILY MEDICINE

## 2019-10-08 RX ORDER — VALSARTAN 320 MG/1
320 TABLET ORAL DAILY
Qty: 90 TABLET | Refills: 3 | Status: SHIPPED | OUTPATIENT
Start: 2019-10-08 | End: 2020-02-26

## 2019-10-08 RX ORDER — DICLOFENAC SODIUM 10 MG/G
GEL TOPICAL
COMMUNITY
Start: 2019-10-07

## 2019-10-08 NOTE — PROGRESS NOTES
Subjective:       Patient ID: Elena Frances is a 79 y.o. female.    Chief Complaint: Cough (chest congestion)    80 yo presents today for evaluation of persistently elevated blood pressures at home over the past 2-3 weeks, with systolics ranging from 150-160, normal diastolics.  She had one episode of substernal chest discomfort, lasted about one hour, approximately 3 days ago.  Pt was sitting at the time, resolved spontaneously, did not radiate into neck or arms.  Pt had normal Dobutamine Echo prior to her knee surgery about 3-4 months ago    Review of Systems   Constitutional: Negative for activity change and unexpected weight change.   Respiratory: Negative for cough, chest tightness and shortness of breath.    Cardiovascular: Positive for chest pain. Negative for palpitations and leg swelling.   Gastrointestinal: Negative for abdominal distention and abdominal pain.   Neurological: Negative for dizziness, weakness, light-headedness and headaches.       Objective:      Physical Exam   Constitutional: She is oriented to person, place, and time. She appears well-developed and well-nourished. No distress.   Neck: Normal range of motion. No JVD present. No thyromegaly present.   Cardiovascular: Normal rate and regular rhythm.   No murmur heard.  Pulmonary/Chest: Effort normal and breath sounds normal. She has no wheezes. She has no rales.   Abdominal: Soft. Bowel sounds are normal. She exhibits no distension and no mass. There is no tenderness. There is no guarding.   Musculoskeletal: She exhibits no edema.   Neurological: She is alert and oriented to person, place, and time. She displays normal reflexes.       Assessment:         1.  Hypertension  2.  Chest pain, doubt cardiac in nature  Plan:       1.  EKG done today is normal  2.  Increase Valsartan to 320mg daily, continue other medications  3.  To ED if chest pains recur  4.  F/u 3-4 weeks   .

## 2019-10-10 ENCOUNTER — TELEPHONE (OUTPATIENT)
Dept: HOME HEALTH SERVICES | Facility: HOSPITAL | Age: 79
End: 2019-10-10

## 2019-11-11 DIAGNOSIS — M25.562 LEFT KNEE PAIN, UNSPECIFIED CHRONICITY: Primary | ICD-10-CM

## 2019-11-14 ENCOUNTER — HOSPITAL ENCOUNTER (OUTPATIENT)
Dept: RADIOLOGY | Facility: HOSPITAL | Age: 79
Discharge: HOME | End: 2019-11-14
Attending: ORTHOPAEDIC SURGERY
Payer: MEDICARE

## 2019-11-14 ENCOUNTER — OFFICE VISIT (OUTPATIENT)
Dept: ORTHOPEDICS | Facility: CLINIC | Age: 79
End: 2019-11-14
Payer: MEDICARE

## 2019-11-14 VITALS — BODY MASS INDEX: 28.13 KG/M2 | HEIGHT: 63 IN | WEIGHT: 158.75 LBS

## 2019-11-14 DIAGNOSIS — M25.562 LEFT KNEE PAIN, UNSPECIFIED CHRONICITY: ICD-10-CM

## 2019-11-14 DIAGNOSIS — Z96.652 PRESENCE OF LEFT ARTIFICIAL KNEE JOINT: Primary | ICD-10-CM

## 2019-11-14 PROCEDURE — 73560 X-RAY EXAM OF KNEE 1 OR 2: CPT | Mod: 26,LT,, | Performed by: RADIOLOGY

## 2019-11-14 PROCEDURE — 99999 PR PBB SHADOW E&M-EST. PATIENT-LVL IV: ICD-10-PCS | Mod: PBBFAC,,, | Performed by: ORTHOPAEDIC SURGERY

## 2019-11-14 PROCEDURE — 99024 PR POST-OP FOLLOW-UP VISIT: ICD-10-PCS | Mod: S$GLB,,, | Performed by: ORTHOPAEDIC SURGERY

## 2019-11-14 PROCEDURE — 99999 PR PBB SHADOW E&M-EST. PATIENT-LVL IV: CPT | Mod: PBBFAC,,, | Performed by: ORTHOPAEDIC SURGERY

## 2019-11-14 PROCEDURE — 99024 POSTOP FOLLOW-UP VISIT: CPT | Mod: S$GLB,,, | Performed by: ORTHOPAEDIC SURGERY

## 2019-11-14 PROCEDURE — 73560 XR KNEE 1 OR 2 VIEW LEFT: ICD-10-PCS | Mod: 26,LT,, | Performed by: RADIOLOGY

## 2019-11-14 PROCEDURE — 73560 X-RAY EXAM OF KNEE 1 OR 2: CPT | Mod: TC,LT

## 2019-11-14 NOTE — PROGRESS NOTES
Subjective:     HPI:   Elena Frances is a 79 y.o. female who presents  For 3 month follow-up left total knee replacement August 21, 2019     overall she is doing well she is happy with her knee.  She does say the weather has changed recently has gotten much colder and at night her knees little bit sore at the lateral aspect but during the day she is fine no issues with walking     Objective:   Exam:   walking well no limp nonantalgic gait.  Incisions well healed.  0-130 degree knee range of motion knee stable anterior-posterior varus and valgus stresses no flexion contracture or extensor lag.      Preop range of motion was negative 4 to 130      Imaging:  Indication:  Exam status post left total knee arthroplasty  Exam Ordered: Radiographs of the left knee include a standing anteroposterior view, a lateral view in full flexion,   Details of Examination: Todays exam show a well fixed, well positioned total knee arthroplasty with no evidence of wear, osteolysis, or loosening.  Impression:  Status post left total knee arthroplasty, implant in good position with no abnormality        Assessment:     Presence of left artificial knee joint [Z96.652]   three-month follow-up doing well     Plan:       Patient is doing very well with their total knee arthroplasty.  They will continue with their routine care of the knee replacement and see me back for their follow-up at the routine interval.  If there are problems in the interim they will see me back sooner.     we discussed she may since the weather change over for some time after surgery recommend ice and/or heat and Tylenol discussed appropriate dosing.      We can see her back in 9 months with x-rays of both knees Dr. Moran replaced her right knee in the past    No orders of the defined types were placed in this encounter.      Past Medical History:   Diagnosis Date    Allergy     Anemia     Arthritis     Asthma     Depression     Generalized headaches  2014    Goiter     MNG    HTN (hypertension), benign     Hyperlipidemia     Hyperparathyroidism     s/p surgery    Intestinal obstruction     resolved spontaneously    Lumbar disc disease     s/p epidural shots    Nuclear sclerosis - Both Eyes 3/11/2014    Osteoporosis, post-menopausal     with 3 rib fractures       Past Surgical History:   Procedure Laterality Date    APPENDECTOMY      BILATERAL OOPHORECTOMY      BREAST CYST EXCISION      left    CATARACT EXTRACTION W/  INTRAOCULAR LENS IMPLANT Right 2016    Dr. Fang ([place])    CATARACT EXTRACTION W/  INTRAOCULAR LENS IMPLANT Left 10/17/2016    Dr. Fang ([place])     SECTION, CLASSIC      x 1    CHOLECYSTECTOMY      COLONOSCOPY N/A 3/1/2016    Procedure: COLONOSCOPY;  Surgeon: Dony Bronson MD;  Location: [facility] (4TH FLR);  Service: Endoscopy;  Laterality: N/A;  PM Prep    HYSTERECTOMY      JOINT REPLACEMENT      KNEE SURGERY Right 2017    TKR    LUMBAR EPIDURAL INJECTION      x 4    MOUTH SURGERY      for abscess drainage of gum of frontal teeth    PARATHYROID GLAND SURGERY      for parathyroid adenoma    TOTAL KNEE ARTHROPLASTY Left 2019    Procedure: ARTHROPLASTY, KNEE, TOTAL-DEPUY-SIGMA;  Surgeon: Newton Rodriguez III, MD;  [facility];  Service: Orthopedics;  Laterality: Left;       Family History   Problem Relation Age of Onset    Heart disease Mother     Hypertension Mother     Heart attack Mother     Heart disease Sister          in their 50's    Heart failure Sister     Heart disease Brother         CABG in age 60's    Hyperlipidemia Brother     Heart disease Sister         in her 50's    Heart disease Sister         in her 50's    Heart disease Sister     Hypertension Sister     Hyperlipidemia Sister     Heart disease Brother         CABG in age 60's    Hyperlipidemia Brother     Thyroid disease Daughter     Amblyopia Neg Hx     Blindness Neg Hx     Cancer Neg Hx      Cataracts Neg Hx     Diabetes Neg Hx     Glaucoma Neg Hx     Macular degeneration Neg Hx     Retinal detachment Neg Hx     Strabismus Neg Hx     Stroke Neg Hx        Social History     Socioeconomic History    Marital status:      Spouse name: Not on file    Number of children: Not on file    Years of education: Not on file    Highest education level: Not on file   Occupational History    Not on file   Social Needs    Financial resource strain: Not on file    Food insecurity:     Worry: Not on file     Inability: Not on file    Transportation needs:     Medical: Not on file     Non-medical: Not on file   Tobacco Use    Smoking status: Never Smoker    Smokeless tobacco: Never Used   Substance and Sexual Activity    Alcohol use: No    Drug use: No    Sexual activity: Not on file   Lifestyle    Physical activity:     Days per week: Not on file     Minutes per session: Not on file    Stress: Not on file   Relationships    Social connections:     Talks on phone: Not on file     Gets together: Not on file     Attends Caodaism service: Not on file     Active member of club or organization: Not on file     Attends meetings of clubs or organizations: Not on file     Relationship status: Not on file   Other Topics Concern    Not on file   Social History Narrative    Not on file

## 2019-11-27 ENCOUNTER — TELEPHONE (OUTPATIENT)
Dept: ORTHOPEDICS | Facility: CLINIC | Age: 79
End: 2019-11-27

## 2019-11-27 NOTE — TELEPHONE ENCOUNTER
Ortho Telephone Triage Call  [phone]  Patient C/O: pain to anterior LLE below L TKA that began Saturday and unrelieved by Tylenol.  Denies known injury to LLE. States painful when standing. Pt denies fever or swelling, bruising, redness or warmth to area.  HX: L TKA 8/21/19  Triage Advice: JAMAICA Maria NP notified and instructs that stronger pain med cannot be prescribed 3 month post-op. Instructs that pt rest, ice, elevate LLE and continue Tylenol and to call Friday for appt if symptoms do not improve.  Resolution: Pt states understanding. Pt has OOC contact number for questions/concerns in interim.

## 2019-11-29 ENCOUNTER — HOSPITAL ENCOUNTER (OUTPATIENT)
Dept: RADIOLOGY | Facility: HOSPITAL | Age: 79
Discharge: HOME | End: 2019-11-29
Attending: PHYSICIAN ASSISTANT
Payer: MEDICARE

## 2019-11-29 ENCOUNTER — OFFICE VISIT (OUTPATIENT)
Dept: ORTHOPEDICS | Facility: CLINIC | Age: 79
End: 2019-11-29
Payer: MEDICARE

## 2019-11-29 ENCOUNTER — TELEPHONE (OUTPATIENT)
Dept: ORTHOPEDICS | Facility: CLINIC | Age: 79
End: 2019-11-29

## 2019-11-29 VITALS
HEART RATE: 64 BPM | TEMPERATURE: 98 F | HEIGHT: 63 IN | SYSTOLIC BLOOD PRESSURE: 157 MMHG | BODY MASS INDEX: 28.67 KG/M2 | WEIGHT: 161.81 LBS | DIASTOLIC BLOOD PRESSURE: 71 MMHG

## 2019-11-29 DIAGNOSIS — M79.605 LEFT LEG PAIN: Primary | ICD-10-CM

## 2019-11-29 DIAGNOSIS — Z96.652 S/P TKR (TOTAL KNEE REPLACEMENT) USING CEMENT, LEFT: ICD-10-CM

## 2019-11-29 DIAGNOSIS — M25.552 LEFT HIP PAIN: ICD-10-CM

## 2019-11-29 DIAGNOSIS — M25.562 LEFT KNEE PAIN, UNSPECIFIED CHRONICITY: ICD-10-CM

## 2019-11-29 DIAGNOSIS — M25.562 LEFT KNEE PAIN, UNSPECIFIED CHRONICITY: Primary | ICD-10-CM

## 2019-11-29 PROCEDURE — 1159F PR MEDICATION LIST DOCUMENTED IN MEDICAL RECORD: ICD-10-PCS | Mod: S$GLB,,, | Performed by: PHYSICIAN ASSISTANT

## 2019-11-29 PROCEDURE — 99213 PR OFFICE/OUTPT VISIT, EST, LEVL III, 20-29 MIN: ICD-10-PCS | Mod: S$GLB,,, | Performed by: PHYSICIAN ASSISTANT

## 2019-11-29 PROCEDURE — 3078F PR MOST RECENT DIASTOLIC BLOOD PRESSURE < 80 MM HG: ICD-10-PCS | Mod: CPTII,S$GLB,, | Performed by: PHYSICIAN ASSISTANT

## 2019-11-29 PROCEDURE — 99999 PR PBB SHADOW E&M-EST. PATIENT-LVL V: ICD-10-PCS | Mod: PBBFAC,,, | Performed by: PHYSICIAN ASSISTANT

## 2019-11-29 PROCEDURE — 73562 X-RAY EXAM OF KNEE 3: CPT | Mod: TC,LT

## 2019-11-29 PROCEDURE — 1101F PR PT FALLS ASSESS DOC 0-1 FALLS W/OUT INJ PAST YR: ICD-10-PCS | Mod: CPTII,S$GLB,, | Performed by: PHYSICIAN ASSISTANT

## 2019-11-29 PROCEDURE — 1101F PT FALLS ASSESS-DOCD LE1/YR: CPT | Mod: CPTII,S$GLB,, | Performed by: PHYSICIAN ASSISTANT

## 2019-11-29 PROCEDURE — 73502 XR HIP 2 VIEW LEFT: ICD-10-PCS | Mod: 26,LT,, | Performed by: RADIOLOGY

## 2019-11-29 PROCEDURE — 99213 OFFICE O/P EST LOW 20 MIN: CPT | Mod: S$GLB,,, | Performed by: PHYSICIAN ASSISTANT

## 2019-11-29 PROCEDURE — 73562 XR KNEE ORTHO LEFT: ICD-10-PCS | Mod: 26,LT,, | Performed by: RADIOLOGY

## 2019-11-29 PROCEDURE — 99999 PR PBB SHADOW E&M-EST. PATIENT-LVL V: CPT | Mod: PBBFAC,,, | Performed by: PHYSICIAN ASSISTANT

## 2019-11-29 PROCEDURE — 73502 X-RAY EXAM HIP UNI 2-3 VIEWS: CPT | Mod: TC,LT

## 2019-11-29 PROCEDURE — 3077F PR MOST RECENT SYSTOLIC BLOOD PRESSURE >= 140 MM HG: ICD-10-PCS | Mod: CPTII,S$GLB,, | Performed by: PHYSICIAN ASSISTANT

## 2019-11-29 PROCEDURE — 1125F PR PAIN SEVERITY QUANTIFIED, PAIN PRESENT: ICD-10-PCS | Mod: S$GLB,,, | Performed by: PHYSICIAN ASSISTANT

## 2019-11-29 PROCEDURE — 1159F MED LIST DOCD IN RCRD: CPT | Mod: S$GLB,,, | Performed by: PHYSICIAN ASSISTANT

## 2019-11-29 PROCEDURE — 73560 X-RAY EXAM OF KNEE 1 OR 2: CPT | Mod: TC,RT

## 2019-11-29 PROCEDURE — 1125F AMNT PAIN NOTED PAIN PRSNT: CPT | Mod: S$GLB,,, | Performed by: PHYSICIAN ASSISTANT

## 2019-11-29 PROCEDURE — 73562 X-RAY EXAM OF KNEE 3: CPT | Mod: 26,LT,, | Performed by: RADIOLOGY

## 2019-11-29 PROCEDURE — 3078F DIAST BP <80 MM HG: CPT | Mod: CPTII,S$GLB,, | Performed by: PHYSICIAN ASSISTANT

## 2019-11-29 PROCEDURE — 3077F SYST BP >= 140 MM HG: CPT | Mod: CPTII,S$GLB,, | Performed by: PHYSICIAN ASSISTANT

## 2019-11-29 PROCEDURE — 73502 X-RAY EXAM HIP UNI 2-3 VIEWS: CPT | Mod: 26,LT,, | Performed by: RADIOLOGY

## 2019-11-29 RX ORDER — ACETAMINOPHEN AND CODEINE PHOSPHATE 300; 30 MG/1; MG/1
1 TABLET ORAL EVERY 8 HOURS PRN
Qty: 21 TABLET | Refills: 0 | Status: SHIPPED | OUTPATIENT
Start: 2019-11-29 | End: 2019-12-06

## 2019-11-29 NOTE — TELEPHONE ENCOUNTER
Spoke with pt. Informed pt she can come in to see Lesli REZA on today for 11am. I scheduled pt with Lesli today at 11am. ----- Message from Swathi Altamirano sent at 11/29/2019  8:07 AM CST -----  Contact: pt   Please call pt at [phone]    Patient is having severe left knee pain since this past Saturday 11/23/19    Thank you

## 2019-11-29 NOTE — PROGRESS NOTES
"  SUBJECTIVE:     Chief Complaint : left leg pain     History of Present Illness:  Elena Frances is a 79 y.o. female seen in clinic today with a chief complaint of left thigh pain. She is s/p L TKA 8/21/2019 by Dr. Rodriguez. She was doing well. She states that 6 days ago she was cleaning her bathroom and woke up the next morning with severe pain in leg. She states that she did not fall or kneel, just bent over to clean. She denies groin pain, numbness/tingling, weakness of leg. She points to thigh when describing pain. She is taking muscle relaxer, Mobic, Tylenol with no relief. She denies pain at rest but has pain while ambulating. She is not using assistive device. Patient does have chronic LBP. She sees Dr. Lopez in pain management for back pain and has had many injections in the past.     Past Medical History:   Diagnosis Date    Allergy     Anemia     Arthritis     Asthma     Depression     Generalized headaches 4/1/2014    Goiter     MNG    HTN (hypertension), benign     Hyperlipidemia     Hyperparathyroidism     s/p surgery    Intestinal obstruction     resolved spontaneously    Lumbar disc disease     s/p epidural shots    Nuclear sclerosis - Both Eyes 3/11/2014    Osteoporosis, post-menopausal     with 3 rib fractures       Review of Systems:  Constitutional: no fever or chills  ENT: no nasal congestion or sore throat  Respiratory: no cough or shortness of breath  Cardiovascular: no chest pain or palpitations  Gastrointestinal: no nausea or vomiting, tolerating diet  Genitourinary: no hematuria or dysuria  Integument/Breast: no rash or pruritis  Hematologic/Lymphatic: no easy bruising or lymphadenopathy  Musculoskeletal: see HPI  Neurological: no seizures or tremors  Behavioral/Psych: no auditory or visual hallucinations    OBJECTIVE:     PHYSICAL EXAM:  Blood pressure (!) 157/71, pulse 64, temperature 98.2 °F (36.8 °C), temperature source Oral, height 5' 3" (1.6 m), weight 73.4 kg " (161 lb 13.1 oz).   General Appearance: WDWN, NAD  Gait: Normal  Neuro/Psych: Mood & affect appropriate  Lungs: Respirations equal and unlabored.   CV: 2+ bilateral upper and lower extremity pulses.   Skin: Intact throughout LE  Extremities: No LE edema    Left Knee Exam  Range of Motion:2-130 active   Effusion:none  Condition of skin:intact and incision well healed  Location of tenderness:None     Left Hip Examination:  Skin: intact with no abnormality  Tenderness:None  ROM: no pain with PROM    Flexion:120   Internal Rotation:20    External Rotation:50     Muscle Strength:    Abduction:5/5    Adduction:4/5    Flexion: 5/5  Stinchfield is positive to thigh     Back Exam:   Tenderness:Lumbosacral  Straight Leg Raise:    Right:Negative   Left:Negative    RADIOGRAPHS: AP, lateral and merchant knee x-rays ordered and images reviewed today by me reveal well positioned and fixed total knee prosthesis bilaterally. Hip xrays obtained and reviewed reveal mild stable degenerative changes of both hips as compared to previous hip xrays    ASSESSMENT/PLAN:   Pain in left leg  Mild hip OA  3 months s/p L TKA  Chronic LBP  - Walker/cane to protect WB  - Continue meds as prescribed  - Tylenol # 3 given to get through the weekend   - F/u with provider who cares for lumbar spine if back pain worsens   - Call us if symptoms do not improve by the end of the weekend.  Patient agrees with plan and is thankful that we worked her in today.

## 2019-12-03 ENCOUNTER — TELEPHONE (OUTPATIENT)
Dept: PAIN MEDICINE | Facility: CLINIC | Age: 79
End: 2019-12-03

## 2019-12-03 NOTE — TELEPHONE ENCOUNTER
----- Message from Venancio Lopez MD sent at 12/2/2019 10:06 AM CST -----  Can u schedule her to see me this Thursday   Please double book   Please call her     MG

## 2019-12-03 NOTE — TELEPHONE ENCOUNTER
Staff contacted pt to schedule appt for Thursday as requested by Dr. Lopez    Pt scheduled for 12/5/19 @ 9:30     Pt advise of building and suite    Pt gave verbal understanding

## 2019-12-05 ENCOUNTER — OFFICE VISIT (OUTPATIENT)
Dept: PAIN MEDICINE | Facility: CLINIC | Age: 79
End: 2019-12-05
Attending: ANESTHESIOLOGY
Payer: MEDICARE

## 2019-12-05 VITALS
TEMPERATURE: 98 F | WEIGHT: 161 LBS | HEART RATE: 67 BPM | DIASTOLIC BLOOD PRESSURE: 76 MMHG | HEIGHT: 63 IN | BODY MASS INDEX: 28.53 KG/M2 | SYSTOLIC BLOOD PRESSURE: 180 MMHG

## 2019-12-05 DIAGNOSIS — M54.15 RADICULOPATHY OF THORACOLUMBAR REGION: Primary | ICD-10-CM

## 2019-12-05 DIAGNOSIS — M51.36 DDD (DEGENERATIVE DISC DISEASE), LUMBAR: ICD-10-CM

## 2019-12-05 DIAGNOSIS — M47.9 OSTEOARTHRITIS OF SPINE, UNSPECIFIED SPINAL OSTEOARTHRITIS COMPLICATION STATUS, UNSPECIFIED SPINAL REGION: ICD-10-CM

## 2019-12-05 DIAGNOSIS — M47.819 SPONDYLOSIS WITHOUT MYELOPATHY: ICD-10-CM

## 2019-12-05 PROBLEM — M51.369 DDD (DEGENERATIVE DISC DISEASE), LUMBAR: Status: ACTIVE | Noted: 2019-12-05

## 2019-12-05 PROCEDURE — 99213 OFFICE O/P EST LOW 20 MIN: CPT | Mod: GC,S$GLB,, | Performed by: ANESTHESIOLOGY

## 2019-12-05 PROCEDURE — 3077F SYST BP >= 140 MM HG: CPT | Mod: CPTII,S$GLB,, | Performed by: ANESTHESIOLOGY

## 2019-12-05 PROCEDURE — 1159F PR MEDICATION LIST DOCUMENTED IN MEDICAL RECORD: ICD-10-PCS | Mod: S$GLB,,, | Performed by: ANESTHESIOLOGY

## 2019-12-05 PROCEDURE — 1159F MED LIST DOCD IN RCRD: CPT | Mod: S$GLB,,, | Performed by: ANESTHESIOLOGY

## 2019-12-05 PROCEDURE — 1101F PR PT FALLS ASSESS DOC 0-1 FALLS W/OUT INJ PAST YR: ICD-10-PCS | Mod: CPTII,S$GLB,, | Performed by: ANESTHESIOLOGY

## 2019-12-05 PROCEDURE — 99999 PR PBB SHADOW E&M-EST. PATIENT-LVL III: ICD-10-PCS | Mod: PBBFAC,,, | Performed by: ANESTHESIOLOGY

## 2019-12-05 PROCEDURE — 99213 PR OFFICE/OUTPT VISIT, EST, LEVL III, 20-29 MIN: ICD-10-PCS | Mod: GC,S$GLB,, | Performed by: ANESTHESIOLOGY

## 2019-12-05 PROCEDURE — 1101F PT FALLS ASSESS-DOCD LE1/YR: CPT | Mod: CPTII,S$GLB,, | Performed by: ANESTHESIOLOGY

## 2019-12-05 PROCEDURE — 3078F PR MOST RECENT DIASTOLIC BLOOD PRESSURE < 80 MM HG: ICD-10-PCS | Mod: CPTII,S$GLB,, | Performed by: ANESTHESIOLOGY

## 2019-12-05 PROCEDURE — 1125F AMNT PAIN NOTED PAIN PRSNT: CPT | Mod: S$GLB,,, | Performed by: ANESTHESIOLOGY

## 2019-12-05 PROCEDURE — 1125F PR PAIN SEVERITY QUANTIFIED, PAIN PRESENT: ICD-10-PCS | Mod: S$GLB,,, | Performed by: ANESTHESIOLOGY

## 2019-12-05 PROCEDURE — 3078F DIAST BP <80 MM HG: CPT | Mod: CPTII,S$GLB,, | Performed by: ANESTHESIOLOGY

## 2019-12-05 PROCEDURE — 99999 PR PBB SHADOW E&M-EST. PATIENT-LVL III: CPT | Mod: PBBFAC,,, | Performed by: ANESTHESIOLOGY

## 2019-12-05 PROCEDURE — 3077F PR MOST RECENT SYSTOLIC BLOOD PRESSURE >= 140 MM HG: ICD-10-PCS | Mod: CPTII,S$GLB,, | Performed by: ANESTHESIOLOGY

## 2019-12-05 RX ORDER — GABAPENTIN 100 MG/1
100 CAPSULE ORAL 3 TIMES DAILY
Qty: 90 CAPSULE | Refills: 0 | Status: SHIPPED | OUTPATIENT
Start: 2019-12-05 | End: 2019-12-29

## 2019-12-05 NOTE — H&P (VIEW-ONLY)
Chronic Pain - New Consult    Referring Physician: Kj, Mary Annrefada    Chief Complaint: LBP     SUBJECTIVE:    Elena Frances presents to the clinic for the evaluation of low back pain. The pain started 15 days ago following excessive cleaning and symptoms have been worsening.The pain is located in the low back  area and radiates to the left leg.  The pain is described as shooting and constant and is rated as 9/10. The pain is rated with a score of  9/10 on the BEST day and a score of 9/10 on the WORST day.  Symptoms interfere with daily activity. The pain is exacerbated by Standing.  The pain is mitigated by nothing. She reports spending 3-4 hours per day reclining. The patient reports 7 hours of uninterrupted sleep per night.    Patient denies night fever/night sweats, urinary incontinence, bowel incontinence, significant weight loss, significant motor weakness and loss of sensations.    Patient last seen 5/2015:  Elena Frances presents to the clinic for a follow-up appointment for back pain. Since the last visit, Elena Frances states the pain has been stable. S/p bilateral L3-4 TF MARYBETH on 4/7/15 with 80% relief. She states the leg pain has resolved. She is complaining of neck and headache pain although this was present before the procedure. Current pain intensity is 8/10.    ---    Physical Therapy/Home Exercise: yes      Pain Disability Index Review:  Last 3 PDI Scores 12/5/2019 5/6/2015 3/25/2015   Pain Disability Index (PDI) 46 [phone]       Pain Medications:    - Adjuvant Medications: Tylenol  - Anti-Coagulants: Aspirin, Nabumetone       report:  Not applicable    Pain Procedures:  Bilateral L3-4 TRANSFORAMINAL EPIDURAL STEROID INJECTION 4/07/15  LEFT ACETABULOFEMORAL JOINT INJECTION (HIP) 12/16/14,   09/16/14 bilateral HIP INJECTION (HIP),  Left L4-5 and L5-S1 TRANSFORAMINAL EPIDURAL STEROID INJECTION 08/12/14  right L3-4-5 FACET MEDIAL BRANCH NERVE RADIOFREQUENCY NEUROTOMY  (lumbar) 14   left L3-4-5 FACET MEDIAL BRANCH NERVE RADIOFREQUENCY NEUROTOMY (lumbar) 06/10/14  BILATERAL L3,4,5 LUMBAR FACET MEDIAL BRANCH NERVE     Imaging: none    Past Medical History:   Diagnosis Date    Allergy     Anemia     Arthritis     Asthma     Depression     Generalized headaches 2014    Goiter     MNG    HTN (hypertension), benign     Hyperlipidemia     Hyperparathyroidism     s/p surgery    Intestinal obstruction     resolved spontaneously    Lumbar disc disease     s/p epidural shots    Nuclear sclerosis - Both Eyes 3/11/2014    Osteoporosis, post-menopausal     with 3 rib fractures     Past Surgical History:   Procedure Laterality Date    APPENDECTOMY      BILATERAL OOPHORECTOMY      BREAST CYST EXCISION      left    CATARACT EXTRACTION W/  INTRAOCULAR LENS IMPLANT Right 2016    Dr. Fang ([place])    CATARACT EXTRACTION W/  INTRAOCULAR LENS IMPLANT Left 10/17/2016    Dr. Fang ([place])     SECTION, CLASSIC      x 1    CHOLECYSTECTOMY      COLONOSCOPY N/A 3/1/2016    Procedure: COLONOSCOPY;  Surgeon: Dony Bronson MD;  Location: [facility] (4TH FLR);  Service: Endoscopy;  Laterality: N/A;  PM Prep    HYSTERECTOMY      JOINT REPLACEMENT      KNEE SURGERY Right 2017    TKR    LUMBAR EPIDURAL INJECTION      x 4    MOUTH SURGERY      for abscess drainage of gum of frontal teeth    PARATHYROID GLAND SURGERY      for parathyroid adenoma    TOTAL KNEE ARTHROPLASTY Left 2019    Procedure: ARTHROPLASTY, KNEE, TOTAL-DEPUY-SIGMA;  Surgeon: Newton Rodriguez III, MD;  [facility];  Service: Orthopedics;  Laterality: Left;     Social History     Socioeconomic History    Marital status:      Spouse name: Not on file    Number of children: Not on file    Years of education: Not on file    Highest education level: Not on file   Occupational History    Not on file   Social Needs    Financial resource strain: Not on file    Food  insecurity:     Worry: Not on file     Inability: Not on file    Transportation needs:     Medical: Not on file     Non-medical: Not on file   Tobacco Use    Smoking status: Never Smoker    Smokeless tobacco: Never Used   Substance and Sexual Activity    Alcohol use: No    Drug use: No    Sexual activity: Not on file   Lifestyle    Physical activity:     Days per week: Not on file     Minutes per session: Not on file    Stress: Not on file   Relationships    Social connections:     Talks on phone: Not on file     Gets together: Not on file     Attends [employer] service: Not on file     Active member of club or organization: Not on file     Attends meetings of clubs or organizations: Not on file     Relationship status: Not on file   Other Topics Concern    Not on file   Social History Narrative    Not on file     Family History   Problem Relation Age of Onset    Heart disease Mother     Hypertension Mother     Heart attack Mother     Heart disease Sister          in their 50's    Heart failure Sister     Heart disease Brother         CABG in age 60's    Hyperlipidemia Brother     Heart disease Sister         in her 50's    Heart disease Sister         in her 50's    Heart disease Sister     Hypertension Sister     Hyperlipidemia Sister     Heart disease Brother         CABG in age 60's    Hyperlipidemia Brother     Thyroid disease Daughter     Amblyopia Neg Hx     Blindness Neg Hx     Cancer Neg Hx     Cataracts Neg Hx     Diabetes Neg Hx     Glaucoma Neg Hx     Macular degeneration Neg Hx     Retinal detachment Neg Hx     Strabismus Neg Hx     Stroke Neg Hx        Review of patient's allergies indicates:   Allergen Reactions    Vicodin [hydrocodone-acetaminophen] Anxiety       Current Outpatient Medications   Medication Sig    acetaminophen (TYLENOL) 650 MG TbSR Take 1 tablet (650 mg total) by mouth every 6 (six) hours as needed (pain).    acetaminophen-codeine 300-30mg  (TYLENOL #3) 300-30 mg Tab Take 1 tablet by mouth every 8 (eight) hours as needed (pain).    ADVAIR DISKUS 100-50 mcg/dose diskus inhaler TAKE 1 PUFF BY MOUTH TWICE A DAY    CYANOCOBALAMIN, VITAMIN B-12, (VITAMIN B-12 ORAL) Take 2,500 mcg by mouth.    diclofenac sodium (VOLTAREN) 1 % Gel     flu vacc [id number],65yr up,PF (FLUZONE HIGH-DOSE 2019-20, PF,) 180 mcg/0.5 mL Syrg Inject in the muscle for one dose    fluticasone propionate (FLONASE) 50 mcg/actuation nasal spray 2 sprays (100 mcg total) by Each Nare route once daily.    meloxicam (MOBIC) 15 MG tablet TAKE 1 TABLET BY MOUTH EVERY DAY    metoprolol succinate (TOPROL-XL) 25 MG 24 hr tablet TAKE 1 TABLET BY MOUTH EVERY DAY    montelukast (SINGULAIR) 10 mg tablet TAKE 1 TABLET BY MOUTH EVERY DAY IN THE EVENING    nabumetone (RELAFEN) 750 MG tablet TAKE 1 TABLET BY MOUTH TWICE DAILY    omeprazole (PRILOSEC) 40 MG capsule TAKE 1 CAPSULE BY MOUTH EVERY DAY (Patient taking differently: - pt. reports twice daily)    polyethylene glycol 3350 (MIRALAX ORAL) Take by mouth as needed.    promethazine (PHENERGAN) 25 MG tablet Take 1 tablet (25 mg total) by mouth every 4 (four) hours as needed for Nausea.    sertraline (ZOLOFT) 50 MG tablet TAKE 1 TABLET BY MOUTH EVERY DAY    VENTOLIN HFA 90 mcg/actuation inhaler INHALE 2 PUFFS INTO THE LUNGS EVERY 6 HOURS AS NEEDED FOR WHEEZING    aspirin (ECOTRIN) 81 MG EC tablet Take 1 tablet (81 mg total) by mouth 2 (two) times daily.    atorvastatin (LIPITOR) 80 MG tablet TAKE 1 TABLET (80 MG TOTAL) BY MOUTH ONCE DAILY.    blood-glucose meter ([employer] BLOOD GLUCOSE SYSTM) kit Use as instructed    lancets Misc 1 lancet by Misc.(Non-Drug; Combo Route) route 2 (two) times daily.    tiZANidine (ZANAFLEX) 4 MG tablet TAKE 1.5 TABLETS (6MG) BY MOUTH 3 TIMES A DAY AS NEEDED FOR MUSCLE SPASM (Patient not taking: Reported on 11/14/2019)    tolterodine (DETROL) 2 MG Tab Take 1 tablet (2 mg total) by mouth 2 (two) times  "daily.    valsartan (DIOVAN) 320 MG tablet Take 1 tablet (320 mg total) by mouth once daily. (Patient not taking: Reported on 11/14/2019)    varicella-zoster [id number], PF, (SHINGRIX, PF,) 50 mcg/0.5 mL injection Inject into the muscle. (Patient not taking: Reported on 11/14/2019)     Current Facility-Administered Medications   Medication    DOBUTamine 500mg in D5W 250mL infusion (premix)       REVIEW OF SYSTEMS:    GENERAL:  No weight loss, malaise or fevers.  HEENT:  Negative for frequent or significant headaches.  NECK:  Negative for lumps, goiter, pain and significant neck swelling.  RESPIRATORY:  Negative for cough, wheezing or shortness of breath.  CARDIOVASCULAR:  Negative for chest pain, leg swelling or palpitations. HTN  GI:  Negative for abdominal discomfort, blood in stools or black stools or change in bowel habits. diverticulitis  MUSCULOSKELETAL:  See HPI.  SKIN:  Negative for lesions, rash, and itching.  PSYCH:  Negative for sleep disturbance, mood disorder and recent psychosocial stressors.  HEMATOLOGY/LYMPHOLOGY:  Negative for prolonged bleeding, bruising easily or swollen nodes.  NEURO:   No history of headaches, syncope, paralysis, seizures or tremors.  All other reviewed and negative other than HPI.    OBJECTIVE:    BP (!) 180/76   Pulse 67   Temp 97.6 °F (36.4 °C)   Ht 5' 3" (1.6 m)   Wt 73 kg (161 lb)   BMI 28.52 kg/m²     PHYSICAL EXAMINATION:    General appearance: Well appearing, in no acute distress, alert and oriented x3.  Psych:  Mood and affect appropriate.  Skin: Skin color, texture, turgor normal, no rashes or lesions, in both upper and lower body.  Head/face:  Normocephalic, atraumatic. No palpable lymph nodes.  Neck: No pain to palpation over the cervical paraspinous muscles. Spurling Negative. No pain with neck flexion, extension, or lateral flexion.   Cor: RRR  Pulm: CTA  GI:  Soft and non-tender.  Back: Straight leg raising in the sitting and supine positions is negative to " radicular pain. Moderate  pain to palpation over the spine or costovertebral angles. Normal range of motion without pain reproduction.Positive axial loading test bilateral.  Positive FABERE,Ganselin and Yeoman's test on the both side.negative FADIR  Extremities: Peripheral joint ROM is full and pain free without obvious instability or laxity in all four extremities. No deformities, edema, or skin discoloration. Good capillary refill.  Musculoskeletal: Shoulder, hip and knee provocative maneuvers are negative. Bilateral upper and lower extremity strength is normal and symmetric.  No atrophy or tone abnormalities are noted.  Neuro: Bilateral upper and lower extremity coordination and muscle stretch reflexes are physiologic and symmetric.  Plantar response are downgoing. No loss of sensation is noted.  Gait: in wheelchair today because of pain with walking    ASSESSMENT: 79 y.o. year old female with lower back and left leg pain, consistent with      1. Radiculopathy of thoracolumbar region  MRI Lumbar Spine Without Contrast    X-Ray Lumbar Complete With Flex And Ext    X-Ray Hips Bilateral 2 View Incl AP Pelvis    gabapentin (NEURONTIN) 100 MG capsule   2. DDD (degenerative disc disease), lumbar  MRI Lumbar Spine Without Contrast    X-Ray Lumbar Complete With Flex And Ext    X-Ray Hips Bilateral 2 View Incl AP Pelvis    gabapentin (NEURONTIN) 100 MG capsule   3. Spondylosis without myelopathy  MRI Lumbar Spine Without Contrast    X-Ray Lumbar Complete With Flex And Ext    X-Ray Hips Bilateral 2 View Incl AP Pelvis    gabapentin (NEURONTIN) 100 MG capsule   4. Osteoarthritis of spine, unspecified spinal osteoarthritis complication status, unspecified spinal region  MRI Lumbar Spine Without Contrast    X-Ray Lumbar Complete With Flex And Ext    X-Ray Hips Bilateral 2 View Incl AP Pelvis    gabapentin (NEURONTIN) 100 MG capsule       PLAN:   - I have stressed the importance of physical activity and a home exercise plan to  help with pain and improve health.    - Patient can continue with medications for now since they are providing benefits, using them appropriately, and without side effects.    - Schedule for L3-4 Left TFESI    - Will order MRI Lumbar    - Order XR Lumbar with Flex/Ext    - Trial Gabapentin 300mg qday. Instructed patient on how to uptitrate to dose.     - RTC 1 month    The above plan and management options were discussed at length with patient. Patient is in agreement with the above and verbalized understanding. It will be communicated with the referring physician via electronic record, fax, or mail.    Du Esqueda I have personally reviewed the history and exam of this patient and agree with the resident/fellow/NPs note as stated above.    Venancio Lopez MD    12/05/2019

## 2019-12-05 NOTE — PROGRESS NOTES
Chronic Pain - New Consult    Referring Physician: Kj, Mary Annrefada    Chief Complaint: LBP     SUBJECTIVE:    Elena Frances presents to the clinic for the evaluation of low back pain. The pain started 15 days ago following excessive cleaning and symptoms have been worsening.The pain is located in the low back  area and radiates to the left leg.  The pain is described as shooting and constant and is rated as 9/10. The pain is rated with a score of  9/10 on the BEST day and a score of 9/10 on the WORST day.  Symptoms interfere with daily activity. The pain is exacerbated by Standing.  The pain is mitigated by nothing. She reports spending 3-4 hours per day reclining. The patient reports 7 hours of uninterrupted sleep per night.    Patient denies night fever/night sweats, urinary incontinence, bowel incontinence, significant weight loss, significant motor weakness and loss of sensations.    Patient last seen 5/2015:  Elena Frances presents to the clinic for a follow-up appointment for back pain. Since the last visit, Elena Frances states the pain has been stable. S/p bilateral L3-4 TF MARYBETH on 4/7/15 with 80% relief. She states the leg pain has resolved. She is complaining of neck and headache pain although this was present before the procedure. Current pain intensity is 8/10.    ---    Physical Therapy/Home Exercise: yes      Pain Disability Index Review:  Last 3 PDI Scores 12/5/2019 5/6/2015 3/25/2015   Pain Disability Index (PDI) 46 [phone]       Pain Medications:    - Adjuvant Medications: Tylenol  - Anti-Coagulants: Aspirin, Nabumetone       report:  Not applicable    Pain Procedures:  Bilateral L3-4 TRANSFORAMINAL EPIDURAL STEROID INJECTION 4/07/15  LEFT ACETABULOFEMORAL JOINT INJECTION (HIP) 12/16/14,   09/16/14 bilateral HIP INJECTION (HIP),  Left L4-5 and L5-S1 TRANSFORAMINAL EPIDURAL STEROID INJECTION 08/12/14  right L3-4-5 FACET MEDIAL BRANCH NERVE RADIOFREQUENCY NEUROTOMY  (lumbar) 14   left L3-4-5 FACET MEDIAL BRANCH NERVE RADIOFREQUENCY NEUROTOMY (lumbar) 06/10/14  BILATERAL L3,4,5 LUMBAR FACET MEDIAL BRANCH NERVE     Imaging: none    Past Medical History:   Diagnosis Date    Allergy     Anemia     Arthritis     Asthma     Depression     Generalized headaches 2014    Goiter     MNG    HTN (hypertension), benign     Hyperlipidemia     Hyperparathyroidism     s/p surgery    Intestinal obstruction     resolved spontaneously    Lumbar disc disease     s/p epidural shots    Nuclear sclerosis - Both Eyes 3/11/2014    Osteoporosis, post-menopausal     with 3 rib fractures     Past Surgical History:   Procedure Laterality Date    APPENDECTOMY      BILATERAL OOPHORECTOMY      BREAST CYST EXCISION      left    CATARACT EXTRACTION W/  INTRAOCULAR LENS IMPLANT Right 2016    Dr. Fang ([place])    CATARACT EXTRACTION W/  INTRAOCULAR LENS IMPLANT Left 10/17/2016    Dr. Fang ([place])     SECTION, CLASSIC      x 1    CHOLECYSTECTOMY      COLONOSCOPY N/A 3/1/2016    Procedure: COLONOSCOPY;  Surgeon: Dony Bronson MD;  Location: [facility] (4TH FLR);  Service: Endoscopy;  Laterality: N/A;  PM Prep    HYSTERECTOMY      JOINT REPLACEMENT      KNEE SURGERY Right 2017    [facility]    LUMBAR EPIDURAL INJECTION      x 4    MOUTH SURGERY      for abscess drainage of gum of frontal teeth    PARATHYROID GLAND SURGERY      for parathyroid adenoma    TOTAL KNEE ARTHROPLASTY Left 2019    Procedure: ARTHROPLASTY, KNEE, TOTAL-DEPUY-SIGMA;  Surgeon: Newton Rodriguez III, MD;  [facility];  Service: Orthopedics;  Laterality: Left;     Social History     Socioeconomic History    Marital status:      Spouse name: Not on file    Number of children: Not on file    Years of education: Not on file    Highest education level: Not on file   Occupational History    Not on file   Social Needs    Financial resource strain: Not on file    Food  insecurity:     Worry: Not on file     Inability: Not on file    Transportation needs:     Medical: Not on file     Non-medical: Not on file   Tobacco Use    Smoking status: Never Smoker    Smokeless tobacco: Never Used   Substance and Sexual Activity    Alcohol use: No    Drug use: No    Sexual activity: Not on file   Lifestyle    Physical activity:     Days per week: Not on file     Minutes per session: Not on file    Stress: Not on file   Relationships    Social connections:     Talks on phone: Not on file     Gets together: Not on file     Attends Zoroastrianism service: Not on file     Active member of club or organization: Not on file     Attends meetings of clubs or organizations: Not on file     Relationship status: Not on file   Other Topics Concern    Not on file   Social History Narrative    Not on file     Family History   Problem Relation Age of Onset    Heart disease Mother     Hypertension Mother     Heart attack Mother     Heart disease Sister          in their 50's    Heart failure Sister     Heart disease Brother         CABG in age 60's    Hyperlipidemia Brother     Heart disease Sister         in her 50's    Heart disease Sister         in her 50's    Heart disease Sister     Hypertension Sister     Hyperlipidemia Sister     Heart disease Brother         CABG in age 60's    Hyperlipidemia Brother     Thyroid disease Daughter     Amblyopia Neg Hx     Blindness Neg Hx     Cancer Neg Hx     Cataracts Neg Hx     Diabetes Neg Hx     Glaucoma Neg Hx     Macular degeneration Neg Hx     Retinal detachment Neg Hx     Strabismus Neg Hx     Stroke Neg Hx        Review of patient's allergies indicates:   Allergen Reactions    Vicodin [hydrocodone-acetaminophen] Anxiety       Current Outpatient Medications   Medication Sig    acetaminophen (TYLENOL) 650 MG TbSR Take 1 tablet (650 mg total) by mouth every 6 (six) hours as needed (pain).    acetaminophen-codeine 300-30mg  (TYLENOL #3) 300-30 mg Tab Take 1 tablet by mouth every 8 (eight) hours as needed (pain).    ADVAIR DISKUS 100-50 mcg/dose diskus inhaler TAKE 1 PUFF BY MOUTH TWICE A DAY    CYANOCOBALAMIN, VITAMIN B-12, (VITAMIN B-12 ORAL) Take 2,500 mcg by mouth.    diclofenac sodium (VOLTAREN) 1 % Gel     flu vacc [id number],65yr up,PF (FLUZONE HIGH-DOSE 2019-20, PF,) 180 mcg/0.5 mL Syrg Inject in the muscle for one dose    fluticasone propionate (FLONASE) 50 mcg/actuation nasal spray 2 sprays (100 mcg total) by Each Nare route once daily.    meloxicam (MOBIC) 15 MG tablet TAKE 1 TABLET BY MOUTH EVERY DAY    metoprolol succinate (TOPROL-XL) 25 MG 24 hr tablet TAKE 1 TABLET BY MOUTH EVERY DAY    montelukast (SINGULAIR) 10 mg tablet TAKE 1 TABLET BY MOUTH EVERY DAY IN THE EVENING    nabumetone (RELAFEN) 750 MG tablet TAKE 1 TABLET BY MOUTH TWICE DAILY    omeprazole (PRILOSEC) 40 MG capsule TAKE 1 CAPSULE BY MOUTH EVERY DAY (Patient taking differently: - pt. reports twice daily)    polyethylene glycol 3350 (MIRALAX ORAL) Take by mouth as needed.    promethazine (PHENERGAN) 25 MG tablet Take 1 tablet (25 mg total) by mouth every 4 (four) hours as needed for Nausea.    sertraline (ZOLOFT) 50 MG tablet TAKE 1 TABLET BY MOUTH EVERY DAY    VENTOLIN HFA 90 mcg/actuation inhaler INHALE 2 PUFFS INTO THE LUNGS EVERY 6 HOURS AS NEEDED FOR WHEEZING    aspirin (ECOTRIN) 81 MG EC tablet Take 1 tablet (81 mg total) by mouth 2 (two) times daily.    atorvastatin (LIPITOR) 80 MG tablet TAKE 1 TABLET (80 MG TOTAL) BY MOUTH ONCE DAILY.    blood-glucose meter ([employer] BLOOD GLUCOSE SYSTM) kit Use as instructed    lancets Misc 1 lancet by Misc.(Non-Drug; Combo Route) route 2 (two) times daily.    tiZANidine (ZANAFLEX) 4 MG tablet TAKE 1.5 TABLETS (6MG) BY MOUTH 3 TIMES A DAY AS NEEDED FOR MUSCLE SPASM (Patient not taking: Reported on 11/14/2019)    tolterodine (DETROL) 2 MG Tab Take 1 tablet (2 mg total) by mouth 2 (two) times  "daily.    valsartan (DIOVAN) 320 MG tablet Take 1 tablet (320 mg total) by mouth once daily. (Patient not taking: Reported on 11/14/2019)    varicella-zoster [id number], PF, (SHINGRIX, PF,) 50 mcg/0.5 mL injection Inject into the muscle. (Patient not taking: Reported on 11/14/2019)     Current Facility-Administered Medications   Medication    DOBUTamine 500mg in D5W 250mL infusion (premix)       REVIEW OF SYSTEMS:    GENERAL:  No weight loss, malaise or fevers.  HEENT:  Negative for frequent or significant headaches.  NECK:  Negative for lumps, goiter, pain and significant neck swelling.  RESPIRATORY:  Negative for cough, wheezing or shortness of breath.  CARDIOVASCULAR:  Negative for chest pain, leg swelling or palpitations. HTN  GI:  Negative for abdominal discomfort, blood in stools or black stools or change in bowel habits. diverticulitis  MUSCULOSKELETAL:  See HPI.  SKIN:  Negative for lesions, rash, and itching.  PSYCH:  Negative for sleep disturbance, mood disorder and recent psychosocial stressors.  HEMATOLOGY/LYMPHOLOGY:  Negative for prolonged bleeding, bruising easily or swollen nodes.  NEURO:   No history of headaches, syncope, paralysis, seizures or tremors.  All other reviewed and negative other than HPI.    OBJECTIVE:    BP (!) 180/76   Pulse 67   Temp 97.6 °F (36.4 °C)   Ht 5' 3" (1.6 m)   Wt 73 kg (161 lb)   BMI 28.52 kg/m²     PHYSICAL EXAMINATION:    General appearance: Well appearing, in no acute distress, alert and oriented x3.  Psych:  Mood and affect appropriate.  Skin: Skin color, texture, turgor normal, no rashes or lesions, in both upper and lower body.  Head/face:  Normocephalic, atraumatic. No palpable lymph nodes.  Neck: No pain to palpation over the cervical paraspinous muscles. Spurling Negative. No pain with neck flexion, extension, or lateral flexion.   Cor: RRR  Pulm: CTA  GI:  Soft and non-tender.  Back: Straight leg raising in the sitting and supine positions is negative to " radicular pain. Moderate  pain to palpation over the spine or costovertebral angles. Normal range of motion without pain reproduction.Positive axial loading test bilateral.  Positive FABERE,Ganselin and Yeoman's test on the both side.negative FADIR  Extremities: Peripheral joint ROM is full and pain free without obvious instability or laxity in all four extremities. No deformities, edema, or skin discoloration. Good capillary refill.  Musculoskeletal: Shoulder, hip and knee provocative maneuvers are negative. Bilateral upper and lower extremity strength is normal and symmetric.  No atrophy or tone abnormalities are noted.  Neuro: Bilateral upper and lower extremity coordination and muscle stretch reflexes are physiologic and symmetric.  Plantar response are downgoing. No loss of sensation is noted.  Gait: in wheelchair today because of pain with walking    ASSESSMENT: 79 y.o. year old female with lower back and left leg pain, consistent with      1. Radiculopathy of thoracolumbar region  MRI Lumbar Spine Without Contrast    X-Ray Lumbar Complete With Flex And Ext    X-Ray Hips Bilateral 2 View Incl AP Pelvis    gabapentin (NEURONTIN) 100 MG capsule   2. DDD (degenerative disc disease), lumbar  MRI Lumbar Spine Without Contrast    X-Ray Lumbar Complete With Flex And Ext    X-Ray Hips Bilateral 2 View Incl AP Pelvis    gabapentin (NEURONTIN) 100 MG capsule   3. Spondylosis without myelopathy  MRI Lumbar Spine Without Contrast    X-Ray Lumbar Complete With Flex And Ext    X-Ray Hips Bilateral 2 View Incl AP Pelvis    gabapentin (NEURONTIN) 100 MG capsule   4. Osteoarthritis of spine, unspecified spinal osteoarthritis complication status, unspecified spinal region  MRI Lumbar Spine Without Contrast    X-Ray Lumbar Complete With Flex And Ext    X-Ray Hips Bilateral 2 View Incl AP Pelvis    gabapentin (NEURONTIN) 100 MG capsule       PLAN:   - I have stressed the importance of physical activity and a home exercise plan to  help with pain and improve health.    - Patient can continue with medications for now since they are providing benefits, using them appropriately, and without side effects.    - Schedule for L3-4 Left TFESI    - Will order MRI Lumbar    - Order XR Lumbar with Flex/Ext    - Trial Gabapentin 300mg qday. Instructed patient on how to uptitrate to dose.     - RTC 1 month    The above plan and management options were discussed at length with patient. Patient is in agreement with the above and verbalized understanding. It will be communicated with the referring physician via electronic record, fax, or mail.    Du Esqueda I have personally reviewed the history and exam of this patient and agree with the resident/fellow/NPs note as stated above.    Venancio Lopez MD    12/05/2019

## 2019-12-13 ENCOUNTER — HOSPITAL ENCOUNTER (OUTPATIENT)
Dept: RADIOLOGY | Facility: HOSPITAL | Age: 79
Discharge: HOME | End: 2019-12-13
Attending: ANESTHESIOLOGY
Payer: MEDICARE

## 2019-12-13 DIAGNOSIS — M54.15 RADICULOPATHY OF THORACOLUMBAR REGION: ICD-10-CM

## 2019-12-13 DIAGNOSIS — M51.36 DDD (DEGENERATIVE DISC DISEASE), LUMBAR: ICD-10-CM

## 2019-12-13 PROCEDURE — 73521 XR HIPS BILATERAL 2 VIEW INCL AP PELVIS: ICD-10-PCS | Mod: 26,,, | Performed by: RADIOLOGY

## 2019-12-13 PROCEDURE — 72110 X-RAY EXAM L-2 SPINE 4/>VWS: CPT | Mod: 26,,, | Performed by: RADIOLOGY

## 2019-12-13 PROCEDURE — 73521 X-RAY EXAM HIPS BI 2 VIEWS: CPT | Mod: 26,,, | Performed by: RADIOLOGY

## 2019-12-13 PROCEDURE — 73521 X-RAY EXAM HIPS BI 2 VIEWS: CPT | Mod: TC

## 2019-12-13 PROCEDURE — 72110 XR LUMBAR SPINE 5 VIEW WITH FLEX AND EXT: ICD-10-PCS | Mod: 26,,, | Performed by: RADIOLOGY

## 2019-12-13 PROCEDURE — 72110 X-RAY EXAM L-2 SPINE 4/>VWS: CPT | Mod: TC

## 2019-12-13 PROCEDURE — 72148 MRI LUMBAR SPINE WITHOUT CONTRAST: ICD-10-PCS | Mod: 26,,, | Performed by: RADIOLOGY

## 2019-12-13 PROCEDURE — 72148 MRI LUMBAR SPINE W/O DYE: CPT | Mod: TC

## 2019-12-13 PROCEDURE — 72148 MRI LUMBAR SPINE W/O DYE: CPT | Mod: 26,,, | Performed by: RADIOLOGY

## 2019-12-18 ENCOUNTER — HOSPITAL ENCOUNTER (OUTPATIENT)
Facility: OTHER | Age: 79
Discharge: HOME | End: 2019-12-18
Attending: ANESTHESIOLOGY | Admitting: ANESTHESIOLOGY
Payer: MEDICARE

## 2019-12-18 VITALS
SYSTOLIC BLOOD PRESSURE: 173 MMHG | RESPIRATION RATE: 18 BRPM | DIASTOLIC BLOOD PRESSURE: 77 MMHG | OXYGEN SATURATION: 100 % | HEART RATE: 69 BPM

## 2019-12-18 DIAGNOSIS — M54.16 LUMBAR RADICULOPATHY: Primary | ICD-10-CM

## 2019-12-18 DIAGNOSIS — M51.36 DDD (DEGENERATIVE DISC DISEASE), LUMBAR: ICD-10-CM

## 2019-12-18 PROCEDURE — 64483 NJX AA&/STRD TFRM EPI L/S 1: CPT | Performed by: ANESTHESIOLOGY

## 2019-12-18 PROCEDURE — 25000003 PHARM REV CODE 250: Performed by: ANESTHESIOLOGY

## 2019-12-18 PROCEDURE — 99152 MOD SED SAME PHYS/QHP 5/>YRS: CPT | Mod: ,,, | Performed by: ANESTHESIOLOGY

## 2019-12-18 PROCEDURE — 64484 PRA INJECT ANES/STEROID FORAMEN LUMBAR/SACRAL W IMG GUIDE ,EA ADD LEVEL: ICD-10-PCS | Mod: LT,,, | Performed by: ANESTHESIOLOGY

## 2019-12-18 PROCEDURE — 99152 PR MOD CONSCIOUS SEDATION, SAME PHYS, 5+ YRS, FIRST 15 MIN: ICD-10-PCS | Mod: ,,, | Performed by: ANESTHESIOLOGY

## 2019-12-18 PROCEDURE — 64484 NJX AA&/STRD TFRM EPI L/S EA: CPT | Performed by: ANESTHESIOLOGY

## 2019-12-18 PROCEDURE — 64484 NJX AA&/STRD TFRM EPI L/S EA: CPT | Mod: LT,,, | Performed by: ANESTHESIOLOGY

## 2019-12-18 PROCEDURE — 63600175 PHARM REV CODE 636 W HCPCS: Performed by: ANESTHESIOLOGY

## 2019-12-18 PROCEDURE — 64483 NJX AA&/STRD TFRM EPI L/S 1: CPT | Mod: LT,,, | Performed by: ANESTHESIOLOGY

## 2019-12-18 PROCEDURE — 64483 PR EPIDURAL INJ, ANES/STEROID, TRANSFORAMINAL, LUMB/SACR, SNGL LEVL: ICD-10-PCS | Mod: LT,,, | Performed by: ANESTHESIOLOGY

## 2019-12-18 PROCEDURE — 25500020 PHARM REV CODE 255: Performed by: ANESTHESIOLOGY

## 2019-12-18 RX ORDER — LIDOCAINE HYDROCHLORIDE 5 MG/ML
INJECTION, SOLUTION INFILTRATION; INTRAVENOUS
Status: DISCONTINUED | OUTPATIENT
Start: 2019-12-18 | End: 2019-12-18 | Stop reason: HOSPADM

## 2019-12-18 RX ORDER — FENTANYL CITRATE 50 UG/ML
INJECTION, SOLUTION INTRAMUSCULAR; INTRAVENOUS
Status: DISCONTINUED | OUTPATIENT
Start: 2019-12-18 | End: 2019-12-18 | Stop reason: HOSPADM

## 2019-12-18 RX ORDER — DEXAMETHASONE SODIUM PHOSPHATE 10 MG/ML
INJECTION INTRAMUSCULAR; INTRAVENOUS
Status: DISCONTINUED | OUTPATIENT
Start: 2019-12-18 | End: 2019-12-18 | Stop reason: HOSPADM

## 2019-12-18 RX ORDER — MIDAZOLAM HYDROCHLORIDE 1 MG/ML
INJECTION INTRAMUSCULAR; INTRAVENOUS
Status: DISCONTINUED | OUTPATIENT
Start: 2019-12-18 | End: 2019-12-18 | Stop reason: HOSPADM

## 2019-12-18 RX ORDER — LIDOCAINE HYDROCHLORIDE 10 MG/ML
INJECTION INFILTRATION; PERINEURAL
Status: DISCONTINUED | OUTPATIENT
Start: 2019-12-18 | End: 2019-12-18 | Stop reason: HOSPADM

## 2019-12-18 RX ORDER — SODIUM CHLORIDE 9 MG/ML
500 INJECTION, SOLUTION INTRAVENOUS CONTINUOUS
Status: DISCONTINUED | OUTPATIENT
Start: 2019-12-18 | End: 2019-12-18 | Stop reason: HOSPADM

## 2019-12-18 NOTE — OP NOTE
Patient Name: Elena Frances  MRN: [id number]    INFORMED CONSENT: The procedure, risks, benefits and options were discussed with patient. There are no contraindications to the procedure. The patient expressed understanding and agreed to proceed. The personnel performing the procedure was discussed. I verify that I personally obtained Elena's consent prior to the start of the procedure and the signed consent can be found on the patient's chart.    Procedure Date: 12/18/2019    Anesthesia: Topical    Pre Procedure diagnosis: Lumbar radiculopathy [M54.16]  DDD (degenerative disc disease), lumbar [M51.36]  1. Lumbar radiculopathy    2. DDD (degenerative disc disease), lumbar      Post-Procedure diagnosis: SAME      Sedation: Yes - Fentanyl 100 mcg and Midazolam 2 mg    PROCEDURE: Left L3/4 AND L4/5 TRANSFORAMINAL EPIDURAL STEROID INJECTION        DESCRIPTION OF PROCEDURE: The patient was brought to the procedure room. After performing time out IV access was obtained prior to the procedure. The patient was positioned prone on the fluoroscopy table. Continuous hemodynamic monitoring was initiated including blood pressure, EKG, and pulse oximetry. . The skin was prepped with chlorhexidine three times and draped in a sterile fashion. Skin anesthesia was achieved using 3 mL of lidocaine 1% over the respective injection site.     An oblique fluoroscopic view was obtained, with the superior articular process of the inferior vertebral body aligned with the pedicle. The tip of a 22-gauge 3.5-inch Quincke-type spinal needle was advanced toward the 6 oclock position of the pedicle under intermittent fluoroscopic guidance. Confirmation of proper needle position was made with AP, oblique, and lateral fluoroscopic views. Negative aspiration for blood or CSF was confirmed. 2 mL of Omnipaque 300 was injected. Live fluoroscopic imaging revealed a clear outline of the spinal nerve with proximal spread of agent through the neural  foramen into the anterior epidural space. A total combination of 3 mL of Lidocaine 0.5% and 5 mg decadron was injected at each level. Contrast spread was noted from L3 to L5 level. There was no pain on injection. The needle was removed and bleeding was nil.  A sterile dressing was applied. Elena was taken back to the recovery room for further observation.     Blood Loss: Nill  Specimen: None    Venancio Lopez MD

## 2019-12-18 NOTE — DISCHARGE INSTRUCTIONS

## 2019-12-18 NOTE — DISCHARGE SUMMARY
Discharge Note  Short Stay      SUMMARY     Admit Date: 12/18/2019    Attending Physician: Venancio Lopez      Discharge Physician: Venancio Lopez      Discharge Date: 12/18/2019 2:48 PM    Procedure(s) (LRB):  INJECTION, STEROID, EPIDURAL, TRANSFORAMINAL APPROACH, L3-L4 AND L4-L5 (Left)    Final Diagnosis: Lumbar radiculopathy [M54.16]  DDD (degenerative disc disease), lumbar [M51.36]    Disposition: Home or self care    Patient Instructions:   Current Discharge Medication List      CONTINUE these medications which have NOT CHANGED    Details   acetaminophen (TYLENOL) 650 MG TbSR Take 1 tablet (650 mg total) by mouth every 6 (six) hours as needed (pain).  Qty: 120 tablet, Refills: 0      ADVAIR DISKUS 100-50 mcg/dose diskus inhaler TAKE 1 PUFF BY MOUTH TWICE A DAY  Qty: 1 each, Refills: 11      aspirin (ECOTRIN) 81 MG EC tablet Take 1 tablet (81 mg total) by mouth 2 (two) times daily.  Qty: 60 tablet, Refills: 0      atorvastatin (LIPITOR) 80 MG tablet TAKE 1 TABLET (80 MG TOTAL) BY MOUTH ONCE DAILY.  Qty: 90 tablet, Refills: 3    Associated Diagnoses: Hyperlipidemia, unspecified hyperlipidemia type      blood-glucose meter ([employer] BLOOD GLUCOSE SYSTM) kit Use as instructed  Qty: 1 each, Refills: 0    Comments: [employer]S kit      CYANOCOBALAMIN, VITAMIN B-12, (VITAMIN B-12 ORAL) Take 2,500 mcg by mouth.      diclofenac sodium (VOLTAREN) 1 % Gel       flu vacc [id number],65yr up,PF (FLUZONE HIGH-DOSE 2019-20, PF,) 180 mcg/0.5 mL Syrg Inject in the muscle for one dose  Qty: 0.5 mL, Refills: 0      fluticasone propionate (FLONASE) 50 mcg/actuation nasal spray 2 sprays (100 mcg total) by Each Nare route once daily.  Qty: 16 g, Refills: 6      gabapentin (NEURONTIN) 100 MG capsule Take 1 capsule (100 mg total) by mouth 3 (three) times daily.  Qty: 90 capsule, Refills: 0    Comments: Take 1 capsule at night for 1 week, then take 1 capsule twice a day for a week, then take 1 capsule 3x/day,  thereafter.  Associated Diagnoses: Radiculopathy of thoracolumbar region; DDD (degenerative disc disease), lumbar; Spondylosis without myelopathy; Osteoarthritis of spine, unspecified spinal osteoarthritis complication status, unspecified spinal region      lancets Misc 1 lancet by Misc.(Non-Drug; Combo Route) route 2 (two) times daily.  Qty: 200 each, Refills: 3    Comments: [employer]S lancets      meloxicam (MOBIC) 15 MG tablet TAKE 1 TABLET BY MOUTH EVERY DAY  Qty: 30 tablet, Refills: 1      metoprolol succinate (TOPROL-XL) 25 MG 24 hr tablet TAKE 1 TABLET BY MOUTH EVERY DAY  Qty: 90 tablet, Refills: 3    Comments: PATIENT NEEDS REFILL ON MEDICATION      montelukast (SINGULAIR) 10 mg tablet TAKE 1 TABLET BY MOUTH EVERY DAY IN THE EVENING  Qty: 90 tablet, Refills: 3      nabumetone (RELAFEN) 750 MG tablet TAKE 1 TABLET BY MOUTH TWICE DAILY  Qty: 180 tablet, Refills: 1      omeprazole (PRILOSEC) 40 MG capsule TAKE 1 CAPSULE BY MOUTH EVERY DAY  Qty: 90 capsule, Refills: 3      polyethylene glycol 3350 (MIRALAX ORAL) Take by mouth as needed.      promethazine (PHENERGAN) 25 MG tablet Take 1 tablet (25 mg total) by mouth every 4 (four) hours as needed for Nausea.  Qty: 61 tablet, Refills: 0      sertraline (ZOLOFT) 50 MG tablet TAKE 1 TABLET BY MOUTH EVERY DAY  Qty: 90 tablet, Refills: 3      tiZANidine (ZANAFLEX) 4 MG tablet TAKE 1.5 TABLETS (6MG) BY MOUTH 3 TIMES A DAY AS NEEDED FOR MUSCLE SPASM  Qty: 135 tablet, Refills: 6      tolterodine (DETROL) 2 MG Tab Take 1 tablet (2 mg total) by mouth 2 (two) times daily.  Qty: 60 tablet, Refills: 11    Associated Diagnoses: Stress incontinence      valsartan (DIOVAN) 320 MG tablet Take 1 tablet (320 mg total) by mouth once daily.  Qty: 90 tablet, Refills: 3      varicella-zoster [id number], PF, (SHINGRIX, PF,) 50 mcg/0.5 mL injection Inject into the muscle.  Qty: 0.5 mL, Refills: 1      VENTOLIN HFA 90 mcg/actuation inhaler INHALE 2 PUFFS INTO THE LUNGS EVERY 6 HOURS AS  NEEDED FOR WHEEZING  Qty: 18 Inhaler, Refills: 1    Associated Diagnoses: Cough                 Discharge Diagnosis: Lumbar radiculopathy [M54.16]  DDD (degenerative disc disease), lumbar [M51.36]  Condition on Discharge: Stable with no complications to procedure   Diet on Discharge: Same as before.  Activity: as per instruction sheet.  Discharge to: Home with a responsible adult.  Follow up: 2-4 weeks       Please call my office or pager at [phone] if experienced any weakness or loss of sensation, fever > 101.5, pain uncontrolled with oral medications, persistent nausea/vomiting/or diarrhea, redness or drainage from the incisions, or any other worrisome concerns. If physician on call was not reached or could not communicate with our office for any reason please go to the nearest emergency department

## 2019-12-23 DIAGNOSIS — R05.9 COUGH: ICD-10-CM

## 2019-12-23 RX ORDER — ALBUTEROL SULFATE 90 UG/1
AEROSOL, METERED RESPIRATORY (INHALATION)
Qty: 18 INHALER | Refills: 1 | Status: SHIPPED | OUTPATIENT
Start: 2019-12-23 | End: 2020-01-28

## 2019-12-27 DIAGNOSIS — M51.36 DDD (DEGENERATIVE DISC DISEASE), LUMBAR: ICD-10-CM

## 2019-12-27 DIAGNOSIS — M54.15 RADICULOPATHY OF THORACOLUMBAR REGION: ICD-10-CM

## 2019-12-27 DIAGNOSIS — M47.819 SPONDYLOSIS WITHOUT MYELOPATHY: ICD-10-CM

## 2019-12-27 DIAGNOSIS — M47.9 OSTEOARTHRITIS OF SPINE, UNSPECIFIED SPINAL OSTEOARTHRITIS COMPLICATION STATUS, UNSPECIFIED SPINAL REGION: ICD-10-CM

## 2019-12-29 RX ORDER — GABAPENTIN 100 MG/1
CAPSULE ORAL
Qty: 90 CAPSULE | Refills: 0 | Status: SHIPPED | OUTPATIENT
Start: 2019-12-29 | End: 2020-01-22

## 2020-01-15 ENCOUNTER — OFFICE VISIT (OUTPATIENT)
Dept: PAIN MEDICINE | Facility: CLINIC | Age: 80
End: 2020-01-15
Payer: MEDICARE

## 2020-01-15 ENCOUNTER — TELEPHONE (OUTPATIENT)
Dept: PAIN MEDICINE | Facility: CLINIC | Age: 80
End: 2020-01-15

## 2020-01-15 VITALS
DIASTOLIC BLOOD PRESSURE: 74 MMHG | TEMPERATURE: 98 F | SYSTOLIC BLOOD PRESSURE: 163 MMHG | WEIGHT: 160.94 LBS | BODY MASS INDEX: 28.52 KG/M2 | HEIGHT: 63 IN | OXYGEN SATURATION: 100 % | HEART RATE: 70 BPM | RESPIRATION RATE: 18 BRPM

## 2020-01-15 DIAGNOSIS — M54.16 LUMBAR RADICULOPATHY: ICD-10-CM

## 2020-01-15 DIAGNOSIS — R10.2 PELVIC PAIN: Primary | ICD-10-CM

## 2020-01-15 DIAGNOSIS — M51.36 DDD (DEGENERATIVE DISC DISEASE), LUMBAR: ICD-10-CM

## 2020-01-15 DIAGNOSIS — M47.816 LUMBAR SPONDYLOSIS: ICD-10-CM

## 2020-01-15 PROCEDURE — 99999 PR PBB SHADOW E&M-EST. PATIENT-LVL III: ICD-10-PCS | Mod: PBBFAC,,, | Performed by: NURSE PRACTITIONER

## 2020-01-15 PROCEDURE — 1125F PR PAIN SEVERITY QUANTIFIED, PAIN PRESENT: ICD-10-PCS | Mod: S$GLB,,, | Performed by: NURSE PRACTITIONER

## 2020-01-15 PROCEDURE — 1101F PR PT FALLS ASSESS DOC 0-1 FALLS W/OUT INJ PAST YR: ICD-10-PCS | Mod: CPTII,S$GLB,, | Performed by: NURSE PRACTITIONER

## 2020-01-15 PROCEDURE — 1101F PT FALLS ASSESS-DOCD LE1/YR: CPT | Mod: CPTII,S$GLB,, | Performed by: NURSE PRACTITIONER

## 2020-01-15 PROCEDURE — 3077F PR MOST RECENT SYSTOLIC BLOOD PRESSURE >= 140 MM HG: ICD-10-PCS | Mod: CPTII,S$GLB,, | Performed by: NURSE PRACTITIONER

## 2020-01-15 PROCEDURE — 1159F PR MEDICATION LIST DOCUMENTED IN MEDICAL RECORD: ICD-10-PCS | Mod: S$GLB,,, | Performed by: NURSE PRACTITIONER

## 2020-01-15 PROCEDURE — 1125F AMNT PAIN NOTED PAIN PRSNT: CPT | Mod: S$GLB,,, | Performed by: NURSE PRACTITIONER

## 2020-01-15 PROCEDURE — 3078F DIAST BP <80 MM HG: CPT | Mod: CPTII,S$GLB,, | Performed by: NURSE PRACTITIONER

## 2020-01-15 PROCEDURE — 3077F SYST BP >= 140 MM HG: CPT | Mod: CPTII,S$GLB,, | Performed by: NURSE PRACTITIONER

## 2020-01-15 PROCEDURE — 99213 PR OFFICE/OUTPT VISIT, EST, LEVL III, 20-29 MIN: ICD-10-PCS | Mod: S$GLB,,, | Performed by: NURSE PRACTITIONER

## 2020-01-15 PROCEDURE — 99999 PR PBB SHADOW E&M-EST. PATIENT-LVL III: CPT | Mod: PBBFAC,,, | Performed by: NURSE PRACTITIONER

## 2020-01-15 PROCEDURE — 1159F MED LIST DOCD IN RCRD: CPT | Mod: S$GLB,,, | Performed by: NURSE PRACTITIONER

## 2020-01-15 PROCEDURE — 99213 OFFICE O/P EST LOW 20 MIN: CPT | Mod: S$GLB,,, | Performed by: NURSE PRACTITIONER

## 2020-01-15 PROCEDURE — 3078F PR MOST RECENT DIASTOLIC BLOOD PRESSURE < 80 MM HG: ICD-10-PCS | Mod: CPTII,S$GLB,, | Performed by: NURSE PRACTITIONER

## 2020-01-15 NOTE — TELEPHONE ENCOUNTER
Staff returned call to pt and informed her that we will clarify the orders with Dr Lopez as to if she needs to be scheduled for the CT with or without contrast      Pt gave verbal understanding

## 2020-01-15 NOTE — PROGRESS NOTES
Chronic Pain - Established Visit    Referring Physician: No ref. provider found    Chief Complaint: LBP     SUBJECTIVE:    Interval History 1/15/2020:  The patient returns to clinic today for follow up. She is s/p left L3/4 and L4/5 TF MARYBETH on 12/18/2019. She reports significant relief of her leg pain. She continues to report low back and pelvic pain. She describes this pain as sharp and aching in nature. She denies any radiating leg pain today. She denies any other health changes. Her pain today is 2/10.    HPI:  Elena Frances presents to the clinic for the evaluation of low back pain. The pain started 15 days ago following excessive cleaning and symptoms have been worsening.The pain is located in the low back  area and radiates to the left leg.  The pain is described as shooting and constant and is rated as 9/10. The pain is rated with a score of  9/10 on the BEST day and a score of 9/10 on the WORST day.  Symptoms interfere with daily activity. The pain is exacerbated by Standing.  The pain is mitigated by nothing. She reports spending 3-4 hours per day reclining. The patient reports 7 hours of uninterrupted sleep per night.    Patient denies night fever/night sweats, urinary incontinence, bowel incontinence, significant weight loss, significant motor weakness and loss of sensations.    Patient last seen 5/2015:  Elena Frances presents to the clinic for a follow-up appointment for back pain. Since the last visit, Elena Frances states the pain has been stable. S/p bilateral L3-4 TF MARYBETH on 4/7/15 with 80% relief. She states the leg pain has resolved. She is complaining of neck and headache pain although this was present before the procedure. Current pain intensity is 8/10.    ---    Physical Therapy/Home Exercise: yes      Pain Disability Index Review:  Last 3 PDI Scores 1/15/2020 12/5/2019 5/6/2015   Pain Disability Index (PDI) 43 [phone] 54       Pain Medications:    - Adjuvant Medications:  Tylenol  - Anti-Coagulants: Aspirin, Nabumetone       report:  Not applicable    Pain Procedures:  12/18/2019- Left L3/4 and L4/5 TF MARYBETH  4/7/2015- Bilateral L3/4 TF MARYBETH  12/16/2014- Left hip joint injection  9/16/2014- Bilateral hip joint injection  8/12/2014- Left L4/5 and L5/S1 TF MARYBETH  6/24/2014- Right L3,4,5,RFA  6/10/2014- Left L3,4,5, RFA    Imaging:   MRI Lumbar Spine 12/13/2019:  COMPARISON:  MRI of the lumbar spine from 03/25/2014.    FINDINGS:  Alignment: There is grade 1 anterolisthesis L3 on L4.  Otherwise spinal alignment is anatomic.    Vertebrae: There are numerous T1 and T2 hypointense lesions throughout the lumbar spine and sacrum, new from prior.  Index lesion within the L3 vertebral body measures 1.3 cm.  Index lesion within the L5 vertebral body measures 1.8 cm.  Index lesion within the left sacral ala measures 1.6 cm.    Discs: There is moderate disc height loss anteriorly at L1-L2.  There is mild disc height loss at L2-L3.  There is moderate disc height loss of L3-L4.  Remaining disc heights are maintained.    Cord: Normal.  Conus terminates at L1-L2.    Degenerative findings:    T12-L1: Mild bilateral facet arthropathy and ligamentum flavum hypertrophy.  No spinal canal stenosis or neural foraminal narrowing.    L1-L2: Diffuse disc bulge with moderate bilateral facet arthropathy results in mild bilateral neural foraminal narrowing.  No spinal canal stenosis.    L2-L3: Diffuse disc bulge with a superimposed left lateral recess disc extrusion migrating superiorly with probable compression of the exiting left L2 nerve root or descending left L3 nerve root.  Mild bilateral facet arthropathy and ligamentum flavum hypertrophy.  Mild right neural foraminal narrowing.  Mild spinal canal stenosis.    L3-L4: Diffuse disc bulge with moderate bilateral facet arthropathy and ligamentum flavum hypertrophy results in moderate spinal canal stenosis and moderate bilateral neural foraminal  narrowing.    L4-L5: Diffuse disc bulge with moderate bilateral facet arthropathy and ligamentum flavum hypertrophy results in mild spinal canal stenosis and mild right and moderate left neural foraminal narrowing.    L5-S1: No spinal canal stenosis or neural foraminal narrowing.  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    Paraspinal muscles & soft tissues: Unremarkable.      Impression       1. Multiple new lesions throughout the lumbar spine, concerning for osseous metastatic disease.  2. Lumbar spondylosis, resulting in moderate spinal canal stenosis at L3-4, and moderate neural foraminal narrowing from L2-3 through L4-5, as above.  3. Moderate-sized left disc extrusion at L2-3, possibly impinging upon the exiting left L2 or descending L3 nerve roots.  This report was flagged in Epic as abnormal.         Past Medical History:   Diagnosis Date    Allergy     Anemia     Arthritis     Asthma     Depression     Generalized headaches 2014    Goiter     MNG    HTN (hypertension), benign     Hyperlipidemia     Hyperparathyroidism     s/p surgery    Intestinal obstruction     resolved spontaneously    Lumbar disc disease     s/p epidural shots    Nuclear sclerosis - Both Eyes 3/11/2014    Osteoporosis, post-menopausal     with 3 rib fractures     Past Surgical History:   Procedure Laterality Date    APPENDECTOMY      BILATERAL OOPHORECTOMY      BREAST CYST EXCISION      left    CATARACT EXTRACTION W/  INTRAOCULAR LENS IMPLANT Right 2016    Dr. Fang ([place])    CATARACT EXTRACTION W/  INTRAOCULAR LENS IMPLANT Left 10/17/2016    Dr. Fang ([place])     SECTION, CLASSIC      x 1    CHOLECYSTECTOMY      COLONOSCOPY N/A 3/1/2016    Procedure: COLONOSCOPY;  Surgeon: Dony Bronson MD;  Location: [address]);  Service: Endoscopy;  Laterality: N/A;  PM Prep    HYSTERECTOMY      JOINT REPLACEMENT      KNEE SURGERY Right 2017    TKR    LUMBAR EPIDURAL INJECTION       x 4    MOUTH SURGERY      for abscess drainage of gum of frontal teeth    PARATHYROID GLAND SURGERY      for parathyroid adenoma    TOTAL KNEE ARTHROPLASTY Left 2019    Procedure: ARTHROPLASTY, KNEE, TOTAL-DEPUY-SIGMA;  Surgeon: Newton Rodriguez III, MD;  Location: [address];  Service: Orthopedics;  Laterality: Left;    TRANSFORAMINAL EPIDURAL INJECTION OF STEROID Left 2019    Procedure: INJECTION, STEROID, EPIDURAL, TRANSFORAMINAL APPROACH, L3-L4 AND L4-L5;  Surgeon: Venancio Lopez MD;  Location[facility];  Service: Pain Management;  Laterality: Left;     Social History     Socioeconomic History    Marital status:      Spouse name: Not on file    Number of children: Not on file    Years of education: Not on file    Highest education level: Not on file   Occupational History    Not on file   Social Needs    Financial resource strain: Not on file    Food insecurity:     Worry: Not on file     Inability: Not on file    Transportation needs:     Medical: Not on file     Non-medical: Not on file   Tobacco Use    Smoking status: Never Smoker    Smokeless tobacco: Never Used   Substance and Sexual Activity    Alcohol use: No    Drug use: No    Sexual activity: Not on file   Lifestyle    Physical activity:     Days per week: Not on file     Minutes per session: Not on file    Stress: Not on file   Relationships    Social connections:     Talks on phone: Not on file     Gets together: Not on file     Attends Yazidi service: Not on file     Active member of club or organization: Not on file     Attends meetings of clubs or organizations: Not on file     Relationship status: Not on file   Other Topics Concern    Not on file   Social History Narrative    Not on file     Family History   Problem Relation Age of Onset    Heart disease Mother     Hypertension Mother     Heart attack Mother     Heart disease Sister          in their 50's    Heart failure Sister      Heart disease Brother         CABG in age 60's    Hyperlipidemia Brother     Heart disease Sister         in her 50's    Heart disease Sister         in her 50's    Heart disease Sister     Hypertension Sister     Hyperlipidemia Sister     Heart disease Brother         CABG in age 60's    Hyperlipidemia Brother     Thyroid disease Daughter     Amblyopia Neg Hx     Blindness Neg Hx     Cancer Neg Hx     Cataracts Neg Hx     Diabetes Neg Hx     Glaucoma Neg Hx     Macular degeneration Neg Hx     Retinal detachment Neg Hx     Strabismus Neg Hx     Stroke Neg Hx        Review of patient's allergies indicates:   Allergen Reactions    Vicodin [hydrocodone-acetaminophen] Anxiety       Current Outpatient Medications   Medication Sig    acetaminophen (TYLENOL) 650 MG TbSR Take 1 tablet (650 mg total) by mouth every 6 (six) hours as needed (pain).    ADVAIR DISKUS 100-50 mcg/dose diskus inhaler TAKE 1 PUFF BY MOUTH TWICE A DAY    CYANOCOBALAMIN, VITAMIN B-12, (VITAMIN B-12 ORAL) Take 2,500 mcg by mouth.    diclofenac sodium (VOLTAREN) 1 % Gel     flu vacc dx0784-97,65yr up,PF (FLUZONE HIGH-DOSE 2019-20, PF,) 180 mcg/0.5 mL Syrg Inject in the muscle for one dose    fluticasone propionate (FLONASE) 50 mcg/actuation nasal spray 2 SPRAYS (100 MCG TOTAL) BY EACH NARE ROUTE ONCE DAILY.    gabapentin (NEURONTIN) 100 MG capsule TAKE 1 CAP AT NIGHT X1 WEEK THEN 1 CAP TWICE A DAY X1 WEEK THEN 1 CAP 3X/DAY THEREAFTER    meloxicam (MOBIC) 15 MG tablet TAKE 1 TABLET BY MOUTH EVERY DAY    metoprolol succinate (TOPROL-XL) 25 MG 24 hr tablet TAKE 1 TABLET BY MOUTH EVERY DAY    montelukast (SINGULAIR) 10 mg tablet TAKE 1 TABLET BY MOUTH EVERY DAY IN THE EVENING    nabumetone (RELAFEN) 750 MG tablet TAKE 1 TABLET BY MOUTH TWICE DAILY    omeprazole (PRILOSEC) 40 MG capsule TAKE 1 CAPSULE BY MOUTH EVERY DAY (Patient taking differently: - pt. reports twice daily)    polyethylene glycol 3350 (MIRALAX ORAL) Take  by mouth as needed.    sertraline (ZOLOFT) 50 MG tablet TAKE 1 TABLET BY MOUTH EVERY DAY    tiZANidine (ZANAFLEX) 4 MG tablet TAKE 1.5 TABLETS (6MG) BY MOUTH 3 TIMES A DAY AS NEEDED FOR MUSCLE SPASM    valsartan (DIOVAN) 320 MG tablet Take 1 tablet (320 mg total) by mouth once daily.    varicella-zoster gE-AS01B, PF, (SHINGRIX, PF,) 50 mcg/0.5 mL injection Inject into the muscle.    VENTOLIN HFA 90 mcg/actuation inhaler INHALE 2 PUFFS INTO THE LUNGS EVERY 6 HOURS AS NEEDED FOR WHEEZING    aspirin (ECOTRIN) 81 MG EC tablet Take 1 tablet (81 mg total) by mouth 2 (two) times daily.    atorvastatin (LIPITOR) 80 MG tablet TAKE 1 TABLET (80 MG TOTAL) BY MOUTH ONCE DAILY.    blood-glucose meter ([employer] BLOOD GLUCOSE SYSTM) kit Use as instructed    lancets Misc 1 lancet by Misc.(Non-Drug; Combo Route) route 2 (two) times daily.    promethazine (PHENERGAN) 25 MG tablet Take 1 tablet (25 mg total) by mouth every 4 (four) hours as needed for Nausea.    tolterodine (DETROL) 2 MG Tab Take 1 tablet (2 mg total) by mouth 2 (two) times daily.     Current Facility-Administered Medications   Medication    DOBUTamine 500mg in D5W 250mL infusion (premix)       REVIEW OF SYSTEMS:    GENERAL:  No weight loss, malaise or fevers.  HEENT:  Negative for frequent or significant headaches.  NECK:  Negative for lumps, goiter, pain and significant neck swelling.  RESPIRATORY:  Negative for cough, wheezing or shortness of breath.  CARDIOVASCULAR:  Negative for chest pain, leg swelling or palpitations. HTN  GI:  Negative for abdominal discomfort, blood in stools or black stools or change in bowel habits. diverticulitis  MUSCULOSKELETAL:  See HPI.  SKIN:  Negative for lesions, rash, and itching.  PSYCH:  Negative for sleep disturbance, mood disorder and recent psychosocial stressors.  HEMATOLOGY/LYMPHOLOGY:  Negative for prolonged bleeding, bruising easily or swollen nodes.  NEURO:   No history of headaches, syncope, paralysis,  "seizures or tremors.  All other reviewed and negative other than HPI.    OBJECTIVE:    BP (!) 163/74   Pulse 70   Temp 97.9 °F (36.6 °C)   Resp 18   Ht 5' 3" (1.6 m)   Wt 73 kg (160 lb 15 oz)   SpO2 100%   BMI 28.51 kg/m²     PHYSICAL EXAMINATION:    General appearance: Well appearing, in no acute distress, alert and oriented x3.  Psych:  Mood and affect appropriate.  Skin: Skin color, texture, turgor normal, no rashes or lesions, in both upper and lower body.  Head/face:  Normocephalic, atraumatic. No palpable lymph nodes.  Cor: RRR  Pulm: CTA  GI:  Soft and non-tender.  Gait: Antalgic- ambulates without assistance.    ASSESSMENT: 79 y.o. year old female with lower back and left leg pain, consistent with      1. Pelvic pain  Creatinine, serum    CANCELED: CT Abdomen Pelvis W Wo Contrast    CANCELED: Creatinine, serum    CANCELED: CT Abdomen Pelvis W Wo Contrast   2. Lumbar radiculopathy     3. Lumbar spondylosis     4. DDD (degenerative disc disease), lumbar         PLAN:     - MRI from 12/13/2019 reviewed with Dr. Lopez. There is concern for malignancy. Dr. Lopez discussed with patient. He has contacted the patient's PCP for further evaluation and work up.      - I have stressed the importance of physical activity and a home exercise plan to help with pain and improve health.    - Patient can continue with medications for now since they are providing benefits, using them appropriately, and without side effects.    - RTC PRN.     - Dr. Lopez was evaluated the patient and agrees with this plan.    The above plan and management options were discussed at length with patient. Patient is in agreement with the above and verbalized understanding.     Elizabeth Gregory NP  01/15/2020  "

## 2020-01-15 NOTE — TELEPHONE ENCOUNTER
----- Message from Nat Sahu sent at 1/15/2020 12:02 PM CST -----  Contact: EVENS PALOMARES   Name of Who is Calling: EVENS PALOMARES       What is the request in detail: Patient is requesting a call back to schedule a injection and mri       Can the clinic reply by [facility]: no      What Number to Call Back if not in [facility]: [phone]

## 2020-01-15 NOTE — TELEPHONE ENCOUNTER
Staff called and informed the patient that she can go for the appointment thats already scheduled for CT     Pt verbalized understanding

## 2020-01-16 ENCOUNTER — LAB VISIT (OUTPATIENT)
Dept: LAB | Facility: HOSPITAL | Age: 80
End: 2020-01-16
Attending: FAMILY MEDICINE
Payer: MEDICARE

## 2020-01-16 DIAGNOSIS — R10.2 PELVIC PAIN: ICD-10-CM

## 2020-01-16 LAB
CREAT SERPL-MCNC: 0.8 MG/DL (ref 0.5–1.4)
EST. GFR  (AFRICAN AMERICAN): >60 ML/MIN/1.73 M^2
EST. GFR  (NON AFRICAN AMERICAN): >60 ML/MIN/1.73 M^2

## 2020-01-16 PROCEDURE — 36415 COLL VENOUS BLD VENIPUNCTURE: CPT | Mod: PO

## 2020-01-16 PROCEDURE — 82565 ASSAY OF CREATININE: CPT

## 2020-01-17 ENCOUNTER — HOSPITAL ENCOUNTER (OUTPATIENT)
Dept: RADIOLOGY | Facility: HOSPITAL | Age: 80
Discharge: HOME | End: 2020-01-17
Attending: FAMILY MEDICINE
Payer: MEDICARE

## 2020-01-17 DIAGNOSIS — D49.89 NEOPLASM OF ABDOMEN: ICD-10-CM

## 2020-01-17 DIAGNOSIS — C79.51 MALIGNANT NEOPLASM METASTATIC TO LUMBAR SPINE WITH UNKNOWN PRIMARY SITE: ICD-10-CM

## 2020-01-17 DIAGNOSIS — C80.1 MALIGNANT NEOPLASM METASTATIC TO LUMBAR SPINE WITH UNKNOWN PRIMARY SITE: ICD-10-CM

## 2020-01-17 PROCEDURE — 74176 CT CHEST ABDOMEN PELVIS WITHOUT CONTRAST(XPD): ICD-10-PCS | Mod: 26,,, | Performed by: RADIOLOGY

## 2020-01-17 PROCEDURE — 74176 CT ABD & PELVIS W/O CONTRAST: CPT | Mod: 26,,, | Performed by: RADIOLOGY

## 2020-01-17 PROCEDURE — 71250 CT THORAX DX C-: CPT | Mod: 26,,, | Performed by: RADIOLOGY

## 2020-01-17 PROCEDURE — 71250 CT CHEST ABDOMEN PELVIS WITHOUT CONTRAST(XPD): ICD-10-PCS | Mod: 26,,, | Performed by: RADIOLOGY

## 2020-01-17 PROCEDURE — 71250 CT THORAX DX C-: CPT | Mod: TC

## 2020-01-17 PROCEDURE — 74176 CT ABD & PELVIS W/O CONTRAST: CPT | Mod: TC

## 2020-01-20 ENCOUNTER — TELEPHONE (OUTPATIENT)
Dept: PRIMARY CARE CLINIC | Facility: CLINIC | Age: 80
End: 2020-01-20

## 2020-01-21 ENCOUNTER — OFFICE VISIT (OUTPATIENT)
Dept: PRIMARY CARE CLINIC | Facility: CLINIC | Age: 80
End: 2020-01-21
Payer: MEDICARE

## 2020-01-21 VITALS
BODY MASS INDEX: 28.64 KG/M2 | HEIGHT: 63 IN | DIASTOLIC BLOOD PRESSURE: 72 MMHG | WEIGHT: 161.63 LBS | HEART RATE: 68 BPM | SYSTOLIC BLOOD PRESSURE: 143 MMHG | TEMPERATURE: 98 F

## 2020-01-21 DIAGNOSIS — Z12.31 SCREENING MAMMOGRAM FOR HIGH-RISK PATIENT: Primary | ICD-10-CM

## 2020-01-21 DIAGNOSIS — C79.51 MALIGNANT NEOPLASM METASTATIC TO LUMBAR SPINE WITH UNKNOWN PRIMARY SITE: Primary | ICD-10-CM

## 2020-01-21 DIAGNOSIS — C79.9 METASTATIC DISEASE: ICD-10-CM

## 2020-01-21 DIAGNOSIS — C80.1 MALIGNANT NEOPLASM METASTATIC TO LUMBAR SPINE WITH UNKNOWN PRIMARY SITE: Primary | ICD-10-CM

## 2020-01-21 DIAGNOSIS — R92.8 OTHER ABNORMAL AND INCONCLUSIVE FINDINGS ON DIAGNOSTIC IMAGING OF BREAST: ICD-10-CM

## 2020-01-21 DIAGNOSIS — K63.5 POLYP OF COLON, UNSPECIFIED PART OF COLON, UNSPECIFIED TYPE: ICD-10-CM

## 2020-01-21 DIAGNOSIS — J01.01 ACUTE RECURRENT MAXILLARY SINUSITIS: ICD-10-CM

## 2020-01-21 PROCEDURE — 99214 OFFICE O/P EST MOD 30 MIN: CPT | Mod: S$GLB,,, | Performed by: FAMILY MEDICINE

## 2020-01-21 PROCEDURE — 1159F MED LIST DOCD IN RCRD: CPT | Mod: S$GLB,,, | Performed by: FAMILY MEDICINE

## 2020-01-21 PROCEDURE — 99999 PR PBB SHADOW E&M-EST. PATIENT-LVL IV: CPT | Mod: PBBFAC,,, | Performed by: FAMILY MEDICINE

## 2020-01-21 PROCEDURE — 3078F PR MOST RECENT DIASTOLIC BLOOD PRESSURE < 80 MM HG: ICD-10-PCS | Mod: CPTII,S$GLB,, | Performed by: FAMILY MEDICINE

## 2020-01-21 PROCEDURE — 1101F PT FALLS ASSESS-DOCD LE1/YR: CPT | Mod: CPTII,S$GLB,, | Performed by: FAMILY MEDICINE

## 2020-01-21 PROCEDURE — 1159F PR MEDICATION LIST DOCUMENTED IN MEDICAL RECORD: ICD-10-PCS | Mod: S$GLB,,, | Performed by: FAMILY MEDICINE

## 2020-01-21 PROCEDURE — 3077F SYST BP >= 140 MM HG: CPT | Mod: CPTII,S$GLB,, | Performed by: FAMILY MEDICINE

## 2020-01-21 PROCEDURE — 3077F PR MOST RECENT SYSTOLIC BLOOD PRESSURE >= 140 MM HG: ICD-10-PCS | Mod: CPTII,S$GLB,, | Performed by: FAMILY MEDICINE

## 2020-01-21 PROCEDURE — 3078F DIAST BP <80 MM HG: CPT | Mod: CPTII,S$GLB,, | Performed by: FAMILY MEDICINE

## 2020-01-21 PROCEDURE — 99214 PR OFFICE/OUTPT VISIT, EST, LEVL IV, 30-39 MIN: ICD-10-PCS | Mod: S$GLB,,, | Performed by: FAMILY MEDICINE

## 2020-01-21 PROCEDURE — 99999 PR PBB SHADOW E&M-EST. PATIENT-LVL IV: ICD-10-PCS | Mod: PBBFAC,,, | Performed by: FAMILY MEDICINE

## 2020-01-21 PROCEDURE — 1101F PR PT FALLS ASSESS DOC 0-1 FALLS W/OUT INJ PAST YR: ICD-10-PCS | Mod: CPTII,S$GLB,, | Performed by: FAMILY MEDICINE

## 2020-01-21 RX ORDER — AZITHROMYCIN 250 MG/1
TABLET, FILM COATED ORAL
Qty: 6 TABLET | Refills: 0 | Status: SHIPPED | OUTPATIENT
Start: 2020-01-21 | End: 2020-01-26

## 2020-01-21 NOTE — PROGRESS NOTES
Subjective:       Patient ID: Elena Frances is a 79 y.o. female.    Chief Complaint: Sinus Problem (cough, nasal congestion)    Pt presents for evaluation of recurrent cough, facial discomfort and congestion, present for 6 weeks.  No fever or chills, no ear pain or sore throat.    She is also here for discussion of abnormal MRI, showing possible metastatic lesions to the lumbar spine.  Over past week, pt had CT of chest/abdome/pelvis showing no significant abnormality.  Last colonoscopy done in 2016, history of polyps, needed to repeat in 3 years.  Last mammogram 2017    Review of Systems   Constitutional: Negative for activity change, chills, fatigue and fever.   HENT: Positive for congestion, postnasal drip and sinus pressure. Negative for ear pain, facial swelling and sore throat.    Gastrointestinal: Negative for abdominal distention and abdominal pain.   Musculoskeletal: Positive for back pain.   Neurological: Negative for dizziness, weakness and light-headedness.       Objective:      Physical Exam   Constitutional: She appears well-developed and well-nourished. No distress.   HENT:   Right Ear: Tympanic membrane and ear canal normal.   Left Ear: Tympanic membrane and ear canal normal.   Nose: Rhinorrhea present. Right sinus exhibits maxillary sinus tenderness. Right sinus exhibits no frontal sinus tenderness. Left sinus exhibits maxillary sinus tenderness. Left sinus exhibits no frontal sinus tenderness.   Mouth/Throat: No posterior oropharyngeal erythema.   Neck: Normal range of motion. No JVD present.   Thyroid slightly enlarged with palpable 1cm nodule   Pulmonary/Chest: Effort normal and breath sounds normal. She has no wheezes. She has no rales.   Abdominal: Soft. Bowel sounds are normal. She exhibits no mass. There is no tenderness. There is no rebound and no guarding.   Lymphadenopathy:     She has no cervical adenopathy.       Assessment:       1. Screening mammogram for high-risk patient     2. Polyp of colon, unspecified part of colon, unspecified type    3. Metastatic disease    4. Other abnormal and inconclusive findings on diagnostic imaging of breast     5. Acute recurrent maxillary sinusitis    6. Metastatic bone cancer        Plan:       1.  Mammogram, colonoscopy  2.  Consult IR for possible biopsy of lesion at L4  3.  Zpak

## 2020-01-22 DIAGNOSIS — M47.819 SPONDYLOSIS WITHOUT MYELOPATHY: ICD-10-CM

## 2020-01-22 DIAGNOSIS — M54.15 RADICULOPATHY OF THORACOLUMBAR REGION: ICD-10-CM

## 2020-01-22 DIAGNOSIS — M47.9 OSTEOARTHRITIS OF SPINE, UNSPECIFIED SPINAL OSTEOARTHRITIS COMPLICATION STATUS, UNSPECIFIED SPINAL REGION: ICD-10-CM

## 2020-01-22 DIAGNOSIS — M51.36 DDD (DEGENERATIVE DISC DISEASE), LUMBAR: ICD-10-CM

## 2020-01-22 RX ORDER — GABAPENTIN 100 MG/1
CAPSULE ORAL
Qty: 90 CAPSULE | Refills: 0 | Status: SHIPPED | OUTPATIENT
Start: 2020-01-22 | End: 2020-03-31 | Stop reason: SDUPTHER

## 2020-01-23 ENCOUNTER — TELEPHONE (OUTPATIENT)
Dept: ENDOSCOPY | Facility: HOSPITAL | Age: 80
End: 2020-01-23

## 2020-01-27 ENCOUNTER — HOSPITAL ENCOUNTER (OUTPATIENT)
Dept: RADIOLOGY | Facility: HOSPITAL | Age: 80
Discharge: HOME | End: 2020-01-27
Attending: FAMILY MEDICINE
Payer: MEDICARE

## 2020-01-27 ENCOUNTER — OFFICE VISIT (OUTPATIENT)
Dept: INTERVENTIONAL RADIOLOGY/VASCULAR | Facility: CLINIC | Age: 80
End: 2020-01-27
Payer: MEDICARE

## 2020-01-27 VITALS
WEIGHT: 162.25 LBS | BODY MASS INDEX: 28.75 KG/M2 | HEIGHT: 63 IN | HEART RATE: 74 BPM | SYSTOLIC BLOOD PRESSURE: 140 MMHG | DIASTOLIC BLOOD PRESSURE: 62 MMHG

## 2020-01-27 DIAGNOSIS — R92.8 OTHER ABNORMAL AND INCONCLUSIVE FINDINGS ON DIAGNOSTIC IMAGING OF BREAST: ICD-10-CM

## 2020-01-27 DIAGNOSIS — M89.9 LESION OF VERTEBRA: Primary | ICD-10-CM

## 2020-01-27 DIAGNOSIS — C79.9 METASTATIC DISEASE: ICD-10-CM

## 2020-01-27 PROCEDURE — 99204 PR OFFICE/OUTPT VISIT, NEW, LEVL IV, 45-59 MIN: ICD-10-PCS | Mod: S$GLB,,, | Performed by: RADIOLOGY

## 2020-01-27 PROCEDURE — 99999 PR PBB SHADOW E&M-EST. PATIENT-LVL IV: CPT | Mod: PBBFAC,,,

## 2020-01-27 PROCEDURE — 3078F PR MOST RECENT DIASTOLIC BLOOD PRESSURE < 80 MM HG: ICD-10-PCS | Mod: CPTII,S$GLB,, | Performed by: RADIOLOGY

## 2020-01-27 PROCEDURE — 1159F PR MEDICATION LIST DOCUMENTED IN MEDICAL RECORD: ICD-10-PCS | Mod: S$GLB,,, | Performed by: RADIOLOGY

## 2020-01-27 PROCEDURE — 77066 DX MAMMO INCL CAD BI: CPT | Mod: 26,,, | Performed by: RADIOLOGY

## 2020-01-27 PROCEDURE — 77066 DX MAMMO INCL CAD BI: CPT | Mod: TC,PO

## 2020-01-27 PROCEDURE — 99999 PR PBB SHADOW E&M-EST. PATIENT-LVL IV: ICD-10-PCS | Mod: PBBFAC,,,

## 2020-01-27 PROCEDURE — 1101F PT FALLS ASSESS-DOCD LE1/YR: CPT | Mod: CPTII,S$GLB,, | Performed by: RADIOLOGY

## 2020-01-27 PROCEDURE — 1159F MED LIST DOCD IN RCRD: CPT | Mod: S$GLB,,, | Performed by: RADIOLOGY

## 2020-01-27 PROCEDURE — 3077F SYST BP >= 140 MM HG: CPT | Mod: CPTII,S$GLB,, | Performed by: RADIOLOGY

## 2020-01-27 PROCEDURE — 99204 OFFICE O/P NEW MOD 45 MIN: CPT | Mod: S$GLB,,, | Performed by: RADIOLOGY

## 2020-01-27 PROCEDURE — 77062 MAMMO DIGITAL DIAGNOSTIC BILAT WITH TOMOSYNTHESIS_CAD: ICD-10-PCS | Mod: 26,,, | Performed by: RADIOLOGY

## 2020-01-27 PROCEDURE — 3078F DIAST BP <80 MM HG: CPT | Mod: CPTII,S$GLB,, | Performed by: RADIOLOGY

## 2020-01-27 PROCEDURE — 1101F PR PT FALLS ASSESS DOC 0-1 FALLS W/OUT INJ PAST YR: ICD-10-PCS | Mod: CPTII,S$GLB,, | Performed by: RADIOLOGY

## 2020-01-27 PROCEDURE — 77062 BREAST TOMOSYNTHESIS BI: CPT | Mod: 26,,, | Performed by: RADIOLOGY

## 2020-01-27 PROCEDURE — 77066 MAMMO DIGITAL DIAGNOSTIC BILAT WITH TOMOSYNTHESIS_CAD: ICD-10-PCS | Mod: 26,,, | Performed by: RADIOLOGY

## 2020-01-27 PROCEDURE — 3077F PR MOST RECENT SYSTOLIC BLOOD PRESSURE >= 140 MM HG: ICD-10-PCS | Mod: CPTII,S$GLB,, | Performed by: RADIOLOGY

## 2020-01-27 NOTE — PROGRESS NOTES
Neurointerventional Clinic Note      Reason for Visit: Multiple vertebral and pelvic bone lesions     SUBJECTIVE:     Chief Complaint: No chief complaint on file.      History of Present Illness: Elena Frances is a 79 y.o. female who presents multiple bosne lesions in the lumbar spine that were identified during MRI for back pain.  She has a long history of back pain due to DDD and spinal stenosis, and more recently she developed a left L2-3 disc extrusion for which she has received steroid injections for pain relief.  Dr. Tinoco refers the patient for lumbar bone biopsy.      Past Medical History:   Diagnosis Date    Allergy     Anemia     Arthritis     Asthma     Depression     Generalized headaches 2014    Goiter     MNG    HTN (hypertension), benign     Hyperlipidemia     Hyperparathyroidism     s/p surgery    Intestinal obstruction     resolved spontaneously    Lumbar disc disease     s/p epidural shots    Nuclear sclerosis - Both Eyes 3/11/2014    Osteoporosis, post-menopausal     with 3 rib fractures     Past Surgical History:   Procedure Laterality Date    APPENDECTOMY      BILATERAL OOPHORECTOMY      BREAST CYST EXCISION      left    CATARACT EXTRACTION W/  INTRAOCULAR LENS IMPLANT Right 2016    Dr. Fang (Torfrandy)    CATARACT EXTRACTION W/  INTRAOCULAR LENS IMPLANT Left 10/17/2016    Dr. Fang (Toric)     SECTION, CLASSIC      x 1    CHOLECYSTECTOMY      COLONOSCOPY N/A 3/1/2016    Procedure: COLONOSCOPY;  Surgeon: Dony Bronson MD;  Location: [facility] ([address] [place] [address]);  Service: Endoscopy;  Laterality: N/A;  PM Prep    HYSTERECTOMY      JOINT REPLACEMENT      KNEE SURGERY Right 2017    TKR    LUMBAR EPIDURAL INJECTION      x 4    MOUTH SURGERY      for abscess drainage of gum of frontal teeth    PARATHYROID GLAND SURGERY      for parathyroid adenoma    TOTAL KNEE ARTHROPLASTY Left 2019    Procedure: ARTHROPLASTY, KNEE, TOTAL-DEPUY-SIGMA;  Surgeon:  "Newton Rodriguez III, MD;  Location: [facility] OR [address] [place] [address];  Service: Orthopedics;  Laterality: Left;    TRANSFORAMINAL EPIDURAL INJECTION OF STEROID Left 2019    Procedure: INJECTION, STEROID, EPIDURAL, TRANSFORAMINAL APPROACH, L3-L4 AND L4-L5;  Surgeon: Venancio Lopez MD;  [facility];  Service: Pain Management;  Laterality: Left;     Family History   Problem Relation Age of Onset    Heart disease Mother     Hypertension Mother     Heart attack Mother     Heart disease Sister          in their 50's    Heart failure Sister     Heart disease Brother         CABG in age 60's    Hyperlipidemia Brother     Heart disease Sister         in her 50's    Heart disease Sister         in her 50's    Heart disease Sister     Hypertension Sister     Hyperlipidemia Sister     Heart disease Brother         CABG in age 60's    Hyperlipidemia Brother     Thyroid disease Daughter     Amblyopia Neg Hx     Blindness Neg Hx     Cancer Neg Hx     Cataracts Neg Hx     Diabetes Neg Hx     Glaucoma Neg Hx     Macular degeneration Neg Hx     Retinal detachment Neg Hx     Strabismus Neg Hx     Stroke Neg Hx      Social History     Tobacco Use    Smoking status: Never Smoker    Smokeless tobacco: Never Used   Substance Use Topics    Alcohol use: No    Drug use: No       Review of patient's allergies indicates:   Allergen Reactions    Vicodin [hydrocodone-acetaminophen] Anxiety        Review of Systems:  Review of Systems   Constitutional: Negative.    HENT: Negative.    Eyes: Negative.    Respiratory: Negative.    Cardiovascular: Negative.    Gastrointestinal: Negative.    Musculoskeletal: Positive for back pain, neck pain and neck stiffness.   Skin: Negative.    Neurological: Positive for numbness.   Psychiatric/Behavioral: Negative.         OBJECTIVE:     Vital Signs Range:  BP (!) 140/62 (BP Location: Right arm, Patient Position: Sitting)   Pulse 74   Ht 5' 3" (1.6 m)   Wt 73.6 kg (162 " lb 4.1 oz)   BMI 28.74 kg/m²     Physical Exam:  Physical Exam   Constitutional: She is oriented to person, place, and time. She appears well-developed and well-nourished.   HENT:   Head: Normocephalic and atraumatic.   Eyes: Pupils are equal, round, and reactive to light. Conjunctivae and EOM are normal.   Cardiovascular: Normal rate and regular rhythm.   Pulmonary/Chest: Effort normal and breath sounds normal.   Abdominal: Soft.   Musculoskeletal: Normal range of motion. She exhibits tenderness.   Tender to palpation in her cervical low paraspinous soft tissues and also along the lumbar spinous processes to palpation.     Neurological: She is alert and oriented to person, place, and time. A sensory deficit is present. No cranial nerve deficit. She exhibits normal muscle tone. Coordination normal.   Subjective mild decreased sensation in both legs   Skin: Skin is warm and dry.   Psychiatric: She has a normal mood and affect. Her behavior is normal. Judgment and thought content normal.       Laboratory:  Results for orders placed or performed in visit on 08/08/19   PROTIME-INR   Result Value Ref Range    Prothrombin Time 10.0 9.0 - 12.5 sec    INR 1.0 0.8 - 1.2   Results for orders placed or performed in visit on 01/27/17   Protime-INR   Result Value Ref Range    Prothrombin Time 10.0 9.0 - 12.5 sec    INR 0.9 0.8 - 1.2     Results for orders placed or performed during the hospital encounter of 02/01/19   CBC auto differential   Result Value Ref Range    WBC 9.67 3.90 - 12.70 K/uL    RBC 4.54 4.00 - 5.40 M/uL    Hemoglobin 12.1 12.0 - 16.0 g/dL    Hematocrit 38.6 37.0 - 48.5 %    Mean Corpuscular Volume 85 82 - 98 fL    Mean Corpuscular Hemoglobin 26.7 (L) 27.0 - 31.0 pg    Mean Corpuscular Hemoglobin Conc 31.3 (L) 32.0 - 36.0 g/dL    RDW 16.0 (H) 11.5 - 14.5 %    Platelets 304 150 - 350 K/uL    MPV 9.9 9.2 - 12.9 fL    Immature Granulocytes 0.3 0.0 - 0.5 %    Gran # (ANC) 6.1 1.8 - 7.7 K/uL    Immature Grans (Abs)  0.03 0.00 - 0.04 K/uL    Lymph # 2.4 1.0 - 4.8 K/uL    Mono # 0.8 0.3 - 1.0 K/uL    Eos # 0.3 0.0 - 0.5 K/uL    Baso # 0.05 0.00 - 0.20 K/uL    nRBC 0 0 /100 WBC    Gran% 63.3 38.0 - 73.0 %    Lymph% 24.3 18.0 - 48.0 %    Mono% 8.5 4.0 - 15.0 %    Eosinophil% 3.1 0.0 - 8.0 %    Basophil% 0.5 0.0 - 1.9 %    Differential Method Automated    Results for orders placed or performed in visit on 07/09/18   CBC auto differential   Result Value Ref Range    WBC 8.14 3.90 - 12.70 K/uL    RBC 4.34 4.00 - 5.40 M/uL    Hemoglobin 12.4 12.0 - 16.0 g/dL    Hematocrit 38.8 37.0 - 48.5 %    Mean Corpuscular Volume 89 82 - 98 fL    Mean Corpuscular Hemoglobin 28.6 27.0 - 31.0 pg    Mean Corpuscular Hemoglobin Conc 32.0 32.0 - 36.0 g/dL    RDW 15.5 (H) 11.5 - 14.5 %    Platelets 263 150 - 350 K/uL    MPV 11.6 9.2 - 12.9 fL    Immature Granulocytes 0.5 0.0 - 0.5 %    Gran # (ANC) 5.1 1.8 - 7.7 K/uL    Immature Grans (Abs) 0.04 0.00 - 0.04 K/uL    Lymph # 2.0 1.0 - 4.8 K/uL    Mono # 0.6 0.3 - 1.0 K/uL    Eos # 0.3 0.0 - 0.5 K/uL    Baso # 0.06 0.00 - 0.20 K/uL    nRBC 0 0 /100 WBC    Gran% 62.9 38.0 - 73.0 %    Lymph% 24.6 18.0 - 48.0 %    Mono% 7.6 4.0 - 15.0 %    Eosinophil% 3.7 0.0 - 8.0 %    Basophil% 0.7 0.0 - 1.9 %    Differential Method Automated      Results for orders placed or performed in visit on 08/08/19   BASIC METABOLIC PANEL   Result Value Ref Range    Sodium 137 136 - 145 mmol/L    Potassium 5.0 3.5 - 5.1 mmol/L    Chloride 101 95 - 110 mmol/L    CO2 27 23 - 29 mmol/L    Glucose 89 70 - 110 mg/dL    BUN, Bld 19 8 - 23 mg/dL    Creatinine 1.0 0.5 - 1.4 mg/dL    Calcium 9.9 8.7 - 10.5 mg/dL    Anion Gap 9 8 - 16 mmol/L    eGFR if African American >60.0 >60 mL/min/1.73 m^2    eGFR if non African American 54.5 (A) >60 mL/min/1.73 m^2   Results for orders placed or performed in visit on 04/12/17   Basic metabolic panel   Result Value Ref Range    Sodium 135 (L) 136 - 145 mmol/L    Potassium 5.0 3.5 - 5.1 mmol/L    Chloride  101 95 - 110 mmol/L    CO2 25 23 - 29 mmol/L    Glucose 106 70 - 110 mg/dL    BUN, Bld 24 (H) 8 - 23 mg/dL    Creatinine 1.0 0.5 - 1.4 mg/dL    Calcium 9.8 8.7 - 10.5 mg/dL    Anion Gap 9 8 - 16 mmol/L    eGFR if African American >60.0 >60 mL/min/1.73 m^2    eGFR if non  55.2 (A) >60 mL/min/1.73 m^2     Results for orders placed or performed in visit on 01/16/20   Creatinine, serum   Result Value Ref Range    Creatinine 0.8 0.5 - 1.4 mg/dL    eGFR if African American >60.0 >60 mL/min/1.73 m^2    eGFR if non African American >60.0 >60 mL/min/1.73 m^2   Results for orders placed or performed during the hospital encounter of 02/01/19   Creatinine, serum   Result Value Ref Range    Creatinine 0.8 0.5 - 1.4 mg/dL    eGFR if African American >60.0 >60 mL/min/1.73 m^2    eGFR if non African American >60.0 >60 mL/min/1.73 m^2     No results found for this or any previous visit.  No results found for this or any previous visit.      Diagnostic Results:  MRI: Reviewed  multiple focal areas of bone marrow replacement are present in the lumbar spine and sacrum.        ASSESSMENT/PLAN:     Multiple bone lesions suspicious for metastatic disease.  Other workup negative.  I discussed the lumbar spine biopasy procedure as requested by Dr. Tinoco with the patient and her son, and they understand and wish to proceed.  This will be scheduled in the near future.  I asked her to hold the low dose ASA that she is taking.      Diagnoses and all orders for this visit:    Lesion of vertebra  -     IR Biopsy Bone deep; Future         David Quinones MD  Interventional Neuroradiology  [facility]

## 2020-01-27 NOTE — H&P (VIEW-ONLY)
Neurointerventional Clinic Note      Reason for Visit: Multiple vertebral and pelvic bone lesions     SUBJECTIVE:     Chief Complaint: No chief complaint on file.      History of Present Illness: Elena Frances is a 79 y.o. female who presents multiple bosne lesions in the lumbar spine that were identified during MRI for back pain.  She has a long history of back pain due to DDD and spinal stenosis, and more recently she developed a left L2-3 disc extrusion for which she has received steroid injections for pain relief.  Dr. Tinoco refers the patient for lumbar bone biopsy.      Past Medical History:   Diagnosis Date    Allergy     Anemia     Arthritis     Asthma     Depression     Generalized headaches 2014    Goiter     MNG    HTN (hypertension), benign     Hyperlipidemia     Hyperparathyroidism     s/p surgery    Intestinal obstruction     resolved spontaneously    Lumbar disc disease     s/p epidural shots    Nuclear sclerosis - Both Eyes 3/11/2014    Osteoporosis, post-menopausal     with 3 rib fractures     Past Surgical History:   Procedure Laterality Date    APPENDECTOMY      BILATERAL OOPHORECTOMY      BREAST CYST EXCISION      left    CATARACT EXTRACTION W/  INTRAOCULAR LENS IMPLANT Right 2016    Dr. Fang ([facility]fr[place])    CATARACT EXTRACTION W/  INTRAOCULAR LENS IMPLANT Left 10/17/2016    Dr. Fang (Toric)     SECTION, CLASSIC      x 1    CHOLECYSTECTOMY      COLONOSCOPY N/A 3/1/2016    Procedure: COLONOSCOPY;  Surgeon: Dony Bronson MD;  Location: [facility] ([address] [place] [address]);  Service: Endoscopy;  Laterality: N/A;  PM Prep    HYSTERECTOMY      JOINT REPLACEMENT      KNEE SURGERY Right 2017    TKR    LUMBAR EPIDURAL INJECTION      x 4    MOUTH SURGERY      for abscess drainage of gum of frontal teeth    PARATHYROID GLAND SURGERY      for parathyroid adenoma    TOTAL KNEE ARTHROPLASTY Left 2019    Procedure: ARTHROPLASTY, KNEE, TOTAL-DEPUY-SIGMA;  Surgeon:  "Newton Rodriguez III, MD;  Location: [facility] OR [address] [place] [address];  Service: Orthopedics;  Laterality: Left;    TRANSFORAMINAL EPIDURAL INJECTION OF STEROID Left 2019    Procedure: INJECTION, STEROID, EPIDURAL, TRANSFORAMINAL APPROACH, L3-L4 AND L4-L5;  Surgeon: Venancio Lopez MD;  [facility];  Service: Pain Management;  Laterality: Left;     Family History   Problem Relation Age of Onset    Heart disease Mother     Hypertension Mother     Heart attack Mother     Heart disease Sister          in their 50's    Heart failure Sister     Heart disease Brother         CABG in age 60's    Hyperlipidemia Brother     Heart disease Sister         in her 50's    Heart disease Sister         in her 50's    Heart disease Sister     Hypertension Sister     Hyperlipidemia Sister     Heart disease Brother         CABG in age 60's    Hyperlipidemia Brother     Thyroid disease Daughter     Amblyopia Neg Hx     Blindness Neg Hx     Cancer Neg Hx     Cataracts Neg Hx     Diabetes Neg Hx     Glaucoma Neg Hx     Macular degeneration Neg Hx     Retinal detachment Neg Hx     Strabismus Neg Hx     Stroke Neg Hx      Social History     Tobacco Use    Smoking status: Never Smoker    Smokeless tobacco: Never Used   Substance Use Topics    Alcohol use: No    Drug use: No       Review of patient's allergies indicates:   Allergen Reactions    Vicodin [hydrocodone-acetaminophen] Anxiety        Review of Systems:  Review of Systems   Constitutional: Negative.    HENT: Negative.    Eyes: Negative.    Respiratory: Negative.    Cardiovascular: Negative.    Gastrointestinal: Negative.    Musculoskeletal: Positive for back pain, neck pain and neck stiffness.   Skin: Negative.    Neurological: Positive for numbness.   Psychiatric/Behavioral: Negative.         OBJECTIVE:     Vital Signs Range:  BP (!) 140/62 (BP Location: Right arm, Patient Position: Sitting)   Pulse 74   Ht 5' 3" (1.6 m)   Wt 73.6 kg (162 " lb 4.1 oz)   BMI 28.74 kg/m²     Physical Exam:  Physical Exam   Constitutional: She is oriented to person, place, and time. She appears well-developed and well-nourished.   HENT:   Head: Normocephalic and atraumatic.   Eyes: Pupils are equal, round, and reactive to light. Conjunctivae and EOM are normal.   Cardiovascular: Normal rate and regular rhythm.   Pulmonary/Chest: Effort normal and breath sounds normal.   Abdominal: Soft.   Musculoskeletal: Normal range of motion. She exhibits tenderness.   Tender to palpation in her cervical low paraspinous soft tissues and also along the lumbar spinous processes to palpation.     Neurological: She is alert and oriented to person, place, and time. A sensory deficit is present. No cranial nerve deficit. She exhibits normal muscle tone. Coordination normal.   Subjective mild decreased sensation in both legs   Skin: Skin is warm and dry.   Psychiatric: She has a normal mood and affect. Her behavior is normal. Judgment and thought content normal.       Laboratory:  Results for orders placed or performed in visit on 08/08/19   PROTIME-INR   Result Value Ref Range    Prothrombin Time 10.0 9.0 - 12.5 sec    INR 1.0 0.8 - 1.2   Results for orders placed or performed in visit on 01/27/17   Protime-INR   Result Value Ref Range    Prothrombin Time 10.0 9.0 - 12.5 sec    INR 0.9 0.8 - 1.2     Results for orders placed or performed during the hospital encounter of 02/01/19   CBC auto differential   Result Value Ref Range    WBC 9.67 3.90 - 12.70 K/uL    RBC 4.54 4.00 - 5.40 M/uL    Hemoglobin 12.1 12.0 - 16.0 g/dL    Hematocrit 38.6 37.0 - 48.5 %    Mean Corpuscular Volume 85 82 - 98 fL    Mean Corpuscular Hemoglobin 26.7 (L) 27.0 - 31.0 pg    Mean Corpuscular Hemoglobin Conc 31.3 (L) 32.0 - 36.0 g/dL    RDW 16.0 (H) 11.5 - 14.5 %    Platelets 304 150 - 350 K/uL    MPV 9.9 9.2 - 12.9 fL    Immature Granulocytes 0.3 0.0 - 0.5 %    Gran # (ANC) 6.1 1.8 - 7.7 K/uL    Immature Grans (Abs)  0.03 0.00 - 0.04 K/uL    Lymph # 2.4 1.0 - 4.8 K/uL    Mono # 0.8 0.3 - 1.0 K/uL    Eos # 0.3 0.0 - 0.5 K/uL    Baso # 0.05 0.00 - 0.20 K/uL    nRBC 0 0 /100 WBC    Gran% 63.3 38.0 - 73.0 %    Lymph% 24.3 18.0 - 48.0 %    Mono% 8.5 4.0 - 15.0 %    Eosinophil% 3.1 0.0 - 8.0 %    Basophil% 0.5 0.0 - 1.9 %    Differential Method Automated    Results for orders placed or performed in visit on 07/09/18   CBC auto differential   Result Value Ref Range    WBC 8.14 3.90 - 12.70 K/uL    RBC 4.34 4.00 - 5.40 M/uL    Hemoglobin 12.4 12.0 - 16.0 g/dL    Hematocrit 38.8 37.0 - 48.5 %    Mean Corpuscular Volume 89 82 - 98 fL    Mean Corpuscular Hemoglobin 28.6 27.0 - 31.0 pg    Mean Corpuscular Hemoglobin Conc 32.0 32.0 - 36.0 g/dL    RDW 15.5 (H) 11.5 - 14.5 %    Platelets 263 150 - 350 K/uL    MPV 11.6 9.2 - 12.9 fL    Immature Granulocytes 0.5 0.0 - 0.5 %    Gran # (ANC) 5.1 1.8 - 7.7 K/uL    Immature Grans (Abs) 0.04 0.00 - 0.04 K/uL    Lymph # 2.0 1.0 - 4.8 K/uL    Mono # 0.6 0.3 - 1.0 K/uL    Eos # 0.3 0.0 - 0.5 K/uL    Baso # 0.06 0.00 - 0.20 K/uL    nRBC 0 0 /100 WBC    Gran% 62.9 38.0 - 73.0 %    Lymph% 24.6 18.0 - 48.0 %    Mono% 7.6 4.0 - 15.0 %    Eosinophil% 3.7 0.0 - 8.0 %    Basophil% 0.7 0.0 - 1.9 %    Differential Method Automated      Results for orders placed or performed in visit on 08/08/19   BASIC METABOLIC PANEL   Result Value Ref Range    Sodium 137 136 - 145 mmol/L    Potassium 5.0 3.5 - 5.1 mmol/L    Chloride 101 95 - 110 mmol/L    CO2 27 23 - 29 mmol/L    Glucose 89 70 - 110 mg/dL    BUN, Bld 19 8 - 23 mg/dL    Creatinine 1.0 0.5 - 1.4 mg/dL    Calcium 9.9 8.7 - 10.5 mg/dL    Anion Gap 9 8 - 16 mmol/L    eGFR if African American >60.0 >60 mL/min/1.73 m^2    eGFR if non African American 54.5 (A) >60 mL/min/1.73 m^2   Results for orders placed or performed in visit on 04/12/17   Basic metabolic panel   Result Value Ref Range    Sodium 135 (L) 136 - 145 mmol/L    Potassium 5.0 3.5 - 5.1 mmol/L    Chloride  101 95 - 110 mmol/L    CO2 25 23 - 29 mmol/L    Glucose 106 70 - 110 mg/dL    BUN, Bld 24 (H) 8 - 23 mg/dL    Creatinine 1.0 0.5 - 1.4 mg/dL    Calcium 9.8 8.7 - 10.5 mg/dL    Anion Gap 9 8 - 16 mmol/L    eGFR if African American >60.0 >60 mL/min/1.73 m^2    eGFR if non  55.2 (A) >60 mL/min/1.73 m^2     Results for orders placed or performed in visit on 01/16/20   Creatinine, serum   Result Value Ref Range    Creatinine 0.8 0.5 - 1.4 mg/dL    eGFR if African American >60.0 >60 mL/min/1.73 m^2    eGFR if non African American >60.0 >60 mL/min/1.73 m^2   Results for orders placed or performed during the hospital encounter of 02/01/19   Creatinine, serum   Result Value Ref Range    Creatinine 0.8 0.5 - 1.4 mg/dL    eGFR if African American >60.0 >60 mL/min/1.73 m^2    eGFR if non African American >60.0 >60 mL/min/1.73 m^2     No results found for this or any previous visit.  No results found for this or any previous visit.      Diagnostic Results:  MRI: Reviewed  multiple focal areas of bone marrow replacement are present in the lumbar spine and sacrum.        ASSESSMENT/PLAN:     Multiple bone lesions suspicious for metastatic disease.  Other workup negative.  I discussed the lumbar spine biopasy procedure as requested by Dr. Tinoco with the patient and her son, and they understand and wish to proceed.  This will be scheduled in the near future.  I asked her to hold the low dose ASA that she is taking.      Diagnoses and all orders for this visit:    Lesion of vertebra  -     IR Biopsy Bone deep; Future         David Quinones MD  Interventional Neuroradiology  [facility]

## 2020-01-28 ENCOUNTER — TELEPHONE (OUTPATIENT)
Dept: PRIMARY CARE CLINIC | Facility: CLINIC | Age: 80
End: 2020-01-28

## 2020-01-28 RX ORDER — ALBUTEROL SULFATE 90 UG/1
2 AEROSOL, METERED RESPIRATORY (INHALATION) EVERY 6 HOURS PRN
Qty: 18 G | Refills: 11 | Status: SHIPPED | OUTPATIENT
Start: 2020-01-28 | End: 2021-04-22 | Stop reason: SDUPTHER

## 2020-01-28 NOTE — TELEPHONE ENCOUNTER
Please inform pt that we received letter from her insurance stating Ventolin is no longer covered.  I have called in Proair HFA which is covered and is same medication

## 2020-02-07 RX ORDER — FENTANYL CITRATE 50 UG/ML
50 INJECTION, SOLUTION INTRAMUSCULAR; INTRAVENOUS
Status: CANCELLED | OUTPATIENT
Start: 2020-02-07

## 2020-02-07 RX ORDER — MIDAZOLAM HYDROCHLORIDE 1 MG/ML
1 INJECTION INTRAMUSCULAR; INTRAVENOUS
Status: CANCELLED | OUTPATIENT
Start: 2020-02-07

## 2020-02-10 ENCOUNTER — HOSPITAL ENCOUNTER (OUTPATIENT)
Facility: HOSPITAL | Age: 80
Discharge: HOME | End: 2020-02-10
Attending: RADIOLOGY | Admitting: RADIOLOGY
Payer: MEDICARE

## 2020-02-10 VITALS
SYSTOLIC BLOOD PRESSURE: 147 MMHG | BODY MASS INDEX: 28.75 KG/M2 | OXYGEN SATURATION: 100 % | WEIGHT: 162.25 LBS | HEIGHT: 63 IN | TEMPERATURE: 98 F | HEART RATE: 68 BPM | RESPIRATION RATE: 20 BRPM | DIASTOLIC BLOOD PRESSURE: 61 MMHG

## 2020-02-10 DIAGNOSIS — M89.9 LESION OF VERTEBRA: ICD-10-CM

## 2020-02-10 LAB
BASOPHILS # BLD AUTO: 0.04 K/UL (ref 0–0.2)
BASOPHILS NFR BLD: 0.6 % (ref 0–1.9)
DIFFERENTIAL METHOD: ABNORMAL
EOSINOPHIL # BLD AUTO: 0.2 K/UL (ref 0–0.5)
EOSINOPHIL NFR BLD: 2.2 % (ref 0–8)
ERYTHROCYTE [DISTWIDTH] IN BLOOD BY AUTOMATED COUNT: 19.7 % (ref 11.5–14.5)
GRAM STN SPEC: NORMAL
HCT VFR BLD AUTO: 34.4 % (ref 37–48.5)
HGB BLD-MCNC: 10.2 G/DL (ref 12–16)
IMM GRANULOCYTES # BLD AUTO: 0.02 K/UL (ref 0–0.04)
IMM GRANULOCYTES NFR BLD AUTO: 0.3 % (ref 0–0.5)
INR PPP: 0.9 (ref 0.8–1.2)
LYMPHOCYTES # BLD AUTO: 1.9 K/UL (ref 1–4.8)
LYMPHOCYTES NFR BLD: 27.2 % (ref 18–48)
MCH RBC QN AUTO: 25.1 PG (ref 27–31)
MCHC RBC AUTO-ENTMCNC: 29.7 G/DL (ref 32–36)
MCV RBC AUTO: 85 FL (ref 82–98)
MONOCYTES # BLD AUTO: 0.6 K/UL (ref 0.3–1)
MONOCYTES NFR BLD: 9 % (ref 4–15)
NEUTROPHILS # BLD AUTO: 4.2 K/UL (ref 1.8–7.7)
NEUTROPHILS NFR BLD: 60.7 % (ref 38–73)
NRBC BLD-RTO: 0 /100 WBC
PLATELET # BLD AUTO: 300 K/UL (ref 150–350)
PMV BLD AUTO: 9.6 FL (ref 9.2–12.9)
PROTHROMBIN TIME: 9.8 SEC (ref 9–12.5)
RBC # BLD AUTO: 4.06 M/UL (ref 4–5.4)
WBC # BLD AUTO: 6.88 K/UL (ref 3.9–12.7)

## 2020-02-10 PROCEDURE — 88341 IMHCHEM/IMCYTCHM EA ADD ANTB: CPT | Mod: 59 | Performed by: PATHOLOGY

## 2020-02-10 PROCEDURE — 88341 PR IHC OR ICC EACH ADD'L SINGLE ANTIBODY  STAINPR: ICD-10-PCS | Mod: 26,,, | Performed by: PATHOLOGY

## 2020-02-10 PROCEDURE — 87102 FUNGUS ISOLATION CULTURE: CPT

## 2020-02-10 PROCEDURE — 87116 MYCOBACTERIA CULTURE: CPT

## 2020-02-10 PROCEDURE — 87075 CULTR BACTERIA EXCEPT BLOOD: CPT

## 2020-02-10 PROCEDURE — 85025 COMPLETE CBC W/AUTO DIFF WBC: CPT

## 2020-02-10 PROCEDURE — 87176 TISSUE HOMOGENIZATION CULTR: CPT

## 2020-02-10 PROCEDURE — 88311 DECALCIFY TISSUE: CPT | Mod: 26,,, | Performed by: PATHOLOGY

## 2020-02-10 PROCEDURE — 88307 TISSUE EXAM BY PATHOLOGIST: CPT | Performed by: PATHOLOGY

## 2020-02-10 PROCEDURE — 63600175 PHARM REV CODE 636 W HCPCS: Performed by: FAMILY MEDICINE

## 2020-02-10 PROCEDURE — 63600175 PHARM REV CODE 636 W HCPCS: Performed by: RADIOLOGY

## 2020-02-10 PROCEDURE — 88341 IMHCHEM/IMCYTCHM EA ADD ANTB: CPT | Mod: 26,,, | Performed by: PATHOLOGY

## 2020-02-10 PROCEDURE — 88364 INSITU HYBRIDIZATION (FISH): CPT | Mod: 26,,, | Performed by: PATHOLOGY

## 2020-02-10 PROCEDURE — 88311 DECALCIFY TISSUE: CPT | Mod: 59 | Performed by: PATHOLOGY

## 2020-02-10 PROCEDURE — 88342 IMHCHEM/IMCYTCHM 1ST ANTB: CPT | Mod: 59 | Performed by: PATHOLOGY

## 2020-02-10 PROCEDURE — 25000003 PHARM REV CODE 250: Performed by: RADIOLOGY

## 2020-02-10 PROCEDURE — 88364 INSITU HYBRIDIZATION (FISH): CPT | Mod: 59 | Performed by: PATHOLOGY

## 2020-02-10 PROCEDURE — 88307 TISSUE EXAM BY PATHOLOGIST: CPT | Mod: 26,,, | Performed by: PATHOLOGY

## 2020-02-10 PROCEDURE — 88365 PR  TISSUE HYBRIDIZATION: ICD-10-PCS | Mod: 26,,, | Performed by: PATHOLOGY

## 2020-02-10 PROCEDURE — 88311 PR  DECALCIFY TISSUE: ICD-10-PCS | Mod: 26,,, | Performed by: PATHOLOGY

## 2020-02-10 PROCEDURE — 88342 CHG IMMUNOCYTOCHEMISTRY: ICD-10-PCS | Mod: 26,,, | Performed by: PATHOLOGY

## 2020-02-10 PROCEDURE — 88313 SPECIAL STAINS GROUP 2: CPT | Mod: 26,,, | Performed by: PATHOLOGY

## 2020-02-10 PROCEDURE — 88313 PR  SPECIAL STAINS,GROUP II: ICD-10-PCS | Mod: 26,,, | Performed by: PATHOLOGY

## 2020-02-10 PROCEDURE — 88313 SPECIAL STAINS GROUP 2: CPT | Performed by: PATHOLOGY

## 2020-02-10 PROCEDURE — 87206 SMEAR FLUORESCENT/ACID STAI: CPT | Mod: 91

## 2020-02-10 PROCEDURE — 87205 SMEAR GRAM STAIN: CPT | Mod: 59

## 2020-02-10 PROCEDURE — 85610 PROTHROMBIN TIME: CPT

## 2020-02-10 PROCEDURE — 88342 IMHCHEM/IMCYTCHM 1ST ANTB: CPT | Mod: 26,,, | Performed by: PATHOLOGY

## 2020-02-10 PROCEDURE — 88365 INSITU HYBRIDIZATION (FISH): CPT | Mod: 26,,, | Performed by: PATHOLOGY

## 2020-02-10 PROCEDURE — 87070 CULTURE OTHR SPECIMN AEROBIC: CPT

## 2020-02-10 PROCEDURE — 88307 PR  SURG PATH,LEVEL V: ICD-10-PCS | Mod: 26,,, | Performed by: PATHOLOGY

## 2020-02-10 PROCEDURE — 88342 IMHCHEM/IMCYTCHM 1ST ANTB: CPT | Mod: 91 | Performed by: PATHOLOGY

## 2020-02-10 PROCEDURE — 88365 INSITU HYBRIDIZATION (FISH): CPT | Mod: 59 | Performed by: PATHOLOGY

## 2020-02-10 PROCEDURE — 88364 CHG INSITU HYBRIDIZATION (FISH: ICD-10-PCS | Mod: 26,,, | Performed by: PATHOLOGY

## 2020-02-10 RX ORDER — LIDOCAINE HYDROCHLORIDE 10 MG/ML
1 INJECTION INFILTRATION; PERINEURAL ONCE
Status: COMPLETED | OUTPATIENT
Start: 2020-02-10 | End: 2020-02-10

## 2020-02-10 RX ORDER — SODIUM CHLORIDE 9 MG/ML
500 INJECTION, SOLUTION INTRAVENOUS ONCE
Status: COMPLETED | OUTPATIENT
Start: 2020-02-10 | End: 2020-02-10

## 2020-02-10 RX ORDER — MIDAZOLAM HYDROCHLORIDE 1 MG/ML
INJECTION INTRAMUSCULAR; INTRAVENOUS CODE/TRAUMA/SEDATION MEDICATION
Status: COMPLETED | OUTPATIENT
Start: 2020-02-10 | End: 2020-02-10

## 2020-02-10 RX ORDER — LIDOCAINE HYDROCHLORIDE 5 MG/ML
INJECTION, SOLUTION INFILTRATION; PERINEURAL CODE/TRAUMA/SEDATION MEDICATION
Status: COMPLETED | OUTPATIENT
Start: 2020-02-10 | End: 2020-02-10

## 2020-02-10 RX ORDER — IBUPROFEN 200 MG
400 TABLET ORAL EVERY 6 HOURS PRN
Status: DISCONTINUED | OUTPATIENT
Start: 2020-02-10 | End: 2020-02-10 | Stop reason: HOSPADM

## 2020-02-10 RX ORDER — FENTANYL CITRATE 50 UG/ML
INJECTION, SOLUTION INTRAMUSCULAR; INTRAVENOUS CODE/TRAUMA/SEDATION MEDICATION
Status: COMPLETED | OUTPATIENT
Start: 2020-02-10 | End: 2020-02-10

## 2020-02-10 RX ADMIN — MIDAZOLAM HYDROCHLORIDE 1 MG: 1 INJECTION, SOLUTION INTRAMUSCULAR; INTRAVENOUS at 10:02

## 2020-02-10 RX ADMIN — SODIUM CHLORIDE 500 ML: 0.9 INJECTION, SOLUTION INTRAVENOUS at 08:02

## 2020-02-10 RX ADMIN — FENTANYL CITRATE 50 MCG: 50 INJECTION, SOLUTION INTRAMUSCULAR; INTRAVENOUS at 10:02

## 2020-02-10 RX ADMIN — LIDOCAINE HYDROCHLORIDE 3 ML: 5 INJECTION, SOLUTION INFILTRATION; PERINEURAL at 10:02

## 2020-02-10 RX ADMIN — LIDOCAINE HYDROCHLORIDE 0.1 ML: 10 INJECTION, SOLUTION EPIDURAL; INFILTRATION; INTRACAUDAL; PERINEURAL at 08:02

## 2020-02-10 RX ADMIN — IBUPROFEN 400 MG: 200 TABLET, FILM COATED ORAL at 11:02

## 2020-02-10 NOTE — PROCEDURES
Vascular and Interventional Radiology Post-Procedure Note    Pre Op Diagnosis:  Spine lesions on MRI  Post Op Diagnosis:  Same    Procedure(s): Fluoroscopic guided L3 and L5 lesion biopsies    Procedure(s) Performed By:  David Quinones mD; David Driscoll MD (res)    Written Informed Consent Obtained?:  Yes  Specimen Removed:  YES multiple aspiration and core biopsy specimens  Estimated Blood Loss:  Less than 50     Findings:   Under fluoro guidance, multiple targeted 13g core biopsy and aspiration samples were obtained of L3 and L5 lesions. Patient tolerated procedure well.  No immediate complications. See imaging report for details.    Plan:  Referring provider to follow up on path results.      David Driscoll MD  R[facility], Diagnostic and Interventional Radiology  Pager: [phone]

## 2020-02-10 NOTE — PROGRESS NOTES
Dr Driscoll at  to discuss procedure and blood infusion status, he speaks english. Consent was reported to be signed in clinic, pt v/u of procedure and agrees to proceed.

## 2020-02-10 NOTE — DISCHARGE SUMMARY
Vascular and Interventional Radiology Discharge Summary      Hospital Course:  No complications    Admit Date:  2/10/2020    Discharge Date:  02/10/2020     Instructions Given to Patient:  Yes    Diet:  Resume prior diet    Activity:  Activity as tolerated    Description of Condition on Discharge:  Stable    Vital Signs (Most Recent):    Temp: 97.9 °F (36.6 °C) (02/10/20 0803)  Pulse: 80 (02/10/20 1115)  Resp: 20 (02/10/20 1115)  BP: (!) 162/71 (02/10/20 1115)  SpO2: 100 % (02/10/20 1115)    Discharge Disposition:  Home    Discharge Diagnosis:  Lumbar spine lesions    Procedure(s) Performed:  Fluoro guided L3 and L5 spine lesion biospies     Follow-up:  Per referring provider    Discharge Prescriptions:  None      David Driscoll MD  [facility], Diagnostic and Interventional Radiology  Pager: [phone]

## 2020-02-10 NOTE — PROGRESS NOTES
Patient assigned to this writer by charge nurse. The patient is in the waiting room but, will be escorted to [facility] room 8. This writer is completing a chart review and reviewing MD orders.

## 2020-02-10 NOTE — DISCHARGE INSTRUCTIONS
For scheduling: Call Maia at [phone]    For questions or concerns call: LAURYN MON-FRI 8 AM- 5PM [phone]. Radiology resident on call [phone].    For immediate concerns that are not emergent, you may call our radiology clinic at: [phone]

## 2020-02-10 NOTE — PLAN OF CARE
Procedure completed. Pt tolerated well. NAD noted or reported. Pain management discussed with pt. All questions answered. Labs collected and sent. Sites bandaged, CDI. Pt to recover in ROCU, report to be given at the bedside. 1 Hr recovery, on her back until 1225.

## 2020-02-10 NOTE — INTERVAL H&P NOTE
The patient has been examined and the H&P has been reviewed:    No changes to note. MODERATE SEDATION, ASA 3, MALLAMPATI 1. Planning for 2 level lumbar biopsy under fluoro guidance        Active Hospital Problems    Diagnosis  POA    Lesion of vertebra [M89.9]  Yes      Resolved Hospital Problems   No resolved problems to display.

## 2020-02-10 NOTE — PROGRESS NOTES
Recovery complete. Patient tolerated well. Dressing clean dry and intact to lower back area. Discharge instructions given patient verbalizes compliance and understanding. Patient discharged home via wheelchair per transport.

## 2020-02-10 NOTE — NURSING
Pt arrives to  via stretcher. Pt is AAOX4, able to state reason she is here. Orders, hx, labs, consent reviewed.

## 2020-02-10 NOTE — PROGRESS NOTES
GWEN pierce called to check on pt, they stated consent already obtained in clinic but would be sending a MD that could speak Polish, explained to discuss Blood transfusion status because I do not believe pt understood what was being asked, v/u

## 2020-02-12 ENCOUNTER — TELEPHONE (OUTPATIENT)
Dept: PRIMARY CARE CLINIC | Facility: CLINIC | Age: 80
End: 2020-02-12

## 2020-02-12 NOTE — TELEPHONE ENCOUNTER
We are waiting for results of a bone biopsy for possible metastatic disease in this patient.  Please inform her that those results are not available, but all cultures for infection are negative.  Please add her to my schedule next Monday in case the results are back and we need to discuss treatment.  Also, please inform her that this is for precaution; if we do not have the biopsy results by Monday, we will call her and cancel the appointment, thanks

## 2020-02-12 NOTE — TELEPHONE ENCOUNTER
Patient notified of results from the culture and appointment has been made for Monday at 4 pm pending the results of the bone biopsy patient verbalized understanding appointment will be cancelled until the results of the biopsy comes in

## 2020-02-14 LAB
BACTERIA SPEC AEROBE CULT: NO GROWTH
BACTERIA SPEC AEROBE CULT: NO GROWTH

## 2020-02-17 ENCOUNTER — TELEPHONE (OUTPATIENT)
Dept: PRIMARY CARE CLINIC | Facility: CLINIC | Age: 80
End: 2020-02-17

## 2020-02-17 LAB
BACTERIA SPEC ANAEROBE CULT: NORMAL
BACTERIA SPEC ANAEROBE CULT: NORMAL

## 2020-02-17 RX ORDER — FLUTICASONE PROPIONATE AND SALMETEROL 100; 50 UG/1; UG/1
POWDER RESPIRATORY (INHALATION)
Qty: 1 EACH | Refills: 11 | Status: SHIPPED | OUTPATIENT
Start: 2020-02-17

## 2020-02-17 RX ORDER — MONTELUKAST SODIUM 10 MG/1
10 TABLET ORAL NIGHTLY
Qty: 30 TABLET | Refills: 11 | Status: SHIPPED | OUTPATIENT
Start: 2020-02-17 | End: 2021-01-29

## 2020-02-17 NOTE — TELEPHONE ENCOUNTER
Please contact Pathology and see if they have at least a preliminary diagnosis on this patient's biopsy since she is scheduled to see me for results today, thanks

## 2020-02-20 ENCOUNTER — TELEPHONE (OUTPATIENT)
Dept: PRIMARY CARE CLINIC | Facility: CLINIC | Age: 80
End: 2020-02-20

## 2020-02-20 DIAGNOSIS — C85.90 LYMPHOMA, UNSPECIFIED BODY REGION, UNSPECIFIED LYMPHOMA TYPE: Primary | ICD-10-CM

## 2020-02-20 NOTE — TELEPHONE ENCOUNTER
Patient grandson was able to relay the message to the patient about results of her pathology report he did verbalize understanding and will relay the message to his grandmother.

## 2020-02-20 NOTE — TELEPHONE ENCOUNTER
Please contact pt and inform her of the followin.  I am still waiting for the pathology results.  I am checking at least twice daily for results  2.  I have put in a consult for Oncology pending results.  We can cancel this if the results are benign, but I want her to be seen as soon as possible if the biopsy is worrisome

## 2020-02-22 ENCOUNTER — TELEPHONE (OUTPATIENT)
Dept: PRIMARY CARE CLINIC | Facility: CLINIC | Age: 80
End: 2020-02-22

## 2020-02-22 LAB
COMMENT: NORMAL
FINAL PATHOLOGIC DIAGNOSIS: NORMAL
GROSS: NORMAL
Lab: NORMAL
MICROSCOPIC EXAM: NORMAL

## 2020-02-22 NOTE — TELEPHONE ENCOUNTER
Pt informed of biopsy results showing large B cell lymphoma.  Please make sure she has a consult with Onccitlaly

## 2020-02-24 ENCOUNTER — TELEPHONE (OUTPATIENT)
Dept: HEMATOLOGY/ONCOLOGY | Facility: CLINIC | Age: 80
End: 2020-02-24

## 2020-02-24 NOTE — TELEPHONE ENCOUNTER
Received referral for pt to see oncology for lymphoma, called & spoke with pt. Scheduled to see  on Wednesday at 2pm. Provided pt with my direct contact information should she have any questions. Right after hanging up, pt's daughter Alexandra called to review appt as pt only understands some English & is very nervous. Reviewed & confirmed appt for this Wednesday. Asked if they would like an in person interpretor, she declined & stated family would be at the appt.

## 2020-02-24 NOTE — TELEPHONE ENCOUNTER
----- Message from Lesley Leyva sent at 2/21/2020  9:05 AM CST -----  Contact: [phone]  Dr.Leandro Tinoco has placed a referral for the patient to be seen with Hematology/Oncology.     Lymphoma, unspecified body region, unspecified lymphoma type [C85.90]

## 2020-02-26 ENCOUNTER — OFFICE VISIT (OUTPATIENT)
Dept: HEMATOLOGY/ONCOLOGY | Facility: CLINIC | Age: 80
End: 2020-02-26
Payer: MEDICARE

## 2020-02-26 VITALS
RESPIRATION RATE: 16 BRPM | BODY MASS INDEX: 28.95 KG/M2 | HEART RATE: 68 BPM | SYSTOLIC BLOOD PRESSURE: 157 MMHG | OXYGEN SATURATION: 100 % | TEMPERATURE: 98 F | DIASTOLIC BLOOD PRESSURE: 72 MMHG | WEIGHT: 163.38 LBS | HEIGHT: 63 IN

## 2020-02-26 DIAGNOSIS — C85.90 LYMPHOMA, UNSPECIFIED BODY REGION, UNSPECIFIED LYMPHOMA TYPE: Primary | ICD-10-CM

## 2020-02-26 DIAGNOSIS — C83.30 DIFFUSE LARGE B-CELL LYMPHOMA, UNSPECIFIED BODY REGION: ICD-10-CM

## 2020-02-26 DIAGNOSIS — C83.39 DIFFUSE LARGE B-CELL LYMPHOMA, EXTRANODAL AND SOLID ORGAN SITES: ICD-10-CM

## 2020-02-26 DIAGNOSIS — C85.90 LYMPHOMA, UNSPECIFIED BODY REGION, UNSPECIFIED LYMPHOMA TYPE: ICD-10-CM

## 2020-02-26 PROCEDURE — 99205 OFFICE O/P NEW HI 60 MIN: CPT | Mod: S$GLB,,, | Performed by: INTERNAL MEDICINE

## 2020-02-26 PROCEDURE — 1159F PR MEDICATION LIST DOCUMENTED IN MEDICAL RECORD: ICD-10-PCS | Mod: S$GLB,,, | Performed by: INTERNAL MEDICINE

## 2020-02-26 PROCEDURE — 1101F PT FALLS ASSESS-DOCD LE1/YR: CPT | Mod: CPTII,S$GLB,, | Performed by: INTERNAL MEDICINE

## 2020-02-26 PROCEDURE — 99999 PR PBB SHADOW E&M-EST. PATIENT-LVL V: ICD-10-PCS | Mod: PBBFAC,,, | Performed by: INTERNAL MEDICINE

## 2020-02-26 PROCEDURE — 3078F PR MOST RECENT DIASTOLIC BLOOD PRESSURE < 80 MM HG: ICD-10-PCS | Mod: CPTII,S$GLB,, | Performed by: INTERNAL MEDICINE

## 2020-02-26 PROCEDURE — 99499 UNLISTED E&M SERVICE: CPT | Mod: S$GLB,,, | Performed by: INTERNAL MEDICINE

## 2020-02-26 PROCEDURE — 1126F AMNT PAIN NOTED NONE PRSNT: CPT | Mod: S$GLB,,, | Performed by: INTERNAL MEDICINE

## 2020-02-26 PROCEDURE — 1159F MED LIST DOCD IN RCRD: CPT | Mod: S$GLB,,, | Performed by: INTERNAL MEDICINE

## 2020-02-26 PROCEDURE — 3077F PR MOST RECENT SYSTOLIC BLOOD PRESSURE >= 140 MM HG: ICD-10-PCS | Mod: CPTII,S$GLB,, | Performed by: INTERNAL MEDICINE

## 2020-02-26 PROCEDURE — 99205 PR OFFICE/OUTPT VISIT, NEW, LEVL V, 60-74 MIN: ICD-10-PCS | Mod: S$GLB,,, | Performed by: INTERNAL MEDICINE

## 2020-02-26 PROCEDURE — 1101F PR PT FALLS ASSESS DOC 0-1 FALLS W/OUT INJ PAST YR: ICD-10-PCS | Mod: CPTII,S$GLB,, | Performed by: INTERNAL MEDICINE

## 2020-02-26 PROCEDURE — 3078F DIAST BP <80 MM HG: CPT | Mod: CPTII,S$GLB,, | Performed by: INTERNAL MEDICINE

## 2020-02-26 PROCEDURE — 99499 RISK ADDL DX/OHS AUDIT: ICD-10-PCS | Mod: S$GLB,,, | Performed by: INTERNAL MEDICINE

## 2020-02-26 PROCEDURE — 99999 PR PBB SHADOW E&M-EST. PATIENT-LVL V: CPT | Mod: PBBFAC,,, | Performed by: INTERNAL MEDICINE

## 2020-02-26 PROCEDURE — 1126F PR PAIN SEVERITY QUANTIFIED, NO PAIN PRESENT: ICD-10-PCS | Mod: S$GLB,,, | Performed by: INTERNAL MEDICINE

## 2020-02-26 PROCEDURE — 3077F SYST BP >= 140 MM HG: CPT | Mod: CPTII,S$GLB,, | Performed by: INTERNAL MEDICINE

## 2020-02-26 RX ORDER — VALSARTAN 160 MG/1
160 TABLET ORAL DAILY
COMMUNITY
Start: 2020-01-28 | End: 2020-07-17

## 2020-02-26 NOTE — LETTER
February 26, 2020      Michael Tinoco MD  [address]  [facility] [address]           [facility]  [address]  [facility] [address]  Phone: [phone]          Patient: Elena Frances   MR Number: [id number]   YOB: 1940   Date of Visit: 2/26/2020       Dear Dr. Michael Tinoco:    Thank you for referring Elena Frances to me for evaluation. Attached you will find relevant portions of my assessment and plan of care.    If you have questions, please do not hesitate to call me. I look forward to following Elena Frances along with you.    Sincerely,    Eve Alvarez MD    Enclosure  CC:  No Recipients    If you would like to receive this communication electronically, please contact [email or web address] or [phone] to request more information on [employer] access.    For providers and/or their staff who would like to refer a patient to [facility], please contact us through our one-stop-shop provider referral line, [facility], at [phone].    If you feel you have received this communication in error or would no longer like to receive these types of communications, please e-mail [email or web address]

## 2020-02-27 LAB
COMMENT: NORMAL
FINAL PATHOLOGIC DIAGNOSIS: NORMAL
GROSS: NORMAL
Lab: NORMAL

## 2020-02-28 ENCOUNTER — TELEPHONE (OUTPATIENT)
Dept: HEMATOLOGY/ONCOLOGY | Facility: CLINIC | Age: 80
End: 2020-02-28

## 2020-02-28 NOTE — H&P (VIEW-ONLY)
Subjective:       Patient ID: Elena Frances is a 79 y.o. female.    Chief Complaint: Lymphoma    Elena presents with 2 sons (CRNA and Radiologist at [facility]) for evaluation of a new diagnosis of diffuse large b cell lymphoma. She was under evaluation for a herniated disc - otherwise feeling fine- when MRI imaging reported metastatic disease to the spine suspicious for neoplasm. IR guided biopsy of the spine ended up being a bone marrow biopsy with diffuse large b cell lymphoma. Due to decalcificate the sample was not appropriate for usual diagnostic testing for classification of lymphoma. CT of chest, abdomen, pelvis has no evidence of lymphadenopathy. Elena does not have any constitutional B symptoms.     HPI  Review of Systems   Constitutional: Negative.    HENT: Negative.    Eyes: Negative.    Respiratory: Negative.    Cardiovascular: Negative.    Gastrointestinal: Negative.    Endocrine: Negative.    Genitourinary: Negative.    Musculoskeletal: Negative.    Skin: Negative.    Allergic/Immunologic: Negative for environmental allergies, food allergies and immunocompromised state.   Neurological: Negative.    Hematological: Negative for adenopathy. Does not bruise/bleed easily.   Psychiatric/Behavioral: Negative.        Objective:      Physical Exam   Constitutional: She is oriented to person, place, and time. She appears well-developed and well-nourished.   HENT:   Head: Normocephalic and atraumatic.   Eyes: Conjunctivae are normal. No scleral icterus.   Neck: Normal range of motion. Neck supple.   Cardiovascular: Normal rate and intact distal pulses.   Pulmonary/Chest: Effort normal. No respiratory distress.   Abdominal: Soft. She exhibits no distension. There is no tenderness.   Musculoskeletal: Normal range of motion. She exhibits no edema.   Neurological: She is alert and oriented to person, place, and time. No cranial nerve deficit.   Skin: Skin is warm and dry.   Psychiatric: She has a normal mood  and affect. Her behavior is normal.   Nursing note and vitals reviewed.      Assessment:       1. Lymphoma, unspecified body region, unspecified lymphoma type    2. Diffuse large B-cell lymphoma, unspecified body region    3. Diffuse large b-cell lymphoma, extranodal and solid organ sites         Plan:       Plan to improved diagnostic testing to complete staging and classification of diffuse large B cell lymphoma with PET image and bone marrow biopsy with appropriate handling of sample.    Discussed curable nature of diffuse large b cell lymphoma. 80% cure rate for non-double or triple hit lymphoma, 60% cure rate for higher-risk lymphoma. Discussed treatment options of RCHOP vs REPOCH dependent on risk category.    Advance Care Planning   Elena has had a bad prior experience when her  went through chemotherapy for lung cancer. She does not want to suffer. She is very hesitant to have chemotherapy. We discussed a 6 month prognosis if she does no cancer directed therapy. A bit longer if she considers immune therapy alone.   She and her family agree to complete staging and diagnostic testing. The decision for chemotherapy is ultimately hers. She is an excellent candidate for concordant palliative care.       Visit 1 hour; >50% counseling

## 2020-02-28 NOTE — TELEPHONE ENCOUNTER
----- Message from Mervat Lauren sent at 2/28/2020  9:14 AM CST -----  Contact: Flora (Pre-Service)  Patient Advice/Staff Message     Caller name: Flora     Reason for call: Insurance needs Dr Alvarez to complete office notes from 02/26.    Do you feel you need to be seen today:: No        Communication Preference: Ext [phone]    Additional Information:

## 2020-02-28 NOTE — PROGRESS NOTES
Subjective:       Patient ID: Elena Frances is a 79 y.o. female.    Chief Complaint: Lymphoma    Elena presents with 2 sons (CRNA and Radiologist at [facility]) for evaluation of a new diagnosis of diffuse large b cell lymphoma. She was under evaluation for a herniated disc - otherwise feeling fine- when MRI imaging reported metastatic disease to the spine suspicious for neoplasm. IR guided biopsy of the spine ended up being a bone marrow biopsy with diffuse large b cell lymphoma. Due to decalcificate the sample was not appropriate for usual diagnostic testing for classification of lymphoma. CT of chest, abdomen, pelvis has no evidence of lymphadenopathy. Elena does not have any constitutional B symptoms.     HPI  Review of Systems   Constitutional: Negative.    HENT: Negative.    Eyes: Negative.    Respiratory: Negative.    Cardiovascular: Negative.    Gastrointestinal: Negative.    Endocrine: Negative.    Genitourinary: Negative.    Musculoskeletal: Negative.    Skin: Negative.    Allergic/Immunologic: Negative for environmental allergies, food allergies and immunocompromised state.   Neurological: Negative.    Hematological: Negative for adenopathy. Does not bruise/bleed easily.   Psychiatric/Behavioral: Negative.        Objective:      Physical Exam   Constitutional: She is oriented to person, place, and time. She appears well-developed and well-nourished.   HENT:   Head: Normocephalic and atraumatic.   Eyes: Conjunctivae are normal. No scleral icterus.   Neck: Normal range of motion. Neck supple.   Cardiovascular: Normal rate and intact distal pulses.   Pulmonary/Chest: Effort normal. No respiratory distress.   Abdominal: Soft. She exhibits no distension. There is no tenderness.   Musculoskeletal: Normal range of motion. She exhibits no edema.   Neurological: She is alert and oriented to person, place, and time. No cranial nerve deficit.   Skin: Skin is warm and dry.   Psychiatric: She has a normal mood  and affect. Her behavior is normal.   Nursing note and vitals reviewed.      Assessment:       1. Lymphoma, unspecified body region, unspecified lymphoma type    2. Diffuse large B-cell lymphoma, unspecified body region    3. Diffuse large b-cell lymphoma, extranodal and solid organ sites         Plan:       Plan to improved diagnostic testing to complete staging and classification of diffuse large B cell lymphoma with PET image and bone marrow biopsy with appropriate handling of sample.    Discussed curable nature of diffuse large b cell lymphoma. 80% cure rate for non-double or triple hit lymphoma, 60% cure rate for higher-risk lymphoma. Discussed treatment options of RCHOP vs REPOCH dependent on risk category.    Advance Care Planning   Elena has had a bad prior experience when her  went through chemotherapy for lung cancer. She does not want to suffer. She is very hesitant to have chemotherapy. We discussed a 6 month prognosis if she does no cancer directed therapy. A bit longer if she considers immune therapy alone.   She and her family agree to complete staging and diagnostic testing. The decision for chemotherapy is ultimately hers. She is an excellent candidate for concordant palliative care.       Visit 1 hour; >50% counseling

## 2020-03-02 ENCOUNTER — HOSPITAL ENCOUNTER (OUTPATIENT)
Dept: RADIOLOGY | Facility: HOSPITAL | Age: 80
Discharge: HOME | End: 2020-03-02
Attending: INTERNAL MEDICINE
Payer: MEDICARE

## 2020-03-02 DIAGNOSIS — C83.30 DIFFUSE LARGE B-CELL LYMPHOMA, UNSPECIFIED BODY REGION: ICD-10-CM

## 2020-03-02 DIAGNOSIS — C83.39 DIFFUSE LARGE B-CELL LYMPHOMA, EXTRANODAL AND SOLID ORGAN SITES: ICD-10-CM

## 2020-03-02 LAB — POCT GLUCOSE: 114 MG/DL (ref 70–110)

## 2020-03-02 PROCEDURE — 78815 NM PET CT ROUTINE: ICD-10-PCS | Mod: 26,PS,, | Performed by: RADIOLOGY

## 2020-03-02 PROCEDURE — A9552 F18 FDG: HCPCS

## 2020-03-02 PROCEDURE — 78815 PET IMAGE W/CT SKULL-THIGH: CPT | Mod: TC,PI

## 2020-03-02 PROCEDURE — 78815 PET IMAGE W/CT SKULL-THIGH: CPT | Mod: 26,PS,, | Performed by: RADIOLOGY

## 2020-03-04 ENCOUNTER — TELEPHONE (OUTPATIENT)
Dept: HEMATOLOGY/ONCOLOGY | Facility: CLINIC | Age: 80
End: 2020-03-04

## 2020-03-04 NOTE — TELEPHONE ENCOUNTER
Informed pt of bmbx date and time, also stated to arrive on the second floor in the main hospital day of surgery center, nothing to eat or drink after midnight and to hold all blood thinning medications unless doctor instructed other wise, the pt will need a ride home, left message and will have another employee leave message in Faroese

## 2020-03-05 ENCOUNTER — HOSPITAL ENCOUNTER (OUTPATIENT)
Facility: HOSPITAL | Age: 80
Discharge: HOME | End: 2020-03-05
Attending: INTERNAL MEDICINE | Admitting: INTERNAL MEDICINE
Payer: MEDICARE

## 2020-03-05 ENCOUNTER — ANESTHESIA (OUTPATIENT)
Dept: SURGERY | Facility: HOSPITAL | Age: 80
End: 2020-03-05
Payer: MEDICARE

## 2020-03-05 ENCOUNTER — ANESTHESIA EVENT (OUTPATIENT)
Dept: SURGERY | Facility: HOSPITAL | Age: 80
End: 2020-03-05
Payer: MEDICARE

## 2020-03-05 VITALS
RESPIRATION RATE: 16 BRPM | DIASTOLIC BLOOD PRESSURE: 66 MMHG | HEART RATE: 65 BPM | TEMPERATURE: 98 F | BODY MASS INDEX: 28.35 KG/M2 | WEIGHT: 160 LBS | OXYGEN SATURATION: 100 % | HEIGHT: 63 IN | SYSTOLIC BLOOD PRESSURE: 147 MMHG

## 2020-03-05 DIAGNOSIS — C83.30 DLBCL (DIFFUSE LARGE B CELL LYMPHOMA): ICD-10-CM

## 2020-03-05 PROCEDURE — D9220A PRA ANESTHESIA: ICD-10-PCS | Mod: CRNA,,, | Performed by: NURSE ANESTHETIST, CERTIFIED REGISTERED

## 2020-03-05 PROCEDURE — 88341 IMHCHEM/IMCYTCHM EA ADD ANTB: CPT | Performed by: PATHOLOGY

## 2020-03-05 PROCEDURE — 88313 SPECIAL STAINS GROUP 2: CPT | Performed by: PATHOLOGY

## 2020-03-05 PROCEDURE — 71000015 HC POSTOP RECOV 1ST HR: Performed by: INTERNAL MEDICINE

## 2020-03-05 PROCEDURE — 88365 INSITU HYBRIDIZATION (FISH): CPT | Performed by: PATHOLOGY

## 2020-03-05 PROCEDURE — 88237 TISSUE CULTURE BONE MARROW: CPT

## 2020-03-05 PROCEDURE — D9220A PRA ANESTHESIA: ICD-10-PCS | Mod: ANES,,, | Performed by: ANESTHESIOLOGY

## 2020-03-05 PROCEDURE — 85097 BONE MARROW INTERPRETATION: CPT | Mod: ,,, | Performed by: PATHOLOGY

## 2020-03-05 PROCEDURE — 88184 FLOWCYTOMETRY/ TC 1 MARKER: CPT | Performed by: PATHOLOGY

## 2020-03-05 PROCEDURE — 88364 CHG INSITU HYBRIDIZATION (FISH: ICD-10-PCS | Mod: 26,,, | Performed by: PATHOLOGY

## 2020-03-05 PROCEDURE — 88341 IMHCHEM/IMCYTCHM EA ADD ANTB: CPT | Mod: 26,59,, | Performed by: PATHOLOGY

## 2020-03-05 PROCEDURE — 38222 PR BONE MARROW BIOPSY(IES) W/ASPIRATION(S); DIAGNOSTIC: ICD-10-PCS | Mod: LT,,, | Performed by: NURSE PRACTITIONER

## 2020-03-05 PROCEDURE — 37000008 HC ANESTHESIA 1ST 15 MINUTES: Performed by: INTERNAL MEDICINE

## 2020-03-05 PROCEDURE — 36000704 HC OR TIME LEV I 1ST 15 MIN: Performed by: INTERNAL MEDICINE

## 2020-03-05 PROCEDURE — 88342 IMHCHEM/IMCYTCHM 1ST ANTB: CPT | Mod: 91 | Performed by: PATHOLOGY

## 2020-03-05 PROCEDURE — 88364 INSITU HYBRIDIZATION (FISH): CPT | Mod: 59 | Performed by: PATHOLOGY

## 2020-03-05 PROCEDURE — 88305 TISSUE EXAM BY PATHOLOGIST: CPT | Mod: 59 | Performed by: PATHOLOGY

## 2020-03-05 PROCEDURE — 25000003 PHARM REV CODE 250: Performed by: NURSE PRACTITIONER

## 2020-03-05 PROCEDURE — 36000705 HC OR TIME LEV I EA ADD 15 MIN: Performed by: INTERNAL MEDICINE

## 2020-03-05 PROCEDURE — 81261 IGH GENE REARRANGE AMP METH: CPT | Performed by: PATHOLOGY

## 2020-03-05 PROCEDURE — 88264 CHROMOSOME ANALYSIS 20-25: CPT

## 2020-03-05 PROCEDURE — 81264 IGK REARRANGEABN CLONAL POP: CPT | Performed by: PATHOLOGY

## 2020-03-05 PROCEDURE — 88342 IMHCHEM/IMCYTCHM 1ST ANTB: CPT | Mod: 59 | Performed by: PATHOLOGY

## 2020-03-05 PROCEDURE — 88311 PR  DECALCIFY TISSUE: ICD-10-PCS | Mod: 26,,, | Performed by: PATHOLOGY

## 2020-03-05 PROCEDURE — 38222 DX BONE MARROW BX & ASPIR: CPT | Mod: LT,,, | Performed by: NURSE PRACTITIONER

## 2020-03-05 PROCEDURE — 88365 INSITU HYBRIDIZATION (FISH): CPT | Mod: 26,,, | Performed by: PATHOLOGY

## 2020-03-05 PROCEDURE — 88342 IMHCHEM/IMCYTCHM 1ST ANTB: CPT | Mod: 26,59,, | Performed by: PATHOLOGY

## 2020-03-05 PROCEDURE — 88365 PR  TISSUE HYBRIDIZATION: ICD-10-PCS | Mod: 26,,, | Performed by: PATHOLOGY

## 2020-03-05 PROCEDURE — 88341 PR IHC OR ICC EACH ADD'L SINGLE ANTIBODY  STAINPR: ICD-10-PCS | Mod: 26,59,, | Performed by: PATHOLOGY

## 2020-03-05 PROCEDURE — 88189 FLOWCYTOMETRY/READ 16 & >: CPT | Mod: ,,, | Performed by: PATHOLOGY

## 2020-03-05 PROCEDURE — 88311 DECALCIFY TISSUE: CPT | Mod: 26,,, | Performed by: PATHOLOGY

## 2020-03-05 PROCEDURE — 88360 PR  TUMOR IMMUNOHISTOCHEM/MANUAL: ICD-10-PCS | Mod: 26,,, | Performed by: PATHOLOGY

## 2020-03-05 PROCEDURE — 63600175 PHARM REV CODE 636 W HCPCS: Performed by: NURSE ANESTHETIST, CERTIFIED REGISTERED

## 2020-03-05 PROCEDURE — D9220A PRA ANESTHESIA: Mod: ANES,,, | Performed by: ANESTHESIOLOGY

## 2020-03-05 PROCEDURE — 88313 PR  SPECIAL STAINS,GROUP II: ICD-10-PCS | Mod: 26,,, | Performed by: PATHOLOGY

## 2020-03-05 PROCEDURE — 37000009 HC ANESTHESIA EA ADD 15 MINS: Performed by: INTERNAL MEDICINE

## 2020-03-05 PROCEDURE — 88305 TISSUE EXAM BY PATHOLOGIST: ICD-10-PCS | Mod: 26,,, | Performed by: PATHOLOGY

## 2020-03-05 PROCEDURE — 88364 INSITU HYBRIDIZATION (FISH): CPT | Mod: 26,,, | Performed by: PATHOLOGY

## 2020-03-05 PROCEDURE — D9220A PRA ANESTHESIA: Mod: CRNA,,, | Performed by: NURSE ANESTHETIST, CERTIFIED REGISTERED

## 2020-03-05 PROCEDURE — 88189 PR  FLOWCYTOMETRY/READ, 16 & > MARKERS: ICD-10-PCS | Mod: ,,, | Performed by: PATHOLOGY

## 2020-03-05 PROCEDURE — 88360 TUMOR IMMUNOHISTOCHEM/MANUAL: CPT | Performed by: PATHOLOGY

## 2020-03-05 PROCEDURE — 88185 FLOWCYTOMETRY/TC ADD-ON: CPT | Mod: 59 | Performed by: PATHOLOGY

## 2020-03-05 PROCEDURE — 25000003 PHARM REV CODE 250: Performed by: INTERNAL MEDICINE

## 2020-03-05 PROCEDURE — 85097 PR  BONE MARROW,SMEAR INTERPRETATION: ICD-10-PCS | Mod: ,,, | Performed by: PATHOLOGY

## 2020-03-05 PROCEDURE — 71000044 HC DOSC ROUTINE RECOVERY FIRST HOUR: Performed by: INTERNAL MEDICINE

## 2020-03-05 PROCEDURE — 88305 TISSUE EXAM BY PATHOLOGIST: CPT | Mod: 26,,, | Performed by: PATHOLOGY

## 2020-03-05 PROCEDURE — 88360 TUMOR IMMUNOHISTOCHEM/MANUAL: CPT | Mod: 26,,, | Performed by: PATHOLOGY

## 2020-03-05 PROCEDURE — 88313 SPECIAL STAINS GROUP 2: CPT | Mod: 26,,, | Performed by: PATHOLOGY

## 2020-03-05 PROCEDURE — 88342 CHG IMMUNOCYTOCHEMISTRY: ICD-10-PCS | Mod: 26,59,, | Performed by: PATHOLOGY

## 2020-03-05 RX ORDER — PROPOFOL 10 MG/ML
VIAL (ML) INTRAVENOUS
Status: DISCONTINUED | OUTPATIENT
Start: 2020-03-05 | End: 2020-03-05

## 2020-03-05 RX ORDER — ACETAMINOPHEN 325 MG/1
650 TABLET ORAL ONCE
Status: COMPLETED | OUTPATIENT
Start: 2020-03-05 | End: 2020-03-05

## 2020-03-05 RX ORDER — LIDOCAINE HYDROCHLORIDE 20 MG/ML
INJECTION INTRAVENOUS
Status: DISCONTINUED | OUTPATIENT
Start: 2020-03-05 | End: 2020-03-05

## 2020-03-05 RX ORDER — SODIUM CHLORIDE 9 MG/ML
INJECTION, SOLUTION INTRAVENOUS CONTINUOUS PRN
Status: DISCONTINUED | OUTPATIENT
Start: 2020-03-05 | End: 2020-03-05

## 2020-03-05 RX ORDER — LIDOCAINE HYDROCHLORIDE 10 MG/ML
INJECTION, SOLUTION EPIDURAL; INFILTRATION; INTRACAUDAL; PERINEURAL
Status: DISCONTINUED | OUTPATIENT
Start: 2020-03-05 | End: 2020-03-05 | Stop reason: HOSPADM

## 2020-03-05 RX ORDER — SODIUM CHLORIDE 0.9 % (FLUSH) 0.9 %
10 SYRINGE (ML) INJECTION
Status: CANCELLED | OUTPATIENT
Start: 2020-03-05

## 2020-03-05 RX ORDER — PROPOFOL 10 MG/ML
VIAL (ML) INTRAVENOUS CONTINUOUS PRN
Status: DISCONTINUED | OUTPATIENT
Start: 2020-03-05 | End: 2020-03-05

## 2020-03-05 RX ORDER — HYDROMORPHONE HYDROCHLORIDE 1 MG/ML
0.2 INJECTION, SOLUTION INTRAMUSCULAR; INTRAVENOUS; SUBCUTANEOUS EVERY 5 MIN PRN
Status: CANCELLED | OUTPATIENT
Start: 2020-03-05

## 2020-03-05 RX ADMIN — SODIUM CHLORIDE: 0.9 INJECTION, SOLUTION INTRAVENOUS at 11:03

## 2020-03-05 RX ADMIN — PROPOFOL 80 MG: 10 INJECTION, EMULSION INTRAVENOUS at 11:03

## 2020-03-05 RX ADMIN — LIDOCAINE HYDROCHLORIDE 80 MG: 20 INJECTION, SOLUTION INTRAVENOUS at 11:03

## 2020-03-05 RX ADMIN — ACETAMINOPHEN 650 MG: 325 TABLET ORAL at 12:03

## 2020-03-05 RX ADMIN — PROPOFOL 175 MCG/KG/MIN: 10 INJECTION, EMULSION INTRAVENOUS at 11:03

## 2020-03-05 RX ADMIN — PROPOFOL 20 MG: 10 INJECTION, EMULSION INTRAVENOUS at 11:03

## 2020-03-05 NOTE — DISCHARGE INSTRUCTIONS
Discharge instructions for having a Bone Marrow Aspiration / Biopsy:    Keep Bandage in place for 24 hours.  - Do not shower or take a tube bath during this time (you may sponge bathe).  - Call the nurse or physician for excessive bleeding or pain at biopsy site.  - You may take Tylenol as needed for pain.    You have received medication to sedate you.  - Do not drive a car or operate heavy machinery for the rest of the day.  - You may resume other activities as tolerated.    You can call [phone] for any problems during the hours of 8:00 AM-5:00PM.    For an emergency after 5:00 PM you can call [phone] and have the  page the Hematologist / Oncologist on call.

## 2020-03-05 NOTE — DISCHARGE SUMMARY
[facility]  Hematology  Bone Marrow Transplant  Discharge Summary      Patient Name: Elena Frances  MRN: [id number]  Admission Date: 3/5/2020  Hospital Length of Stay: 0 days  Discharge Date and Time:  03/05/2020 11:38 AM  Attending Physician: Eve Alvarez MD   Discharging Provider: Tiffany Humphrey NP  Primary Care Provider: Michael Tinoco MD    Procedure(s) (LRB):  Biopsy-bone marrow (N/A)     Hospital Course:  Patient admitted to pre op today for a bone marrow aspiration and biopsy. Pt was consented for a bone marrow biopsy. Patient was sedated per anesthesia and a bone marrow biopsy and aspiration was performed in the OR (see procedure note). Patient was then transferred to post op and discharged home when appropriate per anesthesia.       Pending Diagnostic Studies:     None        There are no hospital problems to display for this patient.     Discharged Condition: good    Disposition: Home or Self Care    Follow Up:   Future Appointments   Date Time Provider Department Center   3/19/2020  3:30 PM LAB, HEMONC CANCER DG [facility] LAB HO Pepe Hoffmann   3/19/2020  4:30 PM Eve Alvarez MD [facility] BMT [facility]       Patient Instructions:      Diet Adult Regular     Notify your health care provider if you experience any of the following:  temperature >100.4     Notify your health care provider if you experience any of the following:  persistent nausea and vomiting or diarrhea     Notify your health care provider if you experience any of the following:  severe uncontrolled pain     Notify your health care provider if you experience any of the following:  redness, tenderness, or signs of infection (pain, swelling, redness, odor or green/yellow discharge around incision site)     Remove dressing in 24 hours     Activity as tolerated     Medications:  Reconciled Home Medications:      Medication List      ASK your doctor about these medications    acetaminophen 650 MG Tbsr  Commonly known as:   TYLENOL  Take 1 tablet (650 mg total) by mouth every 6 (six) hours as needed (pain).     albuterol 90 mcg/actuation inhaler  Commonly known as:  ProAir HFA  Inhale 2 puffs into the lungs every 6 (six) hours as needed for Wheezing. Rescue     aspirin 81 MG EC tablet  Commonly known as:  ECOTRIN  Take 1 tablet (81 mg total) by mouth 2 (two) times daily.     atorvastatin 80 MG tablet  Commonly known as:  LIPITOR  TAKE 1 TABLET (80 MG TOTAL) BY MOUTH ONCE DAILY.     blood-glucose meter kit  Commonly known as:  Revolutult Blood Glucose Systm  Use as instructed     diclofenac sodium 1 % Gel  Commonly known as:  VOLTAREN     fluticasone propionate 50 mcg/actuation nasal spray  Commonly known as:  FLONASE  2 SPRAYS (100 MCG TOTAL) BY EACH NARE ROUTE ONCE DAILY.     fluticasone-salmeterol 100-50 mcg/dose 100-50 mcg/dose diskus inhaler  Commonly known as:  Advair Diskus  TAKE 1 PUFF BY MOUTH TWICE A DAY     Fluzone High-Dose 2019-20 (PF) 180 mcg/0.5 mL Syrg  Generic drug:  flu vacc [id number](65yr up)PF  Inject in the muscle for one dose     gabapentin 100 MG capsule  Commonly known as:  NEURONTIN  PLEASE SEE ATTACHED FOR DETAILED DIRECTIONS     lancets Misc  1 lancet by Misc.(Non-Drug; Combo Route) route 2 (two) times daily.     meloxicam 15 MG tablet  Commonly known as:  MOBIC  TAKE 1 TABLET BY MOUTH EVERY DAY     metoprolol succinate 25 MG 24 hr tablet  Commonly known as:  TOPROL-XL  TAKE 1 TABLET BY MOUTH EVERY DAY     MIRALAX ORAL  Take by mouth as needed.     montelukast 10 mg tablet  Commonly known as:  SINGULAIR  Take 1 tablet (10 mg total) by mouth every evening.     nabumetone 750 MG tablet  Commonly known as:  RELAFEN  TAKE 1 TABLET BY MOUTH TWICE DAILY     omeprazole 40 MG capsule  Commonly known as:  PRILOSEC  TAKE 1 CAPSULE BY MOUTH EVERY DAY     promethazine 25 MG tablet  Commonly known as:  PHENERGAN  Take 1 tablet (25 mg total) by mouth every 4 (four) hours as needed for Nausea.     sertraline 50 MG  tablet  Commonly known as:  ZOLOFT  TAKE 1 TABLET BY MOUTH EVERY DAY     tiZANidine 4 MG tablet  Commonly known as:  ZANAFLEX  TAKE 1.5 TABLETS (6MG) BY MOUTH 3 TIMES A DAY AS NEEDED FOR MUSCLE SPASM     tolterodine 2 MG Tab  Commonly known as:  DETROL  Take 1 tablet (2 mg total) by mouth 2 (two) times daily.     valsartan 160 MG tablet  Commonly known as:  DIOVAN  Take 160 mg by mouth once daily.     varicella-zoster gE-AS01B (PF) 50 mcg/0.5 mL injection  Commonly known as:  Shingrix (PF)  Inject into the muscle.     VITAMIN B-12 ORAL  Take 2,500 mcg by mouth.            Tiffany Humphrey NP  Bone Marrow Transplant  [facility]

## 2020-03-05 NOTE — ANESTHESIA POSTPROCEDURE EVALUATION
Anesthesia Post Evaluation    Patient: Elena Frances    Procedure(s) Performed: Procedure(s) (LRB):  Biopsy-bone marrow (N/A)    Final Anesthesia Type: general    Patient location during evaluation: PACU  Patient participation: Yes- Able to Participate  Level of consciousness: awake and alert  Post-procedure vital signs: reviewed and stable  Pain management: adequate  Airway patency: patent    PONV status at discharge: No PONV  Anesthetic complications: no      Cardiovascular status: stable  Respiratory status: spontaneous ventilation and room air  Hydration status: euvolemic  Follow-up not needed.          Vitals Value Taken Time   /66 3/5/2020 12:09 PM   Temp 36.5 °C (97.7 °F) 3/5/2020 11:39 AM   Pulse 65 3/5/2020 12:09 PM   Resp 16 3/5/2020 12:09 PM   SpO2 100 % 3/5/2020 12:09 PM         No case tracking events are documented in the log.      Pain/Elkin Score: Pain Rating Prior to Med Admin: 7 (3/5/2020 12:03 PM)  Elkin Score: 10 (3/5/2020 12:15 PM)

## 2020-03-05 NOTE — TRANSFER OF CARE
"Anesthesia Transfer of Care Note    Patient: Elena Frances    Procedure(s) Performed: Procedure(s) (LRB):  Biopsy-bone marrow (N/A)    Patient location: [facility]    Anesthesia Type: general    Transport from OR: Transported from OR on 6-10 L/min O2 by face mask with adequate spontaneous ventilation    Post pain: adequate analgesia    Post assessment: no apparent anesthetic complications and tolerated procedure well    Post vital signs: stable    Level of consciousness: sedated    Nausea/Vomiting: no nausea/vomiting    Complications: none    Transfer of care protocol was followed      Last vitals:   Visit Vitals  BP (!) 149/67 (BP Location: Left arm, Patient Position: Lying)   Pulse 66   Temp 36.4 °C (97.5 °F) (Oral)   Resp 17   Ht 5' 3" (1.6 m)   Wt 72.6 kg (160 lb)   SpO2 98%   Breastfeeding? No   BMI 28.34 kg/m²     "

## 2020-03-05 NOTE — ANESTHESIA PREPROCEDURE EVALUATION
"                                                                                                             03/05/2020  Elena Frances is a 80 y.o., female.  Patient Active Problem List   Diagnosis    Hyperlipidemia    HTN (hypertension), benign    Depression    Osteopenia    Nipple discharge in female    Duct ectasia of breast    Vitamin D deficiency disease    Diverticulitis    Multinodular goiter    Nuclear sclerosis - Both Eyes    Cervical syndrome    Generalized headaches    DDD (degenerative disc disease)    Hip arthritis    Cervical disc disease    Radiculopathy of thoracolumbar region    Facet arthropathy, cervical    Unspecified arthropathy, pelvic region and thigh    Degeneration of intervertebral disc, site unspecified    Prediabetes    Iron deficiency anemia    Nuclear sclerotic cataract of left eye    Post-operative state    Right leg weakness    Stiffness of right knee    Primary osteoarthritis of right knee    Asthma    Chronic constipation    Renal impairment    S/P TKR (total knee replacement) using cement, left    DDD (degenerative disc disease), lumbar    Lumbar radiculopathy    Colon polyps    Lesion of vertebra       Anesthesia Evaluation      I have reviewed the Medications.   Steroids Taken In Past Year:     Review of Systems  Anesthesia Hx:  History of prior surgery of interest to airway management or planning: Previous anesthesia: Spinal 2/7/17: right knee replacement with spinal.  Procedure performed at an [facility] Facility. Denies Family Hx of Anesthesia complications.   Denies Personal Hx of Anesthesia complications.   Social:  Denies Tobacco Use. Denies Alcohol Use.   Hematology/Oncology:         -- Anemia: Iron Deficiency Anemia   EENT/Dental:   Throat Symptoms (occasional- " goes down wrong pipe" with liquids) include Swallowing difficulty or pain  Denies Jaw Problems   Cardiovascular:   Hypertension  Functional Capacity low / < 4 METS, " Babysits; was able to walk from garage to POC: denies CP/SOB  Denies Coronary Artery Disease.  Denies Deep Venous Thrombosis (DVT)  Hypertension , Recent typical clinic B/P of 181/63 @ POC visit    Pulmonary:   Asthma  Denies Asthma.  Possible Obstructive Sleep Apnea , (STOP/BANG) Symptoms P - Pressure being treated for high BP and A - Age > 50        Renal/:   Chronic Renal Disease  Denies Kidney Function/Disease    Hepatic/GI:  Hepatic/GI Symptoms: constipation.  Esophageal / Stomach Disorders Controlled by chronic antireflux medication.  Bowel Conditions:  Diverticulitis  Denies Liver Disease    Musculoskeletal:   Arthritis   Joint Disease:  Arthritis, Osteoarthritis  Bone Disorders: Osteoporosis  Spine Disorders: lumbar Disc disease and Degenerative disease  Cervical Spine Disorder, Cervical Disc Disease    Neurological:   Neuromuscular Disease, Headaches  Pain , location of neck pain; right knee , severity is a 6  Osteoarthritis  Denies Seizure Disorder  Denies CVA - Cerebrovasular Accident  Denies TIA - Transient Ischemic Attack    Endocrine:   Prediabetic Denies Diabetes  Thyroid Disease, Goiter, Multinodular Goiter  Parathyroid Disease, Hyperparathyroidism (2000), s/p parathyroidectomy    Psych:   Psychiatric History  Depression.          Physical Exam  General:  Well nourished    Airway/Jaw/Neck:  Airway Findings: Mouth Opening: Normal Jaw/Neck Findings:  Neck ROM: Normal Extension, Painful, Decreased Lateral Motion, to the right, to the left      Dental:  Dental Findings: In tact        Mental Status:  Mental Status Findings:  Cooperative, Alert and Oriented         Anesthesia Plan  Type of Anesthesia, risks & benefits discussed:  Anesthesia Type:  general  Patient's Preference:   Intra-op Monitoring Plan: standard ASA monitors  Intra-op Monitoring Plan Comments:   Post Op Pain Control Plan: IV/PO Opioids PRN  Post Op Pain Control Plan Comments:   Induction:   IV  Beta Blocker:  Patient is not currently  on a Beta-Blocker (No further documentation required).       Informed Consent: Patient understands risks and agrees with Anesthesia plan.  Questions answered. Anesthesia consent signed with patient.  ASA Score: 3     Day of Surgery Review of History & Physical: I have interviewed and examined the patient. I have reviewed the patient's H&P dated:  There are no significant changes.          Ready For Surgery From Anesthesia Perspective.

## 2020-03-05 NOTE — BRIEF OP NOTE
PROCEDURE NOTE:  Date of Procedure: 03/05/2020  Bone Marrow Biopsy and Aspiration  Indication: DLBCL  Consent: Informed consent was obtained from patient.  Timeout: Done and documented.  Position: right lateral  Site: Left posterior illiac crest.  Prep: Betadine.  Needle used: 11 gauge Jamshidi needle.  Anesthetic: 1% lidocaine 5 cc.  Biopsy: The biopsy needle was introduced into the marrow cavity and an aspirate was obtained without complications and sent for flow cytometry and cytogenetics. Core biopsy obtained without difficulty and sent for routine histologic examination.  Complications: None.  Disposition: The patient was discharged home per anesthesia protocol.  Blood loss: Minimal.     Tiffany Humphrey NP  Hematology/Oncology/BMT

## 2020-03-09 LAB
BODY SITE - BONE MARROW: NORMAL
CLINICAL DIAGNOSIS - BONE MARROW: NORMAL
FLOW CYTOMETRY ANTIBODIES ANALYZED - BONE MARROW: NORMAL
FLOW CYTOMETRY COMMENT - BONE MARROW: NORMAL
FLOW CYTOMETRY INTERPRETATION - BONE MARROW: NORMAL
FUNGUS SPEC CULT: NORMAL
FUNGUS SPEC CULT: NORMAL

## 2020-03-10 ENCOUNTER — TELEPHONE (OUTPATIENT)
Dept: PRIMARY CARE CLINIC | Facility: CLINIC | Age: 80
End: 2020-03-10

## 2020-03-10 NOTE — TELEPHONE ENCOUNTER
----- Message from Elroy Bronson sent at 3/10/2020 11:45 AM CDT -----  Contact: Daughter Alexandra   Would like to get medical advice.  Symptoms (please be specific):  Coughing   How long has patient had these symptoms:  3 days   Pharmacy name and phone #:  [id number] - [place] - [address] [phone] (Phone)  [phone] (Fax)  Any drug allergies (copy from chart):      Would the patient rather a call back or a response via [facility]?:  Call back   Comments:

## 2020-03-10 NOTE — TELEPHONE ENCOUNTER
Patient is complaining of chest congestion and cold she has used her inhaler and still feels congested.

## 2020-03-12 LAB
CHROM BANDING METHOD: NORMAL
CHROMOSOME ANALYSIS BM ADDITIONAL INFORMATION: NORMAL
CHROMOSOME ANALYSIS BM RELEASED BY: NORMAL
CHROMOSOME ANALYSIS BM RESULT SUMMARY: NORMAL
CLINICAL CYTOGENETICIST REVIEW: NORMAL
KARYOTYP MAR: NORMAL
REASON FOR REFERRAL (NARRATIVE): NORMAL
REF LAB TEST METHOD: NORMAL
SPECIMEN SOURCE: NORMAL
SPECIMEN: NORMAL

## 2020-03-13 LAB
COMMENT: NORMAL
FINAL PATHOLOGIC DIAGNOSIS: NORMAL
GROSS: NORMAL
Lab: NORMAL
MICROSCOPIC EXAM: NORMAL
SUPPLEMENTAL DIAGNOSIS: NORMAL

## 2020-03-19 ENCOUNTER — OFFICE VISIT (OUTPATIENT)
Dept: HEMATOLOGY/ONCOLOGY | Facility: CLINIC | Age: 80
End: 2020-03-19
Payer: MEDICARE

## 2020-03-19 VITALS
HEART RATE: 66 BPM | WEIGHT: 160.94 LBS | DIASTOLIC BLOOD PRESSURE: 72 MMHG | SYSTOLIC BLOOD PRESSURE: 160 MMHG | BODY MASS INDEX: 28.52 KG/M2 | TEMPERATURE: 98 F | RESPIRATION RATE: 16 BRPM | HEIGHT: 63 IN | OXYGEN SATURATION: 99 %

## 2020-03-19 DIAGNOSIS — C83.39 DIFFUSE LARGE B-CELL LYMPHOMA, EXTRANODAL AND SOLID ORGAN SITES: ICD-10-CM

## 2020-03-19 DIAGNOSIS — C85.90 LYMPHOMA, UNSPECIFIED BODY REGION, UNSPECIFIED LYMPHOMA TYPE: Primary | ICD-10-CM

## 2020-03-19 PROCEDURE — 1159F MED LIST DOCD IN RCRD: CPT | Mod: S$GLB,,, | Performed by: INTERNAL MEDICINE

## 2020-03-19 PROCEDURE — 99215 PR OFFICE/OUTPT VISIT, EST, LEVL V, 40-54 MIN: ICD-10-PCS | Mod: S$GLB,,, | Performed by: INTERNAL MEDICINE

## 2020-03-19 PROCEDURE — 99215 OFFICE O/P EST HI 40 MIN: CPT | Mod: S$GLB,,, | Performed by: INTERNAL MEDICINE

## 2020-03-19 PROCEDURE — 3077F PR MOST RECENT SYSTOLIC BLOOD PRESSURE >= 140 MM HG: ICD-10-PCS | Mod: CPTII,S$GLB,, | Performed by: INTERNAL MEDICINE

## 2020-03-19 PROCEDURE — 3077F SYST BP >= 140 MM HG: CPT | Mod: CPTII,S$GLB,, | Performed by: INTERNAL MEDICINE

## 2020-03-19 PROCEDURE — 1159F PR MEDICATION LIST DOCUMENTED IN MEDICAL RECORD: ICD-10-PCS | Mod: S$GLB,,, | Performed by: INTERNAL MEDICINE

## 2020-03-19 PROCEDURE — 1126F AMNT PAIN NOTED NONE PRSNT: CPT | Mod: S$GLB,,, | Performed by: INTERNAL MEDICINE

## 2020-03-19 PROCEDURE — 3078F DIAST BP <80 MM HG: CPT | Mod: CPTII,S$GLB,, | Performed by: INTERNAL MEDICINE

## 2020-03-19 PROCEDURE — 1101F PR PT FALLS ASSESS DOC 0-1 FALLS W/OUT INJ PAST YR: ICD-10-PCS | Mod: CPTII,S$GLB,, | Performed by: INTERNAL MEDICINE

## 2020-03-19 PROCEDURE — 99999 PR PBB SHADOW E&M-EST. PATIENT-LVL V: ICD-10-PCS | Mod: PBBFAC,,, | Performed by: INTERNAL MEDICINE

## 2020-03-19 PROCEDURE — 3078F PR MOST RECENT DIASTOLIC BLOOD PRESSURE < 80 MM HG: ICD-10-PCS | Mod: CPTII,S$GLB,, | Performed by: INTERNAL MEDICINE

## 2020-03-19 PROCEDURE — 99999 PR PBB SHADOW E&M-EST. PATIENT-LVL V: CPT | Mod: PBBFAC,,, | Performed by: INTERNAL MEDICINE

## 2020-03-19 PROCEDURE — 1126F PR PAIN SEVERITY QUANTIFIED, NO PAIN PRESENT: ICD-10-PCS | Mod: S$GLB,,, | Performed by: INTERNAL MEDICINE

## 2020-03-19 PROCEDURE — 1101F PT FALLS ASSESS-DOCD LE1/YR: CPT | Mod: CPTII,S$GLB,, | Performed by: INTERNAL MEDICINE

## 2020-03-19 PROCEDURE — 99499 RISK ADDL DX/OHS AUDIT: ICD-10-PCS | Mod: S$GLB,,, | Performed by: INTERNAL MEDICINE

## 2020-03-19 PROCEDURE — 99499 UNLISTED E&M SERVICE: CPT | Mod: S$GLB,,, | Performed by: INTERNAL MEDICINE

## 2020-03-19 NOTE — PROGRESS NOTES
Subjective:       Patient ID: Elena Frances is a 80 y.o. female.    Chief Complaint: No chief complaint on file.    Elena presents with 2 sons (CRNA and Radiologist at [facility]) for evaluation of a new diagnosis of diffuse large b cell lymphoma. She was under evaluation for a herniated disc - otherwise feeling fine- when MRI imaging reported metastatic disease to the spine suspicious for neoplasm. IR guided biopsy of the spine ended up being a bone marrow biopsy with diffuse large b cell lymphoma. Due to decalcificate the sample was not appropriate for usual diagnostic testing for classification of lymphoma. CT of chest, abdomen, pelvis has no evidence of lymphadenopathy. Elena does not have any constitutional B symptoms.     3/19/2020  Return visit for results of PET and bone marrow biopsy  PET has no evidence of lymphoma or other malignancy  Bone marrow biopsy only has reactive lymphoid aggregates, no evidence of lymphoma    HPI  Review of Systems   Constitutional: Negative.    HENT: Negative.    Eyes: Negative.    Respiratory: Negative.    Cardiovascular: Negative.    Gastrointestinal: Negative.    Endocrine: Negative.    Genitourinary: Negative.    Musculoskeletal: Negative.    Skin: Negative.    Allergic/Immunologic: Negative for environmental allergies, food allergies and immunocompromised state.   Neurological: Negative.    Hematological: Negative for adenopathy. Does not bruise/bleed easily.   Psychiatric/Behavioral: Negative.        Objective:      Physical Exam   Constitutional: She is oriented to person, place, and time. She appears well-developed and well-nourished.   HENT:   Head: Normocephalic and atraumatic.   Eyes: Conjunctivae are normal. No scleral icterus.   Neck: Normal range of motion. Neck supple.   Cardiovascular: Normal rate and intact distal pulses.   Pulmonary/Chest: Effort normal. No respiratory distress.   Abdominal: Soft. She exhibits no distension. There is no tenderness.    Musculoskeletal: Normal range of motion. She exhibits no edema.   Neurological: She is alert and oriented to person, place, and time. No cranial nerve deficit.   Skin: Skin is warm and dry.   Psychiatric: She has a normal mood and affect. Her behavior is normal.   Nursing note and vitals reviewed.      Assessment:       1. Lymphoma, unspecified body region, unspecified lymphoma type        Plan:       Patient evaluation does not reveal any evidence of lymphoma  Only evidence is L5 biopsy and sample is incomplete due to type of biopsy  Either very early large cell lymphoma or this is a low grade lymphoma    No indication to treat at this time but need to monitor very closely    Plan to repeat PET, CBC, CMP, and LDH in 6 weeks    Advance Care Planning   Elena has had a bad prior experience when her  went through chemotherapy for lung cancer. She does not want to suffer. She is very hesitant to have chemotherapy. We discussed a 6 month prognosis if she does no cancer directed therapy. A bit longer if she considers immune therapy alone.   She and her family agree to complete staging and diagnostic testing. The decision for chemotherapy is ultimately hers. She is an excellent candidate for concordant palliative care.       Visit 1 hour; >50% counseling

## 2020-03-24 RX ORDER — NABUMETONE 750 MG/1
TABLET, FILM COATED ORAL
Qty: 180 TABLET | Refills: 1 | Status: SHIPPED | OUTPATIENT
Start: 2020-03-24 | End: 2021-05-14

## 2020-03-31 DIAGNOSIS — M47.819 SPONDYLOSIS WITHOUT MYELOPATHY: ICD-10-CM

## 2020-03-31 DIAGNOSIS — M54.15 RADICULOPATHY OF THORACOLUMBAR REGION: ICD-10-CM

## 2020-03-31 DIAGNOSIS — M51.36 DDD (DEGENERATIVE DISC DISEASE), LUMBAR: ICD-10-CM

## 2020-03-31 DIAGNOSIS — M47.9 OSTEOARTHRITIS OF SPINE, UNSPECIFIED SPINAL OSTEOARTHRITIS COMPLICATION STATUS, UNSPECIFIED SPINAL REGION: ICD-10-CM

## 2020-03-31 RX ORDER — GABAPENTIN 100 MG/1
CAPSULE ORAL
Qty: 90 CAPSULE | Refills: 0 | Status: SHIPPED | OUTPATIENT
Start: 2020-03-31 | End: 2020-04-24

## 2020-03-31 NOTE — TELEPHONE ENCOUNTER
----- Message from Angela Donaldson sent at 3/31/2020 12:17 PM CDT -----  Contact: [phone] alistair coyne  Type:RX Refill Request  Who Called: [phone] coyne  Best Call Back Number:    Preferred Pharmacy:[facility]/PHARMACY #[id number] - [place] - [address]  Ordering Provider:  RX Name and Strength:gabapentin (NEURONTIN) 100 MG capsule  How is the patient currently taking it? (ex. 1XDay)  30dayRX  Local or Mail Order:na  Would the patient rather a call back or a response via [facility]?    Additional Information:

## 2020-04-13 LAB
ACID FAST MOD KINY STN SPEC: NORMAL
ACID FAST MOD KINY STN SPEC: NORMAL
MYCOBACTERIUM SPEC QL CULT: NORMAL
MYCOBACTERIUM SPEC QL CULT: NORMAL

## 2020-04-24 DIAGNOSIS — M47.9 OSTEOARTHRITIS OF SPINE, UNSPECIFIED SPINAL OSTEOARTHRITIS COMPLICATION STATUS, UNSPECIFIED SPINAL REGION: ICD-10-CM

## 2020-04-24 DIAGNOSIS — M47.819 SPONDYLOSIS WITHOUT MYELOPATHY: ICD-10-CM

## 2020-04-24 DIAGNOSIS — M54.15 RADICULOPATHY OF THORACOLUMBAR REGION: ICD-10-CM

## 2020-04-24 DIAGNOSIS — M51.36 DDD (DEGENERATIVE DISC DISEASE), LUMBAR: ICD-10-CM

## 2020-04-24 RX ORDER — GABAPENTIN 100 MG/1
CAPSULE ORAL
Qty: 90 CAPSULE | Refills: 0 | Status: SHIPPED | OUTPATIENT
Start: 2020-04-24 | End: 2020-05-19

## 2020-05-01 ENCOUNTER — HOSPITAL ENCOUNTER (OUTPATIENT)
Dept: RADIOLOGY | Facility: HOSPITAL | Age: 80
Discharge: HOME | End: 2020-05-01
Attending: INTERNAL MEDICINE
Payer: MEDICARE

## 2020-05-01 DIAGNOSIS — C83.39 DIFFUSE LARGE B-CELL LYMPHOMA, EXTRANODAL AND SOLID ORGAN SITES: ICD-10-CM

## 2020-05-01 DIAGNOSIS — C85.90 LYMPHOMA, UNSPECIFIED BODY REGION, UNSPECIFIED LYMPHOMA TYPE: ICD-10-CM

## 2020-05-01 LAB — POCT GLUCOSE: 101 MG/DL (ref 70–110)

## 2020-05-01 PROCEDURE — 78815 PET IMAGE W/CT SKULL-THIGH: CPT | Mod: TC,PS

## 2020-05-01 PROCEDURE — A9552 F18 FDG: HCPCS

## 2020-05-01 PROCEDURE — 78815 PET IMAGE W/CT SKULL-THIGH: CPT | Mod: 26,PS,, | Performed by: RADIOLOGY

## 2020-05-01 PROCEDURE — 78815 NM PET CT ROUTINE: ICD-10-PCS | Mod: 26,PS,, | Performed by: RADIOLOGY

## 2020-05-15 ENCOUNTER — TELEPHONE (OUTPATIENT)
Dept: PRIMARY CARE CLINIC | Facility: CLINIC | Age: 80
End: 2020-05-15

## 2020-05-15 NOTE — TELEPHONE ENCOUNTER
----- Message from Yahaira Mccray sent at 5/15/2020 10:13 AM CDT -----  Contact: Alexandra(daughter)[phone]  Would like to get medical advice.  Symptoms (please be specific):  Blood in right ear, headache and slight lightheadedness  How long has patient had these symptoms:  This morning for blood in ear and 3 days for the headache  Pharmacy name and phone #:  [facility]/pharmacy #[id number] - [place] - [address] [phone] (Phone)  [phone] (Fax)  Any drug allergies (copy from chart):   See chart   Would the patient rather a call back or a response via [facility]?:  Call back  Comments:

## 2020-05-18 NOTE — TELEPHONE ENCOUNTER
Dr. Tinoco wanted patient to come in today for a visit called patient and she states she is using drops in her ear and she feels better and does not need to see the doctor at this time.

## 2020-05-19 DIAGNOSIS — M51.36 DDD (DEGENERATIVE DISC DISEASE), LUMBAR: ICD-10-CM

## 2020-05-19 DIAGNOSIS — M47.9 OSTEOARTHRITIS OF SPINE, UNSPECIFIED SPINAL OSTEOARTHRITIS COMPLICATION STATUS, UNSPECIFIED SPINAL REGION: ICD-10-CM

## 2020-05-19 DIAGNOSIS — M54.15 RADICULOPATHY OF THORACOLUMBAR REGION: ICD-10-CM

## 2020-05-19 DIAGNOSIS — M47.819 SPONDYLOSIS WITHOUT MYELOPATHY: ICD-10-CM

## 2020-05-19 RX ORDER — GABAPENTIN 100 MG/1
CAPSULE ORAL
Qty: 90 CAPSULE | Refills: 0 | Status: SHIPPED | OUTPATIENT
Start: 2020-05-19 | End: 2020-06-12 | Stop reason: SDUPTHER

## 2020-06-30 NOTE — PROGRESS NOTES
Two patient identifiers verified.  Allergies reviewed.  Patient tolerated injection well; no redness, bleeding, or bruising noted to injection site.  Patient instructed to remain in clinic setting for 15 minutes.  Verbalizes understanding.  Flu consent signed by patient. Flu vaccine administered IM left deltoid   Implemented All Fall with Harm Risk Interventions:  [place] to call system. Call bell, personal items and telephone within reach. Instruct patient to call for assistance. Room bathroom lighting operational. Non-slip footwear when patient is off stretcher. Physically safe environment: no spills, clutter or unnecessary equipment. Stretcher in lowest position, wheels locked, appropriate side rails in place. Provide visual cue, wrist band, yellow gown, etc. Monitor gait and stability. Monitor for mental status changes and reorient to person, place, and time. Review medications for side effects contributing to fall risk. Reinforce activity limits and safety measures with patient and family. Provide visual clues: red socks.

## 2020-07-06 RX ORDER — SERTRALINE HYDROCHLORIDE 50 MG/1
TABLET, FILM COATED ORAL
Qty: 90 TABLET | Refills: 3 | Status: SHIPPED | OUTPATIENT
Start: 2020-07-06 | End: 2020-07-07 | Stop reason: SDUPTHER

## 2020-07-07 RX ORDER — METOPROLOL SUCCINATE 25 MG/1
25 TABLET, EXTENDED RELEASE ORAL DAILY
Qty: 90 TABLET | Refills: 3 | Status: SHIPPED | OUTPATIENT
Start: 2020-07-07 | End: 2021-06-21

## 2020-07-07 RX ORDER — SERTRALINE HYDROCHLORIDE 50 MG/1
50 TABLET, FILM COATED ORAL DAILY
Qty: 90 TABLET | Refills: 3 | Status: SHIPPED | OUTPATIENT
Start: 2020-07-07 | End: 2020-11-23

## 2020-09-08 ENCOUNTER — OFFICE VISIT (OUTPATIENT)
Dept: PRIMARY CARE CLINIC | Facility: CLINIC | Age: 80
End: 2020-09-08
Payer: MEDICARE

## 2020-09-08 ENCOUNTER — IMMUNIZATION (OUTPATIENT)
Dept: PHARMACY | Facility: CLINIC | Age: 80
End: 2020-09-08
Payer: MEDICARE

## 2020-09-08 VITALS
WEIGHT: 164.69 LBS | HEIGHT: 63 IN | TEMPERATURE: 97 F | SYSTOLIC BLOOD PRESSURE: 136 MMHG | DIASTOLIC BLOOD PRESSURE: 70 MMHG | HEART RATE: 68 BPM | BODY MASS INDEX: 29.18 KG/M2

## 2020-09-08 DIAGNOSIS — C83.00 LYMPHOMA, LYMPHOCYTIC: Primary | ICD-10-CM

## 2020-09-08 PROCEDURE — 3075F SYST BP GE 130 - 139MM HG: CPT | Mod: CPTII,S$GLB,, | Performed by: FAMILY MEDICINE

## 2020-09-08 PROCEDURE — 1125F PR PAIN SEVERITY QUANTIFIED, PAIN PRESENT: ICD-10-PCS | Mod: S$GLB,,, | Performed by: FAMILY MEDICINE

## 2020-09-08 PROCEDURE — 3078F PR MOST RECENT DIASTOLIC BLOOD PRESSURE < 80 MM HG: ICD-10-PCS | Mod: CPTII,S$GLB,, | Performed by: FAMILY MEDICINE

## 2020-09-08 PROCEDURE — 1125F AMNT PAIN NOTED PAIN PRSNT: CPT | Mod: S$GLB,,, | Performed by: FAMILY MEDICINE

## 2020-09-08 PROCEDURE — 99214 OFFICE O/P EST MOD 30 MIN: CPT | Mod: S$GLB,,, | Performed by: FAMILY MEDICINE

## 2020-09-08 PROCEDURE — 1159F MED LIST DOCD IN RCRD: CPT | Mod: S$GLB,,, | Performed by: FAMILY MEDICINE

## 2020-09-08 PROCEDURE — 99214 PR OFFICE/OUTPT VISIT, EST, LEVL IV, 30-39 MIN: ICD-10-PCS | Mod: S$GLB,,, | Performed by: FAMILY MEDICINE

## 2020-09-08 PROCEDURE — 99999 PR PBB SHADOW E&M-EST. PATIENT-LVL V: ICD-10-PCS | Mod: PBBFAC,,, | Performed by: FAMILY MEDICINE

## 2020-09-08 PROCEDURE — 3075F PR MOST RECENT SYSTOLIC BLOOD PRESS GE 130-139MM HG: ICD-10-PCS | Mod: CPTII,S$GLB,, | Performed by: FAMILY MEDICINE

## 2020-09-08 PROCEDURE — 1100F PR PT FALLS ASSESS DOC 2+ FALLS/FALL W/INJURY/YR: ICD-10-PCS | Mod: CPTII,S$GLB,, | Performed by: FAMILY MEDICINE

## 2020-09-08 PROCEDURE — 3288F FALL RISK ASSESSMENT DOCD: CPT | Mod: CPTII,S$GLB,, | Performed by: FAMILY MEDICINE

## 2020-09-08 PROCEDURE — 99999 PR PBB SHADOW E&M-EST. PATIENT-LVL V: CPT | Mod: PBBFAC,,, | Performed by: FAMILY MEDICINE

## 2020-09-08 PROCEDURE — 3078F DIAST BP <80 MM HG: CPT | Mod: CPTII,S$GLB,, | Performed by: FAMILY MEDICINE

## 2020-09-08 PROCEDURE — 3288F PR FALLS RISK ASSESSMENT DOCUMENTED: ICD-10-PCS | Mod: CPTII,S$GLB,, | Performed by: FAMILY MEDICINE

## 2020-09-08 PROCEDURE — 1159F PR MEDICATION LIST DOCUMENTED IN MEDICAL RECORD: ICD-10-PCS | Mod: S$GLB,,, | Performed by: FAMILY MEDICINE

## 2020-09-08 PROCEDURE — 1100F PTFALLS ASSESS-DOCD GE2>/YR: CPT | Mod: CPTII,S$GLB,, | Performed by: FAMILY MEDICINE

## 2020-09-08 RX ORDER — CYCLOBENZAPRINE HCL 10 MG
10 TABLET ORAL 3 TIMES DAILY PRN
Qty: 30 TABLET | Refills: 3 | Status: SHIPPED | OUTPATIENT
Start: 2020-09-08 | End: 2020-09-18

## 2020-09-08 RX ORDER — TIZANIDINE 4 MG/1
TABLET ORAL
Qty: 135 TABLET | Refills: 6 | Status: SHIPPED | OUTPATIENT
Start: 2020-09-08 | End: 2020-09-08

## 2020-09-08 NOTE — PROGRESS NOTES
Subjective:       Patient ID: Elena Frances is a 80 y.o. female.    Chief Complaint: Back Pain (center region) and Headache    79 yo presents today for recurrent back pain and follow up of prior consult with Oncology.  She has continued to have lower thoracic pain, about 4/10, usually managed by taking Relafen bid.  Pt has prior history of ruptured lumbar disc, with MRI suggestive of metastatic disease on 12/19.  Biopsy done at the time suggestive of Lymphoma and pt was seen in consult by Oncology.  She is unsure whether she does or does not have a diagnosis of lymphoma, and would like follow up appointment to further discuss diagnosis    Review of Systems   Musculoskeletal: Positive for back pain. Negative for gait problem and myalgias.   Neurological: Negative for weakness and numbness.   Psychiatric/Behavioral: Positive for sleep disturbance.        Decreased sleep recently, would like refill of muscle relaxant to take at night         Objective:      Physical Exam  Constitutional:       General: She is not in acute distress.     Appearance: Normal appearance.   Musculoskeletal:      Comments: Back exam shows tenderness to palpation/percussion at around T7-8  No tenderness over lumbar spine  Negative SLR  DTRs 2+ over knees, ankles   Neurological:      Mental Status: She is alert.         Assessment:       1.  Back pain  2.  Abnormal MRI, biopsy suggestive of lymphoma  Plan:       1.  Continue Nabumetone  2.  Flexeril 10mg qhs  3.  Consult follow up with Oncology service

## 2020-09-09 ENCOUNTER — TELEPHONE (OUTPATIENT)
Dept: HEMATOLOGY/ONCOLOGY | Facility: CLINIC | Age: 80
End: 2020-09-09

## 2020-09-09 DIAGNOSIS — C85.90 LYMPHOMA, UNSPECIFIED BODY REGION, UNSPECIFIED LYMPHOMA TYPE: Primary | ICD-10-CM

## 2020-09-09 NOTE — TELEPHONE ENCOUNTER
----- Message from Dom Ellis sent at 9/9/2020  1:04 PM CDT -----  Contact: pt (son) lois  Calling to speak with dr or nurse regarding pt    Call back: [phone]

## 2020-09-09 NOTE — TELEPHONE ENCOUNTER
Spoke to patients son to inform that after review with dr lal, no indication of lymphoma seen. Informed son that pt is due for regular follow up with dr lal. Son requests that we call pt to discuss

## 2020-09-09 NOTE — TELEPHONE ENCOUNTER
----- Message from Dom Ellis sent at 9/9/2020 11:48 AM CDT -----  Contact: pt  Calling to speak with Dr Alvarez or nurse    Call back: [phone]

## 2020-09-09 NOTE — TELEPHONE ENCOUNTER
"----- Message from Marisa Donaldson sent at 9/9/2020 12:03 PM CDT -----  Rerouting to correct sherrill box.  ----- Message -----  From: Baldev Lovell  Sent: 9/9/2020  11:55 AM CDT  To: [facility]  Pool    Scheduling Request    Patient Status: Establish  Scheduling Appt : Saray   Time/Date Preference: Morning   MyChart Active User?: No   Relationship to Patient?: Self   Contact Preference?: [phone]  Treating Provider:  Antonio  Do you feel you need to be seen today?    Additional Notes:  "Thank you for all that you do for our patients'"             "

## 2020-09-09 NOTE — TELEPHONE ENCOUNTER
"----- Message from Marisa Donaldson sent at 9/9/2020 12:03 PM CDT -----  Rerouting to correct sherrill box.  ----- Message -----  From: Baldev Lovell  Sent: 9/9/2020  11:55 AM CDT  To: [facility]  Pool    Scheduling Request    Patient Status: Establish  Scheduling Appt : Saray   Time/Date Preference: Morning   MyChart Active User?: No   Relationship to Patient?: Self   Contact Preference?: [phone]  Treating Provider:  Antonio  Do you feel you need to be seen today?    Additional Notes:  "Thank you for all that you do for our patients'"             "

## 2020-09-10 DIAGNOSIS — C85.90 INDOLENT LYMPHOMA: ICD-10-CM

## 2020-09-10 DIAGNOSIS — R59.1 LYMPHADENOPATHY: Primary | ICD-10-CM

## 2020-09-10 DIAGNOSIS — Z85.72 HISTORY OF LYMPHOMA: ICD-10-CM

## 2020-09-10 DIAGNOSIS — C83.39 DIFFUSE LARGE B-CELL LYMPHOMA, EXTRANODAL AND SOLID ORGAN SITES: ICD-10-CM

## 2020-09-10 DIAGNOSIS — C85.90 LYMPHOMA, UNSPECIFIED BODY REGION, UNSPECIFIED LYMPHOMA TYPE: Primary | ICD-10-CM

## 2020-09-14 ENCOUNTER — OFFICE VISIT (OUTPATIENT)
Dept: HEMATOLOGY/ONCOLOGY | Facility: CLINIC | Age: 80
End: 2020-09-14
Payer: MEDICARE

## 2020-09-14 ENCOUNTER — LAB VISIT (OUTPATIENT)
Dept: LAB | Facility: HOSPITAL | Age: 80
End: 2020-09-14
Payer: MEDICARE

## 2020-09-14 VITALS
SYSTOLIC BLOOD PRESSURE: 164 MMHG | DIASTOLIC BLOOD PRESSURE: 72 MMHG | WEIGHT: 164.38 LBS | BODY MASS INDEX: 29.12 KG/M2 | RESPIRATION RATE: 16 BRPM | OXYGEN SATURATION: 100 % | HEART RATE: 68 BPM | TEMPERATURE: 98 F | HEIGHT: 63 IN

## 2020-09-14 DIAGNOSIS — C85.80 LARGE CELL LYMPHOMA: Primary | ICD-10-CM

## 2020-09-14 DIAGNOSIS — C85.90 LYMPHOMA, UNSPECIFIED BODY REGION, UNSPECIFIED LYMPHOMA TYPE: ICD-10-CM

## 2020-09-14 DIAGNOSIS — C83.00 LYMPHOMA, LYMPHOCYTIC: ICD-10-CM

## 2020-09-14 DIAGNOSIS — C83.38 DIFFUSE LARGE B-CELL LYMPHOMA, LYMPH NODES OF MULTIPLE SITES: ICD-10-CM

## 2020-09-14 LAB
ALBUMIN SERPL BCP-MCNC: 3.7 G/DL (ref 3.5–5.2)
ALP SERPL-CCNC: 143 U/L (ref 55–135)
ALT SERPL W/O P-5'-P-CCNC: 13 U/L (ref 10–44)
ANION GAP SERPL CALC-SCNC: 8 MMOL/L (ref 8–16)
AST SERPL-CCNC: 16 U/L (ref 10–40)
BASOPHILS # BLD AUTO: 0.04 K/UL (ref 0–0.2)
BASOPHILS NFR BLD: 0.5 % (ref 0–1.9)
BILIRUB SERPL-MCNC: 0.2 MG/DL (ref 0.1–1)
BUN SERPL-MCNC: 20 MG/DL (ref 8–23)
CALCIUM SERPL-MCNC: 9.9 MG/DL (ref 8.7–10.5)
CHLORIDE SERPL-SCNC: 101 MMOL/L (ref 95–110)
CO2 SERPL-SCNC: 28 MMOL/L (ref 23–29)
CREAT SERPL-MCNC: 0.9 MG/DL (ref 0.5–1.4)
DIFFERENTIAL METHOD: ABNORMAL
EOSINOPHIL # BLD AUTO: 0.2 K/UL (ref 0–0.5)
EOSINOPHIL NFR BLD: 2.3 % (ref 0–8)
ERYTHROCYTE [DISTWIDTH] IN BLOOD BY AUTOMATED COUNT: 17.2 % (ref 11.5–14.5)
EST. GFR  (AFRICAN AMERICAN): >60 ML/MIN/1.73 M^2
EST. GFR  (NON AFRICAN AMERICAN): >60 ML/MIN/1.73 M^2
GLUCOSE SERPL-MCNC: 93 MG/DL (ref 70–110)
HCT VFR BLD AUTO: 36.9 % (ref 37–48.5)
HGB BLD-MCNC: 11.5 G/DL (ref 12–16)
IMM GRANULOCYTES # BLD AUTO: 0.03 K/UL (ref 0–0.04)
IMM GRANULOCYTES NFR BLD AUTO: 0.4 % (ref 0–0.5)
LDH SERPL L TO P-CCNC: 215 U/L (ref 110–260)
LYMPHOCYTES # BLD AUTO: 1.8 K/UL (ref 1–4.8)
LYMPHOCYTES NFR BLD: 22.4 % (ref 18–48)
MCH RBC QN AUTO: 27.3 PG (ref 27–31)
MCHC RBC AUTO-ENTMCNC: 31.2 G/DL (ref 32–36)
MCV RBC AUTO: 88 FL (ref 82–98)
MONOCYTES # BLD AUTO: 0.8 K/UL (ref 0.3–1)
MONOCYTES NFR BLD: 9.8 % (ref 4–15)
NEUTROPHILS # BLD AUTO: 5.3 K/UL (ref 1.8–7.7)
NEUTROPHILS NFR BLD: 64.6 % (ref 38–73)
NRBC BLD-RTO: 0 /100 WBC
PLATELET # BLD AUTO: 274 K/UL (ref 150–350)
PMV BLD AUTO: 10.2 FL (ref 9.2–12.9)
POTASSIUM SERPL-SCNC: 4.8 MMOL/L (ref 3.5–5.1)
PROT SERPL-MCNC: 7.5 G/DL (ref 6–8.4)
RBC # BLD AUTO: 4.21 M/UL (ref 4–5.4)
SODIUM SERPL-SCNC: 137 MMOL/L (ref 136–145)
WBC # BLD AUTO: 8.13 K/UL (ref 3.9–12.7)

## 2020-09-14 PROCEDURE — 99499 RISK ADDL DX/OHS AUDIT: ICD-10-PCS | Mod: S$GLB,,, | Performed by: INTERNAL MEDICINE

## 2020-09-14 PROCEDURE — 3288F PR FALLS RISK ASSESSMENT DOCUMENTED: ICD-10-PCS | Mod: CPTII,S$GLB,, | Performed by: INTERNAL MEDICINE

## 2020-09-14 PROCEDURE — 1125F PR PAIN SEVERITY QUANTIFIED, PAIN PRESENT: ICD-10-PCS | Mod: S$GLB,,, | Performed by: INTERNAL MEDICINE

## 2020-09-14 PROCEDURE — 99214 PR OFFICE/OUTPT VISIT, EST, LEVL IV, 30-39 MIN: ICD-10-PCS | Mod: S$GLB,,, | Performed by: INTERNAL MEDICINE

## 2020-09-14 PROCEDURE — 99999 PR PBB SHADOW E&M-EST. PATIENT-LVL IV: ICD-10-PCS | Mod: PBBFAC,,, | Performed by: INTERNAL MEDICINE

## 2020-09-14 PROCEDURE — 1159F MED LIST DOCD IN RCRD: CPT | Mod: S$GLB,,, | Performed by: INTERNAL MEDICINE

## 2020-09-14 PROCEDURE — 36415 COLL VENOUS BLD VENIPUNCTURE: CPT

## 2020-09-14 PROCEDURE — 3078F DIAST BP <80 MM HG: CPT | Mod: CPTII,S$GLB,, | Performed by: INTERNAL MEDICINE

## 2020-09-14 PROCEDURE — 1125F AMNT PAIN NOTED PAIN PRSNT: CPT | Mod: S$GLB,,, | Performed by: INTERNAL MEDICINE

## 2020-09-14 PROCEDURE — 99499 UNLISTED E&M SERVICE: CPT | Mod: S$GLB,,, | Performed by: INTERNAL MEDICINE

## 2020-09-14 PROCEDURE — 83615 LACTATE (LD) (LDH) ENZYME: CPT

## 2020-09-14 PROCEDURE — 3078F PR MOST RECENT DIASTOLIC BLOOD PRESSURE < 80 MM HG: ICD-10-PCS | Mod: CPTII,S$GLB,, | Performed by: INTERNAL MEDICINE

## 2020-09-14 PROCEDURE — 1159F PR MEDICATION LIST DOCUMENTED IN MEDICAL RECORD: ICD-10-PCS | Mod: S$GLB,,, | Performed by: INTERNAL MEDICINE

## 2020-09-14 PROCEDURE — 3077F PR MOST RECENT SYSTOLIC BLOOD PRESSURE >= 140 MM HG: ICD-10-PCS | Mod: CPTII,S$GLB,, | Performed by: INTERNAL MEDICINE

## 2020-09-14 PROCEDURE — 80053 COMPREHEN METABOLIC PANEL: CPT

## 2020-09-14 PROCEDURE — 1100F PR PT FALLS ASSESS DOC 2+ FALLS/FALL W/INJURY/YR: ICD-10-PCS | Mod: CPTII,S$GLB,, | Performed by: INTERNAL MEDICINE

## 2020-09-14 PROCEDURE — 1100F PTFALLS ASSESS-DOCD GE2>/YR: CPT | Mod: CPTII,S$GLB,, | Performed by: INTERNAL MEDICINE

## 2020-09-14 PROCEDURE — 85025 COMPLETE CBC W/AUTO DIFF WBC: CPT

## 2020-09-14 PROCEDURE — 3288F FALL RISK ASSESSMENT DOCD: CPT | Mod: CPTII,S$GLB,, | Performed by: INTERNAL MEDICINE

## 2020-09-14 PROCEDURE — 3077F SYST BP >= 140 MM HG: CPT | Mod: CPTII,S$GLB,, | Performed by: INTERNAL MEDICINE

## 2020-09-14 PROCEDURE — 99214 OFFICE O/P EST MOD 30 MIN: CPT | Mod: S$GLB,,, | Performed by: INTERNAL MEDICINE

## 2020-09-14 PROCEDURE — 99999 PR PBB SHADOW E&M-EST. PATIENT-LVL IV: CPT | Mod: PBBFAC,,, | Performed by: INTERNAL MEDICINE

## 2020-09-14 NOTE — PROGRESS NOTES
Subjective:       Patient ID: Elena Frances is a 80 y.o. female.    Chief Complaint: Lymphoma, unspecified body region, unspecified lymphoma type    Elena presents with 2 sons (CRNA and Radiologist at [facility]) for evaluation of a new diagnosis of diffuse large b cell lymphoma. She was under evaluation for a herniated disc - otherwise feeling fine- when MRI imaging reported metastatic disease to the spine suspicious for neoplasm. IR guided biopsy of the spine ended up being a bone marrow biopsy with diffuse large b cell lymphoma. Due to decalcificate the sample was not appropriate for usual diagnostic testing for classification of lymphoma. CT of chest, abdomen, pelvis has no evidence of lymphadenopathy. Elena does not have any constitutional B symptoms.     3/19/2020  Return visit for results of PET and bone marrow biopsy  PET has no evidence of lymphoma or other malignancy  Bone marrow biopsy only has reactive lymphoid aggregates, no evidence of lymphoma    9/14/2020  Return visit for follow-up for possible lymphoma  PET imaging from May stable  Prior bone biopsy reported as DLBCL then over-read as inconclusive.  Marrow was negative  Pain at hip is stable    HPI  Review of Systems   Constitutional: Negative.    HENT: Negative.    Eyes: Negative.    Respiratory: Negative.    Cardiovascular: Negative.    Gastrointestinal: Negative.    Endocrine: Negative.    Genitourinary: Negative.    Musculoskeletal: Negative.    Skin: Negative.    Allergic/Immunologic: Negative for environmental allergies, food allergies and immunocompromised state.   Neurological: Negative.    Hematological: Negative for adenopathy. Does not bruise/bleed easily.   Psychiatric/Behavioral: Negative.        Objective:      Physical Exam  Vitals signs and nursing note reviewed.   Constitutional:       Appearance: She is well-developed.   HENT:      Head: Normocephalic and atraumatic.   Eyes:      General: No scleral icterus.      Conjunctiva/sclera: Conjunctivae normal.   Neck:      Musculoskeletal: Normal range of motion and neck supple.   Cardiovascular:      Rate and Rhythm: Normal rate.   Pulmonary:      Effort: Pulmonary effort is normal. No respiratory distress.   Abdominal:      General: There is no distension.      Palpations: Abdomen is soft.      Tenderness: There is no abdominal tenderness.   Musculoskeletal: Normal range of motion.   Skin:     General: Skin is warm and dry.   Neurological:      Mental Status: She is alert and oriented to person, place, and time.      Cranial Nerves: No cranial nerve deficit.   Psychiatric:         Behavior: Behavior normal.         Assessment:       1. Large cell lymphoma    2. Lymphoma, lymphocytic    3. Diffuse large b-cell lymphoma, lymph nodes of multiple sites         Plan:       Repeat PET imaging this week    Depending on results consider biopsy vs radiation therapy vs observation    Advance Care Planning   Elena has had a bad prior experience when her  went through chemotherapy for lung cancer. She does not want to suffer. She is very hesitant to have chemotherapy. We discussed a 6 month prognosis if she does no cancer directed therapy. A bit longer if she considers immune therapy alone.   She and her family agree to complete staging and diagnostic testing. The decision for chemotherapy is ultimately hers. She is an excellent candidate for concordant palliative care.       Visit 1 hour; >50% counseling

## 2020-09-14 NOTE — LETTER
September 14, 2020      Michael Tinoco MD  [address]  [facility] [address]           [facility]  [address]  [facility] [address]  Phone: [phone]          Patient: Elena Frances   MR Number: [id number]   YOB: 1940   Date of Visit: 9/14/2020       Dear Dr. Michael Tinoco:    Thank you for referring Elena Frances to me for evaluation. Attached you will find relevant portions of my assessment and plan of care.    If you have questions, please do not hesitate to call me. I look forward to following Elena Frances along with you.    Sincerely,    Eve Alvarez MD    Enclosure  CC:  No Recipients    If you would like to receive this communication electronically, please contact [email or web address] or [phone] to request more information on [facility] [facility] access.    For providers and/or their staff who would like to refer a patient to [facility], please contact us through our one-stop-shop provider referral line, [facility], at [phone].    If you feel you have received this communication in error or would no longer like to receive these types of communications, please e-mail [email or web address]

## 2020-09-18 ENCOUNTER — HOSPITAL ENCOUNTER (OUTPATIENT)
Dept: RADIOLOGY | Facility: HOSPITAL | Age: 80
Discharge: HOME | End: 2020-09-18
Attending: INTERNAL MEDICINE
Payer: MEDICARE

## 2020-09-18 DIAGNOSIS — C85.80 LARGE CELL LYMPHOMA: ICD-10-CM

## 2020-09-18 DIAGNOSIS — C83.38 DIFFUSE LARGE B-CELL LYMPHOMA, LYMPH NODES OF MULTIPLE SITES: ICD-10-CM

## 2020-09-18 LAB — POCT GLUCOSE: 84 MG/DL (ref 70–110)

## 2020-09-18 PROCEDURE — A9552 F18 FDG: HCPCS

## 2020-09-18 PROCEDURE — 78815 PET IMAGE W/CT SKULL-THIGH: CPT | Mod: 26,PS,, | Performed by: RADIOLOGY

## 2020-09-18 PROCEDURE — 78815 NM PET CT ROUTINE: ICD-10-PCS | Mod: 26,PS,, | Performed by: RADIOLOGY

## 2020-09-18 PROCEDURE — 78815 PET IMAGE W/CT SKULL-THIGH: CPT | Mod: TC

## 2020-09-29 DIAGNOSIS — C85.80 LARGE CELL LYMPHOMA: Primary | ICD-10-CM

## 2020-10-01 ENCOUNTER — TELEPHONE (OUTPATIENT)
Dept: HEMATOLOGY/ONCOLOGY | Facility: CLINIC | Age: 80
End: 2020-10-01

## 2020-10-01 NOTE — TELEPHONE ENCOUNTER
----- Message from Mervat Lauren sent at 10/1/2020 12:18 PM CDT -----  Contact: Patient  Patient Advice/Staff Message     Caller name/title: Pt    Provider: Antonio    Reason for call: Pt wants to speak with the RN, requesting orders for a PET scan. Says she left a msg last week, and hasn't heard back from the office.    Do you feel you need to be seen today:: No        Communication Preference: [phone]    Additional Information:

## 2020-10-02 ENCOUNTER — TELEPHONE (OUTPATIENT)
Dept: HEMATOLOGY/ONCOLOGY | Facility: CLINIC | Age: 80
End: 2020-10-02

## 2020-10-02 NOTE — TELEPHONE ENCOUNTER
----- Message from Abbey Spangler sent at 10/2/2020  1:26 PM CDT -----  Regarding: Questions  Caller: Saray  Phone:  [phone]   Physician: Antonio Prado MD   Call reason:    - please call pt she's not stating the reason for calling in.   Thank you

## 2020-10-02 NOTE — TELEPHONE ENCOUNTER
----- Message from Mervat Lauren sent at 10/2/2020  2:56 PM CDT -----  Contact: Patient  Returning a call     Caller name:: Pt    Who Left Message for Patient:: Tamra      Communication Preference: [phone]    Additional Information:

## 2020-10-07 ENCOUNTER — OFFICE VISIT (OUTPATIENT)
Dept: HEMATOLOGY/ONCOLOGY | Facility: CLINIC | Age: 80
End: 2020-10-07
Payer: MEDICARE

## 2020-10-07 ENCOUNTER — OFFICE VISIT (OUTPATIENT)
Dept: INTERVENTIONAL RADIOLOGY/VASCULAR | Facility: CLINIC | Age: 80
End: 2020-10-07
Payer: MEDICARE

## 2020-10-07 VITALS
DIASTOLIC BLOOD PRESSURE: 68 MMHG | BODY MASS INDEX: 29.14 KG/M2 | HEART RATE: 80 BPM | SYSTOLIC BLOOD PRESSURE: 137 MMHG | HEIGHT: 63 IN | WEIGHT: 164.44 LBS

## 2020-10-07 VITALS
WEIGHT: 163 LBS | HEART RATE: 80 BPM | HEIGHT: 63 IN | RESPIRATION RATE: 16 BRPM | SYSTOLIC BLOOD PRESSURE: 150 MMHG | DIASTOLIC BLOOD PRESSURE: 66 MMHG | OXYGEN SATURATION: 96 % | BODY MASS INDEX: 28.88 KG/M2 | TEMPERATURE: 98 F

## 2020-10-07 DIAGNOSIS — C83.30 DIFFUSE LARGE B-CELL LYMPHOMA, UNSPECIFIED BODY REGION: Primary | ICD-10-CM

## 2020-10-07 DIAGNOSIS — C85.90 LYMPHOMA, UNSPECIFIED BODY REGION, UNSPECIFIED LYMPHOMA TYPE: Primary | ICD-10-CM

## 2020-10-07 DIAGNOSIS — M89.8X9 BONE MASS: ICD-10-CM

## 2020-10-07 PROCEDURE — 1100F PR PT FALLS ASSESS DOC 2+ FALLS/FALL W/INJURY/YR: ICD-10-PCS | Mod: CPTII,S$GLB,, | Performed by: INTERNAL MEDICINE

## 2020-10-07 PROCEDURE — 99215 PR OFFICE/OUTPT VISIT, EST, LEVL V, 40-54 MIN: ICD-10-PCS | Mod: S$GLB,,, | Performed by: INTERNAL MEDICINE

## 2020-10-07 PROCEDURE — 1159F MED LIST DOCD IN RCRD: CPT | Mod: S$GLB,,, | Performed by: PHYSICIAN ASSISTANT

## 2020-10-07 PROCEDURE — 1100F PTFALLS ASSESS-DOCD GE2>/YR: CPT | Mod: CPTII,S$GLB,, | Performed by: INTERNAL MEDICINE

## 2020-10-07 PROCEDURE — 3078F DIAST BP <80 MM HG: CPT | Mod: CPTII,S$GLB,, | Performed by: PHYSICIAN ASSISTANT

## 2020-10-07 PROCEDURE — 99213 PR OFFICE/OUTPT VISIT, EST, LEVL III, 20-29 MIN: ICD-10-PCS | Mod: S$GLB,,, | Performed by: PHYSICIAN ASSISTANT

## 2020-10-07 PROCEDURE — 3077F PR MOST RECENT SYSTOLIC BLOOD PRESSURE >= 140 MM HG: ICD-10-PCS | Mod: CPTII,S$GLB,, | Performed by: INTERNAL MEDICINE

## 2020-10-07 PROCEDURE — 1100F PR PT FALLS ASSESS DOC 2+ FALLS/FALL W/INJURY/YR: ICD-10-PCS | Mod: CPTII,S$GLB,, | Performed by: PHYSICIAN ASSISTANT

## 2020-10-07 PROCEDURE — 99999 PR PBB SHADOW E&M-EST. PATIENT-LVL III: CPT | Mod: PBBFAC,,, | Performed by: INTERNAL MEDICINE

## 2020-10-07 PROCEDURE — 3075F SYST BP GE 130 - 139MM HG: CPT | Mod: CPTII,S$GLB,, | Performed by: PHYSICIAN ASSISTANT

## 2020-10-07 PROCEDURE — 1159F MED LIST DOCD IN RCRD: CPT | Mod: S$GLB,,, | Performed by: INTERNAL MEDICINE

## 2020-10-07 PROCEDURE — 3075F PR MOST RECENT SYSTOLIC BLOOD PRESS GE 130-139MM HG: ICD-10-PCS | Mod: CPTII,S$GLB,, | Performed by: PHYSICIAN ASSISTANT

## 2020-10-07 PROCEDURE — 99213 OFFICE O/P EST LOW 20 MIN: CPT | Mod: S$GLB,,, | Performed by: PHYSICIAN ASSISTANT

## 2020-10-07 PROCEDURE — 99999 PR PBB SHADOW E&M-EST. PATIENT-LVL IV: ICD-10-PCS | Mod: PBBFAC,,, | Performed by: PHYSICIAN ASSISTANT

## 2020-10-07 PROCEDURE — 1125F AMNT PAIN NOTED PAIN PRSNT: CPT | Mod: S$GLB,,, | Performed by: INTERNAL MEDICINE

## 2020-10-07 PROCEDURE — 3078F DIAST BP <80 MM HG: CPT | Mod: CPTII,S$GLB,, | Performed by: INTERNAL MEDICINE

## 2020-10-07 PROCEDURE — 99999 PR PBB SHADOW E&M-EST. PATIENT-LVL III: ICD-10-PCS | Mod: PBBFAC,,, | Performed by: INTERNAL MEDICINE

## 2020-10-07 PROCEDURE — 99499 RISK ADDL DX/OHS AUDIT: ICD-10-PCS | Mod: S$GLB,,, | Performed by: PHYSICIAN ASSISTANT

## 2020-10-07 PROCEDURE — 3288F FALL RISK ASSESSMENT DOCD: CPT | Mod: CPTII,S$GLB,, | Performed by: INTERNAL MEDICINE

## 2020-10-07 PROCEDURE — 99999 PR PBB SHADOW E&M-EST. PATIENT-LVL IV: CPT | Mod: PBBFAC,,, | Performed by: PHYSICIAN ASSISTANT

## 2020-10-07 PROCEDURE — 99215 OFFICE O/P EST HI 40 MIN: CPT | Mod: S$GLB,,, | Performed by: INTERNAL MEDICINE

## 2020-10-07 PROCEDURE — 3078F PR MOST RECENT DIASTOLIC BLOOD PRESSURE < 80 MM HG: ICD-10-PCS | Mod: CPTII,S$GLB,, | Performed by: PHYSICIAN ASSISTANT

## 2020-10-07 PROCEDURE — 3288F FALL RISK ASSESSMENT DOCD: CPT | Mod: CPTII,S$GLB,, | Performed by: PHYSICIAN ASSISTANT

## 2020-10-07 PROCEDURE — 3077F SYST BP >= 140 MM HG: CPT | Mod: CPTII,S$GLB,, | Performed by: INTERNAL MEDICINE

## 2020-10-07 PROCEDURE — 1159F PR MEDICATION LIST DOCUMENTED IN MEDICAL RECORD: ICD-10-PCS | Mod: S$GLB,,, | Performed by: PHYSICIAN ASSISTANT

## 2020-10-07 PROCEDURE — 3288F PR FALLS RISK ASSESSMENT DOCUMENTED: ICD-10-PCS | Mod: CPTII,S$GLB,, | Performed by: PHYSICIAN ASSISTANT

## 2020-10-07 PROCEDURE — 1125F PR PAIN SEVERITY QUANTIFIED, PAIN PRESENT: ICD-10-PCS | Mod: S$GLB,,, | Performed by: INTERNAL MEDICINE

## 2020-10-07 PROCEDURE — 1100F PTFALLS ASSESS-DOCD GE2>/YR: CPT | Mod: CPTII,S$GLB,, | Performed by: PHYSICIAN ASSISTANT

## 2020-10-07 PROCEDURE — 3288F PR FALLS RISK ASSESSMENT DOCUMENTED: ICD-10-PCS | Mod: CPTII,S$GLB,, | Performed by: INTERNAL MEDICINE

## 2020-10-07 PROCEDURE — 3078F PR MOST RECENT DIASTOLIC BLOOD PRESSURE < 80 MM HG: ICD-10-PCS | Mod: CPTII,S$GLB,, | Performed by: INTERNAL MEDICINE

## 2020-10-07 PROCEDURE — 99499 UNLISTED E&M SERVICE: CPT | Mod: S$GLB,,, | Performed by: PHYSICIAN ASSISTANT

## 2020-10-07 PROCEDURE — 1159F PR MEDICATION LIST DOCUMENTED IN MEDICAL RECORD: ICD-10-PCS | Mod: S$GLB,,, | Performed by: INTERNAL MEDICINE

## 2020-10-07 NOTE — PROGRESS NOTES
Subjective:       Patient ID: Elena Frances is a 80 y.o. female.    Chief Complaint: Lesion and Cancer    Ms. Frances is a 81 y/o female seen at the request of Dr. Alvarez to discuss bone biopsy.      Oncologic history per Dr. Alvarez's notes:  She was under evaluation for a herniated disc - otherwise feeling fine- when MRI imaging reported metastatic disease to the spine suspicious for neoplasm. IR guided biopsy of the spine ended up being a bone marrow biopsy with diffuse large b cell lymphoma. Due to decalcificate the sample was not appropriate for usual diagnostic testing for classification of lymphoma.      3/19/2020: PET wo evidence of lymphoma or other malignancy. Bone marrow biopsy only has reactive lymphoid aggregates, no evidence of lymphoma     9/14/2020: PET imaging from May stable.  Prior bone biopsy reported as DLBCL then over-read as inconclusive.  Marrow was negative.      10/7/2020  Reviewed PET scan with patient and daughter and discussed potential significance of increased uptake at left scapula  Agreeable to biopsy since diagnosis of lymphoma remains in question    Ms. Frances reports constant pain in the left scapula and neck area.  Denies palliative measures - has tried different pillows, pain pills, pain creams.  Pain is worse with activity - moving her arms, looking up.  She associates the pain with a loss of strength in the hands.  Pain radiates to the left anterior chest/breast area intermittently.  Pain present for years - gradually worsening over time.  Denies unintentional weight loss.  Endorses fatigue, dizziness, change in vision, night sweats and chills.  Denies fevers.  Endorses abdominal pain and distention, constipation.  Denies difficulty with urination.      Review of Systems   Constitutional: Positive for activity change, chills and diaphoresis. Negative for appetite change, fever and unexpected weight change.   Respiratory: Negative for cough, shortness of breath and  wheezing.    Cardiovascular: Positive for chest pain and leg swelling. Negative for palpitations.   Gastrointestinal: Positive for abdominal distention, abdominal pain, constipation and nausea. Negative for diarrhea.   Genitourinary: Negative for bladder incontinence, difficulty urinating and urgency.   Musculoskeletal: Positive for arthralgias, back pain, neck pain and neck stiffness.   Neurological: Positive for dizziness, weakness and light-headedness.         Objective:      Physical Exam  Constitutional:       Appearance: Normal appearance. She is normal weight.   HENT:      Head: Normocephalic and atraumatic.   Eyes:      Extraocular Movements: Extraocular movements intact.   Pulmonary:      Effort: Pulmonary effort is normal.   Abdominal:      General: There is no distension.   Musculoskeletal: Normal range of motion.   Skin:     General: Skin is warm and dry.   Neurological:      Mental Status: She is alert and oriented to person, place, and time.   Psychiatric:         Mood and Affect: Mood normal.         Behavior: Behavior normal.         Thought Content: Thought content normal.         Judgment: Judgment normal.           IMAGING  NM PET CT ROUTINE 9/18/20     CLINICAL HISTORY:  lymphoma; Diffuse large b-cell lymphoma, lymph nodes of multiple sites     TECHNIQUE:  13.81 mCi of F18-FDG was administered intravenously in the right antecubital fossa.  After an approximately 60 min distribution time, PET/CT images were acquired from the skull base to mid thigh.  Transmission images were acquired to correct for attenuation using a whole body low-dose CT scan without contrast with the arms positioned above the head. Glycemia at the time of injection was 84 mg/dL.     COMPARISON:  FDG PET-CT 05/01/2020, 03/02/2020     FINDINGS:  Quality of the study: Adequate.     In the head and neck, there are no hypermetabolic lesions worrisome for malignancy. There are no hypermetabolic mucosal lesions, and there are no  pathologically enlarged or hypermetabolic lymph nodes.     In the chest, there are no hypermetabolic lesions worrisome for malignancy.  There are no concerning pulmonary nodules or masses, and there are no pathologically enlarged or hypermetabolic lymph nodes.     In the abdomen and pelvis, there is physiologic tracer distribution within the abdominal organs and excretion into the genitourinary system.     The spleen has normal uptake and is normal at 7.2 cm in craniocaudal dimension.     In the bones, multiple concerning osseous lesions are seen with index lesions as follows:     -right iliac bone: SUV max 3.6, previously 4.3     -L5 vertebral body: SUV max 5, previously 4.0     -left femoral diaphysis: SUV max 2.3, previously 2.0     -L3 spinous process: SUV max 2.4, previously 2.7     No significant residual hypermetabolic activity in the T5 vertebral body.  There is a borderline tracer avid focus in the left femoral head without CT correlate abnormality (axial fused series image 175).     Several new concerning hypermetabolic sclerotic osseous lesions are identified:     -Left scapula: SUV max 6.1 (series 3, image 32)     -posterior T4 vertebral body: SUV max 3.2 (series 3, image 38)     Incidental CT findings: Cholecystectomy and hysterectomy.  Mild sigmoid and left hemicolonic diverticulosis.     Impression:     Interval development of 2 additional hypermetabolic osseous lesions with relatively stable uptake in index lesions overall concerning for progression of disease.     No lymphadenopathy, splenomegaly, or hypermetabolic soft tissue lesions.     The Deauville Score is: 5     I, Giovani Noriega MD, attest that I reviewed and interpreted the images.     Electronically signed by resident: Bipin Brenner  Date:                                            09/18/2020  Time:                                           12:26     Electronically signed by: Giovani Noriega  Date:                                             09/18/2020  Time:                                           15:22      Assessment:       1. Diffuse large B-cell lymphoma, unspecified body region    2. Bone mass        Plan:       Case discussed with Yobani Ugalde MD.  Plan: biopsy of left scapular lymphoma in patient with h/o lymphoma.  Target marked on PET/CT.     Discussed how the procedure will be performed, risks (including, but not limited to, pain, bleeding, infection, damage to nearby structures, and the need for additional procedures), benefits, possible complications, pre-post procedure expectations, and alternatives. The patient voices understanding and all questions have been answered.  The patient agrees to proceed as planned. Patient scheduled for October 14. Pre-procedure handout with clinic phone number provided.  She will stop ASA and relafen 5 days prior to procedure. Labs ordered/collected today.      Thank you Dr. Alvarez for allowing [facility] to participate in this patient's care.     Juli Lester PA-C  Interventional Radiology  Clinic [phone]

## 2020-10-07 NOTE — PROGRESS NOTES
Subjective:       Patient ID: Elena Frances is a 80 y.o. female.    Chief Complaint: No chief complaint on file.    Elena presents with 2 sons (CRNA and Radiologist at [facility]) for evaluation of a new diagnosis of diffuse large b cell lymphoma. She was under evaluation for a herniated disc - otherwise feeling fine- when MRI imaging reported metastatic disease to the spine suspicious for neoplasm. IR guided biopsy of the spine ended up being a bone marrow biopsy with diffuse large b cell lymphoma. Due to decalcificate the sample was not appropriate for usual diagnostic testing for classification of lymphoma. CT of chest, abdomen, pelvis has no evidence of lymphadenopathy. Elena does not have any constitutional B symptoms.     3/19/2020  Return visit for results of PET and bone marrow biopsy  PET has no evidence of lymphoma or other malignancy  Bone marrow biopsy only has reactive lymphoid aggregates, no evidence of lymphoma    9/14/2020  Return visit for follow-up for possible lymphoma  PET imaging from May stable  Prior bone biopsy reported as DLBCL then over-read as inconclusive.  Marrow was negative  Pain at hip is stable    10/7/2020  Return visit for possible lymphoma; accompanied by daughter Alexandra  Pain is stable at lumbar spine. Pain is reproducible with palpation  Reviewed PET scan with patient and daughter and discussed potential significance of increased uptake at left scapula  Agreeable to biopsy since diagnosis of lymphoma remains in question  Other than lower back pain, ROS is negative      HPI  Review of Systems   Constitutional: Negative.    HENT: Negative.    Eyes: Negative.    Respiratory: Negative.    Cardiovascular: Negative.    Gastrointestinal: Negative.    Endocrine: Negative.    Genitourinary: Negative.    Musculoskeletal: Positive for back pain.   Skin: Negative.    Allergic/Immunologic: Negative for environmental allergies, food allergies and immunocompromised state.   Neurological:  Negative.    Hematological: Negative for adenopathy. Does not bruise/bleed easily.   Psychiatric/Behavioral: Negative.        Objective:      Physical Exam  Vitals signs and nursing note reviewed.   Constitutional:       Appearance: She is well-developed.   HENT:      Head: Normocephalic and atraumatic.   Eyes:      General: No scleral icterus.     Conjunctiva/sclera: Conjunctivae normal.   Neck:      Musculoskeletal: Normal range of motion and neck supple.   Cardiovascular:      Rate and Rhythm: Normal rate.   Pulmonary:      Effort: Pulmonary effort is normal. No respiratory distress.   Abdominal:      General: There is no distension.      Palpations: Abdomen is soft.      Tenderness: There is no abdominal tenderness.   Musculoskeletal: Normal range of motion.   Skin:     General: Skin is warm and dry.   Neurological:      Mental Status: She is alert and oriented to person, place, and time.      Cranial Nerves: No cranial nerve deficit.   Psychiatric:         Behavior: Behavior normal.         Assessment:       No diagnosis found.    Plan:       Consultation with IR today to plan left scapula biopsy  Return visit after biopsy to review pathology      Advance Care Planning   Elena has had a bad prior experience when her  went through chemotherapy for lung cancer. She does not want to suffer. She is very hesitant to have chemotherapy. We discussed a 6 month prognosis if she does no cancer directed therapy. A bit longer if she considers immune therapy alone.   She and her family agree to complete staging and diagnostic testing. The decision for chemotherapy is ultimately hers. She is an excellent candidate for concordant palliative care.       Visit 1 hour; >50% counseling

## 2020-10-13 RX ORDER — MIDAZOLAM HYDROCHLORIDE 1 MG/ML
1 INJECTION INTRAMUSCULAR; INTRAVENOUS
Status: CANCELLED | OUTPATIENT
Start: 2020-10-13

## 2020-10-13 RX ORDER — FENTANYL CITRATE 50 UG/ML
50 INJECTION, SOLUTION INTRAMUSCULAR; INTRAVENOUS
Status: CANCELLED | OUTPATIENT
Start: 2020-10-13

## 2020-10-14 ENCOUNTER — HOSPITAL ENCOUNTER (OUTPATIENT)
Dept: INTERVENTIONAL RADIOLOGY/VASCULAR | Facility: HOSPITAL | Age: 80
Discharge: HOME | End: 2020-10-14
Attending: PHYSICIAN ASSISTANT
Payer: MEDICARE

## 2020-10-14 VITALS
BODY MASS INDEX: 29.06 KG/M2 | HEIGHT: 63 IN | OXYGEN SATURATION: 97 % | TEMPERATURE: 98 F | RESPIRATION RATE: 14 BRPM | HEART RATE: 73 BPM | WEIGHT: 164 LBS | DIASTOLIC BLOOD PRESSURE: 52 MMHG | SYSTOLIC BLOOD PRESSURE: 145 MMHG

## 2020-10-14 DIAGNOSIS — M89.8X9 BONE MASS: ICD-10-CM

## 2020-10-14 DIAGNOSIS — C83.30 DIFFUSE LARGE B-CELL LYMPHOMA, UNSPECIFIED BODY REGION: ICD-10-CM

## 2020-10-14 PROCEDURE — 88365 PR  TISSUE HYBRIDIZATION: ICD-10-PCS | Mod: 26,,, | Performed by: PATHOLOGY

## 2020-10-14 PROCEDURE — 77012 IR BIOPSY BONE DEEP: ICD-10-PCS | Mod: 26,,, | Performed by: RADIOLOGY

## 2020-10-14 PROCEDURE — 88185 FLOWCYTOMETRY/TC ADD-ON: CPT | Mod: 59 | Performed by: PATHOLOGY

## 2020-10-14 PROCEDURE — 88342 IMHCHEM/IMCYTCHM 1ST ANTB: CPT | Mod: 26,59,, | Performed by: PATHOLOGY

## 2020-10-14 PROCEDURE — 99152 MOD SED SAME PHYS/QHP 5/>YRS: CPT

## 2020-10-14 PROCEDURE — 99152 PR MOD CONSCIOUS SEDATION, SAME PHYS, 5+ YRS, FIRST 15 MIN: ICD-10-PCS | Mod: ,,, | Performed by: RADIOLOGY

## 2020-10-14 PROCEDURE — 99153 MOD SED SAME PHYS/QHP EA: CPT

## 2020-10-14 PROCEDURE — 88333 PATH CONSLTJ SURG CYTO XM 1: CPT | Mod: 26,,, | Performed by: PATHOLOGY

## 2020-10-14 PROCEDURE — 88364 INSITU HYBRIDIZATION (FISH): CPT | Mod: 26,,, | Performed by: PATHOLOGY

## 2020-10-14 PROCEDURE — 88341 IMHCHEM/IMCYTCHM EA ADD ANTB: CPT | Performed by: PATHOLOGY

## 2020-10-14 PROCEDURE — 88365 INSITU HYBRIDIZATION (FISH): CPT | Performed by: PATHOLOGY

## 2020-10-14 PROCEDURE — 88342 IMHCHEM/IMCYTCHM 1ST ANTB: CPT | Performed by: PATHOLOGY

## 2020-10-14 PROCEDURE — 20220 BONE BIOPSY TROCAR/NDL SUPFC: CPT | Mod: ,,, | Performed by: RADIOLOGY

## 2020-10-14 PROCEDURE — 99152 MOD SED SAME PHYS/QHP 5/>YRS: CPT | Mod: ,,, | Performed by: RADIOLOGY

## 2020-10-14 PROCEDURE — 88364 CHG INSITU HYBRIDIZATION (FISH: ICD-10-PCS | Mod: 26,,, | Performed by: PATHOLOGY

## 2020-10-14 PROCEDURE — 88307 TISSUE EXAM BY PATHOLOGIST: CPT | Performed by: PATHOLOGY

## 2020-10-14 PROCEDURE — 88333 PR  INTRAOPERATIVE CYTO PATH CONSULT, INITIAL SITE: ICD-10-PCS | Mod: 26,,, | Performed by: PATHOLOGY

## 2020-10-14 PROCEDURE — 88364 INSITU HYBRIDIZATION (FISH): CPT | Performed by: PATHOLOGY

## 2020-10-14 PROCEDURE — 88333 PATH CONSLTJ SURG CYTO XM 1: CPT | Performed by: PATHOLOGY

## 2020-10-14 PROCEDURE — 88189 FLOWCYTOMETRY/READ 16 & >: CPT | Mod: ,,, | Performed by: PATHOLOGY

## 2020-10-14 PROCEDURE — 77012 CT SCAN FOR NEEDLE BIOPSY: CPT | Mod: TC

## 2020-10-14 PROCEDURE — 88341 PR IHC OR ICC EACH ADD'L SINGLE ANTIBODY  STAINPR: ICD-10-PCS | Mod: 26,59,, | Performed by: PATHOLOGY

## 2020-10-14 PROCEDURE — 77012 CT SCAN FOR NEEDLE BIOPSY: CPT | Mod: 26,,, | Performed by: RADIOLOGY

## 2020-10-14 PROCEDURE — 88307 PR  SURG PATH,LEVEL V: ICD-10-PCS | Mod: 26,,, | Performed by: PATHOLOGY

## 2020-10-14 PROCEDURE — 63600175 PHARM REV CODE 636 W HCPCS: Performed by: RADIOLOGY

## 2020-10-14 PROCEDURE — 25000003 PHARM REV CODE 250: Performed by: PHYSICIAN ASSISTANT

## 2020-10-14 PROCEDURE — 88184 FLOWCYTOMETRY/ TC 1 MARKER: CPT | Performed by: PATHOLOGY

## 2020-10-14 PROCEDURE — 88307 TISSUE EXAM BY PATHOLOGIST: CPT | Mod: 26,,, | Performed by: PATHOLOGY

## 2020-10-14 PROCEDURE — 88365 INSITU HYBRIDIZATION (FISH): CPT | Mod: 26,,, | Performed by: PATHOLOGY

## 2020-10-14 PROCEDURE — 88341 IMHCHEM/IMCYTCHM EA ADD ANTB: CPT | Mod: 26,59,, | Performed by: PATHOLOGY

## 2020-10-14 PROCEDURE — 20220 IR BIOPSY BONE DEEP: ICD-10-PCS | Mod: ,,, | Performed by: RADIOLOGY

## 2020-10-14 PROCEDURE — 88342 CHG IMMUNOCYTOCHEMISTRY: ICD-10-PCS | Mod: 26,59,, | Performed by: PATHOLOGY

## 2020-10-14 PROCEDURE — 88189 PR  FLOWCYTOMETRY/READ, 16 & > MARKERS: ICD-10-PCS | Mod: ,,, | Performed by: PATHOLOGY

## 2020-10-14 PROCEDURE — C1830 POWER BONE MARROW BX NEEDLE: HCPCS

## 2020-10-14 RX ORDER — SODIUM CHLORIDE 9 MG/ML
INJECTION, SOLUTION INTRAVENOUS CONTINUOUS
Status: DISCONTINUED | OUTPATIENT
Start: 2020-10-14 | End: 2020-10-15 | Stop reason: HOSPADM

## 2020-10-14 RX ORDER — MIDAZOLAM HYDROCHLORIDE 1 MG/ML
INJECTION INTRAMUSCULAR; INTRAVENOUS CODE/TRAUMA/SEDATION MEDICATION
Status: COMPLETED | OUTPATIENT
Start: 2020-10-14 | End: 2020-10-14

## 2020-10-14 RX ORDER — LIDOCAINE HYDROCHLORIDE 5 MG/ML
INJECTION, SOLUTION INFILTRATION; PERINEURAL CODE/TRAUMA/SEDATION MEDICATION
Status: COMPLETED | OUTPATIENT
Start: 2020-10-14 | End: 2020-10-14

## 2020-10-14 RX ORDER — FENTANYL CITRATE 50 UG/ML
INJECTION, SOLUTION INTRAMUSCULAR; INTRAVENOUS CODE/TRAUMA/SEDATION MEDICATION
Status: COMPLETED | OUTPATIENT
Start: 2020-10-14 | End: 2020-10-14

## 2020-10-14 RX ADMIN — MIDAZOLAM HYDROCHLORIDE 1 MG: 1 INJECTION, SOLUTION INTRAMUSCULAR; INTRAVENOUS at 03:10

## 2020-10-14 RX ADMIN — FENTANYL CITRATE 50 MCG: 50 INJECTION, SOLUTION INTRAMUSCULAR; INTRAVENOUS at 03:10

## 2020-10-14 RX ADMIN — MIDAZOLAM HYDROCHLORIDE 1 MG: 1 INJECTION, SOLUTION INTRAMUSCULAR; INTRAVENOUS at 04:10

## 2020-10-14 RX ADMIN — SODIUM CHLORIDE: 0.9 INJECTION, SOLUTION INTRAVENOUS at 01:10

## 2020-10-14 RX ADMIN — LIDOCAINE HYDROCHLORIDE 5 ML: 5 INJECTION, SOLUTION INFILTRATION; PERINEURAL at 03:10

## 2020-10-14 NOTE — PROGRESS NOTES
Pt arrived to ROCU bed 4 for 1 hour post bone biopsy recovery. Report received from AKIL Silverio. Pt denies pain/discomfort. Dressing CDI. VSS. No acute events.  See flow sheets for post procedure monitoring.

## 2020-10-14 NOTE — PROCEDURES
Radiology Post-Procedure Note    Pre Op Diagnosis: Lymphoma    Post Op Diagnosis: Same    Procedure: Left scapula biopsy    Procedure performed by: Yobani Ugalde MD    Written Informed Consent Obtained: Yes  Specimen Removed: YES 2 x 13 gauge cores and bone marrow clot  Estimated Blood Loss: Minimal    Findings:   Under CT guidance, 11/13 gauge biopsy system was used to sample left scapular hypermetabolic lesion. No complications. See dictation.    Patient tolerated procedure well.    Yobani Ugalde M.D.  Diagnostic and Interventional Radiologist  Department of Radiology  Pager: [phone]

## 2020-10-14 NOTE — DISCHARGE SUMMARY
Radiology Discharge Summary      Hospital Course: No complications    Admit Date: 10/14/2020  Discharge Date: 10/14/2020     Instructions Given to Patient: Yes  Diet: Resume prior diet  Activity: activity as tolerated and no driving for today    Description of Condition on Discharge: Stable  Vital Signs (Most Recent): Temp: 97.8 °F (36.6 °C) (10/14/20 1340)  Pulse: 79 (10/14/20 1615)  Resp: 10 (10/14/20 1615)  BP: (!) 155/67 (10/14/20 1615)  SpO2: 99 % (10/14/20 1615)    Discharge Disposition: Home    Discharge Diagnosis: Lymphoma s/p left scapular biopsy. Follow up as scheduled.    Yobani Ugalde M.D.  Diagnostic and Interventional Radiologist  Department of Radiology  Pager: [phone]

## 2020-10-14 NOTE — PROGRESS NOTES
Recovery complete. Discharge instructions and handouts provided. Pt verbalized understanding. Pt refused wheelchair, escorted out of facility to family. Gait steady.

## 2020-10-14 NOTE — H&P
Radiology History & Physical      SUBJECTIVE:     Chief Complaint: Lymphoma    History of Present Illness:  Elena Frances is a 80 y.o. female who presents for left scapula sclerotic lesion biopsy.     Past Medical History:   Diagnosis Date    Allergy     Anemia     Arthritis     Asthma     Depression     Generalized headaches 2014    Goiter     MNG    HTN (hypertension), benign     Hyperlipidemia     Hyperparathyroidism     s/p surgery    Intestinal obstruction     resolved spontaneously    Lumbar disc disease     s/p epidural shots    Nuclear sclerosis - Both Eyes 3/11/2014    Osteoporosis, post-menopausal     with 3 rib fractures     Past Surgical History:   Procedure Laterality Date    APPENDECTOMY      BILATERAL OOPHORECTOMY      BONE MARROW BIOPSY N/A 3/5/2020    Procedure: Biopsy-bone marrow;  Surgeon: Eve Alvarez MD;  Location: [facility] OR [address] [place] [address];  Service: Oncology;  Laterality: N/A;    BREAST CYST EXCISION      left    CATARACT EXTRACTION W/  INTRAOCULAR LENS IMPLANT Right 2016    Dr. Fang ([place])    CATARACT EXTRACTION W/  INTRAOCULAR LENS IMPLANT Left 10/17/2016    Dr. Fang ([place])     SECTION, CLASSIC      x 1    CHOLECYSTECTOMY      COLONOSCOPY N/A 3/1/2016    Procedure: COLONOSCOPY;  Surgeon: Dony Bronson MD;  Location: [facility] (4TH FLR);  Service: Endoscopy;  Laterality: N/A;  PM Prep    HYSTERECTOMY      JOINT REPLACEMENT      KNEE SURGERY Right 2017    TKR    LUMBAR EPIDURAL INJECTION      x 4    MOUTH SURGERY      for abscess drainage of gum of frontal teeth    PARATHYROID GLAND SURGERY      for parathyroid adenoma    TOTAL KNEE ARTHROPLASTY Left 2019    Procedure: ARTHROPLASTY, KNEE, TOTAL-DEPUY-SIGMA;  Surgeon: Newton Rodriguez III, MD;  Location: [facility] OR 2ND FLR;  Service: Orthopedics;  Laterality: Left;    TRANSFORAMINAL EPIDURAL INJECTION OF STEROID Left 2019    Procedure: INJECTION, STEROID, EPIDURAL,  TRANSFORAMINAL APPROACH, L3-L4 AND L4-L5;  Surgeon: Venancio Lopez MD;  Location: [facility] PAIN MGT;  Service: Pain Management;  Laterality: Left;       Home Meds:   Prior to Admission medications    Medication Sig Start Date End Date Taking? Authorizing Provider   acetaminophen (TYLENOL) 650 MG TbSR Take 1 tablet (650 mg total) by mouth every 6 (six) hours as needed (pain). 8/22/19  Yes Oliverio Hanna MD   albuterol (PROAIR HFA) 90 mcg/actuation inhaler Inhale 2 puffs into the lungs every 6 (six) hours as needed for Wheezing. Rescue 1/28/20  Yes Michael Tinoco MD   aspirin (ECOTRIN) 81 MG EC tablet Take 1 tablet (81 mg total) by mouth 2 (two) times daily. 8/22/19 10/14/20 Yes Oliverio Hanna MD   diclofenac sodium (VOLTAREN) 1 % Gel  10/7/19  Yes Historical Provider   fluticasone propionate (FLONASE) 50 mcg/actuation nasal spray INHALE 2 SPRAYS (100 MCG TOTAL) IN EACH NOSTRIL ONCE DAILY. 10/8/20  Yes Michael Tinoco MD   fluticasone-salmeterol diskus inhaler 100-50 mcg TAKE 1 PUFF BY MOUTH TWICE A DAY 2/17/20  Yes Michael Tinoco MD   gabapentin (NEURONTIN) 100 MG capsule TAKE AS DIRECTED 9/26/20  Yes Michael Tinoco MD   metoprolol succinate (TOPROL-XL) 25 MG 24 hr tablet Take 1 tablet (25 mg total) by mouth once daily. 7/7/20  Yes Michael Tinoco MD   montelukast (SINGULAIR) 10 mg tablet Take 1 tablet (10 mg total) by mouth every evening. 2/17/20  Yes Michael Tinoco MD   nabumetone (RELAFEN) 750 MG tablet TAKE 1 TABLET BY MOUTH TWICE DAILY 3/24/20  Yes Michael Tinoco MD   omeprazole (PRILOSEC) 40 MG capsule TAKE 1 CAPSULE BY MOUTH EVERY DAY 6/11/20  Yes Michael Tinoco MD   polyethylene glycol 3350 (MIRALAX ORAL) Take by mouth as needed.   Yes Historical Provider   blood-glucose meter ([employer] BLOOD GLUCOSE SYSTM) kit Use as instructed 8/18/15 7/3/19  Michael Tnioco MD   flu vac 2020 65up-tyvWT39T,PF, (FLUAD QUAD 2020-21,65Y UP,,PF,) 60 mcg (15 mcg x 4)/0.5 mL Syrg inject 0.5 ml into the muscle for one dose 9/8/20    Michelle Marcos, PharmD   sertraline (ZOLOFT) 50 MG tablet Take 1 tablet (50 mg total) by mouth once daily. 7/7/20   Michael Tinoco MD   varicella-zoster gE-AS01B, PF, (SHINGRIX, PF,) 50 mcg/0.5 mL injection Inject into the muscle. 10/8/19   Michael Tinoco MD   atorvastatin (LIPITOR) 80 MG tablet TAKE 1 TABLET (80 MG TOTAL) BY MOUTH ONCE DAILY. 7/29/19   Michael Tinoco MD   fluticasone propionate (FLONASE) 50 mcg/actuation nasal spray 2 SPRAYS (100 MCG TOTAL) BY EACH NARE ROUTE ONCE DAILY. 1/14/20   Michael Tinoco MD   gabapentin (NEURONTIN) 100 MG capsule TAKE AS DIRECTED 6/12/20   Michael Tinoco MD   gabapentin (NEURONTIN) 100 MG capsule TAKE AS DIRECTED 9/2/20   Michael Tinoco MD     Anticoagulants/Antiplatelets: aspirin    Allergies:   Review of patient's allergies indicates:   Allergen Reactions    Vicodin [hydrocodone-acetaminophen] Anxiety     Sedation History:  no adverse reactions    Review of Systems:   Hematological: no known coagulopathies  Respiratory: no shortness of breath  Cardiovascular: no chest pain  Gastrointestinal: no abdominal pain  Genito-Urinary: no dysuria  Musculoskeletal: negative  Neurological: negative         OBJECTIVE:     Vital Signs (Most Recent)  Temp: 97.8 °F (36.6 °C) (10/14/20 1340)  Pulse: 71 (10/14/20 1340)  Resp: 16 (10/14/20 1340)  BP: (!) 154/67 (10/14/20 1340)  SpO2: 100 % (10/14/20 1340)    Physical Exam:  ASA: 2  Mallampati: 3    General: no acute distress  Mental Status: alert and oriented to person, place and time  HEENT: normocephalic, atraumatic  Chest: unlabored breathing  Heart: regular heart rate  Abdomen: nondistended  Extremity: moves all extremities    Laboratory  Lab Results   Component Value Date    INR 1.0 10/07/2020       Lab Results   Component Value Date    WBC 6.72 10/07/2020    HGB 11.4 (L) 10/07/2020    HCT 36.4 (L) 10/07/2020    MCV 88 10/07/2020     10/07/2020      Lab Results   Component Value Date    GLU 93 09/14/2020     09/14/2020    K 4.8  09/14/2020     09/14/2020    CO2 28 09/14/2020    BUN 20 09/14/2020    CREATININE 0.9 09/14/2020    CALCIUM 9.9 09/14/2020    MG 2.4 07/14/2009    ALT 13 09/14/2020    AST 16 09/14/2020    ALBUMIN 3.7 09/14/2020    BILITOT 0.2 09/14/2020    BILIDIR 0.1 09/14/2018       ASSESSMENT/PLAN:     Sedation Plan: up to moderate sedation   Patient will undergo left scapula bone biopsy.     Dieudonne Yen MD   PGY-4 Radiology

## 2020-10-19 ENCOUNTER — PES CALL (OUTPATIENT)
Dept: ADMINISTRATIVE | Facility: CLINIC | Age: 80
End: 2020-10-19

## 2020-10-20 LAB
ADEQUACY: NORMAL
COMMENT: NORMAL
FINAL PATHOLOGIC DIAGNOSIS: NORMAL
FLOW CYTOMETRY ANTIBODIES ANALYZED - TISSUE: NORMAL
FLOW CYTOMETRY COMMENT - TISSUE: NORMAL
FLOW CYTOMETRY INTERPRETATION - TISSUE: NORMAL
GROSS: NORMAL
Lab: NORMAL
MICROSCOPIC EXAM: NORMAL
SPECIMEN TYPE - TISSUE: NORMAL

## 2020-10-21 ENCOUNTER — TELEPHONE (OUTPATIENT)
Dept: HEMATOLOGY/ONCOLOGY | Facility: CLINIC | Age: 80
End: 2020-10-21

## 2020-10-21 NOTE — TELEPHONE ENCOUNTER
"----- Message from Abbey Spangler sent at 10/21/2020  9:35 AM CDT -----  Patient Assist    Name of caller:  Elena   Contact Preference:  [phone]  Does Patient feel the need to see the MD today? No   Physician: Antonio Prado MD   What is the nature of the call?     Please call and assist pt with discussing a biopsy     Additional Notes:   "Thank you for all that you do for our patients'"          "

## 2020-10-21 NOTE — TELEPHONE ENCOUNTER
----- Message from Idalmis Perla sent at 10/21/2020  1:34 PM CDT -----  Regarding: Resutls  Contact: Pt @ [phone]  Pt stating she needs to speak with staff in regards to results from biopsy.    Call back: [phone]

## 2020-11-02 PROBLEM — K63.5 COLON POLYPS: Status: RESOLVED | Noted: 2020-01-21 | Resolved: 2020-11-02

## 2020-11-02 PROBLEM — R29.898 RIGHT LEG WEAKNESS: Status: RESOLVED | Noted: 2017-01-24 | Resolved: 2020-11-02

## 2020-11-02 PROBLEM — M25.661 STIFFNESS OF RIGHT KNEE: Status: RESOLVED | Noted: 2017-01-24 | Resolved: 2020-11-02

## 2020-11-19 NOTE — MR AVS SNAPSHOT
[facility]  [address] [facility], [address]  [place] [address]  Phone: [phone]  Fax: [phone]                  Elena Frances   2017 10:40 AM   Office Visit    Descripción:  Female : 1940   Personal Médico:  Michael Tinoco MD   Departamento:  [facility]           Razón de la jessee     Asthma           Diagnósticos de Esta Visita        Comentarios    Contact dermatitis, unspecified contact dermatitis type, unspecified trigger    -  Primario     Hyperlipidemia, unspecified hyperlipidemia type         Other depression                Lista de tareas           Citas próximas        Personal Médico Departamento Tfno del dpto    2017 2:30 PM Jai Thacker PT [facility] [phone]    2017 11:00 AM Liliana Fu RN [facility] [phone]    2017 11:45 AM LAB, APPOINT[place] [facility] [phone]    2017 1:30 PM JOINT CAMP, [facility] - Orthopedics Class [phone]    2017 4:00 PM Yudith Maria NP [facility] - Orthopedics [phone]      Marbella cirugías futuras / Procedimientos     2017   Surgery with Rom Moran MD   [facility] ([facility])    [address] [facility] [place] [address] [phone]              Metas (5 Years of Data)     Ninguna      Follow-Up and Disposition     Return in about 3 months (around 2017).      Recetas para recoger        Disp Refills Start End    atorvastatin (LIPITOR) 80 MG tablet 90 tablet 2 2017     Take 1 tablet (80 mg total) by mouth once daily. - Oral    [facility]: [id number] - [place] - [address] AT [facility] [address] [place] No. de tlfo: #: [phone]       paroxetine (PAXIL-CR) 25 MG 24 hr tablet 90 tablet 2 2017     Take 1 tablet (25 mg total) by mouth once daily. - Oral    [facility]:  
Joycelyn Polanco RN. The Norco prescription is shredded. [facility] - [place], [place] - [address] AT [facility] No. de tlfo: #: [phone]       fluocinonide (LIDEX) 0.05 % external solution 60 mL 3 1/23/2017 2/2/2017    Apply topically 2 (two) times daily. - Topical (Top)    [facility]: [facility] - [place], [place] - [address] AT [facility] No. de tlfo: #: [phone]         [facility]     [facility] Enfermeras - Asistencia 24/7  Enfermeras registradas de [facility] pueden ayudarle a reservar genny jessee, proveer educación para la jennifer, asesoría clínica, y otros servicios de asesoramiento.   Llame para saul servicio gratuito a [phone].             Medicamentos           Mensaje sobre Medicamentos     Verificar los cambios y / o adiciones a montalvo régimen de medicación son los mismos que discutir con montalvo médico. Si cualquiera de estos cambios o adiciones son incorrectos, por favor notifique a montalvo proveedor de atención médica.        EMPEZAR a lisa estos medicamentos NUEVOS        Refills    fluocinonide (LIDEX) 0.05 % external solution 3    Sig: Apply topically 2 (two) times daily.    Categoría: Normal    Vía: Topical (Top)           Verifique que la siguiente lista de medicamentos es genny representación exacta de los medicamentos que está tomando actualmente. Si no hay ningunos reportados, la lista puede estar en bourgeois. Si no es correcta, por favor póngase en contacto con montalvo proveedor de atención médica. Lleve esta lista con usted en juancarlos de emergencia.           Medicamentos Actuales     albuterol 90 mcg/actuation inhaler Inhale 2 puffs into the lungs every 6 (six) hours as needed for Wheezing.    atenolol (TENORMIN) 25 MG tablet Take 1 tablet (25 mg total) by mouth once daily.    atorvastatin (LIPITOR) 80 MG tablet Take 1 tablet (80 mg total) by mouth once daily.    diclofenac sodium (VOLTAREN) 1 % Gel Apply 2 g topically once daily.    ferrous sulfate  "324 mg (65 mg iron) TbEC Take 325 mg by mouth once daily.    gabapentin (NEURONTIN) 100 MG capsule Take 2 capsules three times daily    montelukast (SINGULAIR) 10 mg tablet Take 1 tablet (10 mg total) by mouth every evening.    omeprazole (PRILOSEC) 20 MG capsule TAKE 1 CAPSULE BY MOUTH TWICE DAILY    paroxetine (PAXIL-CR) 25 MG 24 hr tablet Take 1 tablet (25 mg total) by mouth once daily.    valsartan (DIOVAN) 320 MG tablet Take 1 tablet (320 mg total) by mouth once daily.    albuterol (PROVENTIL) 2.5 mg /3 mL (0.083 %) nebulizer solution Take 3 mLs (2.5 mg total) by nebulization every 6 (six) hours as needed for Wheezing.    blood-glucose meter ([employer] BLOOD GLUCOSE SYSTM) kit Use as instructed    fluocinonide (LIDEX) 0.05 % external solution Apply topically 2 (two) times daily.    lancets Misc 1 lancet by Misc.(Non-Drug; Combo Route) route 2 (two) times daily.           Información de referencia clínica           Signos vitales - más recientes  Última actualización: 2017 10:20 AM por Sabrina Elder, MA    PS Pulso Temperatura [place] BMI (IMC)    122/78 (BP Location: Left arm, Patient Position: Sitting, BP Method: Manual) 76 98.4 °F (36.9 °C) (Oral) 5' 3" (1.6 m) 73 kg (160 lb 15 oz) 28.51 kg/m2      Blood Pressure          Most Recent Value    BP  122/78      Alergias     A partir del:  2017        Vicodin [Hydrocodone-acetaminophen]      Vacunas     Administradas en la fecha de la visita:  2017        None               Elena Frances   2017 10:40 AM   Office Visit    Description:  Female : 1940   Provider:  Michael Tinoco MD   Department:  [facility]           Reason for Visit     Asthma           Diagnoses this Visit        Comments    Contact dermatitis, unspecified contact dermatitis type, unspecified trigger    -  Primary     Hyperlipidemia, unspecified hyperlipidemia type         Other depression                To Do List           Future " Appointments        Provider Department Dept Phone    1/23/2017 2:30 PM Jai Thacker, [place] [facility] [phone]    1/27/2017 11:00 AM Liliana Fu RN [facility] [phone]    1/27/2017 11:45 AM LAB, APPOINT[place] [facility] [phone]    2/1/2017 1:30 PM JOINT CAMP, [facility] Orthopedics Class [phone]    2/1/2017 4:00 PM Yudith Maria NP [facility] Orthopedics [phone]      Your Future Surgeries/Procedures     Feb 07, 2017   Surgery with Rom Moran MD   [facility] ([facility])    [address] [facility] [address]   [phone]              Goals     None      Follow-Up and Disposition     Return in about 3 months (around 4/23/2017).       These Medications        Disp Refills Start End    atorvastatin (LIPITOR) 80 MG tablet 90 tablet 2 1/23/2017     Take 1 tablet (80 mg total) by mouth once daily. - Oral    Pharmacy: [id number] - [place] [address] AT [facility] Ph #: [phone]       paroxetine (PAXIL-CR) 25 MG 24 hr tablet 90 tablet 2 1/23/2017     Take 1 tablet (25 mg total) by mouth once daily. - Oral    Pharmacy: [id number] [place] [address] AT [facility] Ph #: [phone]       fluocinonide (LIDEX) 0.05 % external solution 60 mL 3 1/23/2017 2/2/2017    Apply topically 2 (two) times daily. - Topical (Top)    [id number] [place] - [address] AT [facility] Ph #: [phone]         [facility]     [facility] On Call Nurse Care Line - 24/7 Assistance  Registered nurses in the [facility] provide clinical advisement, health education, appointment booking, and other advisory services.  Call for this free service at [phone].             Medications            Message regarding Medications     Verify the changes and/or additions to your medication regime listed below are the same as discussed with your clinician today.  If any of these changes or additions are incorrect, please notify your healthcare provider.        START taking these NEW medications        Refills    fluocinonide (LIDEX) 0.05 % external solution 3    Sig: Apply topically 2 (two) times daily.    Class: Normal    Route: Topical (Top)           Verify that the below list of medications is an accurate representation of the medications you are currently taking.  If none reported, the list may be blank. If incorrect, please contact your healthcare provider. Carry this list with you in case of emergency.           Current Medications     albuterol 90 mcg/actuation inhaler Inhale 2 puffs into the lungs every 6 (six) hours as needed for Wheezing.    atenolol (TENORMIN) 25 MG tablet Take 1 tablet (25 mg total) by mouth once daily.    atorvastatin (LIPITOR) 80 MG tablet Take 1 tablet (80 mg total) by mouth once daily.    diclofenac sodium (VOLTAREN) 1 % Gel Apply 2 g topically once daily.    ferrous sulfate 324 mg (65 mg iron) TbEC Take 325 mg by mouth once daily.    gabapentin (NEURONTIN) 100 MG capsule Take 2 capsules three times daily    montelukast (SINGULAIR) 10 mg tablet Take 1 tablet (10 mg total) by mouth every evening.    omeprazole (PRILOSEC) 20 MG capsule TAKE 1 CAPSULE BY MOUTH TWICE DAILY    paroxetine (PAXIL-CR) 25 MG 24 hr tablet Take 1 tablet (25 mg total) by mouth once daily.    valsartan (DIOVAN) 320 MG tablet Take 1 tablet (320 mg total) by mouth once daily.    albuterol (PROVENTIL) 2.5 mg /3 mL (0.083 %) nebulizer solution Take 3 mLs (2.5 mg total) by nebulization every 6 (six) hours as needed for Wheezing.    blood-glucose meter ([employer] BLOOD GLUCOSE SYSTM) kit Use as instructed    fluocinonide (LIDEX) 0.05 % external solution Apply topically 2 (two) times daily.    lancets Misc 1  "lancet by Misc.(Non-Drug; Combo Route) route 2 (two) times daily.           Clinical Reference Information           Vital Signs - Last Recorded  Most recent update: 1/23/2017 10:20 AM by Sabrina Elder MA    BP Pulse Temp Ht Wt BMI    122/78 (BP Location: Left arm, Patient Position: Sitting, BP Method: Manual) 76 98.4 °F (36.9 °C) (Oral) 5' 3" (1.6 m) 73 kg (160 lb 15 oz) 28.51 kg/m2      Blood Pressure          Most Recent Value    BP  122/78      Allergies as of 1/23/2017     Vicodin [Hydrocodone-acetaminophen]      Immunizations Administered on Date of Encounter - 1/23/2017     None        "

## 2020-11-23 ENCOUNTER — OFFICE VISIT (OUTPATIENT)
Dept: PRIMARY CARE CLINIC | Facility: CLINIC | Age: 80
End: 2020-11-23
Payer: MEDICARE

## 2020-11-23 VITALS
DIASTOLIC BLOOD PRESSURE: 86 MMHG | BODY MASS INDEX: 29.02 KG/M2 | OXYGEN SATURATION: 99 % | SYSTOLIC BLOOD PRESSURE: 130 MMHG | HEIGHT: 63 IN | HEART RATE: 60 BPM | WEIGHT: 163.81 LBS

## 2020-11-23 DIAGNOSIS — M54.15 RADICULOPATHY OF THORACOLUMBAR REGION: ICD-10-CM

## 2020-11-23 DIAGNOSIS — M47.9 OSTEOARTHRITIS OF SPINE, UNSPECIFIED SPINAL OSTEOARTHRITIS COMPLICATION STATUS, UNSPECIFIED SPINAL REGION: ICD-10-CM

## 2020-11-23 DIAGNOSIS — I10 HTN (HYPERTENSION), BENIGN: Primary | ICD-10-CM

## 2020-11-23 DIAGNOSIS — M89.9 LESION OF VERTEBRA: ICD-10-CM

## 2020-11-23 DIAGNOSIS — M51.36 DDD (DEGENERATIVE DISC DISEASE), LUMBAR: ICD-10-CM

## 2020-11-23 DIAGNOSIS — M47.819 SPONDYLOSIS WITHOUT MYELOPATHY: ICD-10-CM

## 2020-11-23 PROCEDURE — 99214 OFFICE O/P EST MOD 30 MIN: CPT | Mod: S$GLB,,, | Performed by: FAMILY MEDICINE

## 2020-11-23 PROCEDURE — 99999 PR PBB SHADOW E&M-EST. PATIENT-LVL V: ICD-10-PCS | Mod: PBBFAC,,, | Performed by: FAMILY MEDICINE

## 2020-11-23 PROCEDURE — 3079F PR MOST RECENT DIASTOLIC BLOOD PRESSURE 80-89 MM HG: ICD-10-PCS | Mod: CPTII,S$GLB,, | Performed by: FAMILY MEDICINE

## 2020-11-23 PROCEDURE — 1159F MED LIST DOCD IN RCRD: CPT | Mod: S$GLB,,, | Performed by: FAMILY MEDICINE

## 2020-11-23 PROCEDURE — 1125F PR PAIN SEVERITY QUANTIFIED, PAIN PRESENT: ICD-10-PCS | Mod: S$GLB,,, | Performed by: FAMILY MEDICINE

## 2020-11-23 PROCEDURE — 3075F PR MOST RECENT SYSTOLIC BLOOD PRESS GE 130-139MM HG: ICD-10-PCS | Mod: CPTII,S$GLB,, | Performed by: FAMILY MEDICINE

## 2020-11-23 PROCEDURE — 1101F PR PT FALLS ASSESS DOC 0-1 FALLS W/OUT INJ PAST YR: ICD-10-PCS | Mod: CPTII,S$GLB,, | Performed by: FAMILY MEDICINE

## 2020-11-23 PROCEDURE — 1101F PT FALLS ASSESS-DOCD LE1/YR: CPT | Mod: CPTII,S$GLB,, | Performed by: FAMILY MEDICINE

## 2020-11-23 PROCEDURE — 3288F FALL RISK ASSESSMENT DOCD: CPT | Mod: CPTII,S$GLB,, | Performed by: FAMILY MEDICINE

## 2020-11-23 PROCEDURE — 3075F SYST BP GE 130 - 139MM HG: CPT | Mod: CPTII,S$GLB,, | Performed by: FAMILY MEDICINE

## 2020-11-23 PROCEDURE — 3288F PR FALLS RISK ASSESSMENT DOCUMENTED: ICD-10-PCS | Mod: CPTII,S$GLB,, | Performed by: FAMILY MEDICINE

## 2020-11-23 PROCEDURE — 1125F AMNT PAIN NOTED PAIN PRSNT: CPT | Mod: S$GLB,,, | Performed by: FAMILY MEDICINE

## 2020-11-23 PROCEDURE — 99214 PR OFFICE/OUTPT VISIT, EST, LEVL IV, 30-39 MIN: ICD-10-PCS | Mod: S$GLB,,, | Performed by: FAMILY MEDICINE

## 2020-11-23 PROCEDURE — 3079F DIAST BP 80-89 MM HG: CPT | Mod: CPTII,S$GLB,, | Performed by: FAMILY MEDICINE

## 2020-11-23 PROCEDURE — 1159F PR MEDICATION LIST DOCUMENTED IN MEDICAL RECORD: ICD-10-PCS | Mod: S$GLB,,, | Performed by: FAMILY MEDICINE

## 2020-11-23 PROCEDURE — 99999 PR PBB SHADOW E&M-EST. PATIENT-LVL V: CPT | Mod: PBBFAC,,, | Performed by: FAMILY MEDICINE

## 2020-11-23 RX ORDER — TRAMADOL HYDROCHLORIDE 50 MG/1
50 TABLET ORAL EVERY 8 HOURS PRN
Qty: 30 TABLET | Refills: 0 | Status: SHIPPED | OUTPATIENT
Start: 2020-11-23 | End: 2022-03-03

## 2020-11-23 RX ORDER — SERTRALINE HYDROCHLORIDE 100 MG/1
100 TABLET, FILM COATED ORAL DAILY
Qty: 30 TABLET | Refills: 6 | Status: SHIPPED | OUTPATIENT
Start: 2020-11-23 | End: 2021-05-12

## 2020-11-23 RX ORDER — VALSARTAN 320 MG/1
320 TABLET ORAL DAILY
Qty: 90 TABLET | Refills: 3 | Status: SHIPPED | OUTPATIENT
Start: 2020-11-23 | End: 2023-08-02

## 2020-11-23 RX ORDER — VALSARTAN 160 MG/1
160 TABLET ORAL DAILY
COMMUNITY
Start: 2020-10-09 | End: 2020-11-23

## 2020-11-23 RX ORDER — GABAPENTIN 100 MG/1
CAPSULE ORAL
Qty: 270 CAPSULE | Refills: 3 | Status: SHIPPED | OUTPATIENT
Start: 2020-11-23 | End: 2023-08-14 | Stop reason: ALTCHOICE

## 2020-11-23 NOTE — PROGRESS NOTES
Subjective:       Patient ID: Elena Frances is a 80 y.o. female.    Chief Complaint: Back Pain (upper center region) and Low-back Pain    79 yo presents today for evaluation of recurrent diffuse pain involving the left scapula, left arm and lower back.  Prior diagnosis of lymphoma affecting skeletal system.  Pt is currently being followed by [facility] but has yet to start treatment    Review of Systems   Respiratory: Negative for shortness of breath and wheezing.    Cardiovascular: Negative for chest pain and claudication.   Musculoskeletal: Positive for arthralgias, back pain and myalgias. Negative for joint swelling, neck pain and neck stiffness.   Neurological: Negative for headaches.   Psychiatric/Behavioral: Positive for sleep disturbance. The patient is nervous/anxious.          Objective:      Physical Exam  Constitutional:       General: She is not in acute distress.     Appearance: Normal appearance.   Neck:      Musculoskeletal: Normal range of motion. No neck rigidity.      Vascular: No carotid bruit.   Cardiovascular:      Rate and Rhythm: Normal rate and regular rhythm.      Heart sounds: No murmur.   Pulmonary:      Effort: Pulmonary effort is normal. No respiratory distress.      Breath sounds: No wheezing or rales.   Abdominal:      General: Abdomen is flat. Bowel sounds are normal.      Tenderness: There is no abdominal tenderness.   Musculoskeletal:         General: Tenderness present.      Right lower leg: No edema.      Left lower leg: No edema.      Comments: Tender to palpation over left scapular area, shoulder, lumbar spine   Lymphadenopathy:      Cervical: No cervical adenopathy.   Neurological:      General: No focal deficit present.      Mental Status: She is alert and oriented to person, place, and time.      Deep Tendon Reflexes: Reflexes normal.         Assessment:     1.  Hypertension  2.  Lymphoma, with multiple skeletal lesions  3.  Anxiety  4.  Pain, secondary to #2       Plan:       1.  Gradual increase of Gabapentin to 300mg tid  2.  Tramadol, #30, no refills, to take as needed  3.  Increase Valsartan to 320mg  4.  Increase Zoloft to 100mg daily  5.  F/u 2-3 weeks

## 2020-11-30 ENCOUNTER — PATIENT OUTREACH (OUTPATIENT)
Dept: ADMINISTRATIVE | Facility: HOSPITAL | Age: 80
End: 2020-11-30

## 2020-11-30 DIAGNOSIS — R73.03 PREDIABETES: Primary | ICD-10-CM

## 2020-11-30 NOTE — PROGRESS NOTES
Patient due for the following    Hepatitis C Screening     TETANUS VACCINE     Shingles Vaccine (2 of 3)    DEXA SCAN     Colonoscopy     Hemoglobin A1c       Hemoglobin a1c ordered.    Pre-visit chart review completed.  Immunizations: LINKS delayed  Care Everywhere: n/a  Care Teams: updated  Outreach: Message left for patient to return my call.

## 2021-01-20 ENCOUNTER — TELEPHONE (OUTPATIENT)
Dept: INTERNAL MEDICINE | Facility: CLINIC | Age: 81
End: 2021-01-20

## 2021-01-20 ENCOUNTER — PATIENT MESSAGE (OUTPATIENT)
Dept: INTERNAL MEDICINE | Facility: CLINIC | Age: 81
End: 2021-01-20

## 2021-01-20 ENCOUNTER — OFFICE VISIT (OUTPATIENT)
Dept: INTERNAL MEDICINE | Facility: CLINIC | Age: 81
End: 2021-01-20
Payer: MEDICARE

## 2021-01-20 ENCOUNTER — HOSPITAL ENCOUNTER (OUTPATIENT)
Dept: RADIOLOGY | Facility: HOSPITAL | Age: 81
Discharge: HOME | End: 2021-01-20
Attending: STUDENT IN AN ORGANIZED HEALTH CARE EDUCATION/TRAINING PROGRAM
Payer: MEDICARE

## 2021-01-20 VITALS
DIASTOLIC BLOOD PRESSURE: 64 MMHG | HEIGHT: 63 IN | OXYGEN SATURATION: 99 % | TEMPERATURE: 98 F | RESPIRATION RATE: 18 BRPM | BODY MASS INDEX: 29.38 KG/M2 | SYSTOLIC BLOOD PRESSURE: 128 MMHG | HEART RATE: 85 BPM | WEIGHT: 165.81 LBS

## 2021-01-20 DIAGNOSIS — R05.9 COUGH: ICD-10-CM

## 2021-01-20 DIAGNOSIS — R06.02 SOB (SHORTNESS OF BREATH): ICD-10-CM

## 2021-01-20 DIAGNOSIS — R06.02 SHORTNESS OF BREATH: ICD-10-CM

## 2021-01-20 DIAGNOSIS — R06.02 SOB (SHORTNESS OF BREATH): Primary | ICD-10-CM

## 2021-01-20 DIAGNOSIS — C83.30 DIFFUSE LARGE B-CELL LYMPHOMA, UNSPECIFIED BODY REGION: ICD-10-CM

## 2021-01-20 PROCEDURE — 3074F PR MOST RECENT SYSTOLIC BLOOD PRESSURE < 130 MM HG: ICD-10-PCS | Mod: CPTII,S$GLB,, | Performed by: STUDENT IN AN ORGANIZED HEALTH CARE EDUCATION/TRAINING PROGRAM

## 2021-01-20 PROCEDURE — 3078F DIAST BP <80 MM HG: CPT | Mod: CPTII,S$GLB,, | Performed by: STUDENT IN AN ORGANIZED HEALTH CARE EDUCATION/TRAINING PROGRAM

## 2021-01-20 PROCEDURE — 99999 PR PBB SHADOW E&M-EST. PATIENT-LVL V: ICD-10-PCS | Mod: PBBFAC,,, | Performed by: STUDENT IN AN ORGANIZED HEALTH CARE EDUCATION/TRAINING PROGRAM

## 2021-01-20 PROCEDURE — 3288F PR FALLS RISK ASSESSMENT DOCUMENTED: ICD-10-PCS | Mod: CPTII,S$GLB,, | Performed by: STUDENT IN AN ORGANIZED HEALTH CARE EDUCATION/TRAINING PROGRAM

## 2021-01-20 PROCEDURE — 99499 UNLISTED E&M SERVICE: CPT | Mod: S$GLB,,, | Performed by: STUDENT IN AN ORGANIZED HEALTH CARE EDUCATION/TRAINING PROGRAM

## 2021-01-20 PROCEDURE — 1125F PR PAIN SEVERITY QUANTIFIED, PAIN PRESENT: ICD-10-PCS | Mod: S$GLB,,, | Performed by: STUDENT IN AN ORGANIZED HEALTH CARE EDUCATION/TRAINING PROGRAM

## 2021-01-20 PROCEDURE — 99499 RISK ADDL DX/OHS AUDIT: ICD-10-PCS | Mod: S$GLB,,, | Performed by: STUDENT IN AN ORGANIZED HEALTH CARE EDUCATION/TRAINING PROGRAM

## 2021-01-20 PROCEDURE — 99214 OFFICE O/P EST MOD 30 MIN: CPT | Mod: S$GLB,,, | Performed by: STUDENT IN AN ORGANIZED HEALTH CARE EDUCATION/TRAINING PROGRAM

## 2021-01-20 PROCEDURE — 1101F PR PT FALLS ASSESS DOC 0-1 FALLS W/OUT INJ PAST YR: ICD-10-PCS | Mod: CPTII,S$GLB,, | Performed by: STUDENT IN AN ORGANIZED HEALTH CARE EDUCATION/TRAINING PROGRAM

## 2021-01-20 PROCEDURE — 71046 X-RAY EXAM CHEST 2 VIEWS: CPT | Mod: TC,PO

## 2021-01-20 PROCEDURE — 1159F PR MEDICATION LIST DOCUMENTED IN MEDICAL RECORD: ICD-10-PCS | Mod: S$GLB,,, | Performed by: STUDENT IN AN ORGANIZED HEALTH CARE EDUCATION/TRAINING PROGRAM

## 2021-01-20 PROCEDURE — 3288F FALL RISK ASSESSMENT DOCD: CPT | Mod: CPTII,S$GLB,, | Performed by: STUDENT IN AN ORGANIZED HEALTH CARE EDUCATION/TRAINING PROGRAM

## 2021-01-20 PROCEDURE — 99999 PR PBB SHADOW E&M-EST. PATIENT-LVL V: CPT | Mod: PBBFAC,,, | Performed by: STUDENT IN AN ORGANIZED HEALTH CARE EDUCATION/TRAINING PROGRAM

## 2021-01-20 PROCEDURE — 3078F PR MOST RECENT DIASTOLIC BLOOD PRESSURE < 80 MM HG: ICD-10-PCS | Mod: CPTII,S$GLB,, | Performed by: STUDENT IN AN ORGANIZED HEALTH CARE EDUCATION/TRAINING PROGRAM

## 2021-01-20 PROCEDURE — 71046 X-RAY EXAM CHEST 2 VIEWS: CPT | Mod: 26,,, | Performed by: RADIOLOGY

## 2021-01-20 PROCEDURE — 3074F SYST BP LT 130 MM HG: CPT | Mod: CPTII,S$GLB,, | Performed by: STUDENT IN AN ORGANIZED HEALTH CARE EDUCATION/TRAINING PROGRAM

## 2021-01-20 PROCEDURE — 71046 XR CHEST PA AND LATERAL: ICD-10-PCS | Mod: 26,,, | Performed by: RADIOLOGY

## 2021-01-20 PROCEDURE — 1125F AMNT PAIN NOTED PAIN PRSNT: CPT | Mod: S$GLB,,, | Performed by: STUDENT IN AN ORGANIZED HEALTH CARE EDUCATION/TRAINING PROGRAM

## 2021-01-20 PROCEDURE — U0003 INFECTIOUS AGENT DETECTION BY NUCLEIC ACID (DNA OR RNA); SEVERE ACUTE RESPIRATORY SYNDROME CORONAVIRUS 2 (SARS-COV-2) (CORONAVIRUS DISEASE [COVID-19]), AMPLIFIED PROBE TECHNIQUE, MAKING USE OF HIGH THROUGHPUT TECHNOLOGIES AS DESCRIBED BY CMS-2020-01-R: HCPCS

## 2021-01-20 PROCEDURE — 99214 PR OFFICE/OUTPT VISIT, EST, LEVL IV, 30-39 MIN: ICD-10-PCS | Mod: S$GLB,,, | Performed by: STUDENT IN AN ORGANIZED HEALTH CARE EDUCATION/TRAINING PROGRAM

## 2021-01-20 PROCEDURE — 1101F PT FALLS ASSESS-DOCD LE1/YR: CPT | Mod: CPTII,S$GLB,, | Performed by: STUDENT IN AN ORGANIZED HEALTH CARE EDUCATION/TRAINING PROGRAM

## 2021-01-20 PROCEDURE — 1159F MED LIST DOCD IN RCRD: CPT | Mod: S$GLB,,, | Performed by: STUDENT IN AN ORGANIZED HEALTH CARE EDUCATION/TRAINING PROGRAM

## 2021-01-20 RX ORDER — SULFAMETHOXAZOLE AND TRIMETHOPRIM 800; 160 MG/1; MG/1
TABLET ORAL
COMMUNITY
Start: 2021-01-04 | End: 2022-03-03

## 2021-01-20 RX ORDER — TIZANIDINE 4 MG/1
TABLET ORAL
COMMUNITY
Start: 2020-12-03 | End: 2021-06-07

## 2021-01-20 RX ORDER — ZOLPIDEM TARTRATE 5 MG/1
TABLET ORAL
COMMUNITY
Start: 2021-01-04 | End: 2022-04-01 | Stop reason: CLARIF

## 2021-01-20 RX ORDER — ACYCLOVIR 400 MG/1
TABLET ORAL
COMMUNITY
Start: 2021-01-04

## 2021-01-20 RX ORDER — ALLOPURINOL 300 MG/1
TABLET ORAL
COMMUNITY
Start: 2021-01-04 | End: 2022-03-03

## 2021-01-20 RX ORDER — PREDNISONE 50 MG/1
TABLET ORAL
COMMUNITY
Start: 2021-01-04 | End: 2022-04-01 | Stop reason: CLARIF

## 2021-01-20 RX ORDER — ONDANSETRON 8 MG/1
TABLET, ORALLY DISINTEGRATING ORAL
COMMUNITY
Start: 2021-01-04 | End: 2022-04-01 | Stop reason: CLARIF

## 2021-01-20 RX ORDER — LIDOCAINE AND PRILOCAINE 25; 25 MG/G; MG/G
CREAM TOPICAL
COMMUNITY
Start: 2021-01-12

## 2021-01-22 LAB — SARS-COV-2 RNA RESP QL NAA+PROBE: NOT DETECTED

## 2021-04-23 RX ORDER — ALBUTEROL SULFATE 90 UG/1
2 AEROSOL, METERED RESPIRATORY (INHALATION) EVERY 6 HOURS PRN
Qty: 18 G | Refills: 11 | Status: SHIPPED | OUTPATIENT
Start: 2021-04-23

## 2021-06-07 RX ORDER — TIZANIDINE 4 MG/1
TABLET ORAL
Qty: 270 TABLET | Refills: 3 | Status: SHIPPED | OUTPATIENT
Start: 2021-06-07

## 2021-11-02 RX ORDER — NABUMETONE 750 MG/1
TABLET, FILM COATED ORAL
Qty: 180 TABLET | Refills: 1 | Status: SHIPPED | OUTPATIENT
Start: 2021-11-02

## 2021-12-15 RX ORDER — METOPROLOL SUCCINATE 25 MG/1
TABLET, EXTENDED RELEASE ORAL
Qty: 90 TABLET | Refills: 0 | Status: SHIPPED | OUTPATIENT
Start: 2021-12-15 | End: 2022-03-16

## 2021-12-23 RX ORDER — METOPROLOL SUCCINATE 25 MG/1
TABLET, EXTENDED RELEASE ORAL
Qty: 90 TABLET | Refills: 0 | OUTPATIENT
Start: 2021-12-23

## 2022-01-14 ENCOUNTER — PES CALL (OUTPATIENT)
Dept: ADMINISTRATIVE | Facility: CLINIC | Age: 82
End: 2022-01-14
Payer: MEDICARE

## 2022-01-25 ENCOUNTER — PATIENT MESSAGE (OUTPATIENT)
Dept: ADMINISTRATIVE | Facility: HOSPITAL | Age: 82
End: 2022-01-25
Payer: MEDICARE

## 2022-01-30 NOTE — TELEPHONE ENCOUNTER
No new care gaps identified.  Powered by [employer]. Reference number: [id number].   1/30/2022 7:40:54 AM CST

## 2022-02-07 DIAGNOSIS — C85.90 LYMPHOMA, UNSPECIFIED BODY REGION, UNSPECIFIED LYMPHOMA TYPE: Primary | ICD-10-CM

## 2022-02-07 RX ORDER — SERTRALINE HYDROCHLORIDE 100 MG/1
TABLET, FILM COATED ORAL
Qty: 90 TABLET | Refills: 0 | Status: SHIPPED | OUTPATIENT
Start: 2022-02-07

## 2022-02-08 NOTE — TELEPHONE ENCOUNTER
Refill Authorization Note   Elena Frances  is requesting a refill authorization.  Brief Assessment and Rationale for Refill:  Approve     Medication Therapy Plan:       Medication Reconciliation Completed: No   Comments:   --->Care Gap information included below if applicable.   Orders Placed This Encounter    sertraline (ZOLOFT) 100 MG tablet      Requested Prescriptions   Signed Prescriptions Disp Refills    sertraline (ZOLOFT) 100 MG tablet 90 tablet 0     Sig: TAKE 1 TABLET BY MOUTH EVERY DAY       Psychiatry:  Antidepressants - SSRI Passed - 1/30/2022  7:40 AM        Passed - Patient is at least 18 years old        Passed - Valid encounter within last 15 months     Recent Visits  Date Type Provider Dept   01/20/21 Office Visit Kale Hawkins MD [facility] Internal Medicine   11/23/20 Office Visit Michael Tinoco MD [facility]   09/08/20 Office Visit Michael Tinoco MD [facility]   Showing recent visits within past 720 days and meeting all other requirements  Future Appointments  No visits were found meeting these conditions.  Showing future appointments within next 150 days and meeting all other requirements      Future Appointments              In 1 week MD [facility] - Bone Marrow Transplant, [facility]    In 1 week Katelyn Dos Santos NP [place] [facility] - Internal Medicine, [place]                    Appointments  past 12m or future 3m with PCP    Date Provider   Last Visit   1/20/2021 Kale Hawkins MD   Next Visit   Visit date not found Kale Hawkins MD   ED visits in past 90 days: 0     Note composed:7:59 PM 02/07/2022

## 2022-02-14 ENCOUNTER — LAB VISIT (OUTPATIENT)
Dept: LAB | Facility: HOSPITAL | Age: 82
End: 2022-02-14
Attending: INTERNAL MEDICINE
Payer: MEDICARE

## 2022-02-14 ENCOUNTER — OFFICE VISIT (OUTPATIENT)
Dept: HEMATOLOGY/ONCOLOGY | Facility: CLINIC | Age: 82
End: 2022-02-14
Payer: MEDICARE

## 2022-02-14 VITALS
HEIGHT: 62 IN | HEART RATE: 79 BPM | TEMPERATURE: 98 F | SYSTOLIC BLOOD PRESSURE: 119 MMHG | WEIGHT: 162.13 LBS | DIASTOLIC BLOOD PRESSURE: 63 MMHG | OXYGEN SATURATION: 100 % | BODY MASS INDEX: 29.83 KG/M2 | RESPIRATION RATE: 16 BRPM

## 2022-02-14 DIAGNOSIS — C83.32 DIFFUSE LARGE B-CELL LYMPHOMA OF INTRATHORACIC LYMPH NODES: Primary | ICD-10-CM

## 2022-02-14 DIAGNOSIS — C83.32 DIFFUSE LARGE B-CELL LYMPHOMA OF INTRATHORACIC LYMPH NODES: ICD-10-CM

## 2022-02-14 DIAGNOSIS — D50.9 IRON DEFICIENCY ANEMIA, UNSPECIFIED IRON DEFICIENCY ANEMIA TYPE: ICD-10-CM

## 2022-02-14 LAB
ALBUMIN SERPL BCP-MCNC: 3.7 G/DL (ref 3.5–5.2)
ALP SERPL-CCNC: 150 U/L (ref 55–135)
ALT SERPL W/O P-5'-P-CCNC: 13 U/L (ref 10–44)
ANION GAP SERPL CALC-SCNC: 11 MMOL/L (ref 8–16)
AST SERPL-CCNC: 15 U/L (ref 10–40)
BASOPHILS # BLD AUTO: 0.03 K/UL (ref 0–0.2)
BASOPHILS NFR BLD: 0.4 % (ref 0–1.9)
BILIRUB SERPL-MCNC: 0.2 MG/DL (ref 0.1–1)
BUN SERPL-MCNC: 19 MG/DL (ref 8–23)
CALCIUM SERPL-MCNC: 10.1 MG/DL (ref 8.7–10.5)
CHLORIDE SERPL-SCNC: 101 MMOL/L (ref 95–110)
CO2 SERPL-SCNC: 27 MMOL/L (ref 23–29)
CREAT SERPL-MCNC: 0.8 MG/DL (ref 0.5–1.4)
DIFFERENTIAL METHOD: ABNORMAL
EOSINOPHIL # BLD AUTO: 0.1 K/UL (ref 0–0.5)
EOSINOPHIL NFR BLD: 1.1 % (ref 0–8)
ERYTHROCYTE [DISTWIDTH] IN BLOOD BY AUTOMATED COUNT: 17.9 % (ref 11.5–14.5)
EST. GFR  (AFRICAN AMERICAN): >60 ML/MIN/1.73 M^2
EST. GFR  (NON AFRICAN AMERICAN): >60 ML/MIN/1.73 M^2
GLUCOSE SERPL-MCNC: 95 MG/DL (ref 70–110)
HCT VFR BLD AUTO: 36.8 % (ref 37–48.5)
HGB BLD-MCNC: 11.6 G/DL (ref 12–16)
IMM GRANULOCYTES # BLD AUTO: 0.02 K/UL (ref 0–0.04)
IMM GRANULOCYTES NFR BLD AUTO: 0.3 % (ref 0–0.5)
LDH SERPL L TO P-CCNC: 199 U/L (ref 110–260)
LYMPHOCYTES # BLD AUTO: 1.7 K/UL (ref 1–4.8)
LYMPHOCYTES NFR BLD: 20.9 % (ref 18–48)
MCH RBC QN AUTO: 28.2 PG (ref 27–31)
MCHC RBC AUTO-ENTMCNC: 31.5 G/DL (ref 32–36)
MCV RBC AUTO: 89 FL (ref 82–98)
MONOCYTES # BLD AUTO: 0.7 K/UL (ref 0.3–1)
MONOCYTES NFR BLD: 9 % (ref 4–15)
NEUTROPHILS # BLD AUTO: 5.4 K/UL (ref 1.8–7.7)
NEUTROPHILS NFR BLD: 68.3 % (ref 38–73)
NRBC BLD-RTO: 0 /100 WBC
PLATELET # BLD AUTO: 253 K/UL (ref 150–450)
PMV BLD AUTO: 9.4 FL (ref 9.2–12.9)
POTASSIUM SERPL-SCNC: 4.6 MMOL/L (ref 3.5–5.1)
PROT SERPL-MCNC: 7.2 G/DL (ref 6–8.4)
RBC # BLD AUTO: 4.12 M/UL (ref 4–5.4)
SODIUM SERPL-SCNC: 139 MMOL/L (ref 136–145)
T3 SERPL-MCNC: 97 NG/DL (ref 60–180)
T4 FREE SERPL-MCNC: 0.89 NG/DL (ref 0.71–1.51)
TSH SERPL DL<=0.005 MIU/L-ACNC: 1.08 UIU/ML (ref 0.4–4)
WBC # BLD AUTO: 7.9 K/UL (ref 3.9–12.7)

## 2022-02-14 PROCEDURE — 99417 PR PROLONGED SVC, OUTPT, W/WO DIRECT PT CONTACT,  EA ADDTL 15 MIN: ICD-10-PCS | Mod: S$GLB,,, | Performed by: INTERNAL MEDICINE

## 2022-02-14 PROCEDURE — 1101F PR PT FALLS ASSESS DOC 0-1 FALLS W/OUT INJ PAST YR: ICD-10-PCS | Mod: CPTII,S$GLB,, | Performed by: INTERNAL MEDICINE

## 2022-02-14 PROCEDURE — 80053 COMPREHEN METABOLIC PANEL: CPT | Performed by: INTERNAL MEDICINE

## 2022-02-14 PROCEDURE — 3074F SYST BP LT 130 MM HG: CPT | Mod: CPTII,S$GLB,, | Performed by: INTERNAL MEDICINE

## 2022-02-14 PROCEDURE — 84443 ASSAY THYROID STIM HORMONE: CPT | Mod: GA | Performed by: INTERNAL MEDICINE

## 2022-02-14 PROCEDURE — 99999 PR PBB SHADOW E&M-EST. PATIENT-LVL IV: CPT | Mod: PBBFAC,,, | Performed by: INTERNAL MEDICINE

## 2022-02-14 PROCEDURE — 84480 ASSAY TRIIODOTHYRONINE (T3): CPT | Performed by: INTERNAL MEDICINE

## 2022-02-14 PROCEDURE — 1126F AMNT PAIN NOTED NONE PRSNT: CPT | Mod: CPTII,S$GLB,, | Performed by: INTERNAL MEDICINE

## 2022-02-14 PROCEDURE — 99215 PR OFFICE/OUTPT VISIT, EST, LEVL V, 40-54 MIN: ICD-10-PCS | Mod: S$GLB,,, | Performed by: INTERNAL MEDICINE

## 2022-02-14 PROCEDURE — 99215 OFFICE O/P EST HI 40 MIN: CPT | Mod: S$GLB,,, | Performed by: INTERNAL MEDICINE

## 2022-02-14 PROCEDURE — 3078F PR MOST RECENT DIASTOLIC BLOOD PRESSURE < 80 MM HG: ICD-10-PCS | Mod: CPTII,S$GLB,, | Performed by: INTERNAL MEDICINE

## 2022-02-14 PROCEDURE — 1101F PT FALLS ASSESS-DOCD LE1/YR: CPT | Mod: CPTII,S$GLB,, | Performed by: INTERNAL MEDICINE

## 2022-02-14 PROCEDURE — 3288F FALL RISK ASSESSMENT DOCD: CPT | Mod: CPTII,S$GLB,, | Performed by: INTERNAL MEDICINE

## 2022-02-14 PROCEDURE — 99999 PR PBB SHADOW E&M-EST. PATIENT-LVL IV: ICD-10-PCS | Mod: PBBFAC,,, | Performed by: INTERNAL MEDICINE

## 2022-02-14 PROCEDURE — 99417 PROLNG OP E/M EACH 15 MIN: CPT | Mod: S$GLB,,, | Performed by: INTERNAL MEDICINE

## 2022-02-14 PROCEDURE — 1159F PR MEDICATION LIST DOCUMENTED IN MEDICAL RECORD: ICD-10-PCS | Mod: CPTII,S$GLB,, | Performed by: INTERNAL MEDICINE

## 2022-02-14 PROCEDURE — 3078F DIAST BP <80 MM HG: CPT | Mod: CPTII,S$GLB,, | Performed by: INTERNAL MEDICINE

## 2022-02-14 PROCEDURE — 84439 ASSAY OF FREE THYROXINE: CPT | Mod: GA | Performed by: INTERNAL MEDICINE

## 2022-02-14 PROCEDURE — 1126F PR PAIN SEVERITY QUANTIFIED, NO PAIN PRESENT: ICD-10-PCS | Mod: CPTII,S$GLB,, | Performed by: INTERNAL MEDICINE

## 2022-02-14 PROCEDURE — 36415 COLL VENOUS BLD VENIPUNCTURE: CPT | Performed by: INTERNAL MEDICINE

## 2022-02-14 PROCEDURE — 3074F PR MOST RECENT SYSTOLIC BLOOD PRESSURE < 130 MM HG: ICD-10-PCS | Mod: CPTII,S$GLB,, | Performed by: INTERNAL MEDICINE

## 2022-02-14 PROCEDURE — 83615 LACTATE (LD) (LDH) ENZYME: CPT | Performed by: INTERNAL MEDICINE

## 2022-02-14 PROCEDURE — 3288F PR FALLS RISK ASSESSMENT DOCUMENTED: ICD-10-PCS | Mod: CPTII,S$GLB,, | Performed by: INTERNAL MEDICINE

## 2022-02-14 PROCEDURE — 85025 COMPLETE CBC W/AUTO DIFF WBC: CPT | Performed by: INTERNAL MEDICINE

## 2022-02-14 PROCEDURE — 1159F MED LIST DOCD IN RCRD: CPT | Mod: CPTII,S$GLB,, | Performed by: INTERNAL MEDICINE

## 2022-02-14 RX ORDER — LEVOCETIRIZINE DIHYDROCHLORIDE 5 MG/1
5 TABLET, FILM COATED ORAL NIGHTLY
COMMUNITY
Start: 2022-01-20

## 2022-02-14 NOTE — PROGRESS NOTES
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 Has recovered from therapy and wishes to consolidate her care to [facility].     Hematologic History:  --Work up started with back pain, MRI showed multiple vertebral sclerotic bony lesions  --Bone biopsy was most consistent with DLBCL  --PET 2020 showed additional bone lesions, including the left scapula. Biopsy showed a kappa restricted B- cell lymphoma that is negative for CD5 and CD1-.   --Slides reviewed at [facility] confirmed GCB-DLBCL in L5  --Received R-CHOP x 6 in a CR      PAST MEDICAL HISTORY:   Past Medical History:   Diagnosis Date    Allergy     Anemia     Arthritis     Asthma     Depression     Generalized headaches 2014    Goiter     MNG    HTN (hypertension), benign     Hyperlipidemia     Hyperparathyroidism     s/p surgery    Intestinal obstruction     resolved spontaneously    Lumbar disc disease     s/p epidural shots    Nuclear sclerosis - Both Eyes 3/11/2014    Osteoporosis, post-menopausal     with 3 rib fractures       PAST SURGICAL HISTORY:   Past Surgical History:   Procedure Laterality Date    APPENDECTOMY      BILATERAL OOPHORECTOMY      BONE MARROW BIOPSY N/A 3/5/2020    Procedure: Biopsy-bone marrow;  Surgeon: Eve Alvarez MD;  Location: [facility] OR [address] [place] [address];  Service: Oncology;  Laterality: N/A;    BREAST CYST EXCISION      left    CATARACT EXTRACTION W/  INTRAOCULAR LENS IMPLANT Right 2016    Dr. Fang ([place])    CATARACT EXTRACTION W/  INTRAOCULAR LENS IMPLANT Left 10/17/2016    Dr. Fang (Toric)     SECTION, CLASSIC      x 1    CHOLECYSTECTOMY      COLONOSCOPY N/A 3/1/2016    Procedure: COLONOSCOPY;  Surgeon: Dony Bronson MD;  Location:  [facility] ENDO (4TH FLR);  Service: Endoscopy;  Laterality: N/A;  PM Prep    HYSTERECTOMY      JOINT REPLACEMENT      KNEE SURGERY Right 2017    TKR    LUMBAR EPIDURAL INJECTION      x 4    MOUTH SURGERY      for abscess drainage of gum of frontal teeth    PARATHYROID GLAND SURGERY      for parathyroid adenoma    TOTAL KNEE ARTHROPLASTY Left 2019    Procedure: ARTHROPLASTY, KNEE, TOTAL-DEPUY-SIGMA;  Surgeon: Newton Rodriguez III, MD;  Location: [facility] OR 2ND FLR;  Service: Orthopedics;  Laterality: Left;    TRANSFORAMINAL EPIDURAL INJECTION OF STEROID Left 2019    Procedure: INJECTION, STEROID, EPIDURAL, TRANSFORAMINAL APPROACH, L3-L4 AND L4-L5;  Surgeon: Venancio Lopez MD;  Location: [facility] PAIN MGT;  Service: Pain Management;  Laterality: Left;       PAST SOCIAL HISTORY:   reports that she has never smoked. She has never used smokeless tobacco. She reports that she does not drink alcohol and does not use drugs.    FAMILY HISTORY:  Family History   Problem Relation Age of Onset    Heart disease Mother     Hypertension Mother     Heart attack Mother     Heart disease Sister          in their 50's    Heart failure Sister     Heart disease Brother         CABG in age 60's    Hyperlipidemia Brother     Heart disease Sister         in her 50's    Heart disease Sister         in her 50's    Heart disease Sister     Hypertension Sister     Hyperlipidemia Sister     Heart disease Brother         CABG in age 60's    Hyperlipidemia Brother     Thyroid disease Daughter     Amblyopia Neg Hx     Blindness Neg Hx     Cancer Neg Hx     Cataracts Neg Hx     Diabetes Neg Hx     Glaucoma Neg Hx     Macular degeneration Neg Hx     Retinal detachment Neg Hx     Strabismus Neg Hx     Stroke Neg Hx        CURRENT MEDICATIONS:   Current Outpatient Medications   Medication Sig    acetaminophen (TYLENOL) 650 MG TbSR Take 1 tablet (650 mg total) by mouth every 6 (six) hours as needed (pain).     acyclovir (ZOVIRAX) 400 MG tablet SMARTSI Tablet(s) By Mouth Twice Daily    albuterol (PROAIR HFA) 90 mcg/actuation inhaler Inhale 2 puffs into the lungs every 6 (six) hours as needed for Wheezing. Rescue    aspirin (ECOTRIN) 81 MG EC tablet Take 1 tablet (81 mg total) by mouth 2 (two) times daily.    atorvastatin (LIPITOR) 80 MG tablet TAKE 1 TABLET (80 MG TOTAL) BY MOUTH ONCE DAILY.    diclofenac sodium (VOLTAREN) 1 % Gel     fluticasone propionate (FLONASE) 50 mcg/actuation nasal spray INHALE 2 SPRAYS (100 MCG TOTAL) IN EACH NOSTRIL ONCE DAILY.    fluticasone-salmeterol diskus inhaler 100-50 mcg TAKE 1 PUFF BY MOUTH TWICE A DAY    gabapentin (NEURONTIN) 100 MG capsule Take 3 capsules three times daily as instructed    levocetirizine (XYZAL) 5 MG tablet Take 5 mg by mouth nightly.    lidocaine-prilocaine (EMLA) cream USE AS DIRECTED ONE HOUR BEFORE CHEMOTHERAPY EXTERNALLY    metoprolol succinate (TOPROL-XL) 25 MG 24 hr tablet TAKE 1 TABLET BY MOUTH EVERY DAY    nabumetone (RELAFEN) 750 MG tablet TAKE 1 TABLET BY MOUTH TWICE A DAY    predniSONE (DELTASONE) 50 MG Tab SMARTSI Tablet(s) By Mouth Every Morning    tiZANidine (ZANAFLEX) 4 MG tablet TAKE 1.5 TABLETS (6MG) BY MOUTH 3 TIMES A DAY AS NEEDED FOR MUSCLE SPASM    valsartan (DIOVAN) 320 MG tablet Take 1 tablet (320 mg total) by mouth once daily.    zolpidem (AMBIEN) 5 MG Tab SMARTSI Tablet(s) By Mouth Every Night    allopurinoL (ZYLOPRIM) 300 MG tablet SMARTSI Tablet(s) By Mouth Daily    blood-glucose meter ([employer] BLOOD GLUCOSE SYSTM) kit Use as instructed (Patient not taking: Reported on 2022)    flu vac 2020 65up-[id number],PF, (FLUAD QUAD -21,65Y UP,,PF,) 60 mcg (15 mcg x 4)/0.5 mL Syrg inject 0.5 ml into the muscle for one dose    montelukast (SINGULAIR) 10 mg tablet TAKE 1 TABLET BY MOUTH EVERY DAY IN THE EVENING (Patient not taking: Reported on 2022)    omeprazole (PRILOSEC) 40 MG capsule TAKE 1 CAPSULE  BY MOUTH EVERY DAY (Patient not taking: Reported on 2022)    ondansetron (ZOFRAN-ODT) 8 MG TbDL SMARTSI Tablet(s) By Mouth Every 8 Hours PRN    polyethylene glycol 3350 (MIRALAX ORAL) Take by mouth as needed.    sertraline (ZOLOFT) 100 MG tablet TAKE 1 TABLET BY MOUTH EVERY DAY (Patient not taking: Reported on 2022)    sulfamethoxazole-trimethoprim 800-160mg (BACTRIM DS) 800-160 mg Tab SMARTSI Tablet(s) By Mouth 3 Times a Week    traMADoL (ULTRAM) 50 mg tablet Take 1 tablet (50 mg total) by mouth every 8 (eight) hours as needed for Pain. (Patient not taking: No sig reported)    varicella-zoster [id number]-AS01B, PF, (SHINGRIX, PF,) 50 mcg/0.5 mL injection Inject into the muscle.     No current facility-administered medications for this visit.     ALLERGIES:   Review of patient's allergies indicates:   Allergen Reactions    Vicodin [hydrocodone-acetaminophen] Anxiety         Review of Systems:     Review of Systems   Constitutional: Negative for appetite change, chills, diaphoresis, fatigue, fever and unexpected weight change.   HENT:   Negative for hearing loss, mouth sores, nosebleeds, sore throat, trouble swallowing and voice change.    Eyes: Negative for eye problems and icterus.   Respiratory: Negative for chest tightness, cough, hemoptysis, shortness of breath and wheezing.    Cardiovascular: Negative for chest pain, leg swelling and palpitations.   Gastrointestinal: Negative for abdominal distention, abdominal pain, blood in stool, diarrhea, nausea and vomiting.   Endocrine: Negative for hot flashes.   Genitourinary: Negative for bladder incontinence, difficulty urinating, dysuria and hematuria.    Musculoskeletal: Negative for arthralgias, back pain, flank pain, gait problem, myalgias, neck pain and neck stiffness.   Skin: Negative for itching, rash and wound.   Neurological: Negative for dizziness, extremity weakness, gait problem, headaches, numbness, seizures and speech difficulty.    Hematological: Negative for adenopathy. Does not bruise/bleed easily.   Psychiatric/Behavioral: Negative for confusion, depression and sleep disturbance. The patient is not nervous/anxious.           Physical Exam:     Vitals:    02/14/22 0947   BP: 119/63   Pulse: 79   Resp: 16   Temp: 98.3 °F (36.8 °C)     Physical Exam  Constitutional:       General: She is not in acute distress.     Appearance: She is well-developed. She is not diaphoretic.   HENT:      Head: Normocephalic and atraumatic.      Mouth/Throat:      Pharynx: No oropharyngeal exudate.   Eyes:      Conjunctiva/sclera: Conjunctivae normal.      Pupils: Pupils are equal, round, and reactive to light.   Neck:      Thyroid: No thyromegaly.      Vascular: No JVD.      Trachea: No tracheal deviation.   Cardiovascular:      Rate and Rhythm: Normal rate and regular rhythm.      Heart sounds: Normal heart sounds. No murmur heard.    No friction rub.   Pulmonary:      Effort: Pulmonary effort is normal. No respiratory distress.      Breath sounds: Normal breath sounds. No stridor. No wheezing or rales.   Chest:      Chest wall: No tenderness.   Abdominal:      General: Bowel sounds are normal. There is no distension.      Palpations: Abdomen is soft.      Tenderness: There is no abdominal tenderness. There is no guarding or rebound.   Musculoskeletal:         General: No tenderness or deformity. Normal range of motion.      Cervical back: Normal range of motion and neck supple.   Skin:     General: Skin is warm and dry.      Capillary Refill: Capillary refill takes less than 2 seconds.      Coloration: Skin is not pale.      Findings: No erythema or rash.   Neurological:      Mental Status: She is alert and oriented to person, place, and time.      Cranial Nerves: No cranial nerve deficit.      Sensory: No sensory deficit.      Motor: No abnormal muscle tone.      Coordination: Coordination normal.      Deep Tendon Reflexes: Reflexes normal.   Psychiatric:          Behavior: Behavior normal.         Thought Content: Thought content normal.         Judgment: Judgment normal.         ECOG Performance Status: (foot note - ECOG PS provided by Eastern Cooperative Oncology Group) 1 - Symptomatic but completely ambulatory    Karnofsky Performance Score:  90%- Able to Carry on Normal Activity: Minor Symptoms of Disease    Labs:   Lab Results   Component Value Date    WBC 7.90 02/14/2022    HGB 11.6 (L) 02/14/2022    HCT 36.8 (L) 02/14/2022     02/14/2022    CHOL 179 07/09/2018    TRIG 91 07/09/2018    HDL 60 07/09/2018    ALT 13 02/14/2022    AST 15 02/14/2022     02/14/2022    K 4.6 02/14/2022     02/14/2022    CREATININE 0.8 02/14/2022    BUN 19 02/14/2022    CO2 27 02/14/2022    TSH 1.079 02/14/2022    INR 1.0 10/07/2020    HGBA1C 6.2 (H) 06/03/2019       Imaging: Previous imaging has been reviewed   Assessment and Plan:     Ms. Frances is a pleasant 81 year old with DLBCL successfully treated in 2021.    Diffuse Large B Cell Lymphoma  --Treated at [facility] with R-CHOP x 6  --Due for imaging in June 2022  --Denies B symptoms    Full conversation was conducted through an in person     60 minutes were spent face to face with the patient and her  family to discuss the disease, natural history, treatment options and survival statistics. I have provided the patient with an opportunity to ask questions and have all questions answered to her satisfaction.       she will return to clinic in June, but knows to call in the interim if symptoms change or should a problem arise.        Luh Kirby MD  Hematology and Medical Oncology  Bone Marrow Transplant  [facility]

## 2022-02-16 DIAGNOSIS — Z45.2 ENCOUNTER FOR REMOVAL OF TUNNELED CENTRAL VENOUS CATHETER (CVC) WITH PORT: Primary | ICD-10-CM

## 2022-02-16 DIAGNOSIS — Z85.72 HISTORY OF LYMPHOMA: ICD-10-CM

## 2022-02-21 DIAGNOSIS — C83.32 DIFFUSE LARGE B-CELL LYMPHOMA OF INTRATHORACIC LYMPH NODES: ICD-10-CM

## 2022-02-21 DIAGNOSIS — Z85.72 HISTORY OF LYMPHOMA: ICD-10-CM

## 2022-02-21 DIAGNOSIS — Z45.2 ENCOUNTER FOR REMOVAL OF TUNNELED CENTRAL VENOUS CATHETER (CVC) WITH PORT: Primary | ICD-10-CM

## 2022-02-28 ENCOUNTER — LAB VISIT (OUTPATIENT)
Dept: LAB | Facility: HOSPITAL | Age: 82
End: 2022-02-28
Attending: INTERNAL MEDICINE
Payer: MEDICARE

## 2022-02-28 DIAGNOSIS — C85.90 LYMPHOMA, UNSPECIFIED BODY REGION, UNSPECIFIED LYMPHOMA TYPE: ICD-10-CM

## 2022-02-28 PROCEDURE — 88323 CONSLTJ&REPRT MATRL PREP SLD: CPT | Performed by: PATHOLOGY

## 2022-02-28 PROCEDURE — 88342 CHG IMMUNOCYTOCHEMISTRY: ICD-10-PCS | Mod: 26,59,, | Performed by: PATHOLOGY

## 2022-02-28 PROCEDURE — 88321 CONSLTJ&REPRT SLD PREP ELSWR: CPT | Performed by: PATHOLOGY

## 2022-02-28 PROCEDURE — 88341 IMHCHEM/IMCYTCHM EA ADD ANTB: CPT | Mod: 26,59,, | Performed by: PATHOLOGY

## 2022-02-28 PROCEDURE — 88323 PR  MICROSLIDE CONSULT W SLIDE PREP: ICD-10-PCS | Mod: 26,,, | Performed by: PATHOLOGY

## 2022-02-28 PROCEDURE — 88341 PR IHC OR ICC EACH ADD'L SINGLE ANTIBODY  STAINPR: ICD-10-PCS | Mod: 26,59,, | Performed by: PATHOLOGY

## 2022-02-28 PROCEDURE — 88342 IMHCHEM/IMCYTCHM 1ST ANTB: CPT | Mod: 26,59,, | Performed by: PATHOLOGY

## 2022-02-28 PROCEDURE — 88341 IMHCHEM/IMCYTCHM EA ADD ANTB: CPT | Mod: 59 | Performed by: PATHOLOGY

## 2022-02-28 PROCEDURE — 88323 CONSLTJ&REPRT MATRL PREP SLD: CPT | Mod: 26,,, | Performed by: PATHOLOGY

## 2022-02-28 PROCEDURE — 88342 IMHCHEM/IMCYTCHM 1ST ANTB: CPT | Performed by: PATHOLOGY

## 2022-03-03 ENCOUNTER — OFFICE VISIT (OUTPATIENT)
Dept: INTERVENTIONAL RADIOLOGY/VASCULAR | Facility: CLINIC | Age: 82
End: 2022-03-03
Payer: MEDICARE

## 2022-03-03 VITALS
HEART RATE: 84 BPM | BODY MASS INDEX: 30.85 KG/M2 | DIASTOLIC BLOOD PRESSURE: 77 MMHG | SYSTOLIC BLOOD PRESSURE: 131 MMHG | HEIGHT: 61 IN | WEIGHT: 163.38 LBS

## 2022-03-03 DIAGNOSIS — C83.30 DIFFUSE LARGE B-CELL LYMPHOMA, UNSPECIFIED BODY REGION: Primary | ICD-10-CM

## 2022-03-03 DIAGNOSIS — Z01.812 PRE-PROCEDURE LAB EXAM: ICD-10-CM

## 2022-03-03 PROCEDURE — 99999 PR PBB SHADOW E&M-EST. PATIENT-LVL IV: ICD-10-PCS | Mod: PBBFAC,,, | Performed by: FAMILY MEDICINE

## 2022-03-03 PROCEDURE — 99999 PR PBB SHADOW E&M-EST. PATIENT-LVL IV: CPT | Mod: PBBFAC,,, | Performed by: FAMILY MEDICINE

## 2022-03-03 PROCEDURE — 3075F SYST BP GE 130 - 139MM HG: CPT | Mod: CPTII,S$GLB,, | Performed by: FAMILY MEDICINE

## 2022-03-03 PROCEDURE — 1159F PR MEDICATION LIST DOCUMENTED IN MEDICAL RECORD: ICD-10-PCS | Mod: CPTII,S$GLB,, | Performed by: FAMILY MEDICINE

## 2022-03-03 PROCEDURE — 3078F PR MOST RECENT DIASTOLIC BLOOD PRESSURE < 80 MM HG: ICD-10-PCS | Mod: CPTII,S$GLB,, | Performed by: FAMILY MEDICINE

## 2022-03-03 PROCEDURE — 99204 OFFICE O/P NEW MOD 45 MIN: CPT | Mod: S$GLB,CS,, | Performed by: FAMILY MEDICINE

## 2022-03-03 PROCEDURE — 3078F DIAST BP <80 MM HG: CPT | Mod: CPTII,S$GLB,, | Performed by: FAMILY MEDICINE

## 2022-03-03 PROCEDURE — 3075F PR MOST RECENT SYSTOLIC BLOOD PRESS GE 130-139MM HG: ICD-10-PCS | Mod: CPTII,S$GLB,, | Performed by: FAMILY MEDICINE

## 2022-03-03 PROCEDURE — 1160F PR REVIEW ALL MEDS BY PRESCRIBER/CLIN PHARMACIST DOCUMENTED: ICD-10-PCS | Mod: CPTII,S$GLB,, | Performed by: FAMILY MEDICINE

## 2022-03-03 PROCEDURE — 1159F MED LIST DOCD IN RCRD: CPT | Mod: CPTII,S$GLB,, | Performed by: FAMILY MEDICINE

## 2022-03-03 PROCEDURE — 1160F RVW MEDS BY RX/DR IN RCRD: CPT | Mod: CPTII,S$GLB,, | Performed by: FAMILY MEDICINE

## 2022-03-03 PROCEDURE — 99204 PR OFFICE/OUTPT VISIT, NEW, LEVL IV, 45-59 MIN: ICD-10-PCS | Mod: S$GLB,CS,, | Performed by: FAMILY MEDICINE

## 2022-03-03 NOTE — PROGRESS NOTES
Subjective:       Patient ID: Elena Frances is a 82 y.o. female.    Chief Complaint: Vascular Access Problem    Language line utilized for duration of visit:  # [id number]. Patient referred to Interventional Radiology by Luh iKrby MD to discuss port removal. She reports port was placed approximately 2-3 years ago in her right chest wall at [facility]. She no longer needs chemotherapy and has transitioned her care here.     Review of Systems   Constitutional: Negative for activity change, appetite change, chills, fatigue and fever.   HENT: Negative for nasal congestion, drooling, ear discharge, postnasal drip, sneezing and trouble swallowing.    Eyes: Negative for pain, discharge, redness and itching.   Respiratory: Negative for cough, shortness of breath, wheezing and stridor.    Cardiovascular: Negative for chest pain, palpitations and leg swelling.   Gastrointestinal: Negative for abdominal distention, abdominal pain, constipation, diarrhea, nausea and vomiting.   Genitourinary: Negative for difficulty urinating, dysuria, frequency and urgency.   Musculoskeletal: Positive for arthralgias. Negative for back pain, gait problem, joint swelling, myalgias and neck pain.   Integumentary:  Negative for color change, pallor and rash.   Neurological: Negative for dizziness, weakness and headaches.         Objective:      Physical Exam  Vitals reviewed.   Constitutional:       General: She is not in acute distress.     Appearance: She is well-developed. She is not diaphoretic.   HENT:      Head: Normocephalic and atraumatic.      Right Ear: External ear normal.      Left Ear: External ear normal.      Nose: Nose normal.      Mouth/Throat:      Pharynx: No oropharyngeal exudate.   Eyes:      General: No scleral icterus.        Right eye: No discharge.         Left eye: No discharge.      Conjunctiva/sclera: Conjunctivae normal.      Pupils: Pupils are equal, round, and reactive to light.   Neck:       Thyroid: No thyromegaly.      Vascular: No JVD.      Trachea: No tracheal deviation.   Cardiovascular:      Rate and Rhythm: Normal rate and regular rhythm.      Heart sounds: Normal heart sounds. No murmur heard.    No friction rub. No gallop.   Pulmonary:      Effort: Pulmonary effort is normal. No respiratory distress.      Breath sounds: Normal breath sounds. No stridor. No wheezing or rales.   Chest:       Abdominal:      General: Bowel sounds are normal. There is no distension.      Palpations: Abdomen is soft. There is no mass.      Tenderness: There is no abdominal tenderness. There is no guarding or rebound.   Musculoskeletal:      Cervical back: Neck supple.   Lymphadenopathy:      Cervical: No cervical adenopathy.   Skin:     General: Skin is warm and dry.      Nails: There is no clubbing.   Neurological:      Mental Status: She is alert and oriented to person, place, and time.      Gait: Gait normal.         Assessment:       Problem List Items Addressed This Visit        Oncology    DLBCL (diffuse large B cell lymphoma) - Primary    Relevant Orders    IR Tunneled Cath Removal with Port    CBC Auto Differential    Protime-INR      Other Visit Diagnoses     Pre-procedure lab exam        Relevant Orders    COVID-19 Routine Screening          Plan:         Discussed case with Dr. Ugalde. Explained to patient can offer chest port removal. Discussed how the procedure will be performed, risks (including, but not limited to, pain, bleeding, infection, damage to nearby structures, and the need for additional procedures), benefits, possible complications, pre-post procedure expectations, and alternatives. The patient voices understanding and all questions have been answered.  The patient agrees to proceed as planned. Offered patient 3/17/2022 at our [facility] [place], operated by [facility] location and 3/18/2022 at [facility]. Patient would prefer a Monday and requests anesthesia, not moderate sedation. Patient scheduled for 4/4/2022.  Pre-procedure handout with clinic phone number provided. Patient scheduled for pre-procedure labs on 3/23/2022. Patient scheduled for COVID test on 4/1/2022.

## 2022-03-03 NOTE — Clinical Note
"Thank you for referring Ms. Frances to Interventional Radiology at the [facility]. Please don't hesitate to contact us if there are any questions regarding this evaluation at [phone]. If you have any other patients for whom you would like a consultation, please place an order for "[id number]", and we will be happy to review their case.  Sincerely, SHAMAR Veloz, FNP Interventional Radiology    "

## 2022-03-12 NOTE — TELEPHONE ENCOUNTER
Care Due:                  Date            Visit Type   Department     Provider  --------------------------------------------------------------------------------                                SAME DAY -                              ESTABLISHED   [facility] INTERNAL  Last Visit: 01-      PATIENT      MEDICINE       Kale Hawkins  Next Visit: None Scheduled  None         None Found                                                            Last  Test          Frequency    Reason                     Performed    Due Date  --------------------------------------------------------------------------------    Office Visit  12 months..  albuterol, montelukast,    01-   01-                             omeprazole, sertraline...    Powered by [employer]. Reference number: [id number].   3/12/2022 12:04:33 AM CST

## 2022-03-16 ENCOUNTER — PATIENT MESSAGE (OUTPATIENT)
Dept: ADMINISTRATIVE | Facility: HOSPITAL | Age: 82
End: 2022-03-16
Payer: MEDICARE

## 2022-03-16 RX ORDER — METOPROLOL SUCCINATE 25 MG/1
TABLET, EXTENDED RELEASE ORAL
Qty: 90 TABLET | Refills: 0 | Status: SHIPPED | OUTPATIENT
Start: 2022-03-16 | End: 2022-06-10

## 2022-03-16 NOTE — TELEPHONE ENCOUNTER
I spoke to pt's daughter and notified her the Rx below was sent to the pharmacy:   metoprolol succinate (TOPROL-XL) 25 MG 24 hr tablet         Sig: TAKE 1 TABLET BY MOUTH EVERY DAY    Disp:  90 tablet    Refills:  0 (Pharmacy requested: Not specified)    Start: 3/16/2022    Class: Normal    Authorized by: MELVI WRIGHT        [facility] be [id number] [address]       Pt is overdue for her annual visit. An appt was scheduled for 4-26-22 at 10:30.  An appt reminder will be mailed to pt's home.  Alexandra verbalized udnerstanding

## 2022-03-16 NOTE — TELEPHONE ENCOUNTER
Provider Staff:     Action is required for this patient.   Please see care gap opportunities below in Care Due Message.     Thanks!  [facility]     Appointments      Date Provider   Last Visit   1/20/2021 Kale Hawkins MD   Next Visit   Visit date not found Kale Hawkins MD     Note composed:10:37 AM 03/16/2022

## 2022-03-16 NOTE — TELEPHONE ENCOUNTER
Refill Authorization Note   Elena Frances  is requesting a refill authorization.  Brief Assessment and Rationale for Refill:  Approve    -Medication-Related Problems Identified: Requires appointment  Medication Therapy Plan:       Medication Reconciliation Completed: No   Comments:   --->Care Gap information included below if applicable.       Requested Prescriptions   Pending Prescriptions Disp Refills    metoprolol succinate (TOPROL-XL) 25 MG 24 hr tablet [Pharmacy Med Name: METOPROLOL SUCC ER 25 MG TAB] 90 tablet 0     Sig: TAKE 1 TABLET BY MOUTH EVERY DAY       Cardiovascular:  Beta Blockers Passed - 3/16/2022 10:25 AM        Passed - Patient is at least 18 years old        Passed - Last BP in normal range within 360 days     BP Readings from Last 3 Encounters:   03/03/22 131/77   02/14/22 119/63   01/20/21 128/64               Passed - Last Heart Rate in normal range within 360 days     Pulse Readings from Last 1 Encounters:   03/03/22 84              Passed - Valid encounter within last 15 months     Recent Visits  Date Type Provider Dept   01/20/21 Office Visit Kale Hawkins MD [facility] Internal Medicine   11/23/20 Office Visit Michael Tinoco MD [facility]   09/08/20 Office Visit Michael Tinoco MD [facility]   Showing recent visits within past 720 days and meeting all other requirements  Future Appointments  No visits were found meeting these conditions.  Showing future appointments within next 150 days and meeting all other requirements      Future Appointments              In 1 week LAB, APPOINTMENT Davy [facility] - Lab (Venipuncture), [facility]    In 2 weeks [facility] [id number] [facility] Interv Radiology - 2nd Fl, [facility]    In 2 months [facility] PET CT LIMIT 500 LBS [facility] - Lab & Imaging 2nd Fl, [facility]    In 2 months [facility] LAB BMT [facility] - Bone Marrow Lab, [facility]    In 2 months Luh Kirby MD [facility] - Bone Marrow Transplant, [facility]                     Appointments  past 12m or future 3m with PCP    Date Provider   Last Visit   1/20/2021 Kale Hawkins MD   Next Visit   Visit date not found Kale Hawkins MD   ED visits in past 90 days: 0     Note composed:10:34 AM 03/16/2022

## 2022-03-23 ENCOUNTER — LAB VISIT (OUTPATIENT)
Dept: LAB | Facility: HOSPITAL | Age: 82
End: 2022-03-23
Payer: MEDICARE

## 2022-03-23 DIAGNOSIS — C83.30 DIFFUSE LARGE B-CELL LYMPHOMA, UNSPECIFIED BODY REGION: ICD-10-CM

## 2022-03-23 LAB
BASOPHILS # BLD AUTO: 0.05 K/UL (ref 0–0.2)
BASOPHILS NFR BLD: 0.7 % (ref 0–1.9)
DIFFERENTIAL METHOD: ABNORMAL
EOSINOPHIL # BLD AUTO: 0.1 K/UL (ref 0–0.5)
EOSINOPHIL NFR BLD: 1.6 % (ref 0–8)
ERYTHROCYTE [DISTWIDTH] IN BLOOD BY AUTOMATED COUNT: 18.5 % (ref 11.5–14.5)
HCT VFR BLD AUTO: 39.7 % (ref 37–48.5)
HGB BLD-MCNC: 12.9 G/DL (ref 12–16)
IMM GRANULOCYTES # BLD AUTO: 0.03 K/UL (ref 0–0.04)
IMM GRANULOCYTES NFR BLD AUTO: 0.4 % (ref 0–0.5)
INR PPP: 1 (ref 0.8–1.2)
LYMPHOCYTES # BLD AUTO: 1.6 K/UL (ref 1–4.8)
LYMPHOCYTES NFR BLD: 23.1 % (ref 18–48)
MCH RBC QN AUTO: 30 PG (ref 27–31)
MCHC RBC AUTO-ENTMCNC: 32.5 G/DL (ref 32–36)
MCV RBC AUTO: 92 FL (ref 82–98)
MONOCYTES # BLD AUTO: 0.6 K/UL (ref 0.3–1)
MONOCYTES NFR BLD: 9 % (ref 4–15)
NEUTROPHILS # BLD AUTO: 4.5 K/UL (ref 1.8–7.7)
NEUTROPHILS NFR BLD: 65.2 % (ref 38–73)
NRBC BLD-RTO: 0 /100 WBC
PLATELET # BLD AUTO: 236 K/UL (ref 150–450)
PMV BLD AUTO: 10.4 FL (ref 9.2–12.9)
PROTHROMBIN TIME: 10.1 SEC (ref 9–12.5)
RBC # BLD AUTO: 4.3 M/UL (ref 4–5.4)
WBC # BLD AUTO: 6.97 K/UL (ref 3.9–12.7)

## 2022-03-23 PROCEDURE — 85025 COMPLETE CBC W/AUTO DIFF WBC: CPT | Performed by: FAMILY MEDICINE

## 2022-03-23 PROCEDURE — 36415 COLL VENOUS BLD VENIPUNCTURE: CPT | Performed by: FAMILY MEDICINE

## 2022-03-23 PROCEDURE — 85610 PROTHROMBIN TIME: CPT | Performed by: FAMILY MEDICINE

## 2022-04-01 ENCOUNTER — DOCUMENTATION ONLY (OUTPATIENT)
Dept: PREADMISSION TESTING | Facility: HOSPITAL | Age: 82
End: 2022-04-01
Payer: MEDICARE

## 2022-04-01 NOTE — PRE-PROCEDURE INSTRUCTIONS
PRE-OP INSTRUCTIONS:  Instructed patient to have no food,milk or milk products after midnight   It is ok to take AM medications with a few sips of water   Medication instructions for pm prior to and am of surgery reviewed.  Instructed patient to avoid taking vitamins,supplements,aspirin or ibuprofen the am of surgery.  Shower instructions provided    Patient denies any side effects or issues with anesthesia or sedation.

## 2022-04-04 ENCOUNTER — ANESTHESIA EVENT (OUTPATIENT)
Dept: INTERVENTIONAL RADIOLOGY/VASCULAR | Facility: HOSPITAL | Age: 82
End: 2022-04-04
Payer: MEDICARE

## 2022-04-04 ENCOUNTER — HOSPITAL ENCOUNTER (OUTPATIENT)
Dept: INTERVENTIONAL RADIOLOGY/VASCULAR | Facility: HOSPITAL | Age: 82
Discharge: HOME | End: 2022-04-04
Attending: FAMILY MEDICINE
Payer: MEDICARE

## 2022-04-04 VITALS
TEMPERATURE: 99 F | HEART RATE: 66 BPM | WEIGHT: 160 LBS | HEIGHT: 63 IN | SYSTOLIC BLOOD PRESSURE: 149 MMHG | BODY MASS INDEX: 28.35 KG/M2 | DIASTOLIC BLOOD PRESSURE: 69 MMHG | RESPIRATION RATE: 18 BRPM | OXYGEN SATURATION: 100 %

## 2022-04-04 DIAGNOSIS — C83.30 DIFFUSE LARGE B-CELL LYMPHOMA, UNSPECIFIED BODY REGION: ICD-10-CM

## 2022-04-04 PROCEDURE — 36590 REMOVAL TUNNELED CV CATH: CPT | Mod: RT | Performed by: RADIOLOGY

## 2022-04-04 PROCEDURE — 63600175 PHARM REV CODE 636 W HCPCS: Performed by: STUDENT IN AN ORGANIZED HEALTH CARE EDUCATION/TRAINING PROGRAM

## 2022-04-04 PROCEDURE — D9220A PRA ANESTHESIA: ICD-10-PCS | Mod: CRNA,,, | Performed by: STUDENT IN AN ORGANIZED HEALTH CARE EDUCATION/TRAINING PROGRAM

## 2022-04-04 PROCEDURE — A4550 SURGICAL TRAYS: HCPCS

## 2022-04-04 PROCEDURE — 36590 IR TUNNELED CATH REMOVAL W/PORT: ICD-10-PCS | Mod: RT,,, | Performed by: RADIOLOGY

## 2022-04-04 PROCEDURE — 37000008 HC ANESTHESIA 1ST 15 MINUTES

## 2022-04-04 PROCEDURE — D9220A PRA ANESTHESIA: ICD-10-PCS | Mod: ANES,,, | Performed by: ANESTHESIOLOGY

## 2022-04-04 PROCEDURE — 25000003 PHARM REV CODE 250: Performed by: STUDENT IN AN ORGANIZED HEALTH CARE EDUCATION/TRAINING PROGRAM

## 2022-04-04 PROCEDURE — 37000009 HC ANESTHESIA EA ADD 15 MINS

## 2022-04-04 PROCEDURE — 25000003 PHARM REV CODE 250: Performed by: FAMILY MEDICINE

## 2022-04-04 PROCEDURE — D9220A PRA ANESTHESIA: Mod: CRNA,,, | Performed by: STUDENT IN AN ORGANIZED HEALTH CARE EDUCATION/TRAINING PROGRAM

## 2022-04-04 PROCEDURE — D9220A PRA ANESTHESIA: Mod: ANES,,, | Performed by: ANESTHESIOLOGY

## 2022-04-04 RX ORDER — DEXMEDETOMIDINE HYDROCHLORIDE 100 UG/ML
INJECTION, SOLUTION INTRAVENOUS
Status: DISCONTINUED | OUTPATIENT
Start: 2022-04-04 | End: 2022-04-04

## 2022-04-04 RX ORDER — PROPOFOL 10 MG/ML
VIAL (ML) INTRAVENOUS
Status: DISCONTINUED | OUTPATIENT
Start: 2022-04-04 | End: 2022-04-04

## 2022-04-04 RX ORDER — HYDROMORPHONE HYDROCHLORIDE 1 MG/ML
0.2 INJECTION, SOLUTION INTRAMUSCULAR; INTRAVENOUS; SUBCUTANEOUS EVERY 5 MIN PRN
Status: DISCONTINUED | OUTPATIENT
Start: 2022-04-04 | End: 2022-04-05 | Stop reason: HOSPADM

## 2022-04-04 RX ORDER — MIDAZOLAM HYDROCHLORIDE 1 MG/ML
INJECTION INTRAMUSCULAR; INTRAVENOUS
Status: DISCONTINUED | OUTPATIENT
Start: 2022-04-04 | End: 2022-04-04

## 2022-04-04 RX ORDER — ONDANSETRON 2 MG/ML
4 INJECTION INTRAMUSCULAR; INTRAVENOUS EVERY 6 HOURS PRN
Status: DISCONTINUED | OUTPATIENT
Start: 2022-04-04 | End: 2022-04-05 | Stop reason: HOSPADM

## 2022-04-04 RX ORDER — PROPOFOL 10 MG/ML
VIAL (ML) INTRAVENOUS CONTINUOUS PRN
Status: DISCONTINUED | OUTPATIENT
Start: 2022-04-04 | End: 2022-04-04

## 2022-04-04 RX ORDER — SODIUM CHLORIDE 9 MG/ML
INJECTION, SOLUTION INTRAVENOUS CONTINUOUS
Status: DISCONTINUED | OUTPATIENT
Start: 2022-04-04 | End: 2022-04-05 | Stop reason: HOSPADM

## 2022-04-04 RX ORDER — LIDOCAINE HYDROCHLORIDE 20 MG/ML
INJECTION INTRAVENOUS
Status: DISCONTINUED | OUTPATIENT
Start: 2022-04-04 | End: 2022-04-04

## 2022-04-04 RX ORDER — ONDANSETRON 2 MG/ML
INJECTION INTRAMUSCULAR; INTRAVENOUS
Status: DISCONTINUED | OUTPATIENT
Start: 2022-04-04 | End: 2022-04-04

## 2022-04-04 RX ORDER — FENTANYL CITRATE 50 UG/ML
25 INJECTION, SOLUTION INTRAMUSCULAR; INTRAVENOUS EVERY 5 MIN PRN
Status: DISCONTINUED | OUTPATIENT
Start: 2022-04-04 | End: 2022-04-05 | Stop reason: HOSPADM

## 2022-04-04 RX ORDER — LIDOCAINE HYDROCHLORIDE 10 MG/ML
1 INJECTION, SOLUTION EPIDURAL; INFILTRATION; INTRACAUDAL; PERINEURAL ONCE
Status: DISCONTINUED | OUTPATIENT
Start: 2022-04-04 | End: 2022-04-05 | Stop reason: HOSPADM

## 2022-04-04 RX ORDER — ONDANSETRON 2 MG/ML
4 INJECTION INTRAMUSCULAR; INTRAVENOUS ONCE AS NEEDED
Status: DISCONTINUED | OUTPATIENT
Start: 2022-04-04 | End: 2022-04-05 | Stop reason: HOSPADM

## 2022-04-04 RX ADMIN — MIDAZOLAM HYDROCHLORIDE 1 MG: 1 INJECTION, SOLUTION INTRAMUSCULAR; INTRAVENOUS at 02:04

## 2022-04-04 RX ADMIN — CEFAZOLIN 1 G: 330 INJECTION, POWDER, FOR SOLUTION INTRAMUSCULAR; INTRAVENOUS at 03:04

## 2022-04-04 RX ADMIN — ONDANSETRON 4 MG: 2 INJECTION, SOLUTION INTRAMUSCULAR; INTRAVENOUS at 03:04

## 2022-04-04 RX ADMIN — DEXMEDETOMIDINE HYDROCHLORIDE 8 MCG: 100 INJECTION, SOLUTION, CONCENTRATE INTRAVENOUS at 03:04

## 2022-04-04 RX ADMIN — LIDOCAINE HYDROCHLORIDE 60 MG: 20 INJECTION, SOLUTION INTRAVENOUS at 02:04

## 2022-04-04 RX ADMIN — SODIUM CHLORIDE: 9 INJECTION, SOLUTION INTRAVENOUS at 02:04

## 2022-04-04 RX ADMIN — PROPOFOL 100 MCG/KG/MIN: 10 INJECTION, EMULSION INTRAVENOUS at 02:04

## 2022-04-04 RX ADMIN — PROPOFOL 40 MG: 10 INJECTION, EMULSION INTRAVENOUS at 02:04

## 2022-04-04 NOTE — TRANSFER OF CARE
"Anesthesia Transfer of Care Note    Patient: Elena Frances    Procedure(s) Performed: * No procedures listed *    Patient location: [facility]    Anesthesia Type: general    Transport from OR: Transported from OR on 6-10 L/min O2 by face mask with adequate spontaneous ventilation    Post pain: adequate analgesia    Post assessment: no apparent anesthetic complications and tolerated procedure well    Post vital signs: stable    Level of consciousness: awake, alert and oriented    Nausea/Vomiting: no nausea/vomiting    Complications: none    Transfer of care protocol was followed      Last vitals:   Visit Vitals  BP (!) 135/63 (BP Location: Left arm, Patient Position: Lying)   Pulse 71   Temp 36.4 °C (97.5 °F) (Temporal)   Resp 20   Ht 5' 3" (1.6 m)   Wt 72.6 kg (160 lb)   SpO2 99%   Breastfeeding No   BMI 28.34 kg/m²     "

## 2022-04-04 NOTE — PLAN OF CARE
Patient states they are ready to be discharged. Instructions given to patient and family. Both verbalize understanding. Patient tolerating po liquids with no difficulty. Patient states pain is at a tolerable level for them. Anesthesia consent and surgical consent in chart upon patient's discharge from [facility].

## 2022-04-04 NOTE — H&P
Radiology History & Physical      SUBJECTIVE:     Chief Complaint: port removal    History of Present Illness:  Elena Frances is a 82 y.o. female with history of lymphoma, who presents for port removal.    Past Medical History:   Diagnosis Date    Allergy     Anemia     Arthritis     Asthma     Depression     Generalized headaches 2014    Goiter     [facility]    HTN (hypertension), benign     Hyperlipidemia     Hyperparathyroidism     s/p surgery    Intestinal obstruction     resolved spontaneously    Lumbar disc disease     s/p epidural shots    Nuclear sclerosis - Both Eyes 3/11/2014    Osteoporosis, post-menopausal     with 3 rib fractures     Past Surgical History:   Procedure Laterality Date    APPENDECTOMY      BILATERAL OOPHORECTOMY      BONE MARROW BIOPSY N/A 3/5/2020    Procedure: Biopsy-bone marrow;  Surgeon: Eve Alvarez MD;  Location: [facility] OR 2ND [facility];  Service: Oncology;  Laterality: N/A;    BREAST CYST EXCISION      left    CATARACT EXTRACTION W/  INTRAOCULAR LENS IMPLANT Right 2016    Dr. Fang ([place])    CATARACT EXTRACTION W/  INTRAOCULAR LENS IMPLANT Left 10/17/2016    Dr. Fang ([place])     SECTION, CLASSIC      x 1    CHOLECYSTECTOMY      COLONOSCOPY N/A 3/1/2016    Procedure: COLONOSCOPY;  Surgeon: Dony Bronson MD;  Location: [facility] (4TH FLR);  Service: Endoscopy;  Laterality: N/A;  PM Prep    HYSTERECTOMY      JOINT REPLACEMENT      KNEE SURGERY Right 2017    TKR    LUMBAR EPIDURAL INJECTION      x 4    MOUTH SURGERY      for abscess drainage of gum of frontal teeth    PARATHYROID GLAND SURGERY      for parathyroid adenoma    TOTAL KNEE ARTHROPLASTY Left 2019    Procedure: ARTHROPLASTY, KNEE, TOTAL-DEPUY-SIGMA;  Surgeon: Newton Rodriguez III, MD;  Location: [facility] OR 2ND FLR;  Service: Orthopedics;  Laterality: Left;    TRANSFORAMINAL EPIDURAL INJECTION OF STEROID Left 2019    Procedure: INJECTION, STEROID, EPIDURAL,  TRANSFORAMINAL APPROACH, L3-L4 AND L4-L5;  Surgeon: Venancio Lopez MD;  [facility];  Service: Pain Management;  Laterality: Left;       Home Meds:   Prior to Admission medications    Medication Sig Start Date End Date Taking? Authorizing Provider   acetaminophen (TYLENOL) 650 MG TbSR Take 1 tablet (650 mg total) by mouth every 6 (six) hours as needed (pain). 19  Yes Oliverio Hanna MD   acyclovir (ZOVIRAX) 400 MG tablet SMARTSI Tablet(s) By Mouth Twice Daily 21  Yes Historical Provider   albuterol (PROAIR HFA) 90 mcg/actuation inhaler Inhale 2 puffs into the lungs every 6 (six) hours as needed for Wheezing. Rescue 21  Yes Kale Hawkins MD   aspirin (ECOTRIN) 81 MG EC tablet Take 1 tablet (81 mg total) by mouth 2 (two) times daily.  Patient taking differently: Take 81 mg by mouth every morning. 8/22/19 10/14/20 Yes Oliverio Hanna MD   atorvastatin (LIPITOR) 80 MG tablet TAKE 1 TABLET (80 MG TOTAL) BY MOUTH ONCE DAILY.  Patient taking differently: Take 80 mg by mouth every morning. 21 Yes Kale Hawkins MD   fluticasone propionate (FLONASE) 50 mcg/actuation nasal spray INHALE 2 SPRAYS (100 MCG TOTAL) IN EACH NOSTRIL ONCE DAILY.  Patient taking differently: every evening. 21  Yes Kale Hawkins MD   gabapentin (NEURONTIN) 100 MG capsule Take 3 capsules three times daily as instructed  Patient taking differently: Take by mouth 2 (two) times daily. Take 3 capsules three times daily as instructed 20  Yes Michael Tinoco MD   metoprolol succinate (TOPROL-XL) 25 MG 24 hr tablet TAKE 1 TABLET BY MOUTH EVERY DAY  Patient taking differently: Take by mouth every morning. 3/16/22  Yes Kale Hawkins MD   montelukast (SINGULAIR) 10 mg tablet TAKE 1 TABLET BY MOUTH EVERY DAY IN THE EVENING 1/3/22  Yes Kale Hawkins MD   omeprazole (PRILOSEC) 40 MG capsule TAKE 1 CAPSULE BY MOUTH EVERY DAY  Patient taking differently: Take by mouth every morning.  5/14/21  Yes Kale Hawkins MD   sertraline (ZOLOFT) 100 MG tablet TAKE 1 TABLET BY MOUTH EVERY DAY  Patient taking differently: Take 50 mg by mouth every morning. 2/7/22  Yes Kale Hawkins MD   valsartan (DIOVAN) 320 MG tablet Take 1 tablet (320 mg total) by mouth once daily.  Patient taking differently: Take 320 mg by mouth every morning. 11/23/20 11/23/21 Yes Michael Tinoco MD   blood-glucose meter ([employer] BLOOD GLUCOSE SYSTM) kit Use as instructed  Patient not taking: Reported on 2/14/2022 8/18/15 7/3/19  Michael Tinoco MD   diclofenac sodium (VOLTAREN) 1 % Gel  10/7/19   Historical Provider   fluticasone-salmeterol diskus inhaler 100-50 mcg TAKE 1 PUFF BY MOUTH TWICE A DAY 2/17/20   Michael Tinoco MD   levocetirizine (XYZAL) 5 MG tablet Take 5 mg by mouth nightly. 1/20/22   Historical Provider   lidocaine-prilocaine (EMLA) cream USE AS DIRECTED ONE HOUR BEFORE CHEMOTHERAPY EXTERNALLY 1/12/21   Historical Provider   nabumetone (RELAFEN) 750 MG tablet TAKE 1 TABLET BY MOUTH TWICE A DAY  Patient taking differently: Take by mouth 2 (two) times daily as needed. 11/2/21   Kale Hawkins MD   polyethylene glycol 3350 (MIRALAX ORAL) Take by mouth as needed.    Historical Provider   tiZANidine (ZANAFLEX) 4 MG tablet TAKE 1.5 TABLETS (6MG) BY MOUTH 3 TIMES A DAY AS NEEDED FOR MUSCLE SPASM 6/7/21   Kale Hawkins MD     Anticoagulants/Antiplatelets: no anticoagulation    Allergies:   Review of patient's allergies indicates:   Allergen Reactions    Vicodin [hydrocodone-acetaminophen] Anxiety     Sedation History:  no adverse reactions    Review of Systems:   Hematological: no known coagulopathies  Respiratory: no shortness of breath  Cardiovascular: no chest pain  Gastrointestinal: no abdominal pain  Genito-Urinary: no dysuria  Musculoskeletal: negative  Neurological: no TIA or stroke symptoms         OBJECTIVE:     Vital Signs (Most Recent)       Physical Exam:  ASA: 2  Mallampati: 2    General: no  acute distress  Mental Status: alert and oriented to person, place and time  HEENT: normocephalic, atraumatic  Chest: unlabored breathing  Heart: regular heart rate  Abdomen: nondistended  Extremity: moves all extremities    Laboratory  Lab Results   Component Value Date    INR 1.0 03/23/2022       Lab Results   Component Value Date    WBC 6.97 03/23/2022    HGB 12.9 03/23/2022    HCT 39.7 03/23/2022    MCV 92 03/23/2022     03/23/2022      Lab Results   Component Value Date    GLU 95 02/14/2022     02/14/2022    K 4.6 02/14/2022     02/14/2022    CO2 27 02/14/2022    BUN 19 02/14/2022    CREATININE 0.8 02/14/2022    CALCIUM 10.1 02/14/2022    MG 2.4 07/14/2009    ALT 13 02/14/2022    AST 15 02/14/2022    ALBUMIN 3.7 02/14/2022    BILITOT 0.2 02/14/2022    BILIDIR 0.1 09/14/2018       ASSESSMENT/PLAN:     Sedation Plan: per anesthesia  Patient will undergo port removal.    Davie Tijerina MD PGY2  Department of Radiology  [facility]

## 2022-04-05 NOTE — ANESTHESIA POSTPROCEDURE EVALUATION
Anesthesia Post Evaluation    Patient: Elena Frances    Procedure(s) Performed: * No procedures listed *    Final Anesthesia Type: general      Patient location during evaluation: PACU  Patient participation: Yes- Able to Participate  Level of consciousness: awake and alert  Post-procedure vital signs: reviewed and stable  Pain management: adequate  Airway patency: patent    PONV status at discharge: No PONV  Anesthetic complications: no      Cardiovascular status: stable  Respiratory status: spontaneous ventilation  Hydration status: euvolemic  Follow-up not needed.        No case tracking events are documented in the log.      Pain/Elkin Score: Elkin Score: 10 (4/4/2022  3:50 PM)

## 2022-05-01 NOTE — TELEPHONE ENCOUNTER
No new care gaps identified.  Powered by [employer]. Reference number: [id number].   5/01/2022 7:35:26 AM CDT

## 2022-05-02 RX ORDER — SERTRALINE HYDROCHLORIDE 100 MG/1
TABLET, FILM COATED ORAL
Qty: 90 TABLET | Refills: 0 | OUTPATIENT
Start: 2022-05-02

## 2022-05-02 NOTE — TELEPHONE ENCOUNTER
Refill Routing Note   Medication(s) are not appropriate for processing by [facility] for the following reason(s):      - Patient has not been seen in over 15 months by PCP    [facility] action(s):  Defer          Medication reconciliation completed: No     Appointments  past 12m or future 3m with PCP    Date Provider   Last Visit   1/20/2021 Kale Hawkins MD   Next Visit   Visit date not found Kale Hawkins MD   ED visits in past 90 days: 0        Note composed:8:18 PM 05/01/2022

## 2022-05-03 NOTE — TELEPHONE ENCOUNTER
Provider Staff:     Action required for this patient.    Please note Refusal of medication.            Requested Prescriptions     Refused Prescriptions Disp Refills    sertraline (ZOLOFT) 100 MG tablet [Pharmacy Med Name: SERTRALINE  MG TABLET] 90 tablet 0     Sig: TAKE 1 TABLET BY MOUTH EVERY DAY     Refused By: BRITANY SORENSEN     Reason for Refusal: Patient needs an appointment      Thanks!  [facility]   Note composed: 05/02/2022 8:05 PM

## 2022-05-03 NOTE — TELEPHONE ENCOUNTER
Last OV 1-20-21    I called pt, she was notified an appt is needed for her Rx refill.  Pt was advised to have her daughter Alexandra call the clinic to schedule an appt.  I also spoke to Bandar and notified him pt is overdue for an appt.    He verbalized understanding

## 2022-06-01 ENCOUNTER — HOSPITAL ENCOUNTER (OUTPATIENT)
Dept: RADIOLOGY | Facility: HOSPITAL | Age: 82
Discharge: HOME | End: 2022-06-01
Attending: INTERNAL MEDICINE
Payer: MEDICARE

## 2022-06-01 DIAGNOSIS — C83.32 DIFFUSE LARGE B-CELL LYMPHOMA OF INTRATHORACIC LYMPH NODES: ICD-10-CM

## 2022-06-01 LAB — POCT GLUCOSE: 100 MG/DL (ref 70–110)

## 2022-06-01 PROCEDURE — 78815 NM PET CT ROUTINE: ICD-10-PCS | Mod: 26,PS,, | Performed by: RADIOLOGY

## 2022-06-01 PROCEDURE — 78815 PET IMAGE W/CT SKULL-THIGH: CPT | Mod: 26,PS,, | Performed by: RADIOLOGY

## 2022-06-01 PROCEDURE — 78815 PET IMAGE W/CT SKULL-THIGH: CPT | Mod: TC,PS

## 2022-06-06 ENCOUNTER — LAB VISIT (OUTPATIENT)
Dept: LAB | Facility: HOSPITAL | Age: 82
End: 2022-06-06
Attending: STUDENT IN AN ORGANIZED HEALTH CARE EDUCATION/TRAINING PROGRAM
Payer: MEDICARE

## 2022-06-06 ENCOUNTER — OFFICE VISIT (OUTPATIENT)
Dept: HEMATOLOGY/ONCOLOGY | Facility: CLINIC | Age: 82
End: 2022-06-06
Payer: MEDICARE

## 2022-06-06 VITALS
WEIGHT: 167.69 LBS | RESPIRATION RATE: 16 BRPM | HEIGHT: 63 IN | SYSTOLIC BLOOD PRESSURE: 181 MMHG | OXYGEN SATURATION: 100 % | HEART RATE: 83 BPM | BODY MASS INDEX: 29.71 KG/M2 | DIASTOLIC BLOOD PRESSURE: 78 MMHG

## 2022-06-06 DIAGNOSIS — M89.9 LESION OF VERTEBRA: ICD-10-CM

## 2022-06-06 DIAGNOSIS — Z85.72 HISTORY OF LYMPHOMA: ICD-10-CM

## 2022-06-06 DIAGNOSIS — C83.33 DIFFUSE LARGE B-CELL LYMPHOMA OF INTRA-ABDOMINAL LYMPH NODES: Primary | ICD-10-CM

## 2022-06-06 DIAGNOSIS — D50.9 IRON DEFICIENCY ANEMIA, UNSPECIFIED IRON DEFICIENCY ANEMIA TYPE: ICD-10-CM

## 2022-06-06 DIAGNOSIS — C83.32 DIFFUSE LARGE B-CELL LYMPHOMA OF INTRATHORACIC LYMPH NODES: ICD-10-CM

## 2022-06-06 LAB
ALBUMIN SERPL BCP-MCNC: 3.4 G/DL (ref 3.5–5.2)
ALP SERPL-CCNC: 140 U/L (ref 55–135)
ALT SERPL W/O P-5'-P-CCNC: 11 U/L (ref 10–44)
ANION GAP SERPL CALC-SCNC: 7 MMOL/L (ref 8–16)
AST SERPL-CCNC: 15 U/L (ref 10–40)
BASOPHILS # BLD AUTO: 0.03 K/UL (ref 0–0.2)
BASOPHILS NFR BLD: 0.5 % (ref 0–1.9)
BILIRUB SERPL-MCNC: 0.2 MG/DL (ref 0.1–1)
BUN SERPL-MCNC: 22 MG/DL (ref 8–23)
CALCIUM SERPL-MCNC: 9.7 MG/DL (ref 8.7–10.5)
CHLORIDE SERPL-SCNC: 104 MMOL/L (ref 95–110)
CO2 SERPL-SCNC: 27 MMOL/L (ref 23–29)
CREAT SERPL-MCNC: 0.9 MG/DL (ref 0.5–1.4)
DIFFERENTIAL METHOD: ABNORMAL
EOSINOPHIL # BLD AUTO: 0.1 K/UL (ref 0–0.5)
EOSINOPHIL NFR BLD: 1.9 % (ref 0–8)
ERYTHROCYTE [DISTWIDTH] IN BLOOD BY AUTOMATED COUNT: 14.6 % (ref 11.5–14.5)
EST. GFR  (AFRICAN AMERICAN): >60 ML/MIN/1.73 M^2
EST. GFR  (NON AFRICAN AMERICAN): >60 ML/MIN/1.73 M^2
GLUCOSE SERPL-MCNC: 166 MG/DL (ref 70–110)
HCT VFR BLD AUTO: 35.4 % (ref 37–48.5)
HGB BLD-MCNC: 11.6 G/DL (ref 12–16)
IMM GRANULOCYTES # BLD AUTO: 0.02 K/UL (ref 0–0.04)
IMM GRANULOCYTES NFR BLD AUTO: 0.4 % (ref 0–0.5)
LDH SERPL L TO P-CCNC: 201 U/L (ref 110–260)
LYMPHOCYTES # BLD AUTO: 0.5 K/UL (ref 1–4.8)
LYMPHOCYTES NFR BLD: 8.1 % (ref 18–48)
MCH RBC QN AUTO: 30.4 PG (ref 27–31)
MCHC RBC AUTO-ENTMCNC: 32.8 G/DL (ref 32–36)
MCV RBC AUTO: 93 FL (ref 82–98)
MONOCYTES # BLD AUTO: 0.5 K/UL (ref 0.3–1)
MONOCYTES NFR BLD: 8.1 % (ref 4–15)
NEUTROPHILS # BLD AUTO: 4.6 K/UL (ref 1.8–7.7)
NEUTROPHILS NFR BLD: 81 % (ref 38–73)
NRBC BLD-RTO: 0 /100 WBC
PLATELET # BLD AUTO: 194 K/UL (ref 150–450)
PMV BLD AUTO: 9.2 FL (ref 9.2–12.9)
POTASSIUM SERPL-SCNC: 4.5 MMOL/L (ref 3.5–5.1)
PROT SERPL-MCNC: 6.8 G/DL (ref 6–8.4)
RBC # BLD AUTO: 3.81 M/UL (ref 4–5.4)
SODIUM SERPL-SCNC: 138 MMOL/L (ref 136–145)
WBC # BLD AUTO: 5.7 K/UL (ref 3.9–12.7)

## 2022-06-06 PROCEDURE — 1101F PT FALLS ASSESS-DOCD LE1/YR: CPT | Mod: CPTII,S$GLB,, | Performed by: INTERNAL MEDICINE

## 2022-06-06 PROCEDURE — 85025 COMPLETE CBC W/AUTO DIFF WBC: CPT | Performed by: INTERNAL MEDICINE

## 2022-06-06 PROCEDURE — 1126F PR PAIN SEVERITY QUANTIFIED, NO PAIN PRESENT: ICD-10-PCS | Mod: CPTII,S$GLB,, | Performed by: INTERNAL MEDICINE

## 2022-06-06 PROCEDURE — 3078F PR MOST RECENT DIASTOLIC BLOOD PRESSURE < 80 MM HG: ICD-10-PCS | Mod: CPTII,S$GLB,, | Performed by: INTERNAL MEDICINE

## 2022-06-06 PROCEDURE — 80053 COMPREHEN METABOLIC PANEL: CPT | Performed by: INTERNAL MEDICINE

## 2022-06-06 PROCEDURE — 99999 PR PBB SHADOW E&M-EST. PATIENT-LVL IV: CPT | Mod: PBBFAC,,, | Performed by: INTERNAL MEDICINE

## 2022-06-06 PROCEDURE — 3077F SYST BP >= 140 MM HG: CPT | Mod: CPTII,S$GLB,, | Performed by: INTERNAL MEDICINE

## 2022-06-06 PROCEDURE — 1126F AMNT PAIN NOTED NONE PRSNT: CPT | Mod: CPTII,S$GLB,, | Performed by: INTERNAL MEDICINE

## 2022-06-06 PROCEDURE — 3078F DIAST BP <80 MM HG: CPT | Mod: CPTII,S$GLB,, | Performed by: INTERNAL MEDICINE

## 2022-06-06 PROCEDURE — 1101F PR PT FALLS ASSESS DOC 0-1 FALLS W/OUT INJ PAST YR: ICD-10-PCS | Mod: CPTII,S$GLB,, | Performed by: INTERNAL MEDICINE

## 2022-06-06 PROCEDURE — 3077F PR MOST RECENT SYSTOLIC BLOOD PRESSURE >= 140 MM HG: ICD-10-PCS | Mod: CPTII,S$GLB,, | Performed by: INTERNAL MEDICINE

## 2022-06-06 PROCEDURE — 1159F MED LIST DOCD IN RCRD: CPT | Mod: CPTII,S$GLB,, | Performed by: INTERNAL MEDICINE

## 2022-06-06 PROCEDURE — 3288F PR FALLS RISK ASSESSMENT DOCUMENTED: ICD-10-PCS | Mod: CPTII,S$GLB,, | Performed by: INTERNAL MEDICINE

## 2022-06-06 PROCEDURE — 99215 OFFICE O/P EST HI 40 MIN: CPT | Mod: S$GLB,,, | Performed by: INTERNAL MEDICINE

## 2022-06-06 PROCEDURE — 36415 COLL VENOUS BLD VENIPUNCTURE: CPT | Performed by: INTERNAL MEDICINE

## 2022-06-06 PROCEDURE — 99215 PR OFFICE/OUTPT VISIT, EST, LEVL V, 40-54 MIN: ICD-10-PCS | Mod: S$GLB,,, | Performed by: INTERNAL MEDICINE

## 2022-06-06 PROCEDURE — 99999 PR PBB SHADOW E&M-EST. PATIENT-LVL IV: ICD-10-PCS | Mod: PBBFAC,,, | Performed by: INTERNAL MEDICINE

## 2022-06-06 PROCEDURE — 1159F PR MEDICATION LIST DOCUMENTED IN MEDICAL RECORD: ICD-10-PCS | Mod: CPTII,S$GLB,, | Performed by: INTERNAL MEDICINE

## 2022-06-06 PROCEDURE — 83615 LACTATE (LD) (LDH) ENZYME: CPT | Performed by: INTERNAL MEDICINE

## 2022-06-06 PROCEDURE — 3288F FALL RISK ASSESSMENT DOCD: CPT | Mod: CPTII,S$GLB,, | Performed by: INTERNAL MEDICINE

## 2022-06-06 NOTE — PROGRESS NOTES
Hematology and Medical Oncology   New Patient Consult     06/06/2022    Primary Hematologic Diagnosis: Diffuse Large B Cell Lymphoma    History of Present Ilness:   Elena Frances (Elena) is a pleasant 82 y.o.female who is transitioning care from [facility] following the successful treatment of DLBCL. Has recovered from therapy and wishes to consolidate her care to [facility].     Hematologic History:  --Work up started with back pain, MRI showed multiple vertebral sclerotic bony lesions  --Bone biopsy was most consistent with DLBCL  --PET September 2020 showed additional bone lesions, including the left scapula. Biopsy showed a kappa restricted B- cell lymphoma that is negative for CD5 and CD1-.   --Slides reviewed at [facility] confirmed GCB-DLBCL in L5  --Received R-CHOP x 6 in a CR    --Surveilance PET on 6/1/22:   Right iliac bone with a SUV max of 2.5, previously 3.6 (axial fused image 162)  L5 vertebral body with a SUV max of 2.9, previously 5 (axial fused image 146)  The previously hypermetabolic focus at the left scapula is no longer seen now demonstrating an SUV max of 1.2, previously 6.1 (axial fused image 37)  Posterior T4 vertebral body, demonstrates decreased uptake with a SUV of 2.4, previously 3.1 (axial fused image 44)  L3 spinous process demonstrate no increased uptake with SUV max of 1.6, previously 2.4 (axial fused image 128)  Left femoral diaphysis demonstrates no increased uptake with SUV max of 1.2 (axial fused image 212).       Interval History:  Doing well. Happy to have her port out. Denies all B symptoms.       PAST MEDICAL HISTORY:   Past Medical History:   Diagnosis Date    Allergy     Anemia     Arthritis     Asthma     Depression     Generalized headaches 4/1/2014    Goiter     MNG    HTN (hypertension), benign     Hyperlipidemia     Hyperparathyroidism     s/p surgery    Intestinal obstruction     resolved spontaneously    Lumbar disc disease     s/p epidural shots     Nuclear sclerosis - Both Eyes 3/11/2014    Osteoporosis, post-menopausal     with 3 rib fractures       PAST SURGICAL HISTORY:   Past Surgical History:   Procedure Laterality Date    APPENDECTOMY      BILATERAL OOPHORECTOMY      BONE MARROW BIOPSY N/A 3/5/2020    Procedure: Biopsy-bone marrow;  Surgeon: Eve Alvarez MD;  [facility];  Service: Oncology;  Laterality: N/A;    BREAST CYST EXCISION      left    CATARACT EXTRACTION W/  INTRAOCULAR LENS IMPLANT Right 2016    Dr. Fang ([place])    CATARACT EXTRACTION W/  INTRAOCULAR LENS IMPLANT Left 10/17/2016    Dr. Fang ([place])     SECTION, CLASSIC      x 1    CHOLECYSTECTOMY      COLONOSCOPY N/A 3/1/2016    Procedure: COLONOSCOPY;  Surgeon: Dony Bronson MD;  Location: [facility] ENDO (4TH FLR);  Service: Endoscopy;  Laterality: N/A;  PM Prep    HYSTERECTOMY      JOINT REPLACEMENT      KNEE SURGERY Right 2017    TKR    LUMBAR EPIDURAL INJECTION      x 4    MOUTH SURGERY      for abscess drainage of gum of frontal teeth    PARATHYROID GLAND SURGERY      for parathyroid adenoma    TOTAL KNEE ARTHROPLASTY Left 2019    Procedure: ARTHROPLASTY, KNEE, TOTAL-DEPUY-SIGMA;  Surgeon: Newton Rodriguez III, MD;  Location: [facility] OR 2ND FLR;  Service: Orthopedics;  Laterality: Left;    TRANSFORAMINAL EPIDURAL INJECTION OF STEROID Left 2019    Procedure: INJECTION, STEROID, EPIDURAL, TRANSFORAMINAL APPROACH, L3-L4 AND L4-L5;  Surgeon: Venancio Lopez MD;  Location: [facility] PAIN MGT;  Service: Pain Management;  Laterality: Left;       PAST SOCIAL HISTORY:   reports that she has never smoked. She has never used smokeless tobacco. She reports that she does not drink alcohol and does not use drugs.    FAMILY HISTORY:  Family History   Problem Relation Age of Onset    Heart disease Mother     Hypertension Mother     Heart attack Mother     Heart disease Sister          in their 50's    Heart failure Sister     Heart  disease Brother         CABG in age 60's    Hyperlipidemia Brother     Heart disease Sister         in her 50's    Heart disease Sister         in her 50's    Heart disease Sister     Hypertension Sister     Hyperlipidemia Sister     Heart disease Brother         CABG in age 60's    Hyperlipidemia Brother     Thyroid disease Daughter     Amblyopia Neg Hx     Blindness Neg Hx     Cancer Neg Hx     Cataracts Neg Hx     Diabetes Neg Hx     Glaucoma Neg Hx     Macular degeneration Neg Hx     Retinal detachment Neg Hx     Strabismus Neg Hx     Stroke Neg Hx        CURRENT MEDICATIONS:   Current Outpatient Medications   Medication Sig    acetaminophen (TYLENOL) 650 MG TbSR Take 1 tablet (650 mg total) by mouth every 6 (six) hours as needed (pain).    acyclovir (ZOVIRAX) 400 MG tablet SMARTSI Tablet(s) By Mouth Twice Daily    albuterol (PROAIR HFA) 90 mcg/actuation inhaler Inhale 2 puffs into the lungs every 6 (six) hours as needed for Wheezing. Rescue    diclofenac sodium (VOLTAREN) 1 % Gel     fluticasone propionate (FLONASE) 50 mcg/actuation nasal spray INHALE 2 SPRAYS (100 MCG TOTAL) IN EACH NOSTRIL ONCE DAILY. (Patient taking differently: every evening.)    fluticasone-salmeterol diskus inhaler 100-50 mcg TAKE 1 PUFF BY MOUTH TWICE A DAY    gabapentin (NEURONTIN) 100 MG capsule Take 3 capsules three times daily as instructed (Patient taking differently: Take by mouth 2 (two) times daily. Take 3 capsules three times daily as instructed)    levocetirizine (XYZAL) 5 MG tablet Take 5 mg by mouth nightly.    lidocaine-prilocaine (EMLA) cream USE AS DIRECTED ONE HOUR BEFORE CHEMOTHERAPY EXTERNALLY    metoprolol succinate (TOPROL-XL) 25 MG 24 hr tablet TAKE 1 TABLET BY MOUTH EVERY DAY (Patient taking differently: Take by mouth every morning.)    montelukast (SINGULAIR) 10 mg tablet TAKE 1 TABLET BY MOUTH EVERY DAY IN THE EVENING    nabumetone (RELAFEN) 750 MG tablet TAKE 1 TABLET BY MOUTH  TWICE A DAY (Patient taking differently: Take by mouth 2 (two) times daily as needed.)    omeprazole (PRILOSEC) 40 MG capsule TAKE 1 CAPSULE BY MOUTH EVERY DAY (Patient taking differently: Take by mouth every morning.)    polyethylene glycol 3350 (MIRALAX ORAL) Take by mouth as needed.    sertraline (ZOLOFT) 100 MG tablet TAKE 1 TABLET BY MOUTH EVERY DAY (Patient taking differently: Take 50 mg by mouth every morning.)    tiZANidine (ZANAFLEX) 4 MG tablet TAKE 1.5 TABLETS (6MG) BY MOUTH 3 TIMES A DAY AS NEEDED FOR MUSCLE SPASM    aspirin (ECOTRIN) 81 MG EC tablet Take 1 tablet (81 mg total) by mouth 2 (two) times daily. (Patient taking differently: Take 81 mg by mouth every morning.)    atorvastatin (LIPITOR) 80 MG tablet TAKE 1 TABLET (80 MG TOTAL) BY MOUTH ONCE DAILY. (Patient taking differently: Take 80 mg by mouth every morning.)    blood-glucose meter (VerafinULT BLOOD GLUCOSE SYSTM) kit Use as instructed (Patient not taking: Reported on 2/14/2022)    valsartan (DIOVAN) 320 MG tablet Take 1 tablet (320 mg total) by mouth once daily. (Patient taking differently: Take 320 mg by mouth every morning.)     No current facility-administered medications for this visit.     ALLERGIES:   Review of patient's allergies indicates:   Allergen Reactions    Vicodin [hydrocodone-acetaminophen] Anxiety         Review of Systems:     Review of Systems   Constitutional: Negative for appetite change, chills, diaphoresis, fatigue, fever and unexpected weight change.   HENT:   Negative for hearing loss, mouth sores, nosebleeds, sore throat, trouble swallowing and voice change.    Eyes: Negative for eye problems and icterus.   Respiratory: Negative for chest tightness, cough, hemoptysis, shortness of breath and wheezing.    Cardiovascular: Negative for chest pain, leg swelling and palpitations.   Gastrointestinal: Negative for abdominal distention, abdominal pain, blood in stool, diarrhea, nausea and vomiting.   Endocrine:  Negative for hot flashes.   Genitourinary: Negative for bladder incontinence, difficulty urinating, dysuria and hematuria.    Musculoskeletal: Negative for arthralgias, back pain, flank pain, gait problem, myalgias, neck pain and neck stiffness.   Skin: Negative for itching, rash and wound.   Neurological: Negative for dizziness, extremity weakness, gait problem, headaches, numbness, seizures and speech difficulty.   Hematological: Negative for adenopathy. Does not bruise/bleed easily.   Psychiatric/Behavioral: Negative for confusion, depression and sleep disturbance. The patient is not nervous/anxious.         Physical Exam:     Vitals:    06/06/22 0846   BP: (!) 181/78   Pulse: 83   Resp: 16     Physical Exam  Constitutional:       General: She is not in acute distress.     Appearance: She is well-developed. She is not diaphoretic.   HENT:      Head: Normocephalic and atraumatic.      Mouth/Throat:      Pharynx: No oropharyngeal exudate.   Eyes:      Conjunctiva/sclera: Conjunctivae normal.      Pupils: Pupils are equal, round, and reactive to light.   Neck:      Thyroid: No thyromegaly.      Vascular: No JVD.      Trachea: No tracheal deviation.   Cardiovascular:      Rate and Rhythm: Normal rate and regular rhythm.      Heart sounds: Normal heart sounds. No murmur heard.    No friction rub.   Pulmonary:      Effort: Pulmonary effort is normal. No respiratory distress.      Breath sounds: Normal breath sounds. No stridor. No wheezing or rales.   Chest:      Chest wall: No tenderness.   Abdominal:      General: Bowel sounds are normal. There is no distension.      Palpations: Abdomen is soft.      Tenderness: There is no abdominal tenderness. There is no guarding or rebound.   Musculoskeletal:         General: No tenderness or deformity. Normal range of motion.      Cervical back: Normal range of motion and neck supple.   Skin:     General: Skin is warm and dry.      Capillary Refill: Capillary refill takes less  than 2 seconds.      Coloration: Skin is not pale.      Findings: No erythema or rash.   Neurological:      Mental Status: She is alert and oriented to person, place, and time.      Cranial Nerves: No cranial nerve deficit.      Sensory: No sensory deficit.      Motor: No abnormal muscle tone.      Coordination: Coordination normal.      Deep Tendon Reflexes: Reflexes normal.   Psychiatric:         Behavior: Behavior normal.         Thought Content: Thought content normal.         Judgment: Judgment normal.         ECOG Performance Status: (foot note - ECOG PS provided by Eastern Cooperative Oncology Group) 1 - Symptomatic but completely ambulatory    Karnofsky Performance Score:  90%- Able to Carry on Normal Activity: Minor Symptoms of Disease    Labs:   Lab Results   Component Value Date    WBC 5.70 06/06/2022    HGB 11.6 (L) 06/06/2022    HCT 35.4 (L) 06/06/2022     06/06/2022    CHOL 179 07/09/2018    TRIG 91 07/09/2018    HDL 60 07/09/2018    ALT 11 06/06/2022    AST 15 06/06/2022     06/06/2022    K 4.5 06/06/2022     06/06/2022    CREATININE 0.9 06/06/2022    BUN 22 06/06/2022    CO2 27 06/06/2022    TSH 1.079 02/14/2022    INR 1.0 03/23/2022    HGBA1C 6.2 (H) 06/03/2019       Imaging: Previous imaging has been reviewed   Assessment and Plan:     Ms. Frances is a pleasant 82 year old with DLBCL successfully treated in 2021.    Diffuse Large B Cell Lymphoma  --Treated at [facility] with R-CHOP x 6  --Surveilance PET shows no signs of recurrance  --Denies B symptoms    Full conversation was conducted through an in person     30 minutes were spent face to face with the patient and her  family to discuss the disease, natural history, treatment options and survival statistics. I have provided the patient with an opportunity to ask questions and have all questions answered to her satisfaction.       she will return to clinic in 3 months with labs, but knows to call in the interim if  symptoms change or should a problem arise.        Luh Kirby MD  Hematology and Medical Oncology  Bone Marrow Transplant  [facility]      BMT Chart Routing      Follow up with physician 3 months. 1. see me in 3 months with labs: cbc,cmp, ldh 2. will need    Follow up with [facility]    Labs CBC, CMP and LDH   Lab interval:     Imaging None      Pharmacy appointment No pharmacy appointment needed      Other referrals No additional referrals needed

## 2022-06-10 RX ORDER — METOPROLOL SUCCINATE 25 MG/1
25 TABLET, EXTENDED RELEASE ORAL EVERY MORNING
Qty: 30 TABLET | Refills: 1 | Status: SHIPPED | OUTPATIENT
Start: 2022-06-10

## 2022-06-10 NOTE — TELEPHONE ENCOUNTER
Pt is overdue for annual visit.  I tried calling pt's daughter, no answer.  Left VM for her to call the clinic.

## 2022-06-10 NOTE — TELEPHONE ENCOUNTER
Refill Routing Note   Medication(s) are not appropriate for processing by [facility] for the following reason(s):      - Patient has not been seen in over 15 months by PCP  - Required vitals are abnormal    [facility] action(s):  Defer          Medication reconciliation completed: No     Appointments  past 12m or future 3m with PCP    Date Provider   Last Visit   1/20/2021 Kale Hawkins MD   Next Visit   Visit date not found Kale Hawkins MD   ED visits in past 90 days: 0        Note composed:7:50 AM 06/10/2022

## 2022-06-10 NOTE — TELEPHONE ENCOUNTER
Care Due:                  Date            Visit Type   Department     Provider  --------------------------------------------------------------------------------                                SAME DAY -                              ESTABLISHED   [facility] INTERNAL  Last Visit: 01-      PATIENT      MEDICINE       Kale Hawkins  Next Visit: None Scheduled  None         None Found                                                            Last  Test          Frequency    Reason                     Performed    Due Date  --------------------------------------------------------------------------------    Office Visit  12 months..  albuterol, montelukast,    01-   01-                             omeprazole, sertraline...    [employer] Embedded Care Gaps. Reference number: [id number]. 6/10/2022   12:02:30 AM CDT

## 2022-06-15 DIAGNOSIS — E78.5 HYPERLIPIDEMIA, UNSPECIFIED HYPERLIPIDEMIA TYPE: ICD-10-CM

## 2022-06-15 RX ORDER — ATORVASTATIN CALCIUM 80 MG/1
TABLET, FILM COATED ORAL
Qty: 90 TABLET | Refills: 0 | OUTPATIENT
Start: 2022-06-15

## 2022-06-15 NOTE — TELEPHONE ENCOUNTER
Pt sent a Rx request.  She is overdue for annual visit.  I spoke to pt's daughter. Pt have changed PCP. She have requested to cancel the Rx request.  A updated PCP information.

## 2022-06-15 NOTE — TELEPHONE ENCOUNTER
Refill Routing Note   Medication(s) are not appropriate for processing by [facility] for the following reason(s):      - Patient has not been seen in over 15 months by PCP  - Required laboratory values are outdated    [facility] action(s):  Defer          Medication reconciliation completed: No     Appointments  past 12m or future 3m with PCP    Date Provider   Last Visit   1/20/2021 Kale Hawkins MD   Next Visit   Visit date not found Kale Hawkins MD   ED visits in past 90 days: 0        Note composed:10:40 AM 06/15/2022

## 2022-06-15 NOTE — TELEPHONE ENCOUNTER
No new care gaps identified.  [facility] Embedded Care Gaps. Reference number: [id number]. 6/15/2022   10:18:18 AM CDT

## 2022-07-15 DIAGNOSIS — E78.5 HYPERLIPIDEMIA, UNSPECIFIED HYPERLIPIDEMIA TYPE: ICD-10-CM

## 2022-07-15 RX ORDER — ATORVASTATIN CALCIUM 80 MG/1
TABLET, FILM COATED ORAL
Qty: 30 TABLET | Refills: 1 | Status: SHIPPED | OUTPATIENT
Start: 2022-07-15 | End: 2022-08-08

## 2022-07-15 NOTE — TELEPHONE ENCOUNTER
Refill Routing Note   Medication(s) are not appropriate for processing by [facility] for the following reason(s):      - Patient has not been seen in over 15 months by PCP  - Required laboratory values are outdated  - Patient has been seen in the ED/Hospital since the last PCP visit    [facility] action(s):  Defer          Medication reconciliation completed: No     Appointments  past 12m or future 3m with PCP    Date Provider   Last Visit   1/20/2021 Kale Hawkins MD   Next Visit   Visit date not found Kale Hawkins MD   ED visits in past 90 days: 0        Note composed:7:20 AM 07/15/2022

## 2022-07-15 NOTE — TELEPHONE ENCOUNTER
I spoke to pt and notified her Rx below was sent to her pharmacy.     atorvastatin (LIPITOR) 80 MG tablet         Sig: TAKE 1 TABLET BY MOUTH EVERY DAY    Disp:  30 tablet    Refills:  1 (Pharmacy requested: 3)    Start: 7/15/2022    Class: Normal    Authorized by: Kale Hawkins MD    For: Hyperlipidemia, unspecified hyperlipidemia type        To be filled at: [id number] [place] [facility] [address]     Annual visit scheduled 8-24-22 at 10:30 am  I will mail pt appt reminder.  Pt verbalized

## 2022-08-30 DIAGNOSIS — E78.5 HYPERLIPIDEMIA, UNSPECIFIED HYPERLIPIDEMIA TYPE: ICD-10-CM

## 2022-08-30 RX ORDER — ATORVASTATIN CALCIUM 80 MG/1
TABLET, FILM COATED ORAL
Qty: 30 TABLET | Refills: 1 | OUTPATIENT
Start: 2022-08-30

## 2022-08-30 NOTE — TELEPHONE ENCOUNTER
Pt is overdue for annual visit. I spoke to pt's daughter, she changed PCP but don't remember the name of the provider.

## 2022-08-30 NOTE — TELEPHONE ENCOUNTER
Refill Routing Note   Medication(s) are not appropriate for processing by [facility] for the following reason(s):      - no pcp listed on profile;unclear if pt est care  - Patient has not been seen in over 15 months by PCP  - Required laboratory values are outdated  - Patient has been seen in the ED/Hospital since the last PCP visit    [facility] action(s):  Defer          Medication reconciliation completed: No     Appointments  past 12m or future 3m with PCP    Date Provider   Last Visit   1/20/2021 Kale Hawkins MD   Next Visit   Visit date not found Kale Hawkins MD   ED visits in past 90 days: 0        Note composed:10:51 AM 08/30/2022

## 2022-08-31 DIAGNOSIS — C83.36 DIFFUSE LARGE B-CELL LYMPHOMA OF INTRAPELVIC LYMPH NODES: Primary | ICD-10-CM

## 2022-09-01 ENCOUNTER — HOSPITAL ENCOUNTER (OUTPATIENT)
Dept: RADIOLOGY | Facility: HOSPITAL | Age: 82
Discharge: HOME | End: 2022-09-01
Attending: INTERNAL MEDICINE
Payer: MEDICARE

## 2022-09-01 DIAGNOSIS — C83.36 DIFFUSE LARGE B-CELL LYMPHOMA OF INTRAPELVIC LYMPH NODES: ICD-10-CM

## 2022-09-01 LAB
CREAT SERPL-MCNC: 0.8 MG/DL (ref 0.5–1.4)
SAMPLE: NORMAL

## 2022-09-01 PROCEDURE — 74177 CT ABDOMEN PELVIS WITH CONTRAST: ICD-10-PCS | Mod: 26,,, | Performed by: RADIOLOGY

## 2022-09-01 PROCEDURE — 74177 CT ABD & PELVIS W/CONTRAST: CPT | Mod: 26,,, | Performed by: RADIOLOGY

## 2022-09-01 PROCEDURE — 25500020 PHARM REV CODE 255: Performed by: INTERNAL MEDICINE

## 2022-09-01 PROCEDURE — 74177 CT ABD & PELVIS W/CONTRAST: CPT | Mod: TC

## 2022-09-01 RX ADMIN — IOHEXOL 75 ML: 350 INJECTION, SOLUTION INTRAVENOUS at 02:09

## 2022-09-12 ENCOUNTER — LAB VISIT (OUTPATIENT)
Dept: LAB | Facility: HOSPITAL | Age: 82
End: 2022-09-12
Payer: MEDICARE

## 2022-09-12 ENCOUNTER — OFFICE VISIT (OUTPATIENT)
Dept: HEMATOLOGY/ONCOLOGY | Facility: CLINIC | Age: 82
End: 2022-09-12
Payer: MEDICARE

## 2022-09-12 VITALS
HEART RATE: 75 BPM | RESPIRATION RATE: 16 BRPM | WEIGHT: 168.75 LBS | SYSTOLIC BLOOD PRESSURE: 150 MMHG | BODY MASS INDEX: 29.9 KG/M2 | OXYGEN SATURATION: 97 % | DIASTOLIC BLOOD PRESSURE: 70 MMHG | HEIGHT: 63 IN | TEMPERATURE: 98 F

## 2022-09-12 DIAGNOSIS — D50.9 IRON DEFICIENCY ANEMIA, UNSPECIFIED IRON DEFICIENCY ANEMIA TYPE: ICD-10-CM

## 2022-09-12 DIAGNOSIS — M17.11 PRIMARY OSTEOARTHRITIS OF RIGHT KNEE: ICD-10-CM

## 2022-09-12 DIAGNOSIS — C83.33 DIFFUSE LARGE B-CELL LYMPHOMA OF INTRA-ABDOMINAL LYMPH NODES: ICD-10-CM

## 2022-09-12 DIAGNOSIS — E03.9 ACQUIRED HYPOTHYROIDISM: Primary | ICD-10-CM

## 2022-09-12 DIAGNOSIS — C83.30 DIFFUSE LARGE B-CELL LYMPHOMA, UNSPECIFIED BODY REGION: ICD-10-CM

## 2022-09-12 DIAGNOSIS — K83.8 COMMON BILE DUCT DILATION: ICD-10-CM

## 2022-09-12 LAB
ALBUMIN SERPL BCP-MCNC: 3.5 G/DL (ref 3.5–5.2)
ALP SERPL-CCNC: 142 U/L (ref 55–135)
ALT SERPL W/O P-5'-P-CCNC: 13 U/L (ref 10–44)
ANION GAP SERPL CALC-SCNC: 7 MMOL/L (ref 8–16)
AST SERPL-CCNC: 16 U/L (ref 10–40)
BASOPHILS # BLD AUTO: 0.03 K/UL (ref 0–0.2)
BASOPHILS NFR BLD: 0.5 % (ref 0–1.9)
BILIRUB SERPL-MCNC: 0.2 MG/DL (ref 0.1–1)
BUN SERPL-MCNC: 16 MG/DL (ref 8–23)
CALCIUM SERPL-MCNC: 9.9 MG/DL (ref 8.7–10.5)
CHLORIDE SERPL-SCNC: 100 MMOL/L (ref 95–110)
CO2 SERPL-SCNC: 27 MMOL/L (ref 23–29)
CREAT SERPL-MCNC: 0.9 MG/DL (ref 0.5–1.4)
DIFFERENTIAL METHOD: ABNORMAL
EOSINOPHIL # BLD AUTO: 0.1 K/UL (ref 0–0.5)
EOSINOPHIL NFR BLD: 1.6 % (ref 0–8)
ERYTHROCYTE [DISTWIDTH] IN BLOOD BY AUTOMATED COUNT: 14.6 % (ref 11.5–14.5)
EST. GFR  (NO RACE VARIABLE): >60 ML/MIN/1.73 M^2
GLUCOSE SERPL-MCNC: 104 MG/DL (ref 70–110)
HCT VFR BLD AUTO: 34 % (ref 37–48.5)
HGB BLD-MCNC: 11.6 G/DL (ref 12–16)
IMM GRANULOCYTES # BLD AUTO: 0.02 K/UL (ref 0–0.04)
IMM GRANULOCYTES NFR BLD AUTO: 0.4 % (ref 0–0.5)
LDH SERPL L TO P-CCNC: 186 U/L (ref 110–260)
LYMPHOCYTES # BLD AUTO: 1.4 K/UL (ref 1–4.8)
LYMPHOCYTES NFR BLD: 24.5 % (ref 18–48)
MCH RBC QN AUTO: 32.1 PG (ref 27–31)
MCHC RBC AUTO-ENTMCNC: 34.1 G/DL (ref 32–36)
MCV RBC AUTO: 94 FL (ref 82–98)
MONOCYTES # BLD AUTO: 0.6 K/UL (ref 0.3–1)
MONOCYTES NFR BLD: 10 % (ref 4–15)
NEUTROPHILS # BLD AUTO: 3.5 K/UL (ref 1.8–7.7)
NEUTROPHILS NFR BLD: 63 % (ref 38–73)
NRBC BLD-RTO: 0 /100 WBC
PLATELET # BLD AUTO: 209 K/UL (ref 150–450)
PMV BLD AUTO: 9.3 FL (ref 9.2–12.9)
POTASSIUM SERPL-SCNC: 4.2 MMOL/L (ref 3.5–5.1)
PROT SERPL-MCNC: 6.8 G/DL (ref 6–8.4)
RBC # BLD AUTO: 3.61 M/UL (ref 4–5.4)
SODIUM SERPL-SCNC: 134 MMOL/L (ref 136–145)
WBC # BLD AUTO: 5.51 K/UL (ref 3.9–12.7)

## 2022-09-12 PROCEDURE — 1101F PR PT FALLS ASSESS DOC 0-1 FALLS W/OUT INJ PAST YR: ICD-10-PCS | Mod: CPTII,S$GLB,, | Performed by: INTERNAL MEDICINE

## 2022-09-12 PROCEDURE — 3077F SYST BP >= 140 MM HG: CPT | Mod: CPTII,S$GLB,, | Performed by: INTERNAL MEDICINE

## 2022-09-12 PROCEDURE — 83615 LACTATE (LD) (LDH) ENZYME: CPT | Performed by: INTERNAL MEDICINE

## 2022-09-12 PROCEDURE — 3288F PR FALLS RISK ASSESSMENT DOCUMENTED: ICD-10-PCS | Mod: CPTII,S$GLB,, | Performed by: INTERNAL MEDICINE

## 2022-09-12 PROCEDURE — 1125F PR PAIN SEVERITY QUANTIFIED, PAIN PRESENT: ICD-10-PCS | Mod: CPTII,S$GLB,, | Performed by: INTERNAL MEDICINE

## 2022-09-12 PROCEDURE — 1159F PR MEDICATION LIST DOCUMENTED IN MEDICAL RECORD: ICD-10-PCS | Mod: CPTII,S$GLB,, | Performed by: INTERNAL MEDICINE

## 2022-09-12 PROCEDURE — 99215 PR OFFICE/OUTPT VISIT, EST, LEVL V, 40-54 MIN: ICD-10-PCS | Mod: S$GLB,,, | Performed by: INTERNAL MEDICINE

## 2022-09-12 PROCEDURE — 3077F PR MOST RECENT SYSTOLIC BLOOD PRESSURE >= 140 MM HG: ICD-10-PCS | Mod: CPTII,S$GLB,, | Performed by: INTERNAL MEDICINE

## 2022-09-12 PROCEDURE — 99999 PR PBB SHADOW E&M-EST. PATIENT-LVL IV: CPT | Mod: PBBFAC,,, | Performed by: INTERNAL MEDICINE

## 2022-09-12 PROCEDURE — 36415 COLL VENOUS BLD VENIPUNCTURE: CPT | Performed by: INTERNAL MEDICINE

## 2022-09-12 PROCEDURE — 1101F PT FALLS ASSESS-DOCD LE1/YR: CPT | Mod: CPTII,S$GLB,, | Performed by: INTERNAL MEDICINE

## 2022-09-12 PROCEDURE — 3078F PR MOST RECENT DIASTOLIC BLOOD PRESSURE < 80 MM HG: ICD-10-PCS | Mod: CPTII,S$GLB,, | Performed by: INTERNAL MEDICINE

## 2022-09-12 PROCEDURE — 1125F AMNT PAIN NOTED PAIN PRSNT: CPT | Mod: CPTII,S$GLB,, | Performed by: INTERNAL MEDICINE

## 2022-09-12 PROCEDURE — 80053 COMPREHEN METABOLIC PANEL: CPT | Performed by: INTERNAL MEDICINE

## 2022-09-12 PROCEDURE — 3078F DIAST BP <80 MM HG: CPT | Mod: CPTII,S$GLB,, | Performed by: INTERNAL MEDICINE

## 2022-09-12 PROCEDURE — 3288F FALL RISK ASSESSMENT DOCD: CPT | Mod: CPTII,S$GLB,, | Performed by: INTERNAL MEDICINE

## 2022-09-12 PROCEDURE — 99215 OFFICE O/P EST HI 40 MIN: CPT | Mod: S$GLB,,, | Performed by: INTERNAL MEDICINE

## 2022-09-12 PROCEDURE — 99999 PR PBB SHADOW E&M-EST. PATIENT-LVL IV: ICD-10-PCS | Mod: PBBFAC,,, | Performed by: INTERNAL MEDICINE

## 2022-09-12 PROCEDURE — 85025 COMPLETE CBC W/AUTO DIFF WBC: CPT | Performed by: INTERNAL MEDICINE

## 2022-09-12 PROCEDURE — 1159F MED LIST DOCD IN RCRD: CPT | Mod: CPTII,S$GLB,, | Performed by: INTERNAL MEDICINE

## 2022-09-12 NOTE — PROGRESS NOTES
Hematology and Medical Oncology   Follow Up     09/12/2022    Primary Hematologic Diagnosis: Diffuse Large B Cell Lymphoma    History of Present Ilness:   Elena Frances (Elena) is a pleasant 82 y.o.female who is transitioning care from [facility] following the successful treatment of DLBCL. Has recovered from therapy and wishes to consolidate her care to [facility].     Hematologic History:  --Work up started with back pain, MRI showed multiple vertebral sclerotic bony lesions  --Bone biopsy was most consistent with DLBCL  --PET September 2020 showed additional bone lesions, including the left scapula. Biopsy showed a kappa restricted B- cell lymphoma that is negative for CD5 and CD1-.   --Slides reviewed at [facility] confirmed GCB-DLBCL in L5  --Received R-CHOP x 6 in a CR    --Surveilance PET on 6/1/22:   Right iliac bone with a SUV max of 2.5, previously 3.6 (axial fused image 162)  L5 vertebral body with a SUV max of 2.9, previously 5 (axial fused image 146)  The previously hypermetabolic focus at the left scapula is no longer seen now demonstrating an SUV max of 1.2, previously 6.1 (axial fused image 37)  Posterior T4 vertebral body, demonstrates decreased uptake with a SUV of 2.4, previously 3.1 (axial fused image 44)  L3 spinous process demonstrate no increased uptake with SUV max of 1.6, previously 2.4 (axial fused image 128)  Left femoral diaphysis demonstrates no increased uptake with SUV max of 1.2 (axial fused image 212).     --CT abdomen/pelvis on 9/1/22:   1. Mild intra and extrahepatic biliary ductal dilatation that is new when compared to 01/17/2020.  If there are clinical signs of biliary obstruction, MRCP or ERCP could be considered.  2. Diverticulosis without evidence of diverticulitis.  3. New tiny nodule in the right lower lobe when compared to prior imaging.  Attention on follow-up.    Interval History:  Has a 2 month history of significant abdominal pain. Concerned about the discomfort she's  experiencing since it's the same symptoms of her initial diagnosis. No identified lymphadenopathy or other B symptoms.    Here with her daughter today.      PAST MEDICAL HISTORY:   Past Medical History:   Diagnosis Date    Allergy     Anemia     Arthritis     Asthma     Depression     Generalized headaches 2014    Goiter     MNG    HTN (hypertension), benign     Hyperlipidemia     Hyperparathyroidism     s/p surgery    Intestinal obstruction     resolved spontaneously    Lumbar disc disease     s/p epidural shots    Nuclear sclerosis - Both Eyes 3/11/2014    Osteoporosis, post-menopausal     with 3 rib fractures       PAST SURGICAL HISTORY:   Past Surgical History:   Procedure Laterality Date    APPENDECTOMY      BILATERAL OOPHORECTOMY      BONE MARROW BIOPSY N/A 3/5/2020    Procedure: Biopsy-bone marrow;  Surgeon: Eve Alvarez MD;  Location: [facility] OR [address] [place] [address];  Service: Oncology;  Laterality: N/A;    BREAST CYST EXCISION      left    CATARACT EXTRACTION W/  INTRAOCULAR LENS IMPLANT Right 2016    Dr. Fang ([place])    CATARACT EXTRACTION W/  INTRAOCULAR LENS IMPLANT Left 10/17/2016    Dr. Fang ([place])     SECTION, CLASSIC      x 1    CHOLECYSTECTOMY      COLONOSCOPY N/A 3/1/2016    Procedure: COLONOSCOPY;  Surgeon: Dony Bronson MD;  Location: [facility] (4TH FLR);  Service: Endoscopy;  Laterality: N/A;  PM Prep    HYSTERECTOMY      JOINT REPLACEMENT      KNEE SURGERY Right 2017    TKR    LUMBAR EPIDURAL INJECTION      x 4    MOUTH SURGERY      for abscess drainage of gum of frontal teeth    PARATHYROID GLAND SURGERY      for parathyroid adenoma    TOTAL KNEE ARTHROPLASTY Left 2019    Procedure: ARTHROPLASTY, KNEE, TOTAL-DEPUY-SIGMA;  Surgeon: Newton Rodriguez III, MD;  [facility];  Service: Orthopedics;  Laterality: Left;    TRANSFORAMINAL EPIDURAL INJECTION OF STEROID Left 2019    Procedure: INJECTION, STEROID, EPIDURAL, TRANSFORAMINAL APPROACH, L3-L4 AND L4-L5;   Surgeon: Venancio Lopez MD;  Location: [facility];  Service: Pain Management;  Laterality: Left;       PAST SOCIAL HISTORY:   reports that she has never smoked. She has never used smokeless tobacco. She reports that she does not drink alcohol and does not use drugs.    FAMILY HISTORY:  Family History   Problem Relation Age of Onset    Heart disease Mother     Hypertension Mother     Heart attack Mother     Heart disease Sister          in their 50's    Heart failure Sister     Heart disease Brother         CABG in age 60's    Hyperlipidemia Brother     Heart disease Sister         in her 50's    Heart disease Sister         in her 50's    Heart disease Sister     Hypertension Sister     Hyperlipidemia Sister     Heart disease Brother         CABG in age 60's    Hyperlipidemia Brother     Thyroid disease Daughter     Amblyopia Neg Hx     Blindness Neg Hx     Cancer Neg Hx     Cataracts Neg Hx     Diabetes Neg Hx     Glaucoma Neg Hx     Macular degeneration Neg Hx     Retinal detachment Neg Hx     Strabismus Neg Hx     Stroke Neg Hx        CURRENT MEDICATIONS:   Current Outpatient Medications   Medication Sig    acetaminophen (TYLENOL) 650 MG TbSR Take 1 tablet (650 mg total) by mouth every 6 (six) hours as needed (pain).    acyclovir (ZOVIRAX) 400 MG tablet SMARTSI Tablet(s) By Mouth Twice Daily    albuterol (PROAIR HFA) 90 mcg/actuation inhaler Inhale 2 puffs into the lungs every 6 (six) hours as needed for Wheezing. Rescue    aspirin (ECOTRIN) 81 MG EC tablet Take 1 tablet (81 mg total) by mouth 2 (two) times daily. (Patient taking differently: Take 81 mg by mouth every morning.)    atorvastatin (LIPITOR) 80 MG tablet TAKE 1 TABLET BY MOUTH EVERY DAY    diclofenac sodium (VOLTAREN) 1 % Gel     fluticasone propionate (FLONASE) 50 mcg/actuation nasal spray INHALE 2 SPRAYS (100 MCG TOTAL) IN EACH NOSTRIL ONCE DAILY. (Patient taking differently: every evening.)    fluticasone-salmeterol diskus inhaler  100-50 mcg TAKE 1 PUFF BY MOUTH TWICE A DAY    gabapentin (NEURONTIN) 100 MG capsule Take 3 capsules three times daily as instructed (Patient taking differently: Take by mouth 2 (two) times daily. Take 3 capsules three times daily as instructed)    levocetirizine (XYZAL) 5 MG tablet Take 5 mg by mouth nightly.    metoprolol succinate (TOPROL-XL) 25 MG 24 hr tablet Take 1 tablet (25 mg total) by mouth every morning.    montelukast (SINGULAIR) 10 mg tablet TAKE 1 TABLET BY MOUTH EVERY DAY IN THE EVENING    nabumetone (RELAFEN) 750 MG tablet TAKE 1 TABLET BY MOUTH TWICE A DAY (Patient taking differently: Take by mouth 2 (two) times daily as needed.)    omeprazole (PRILOSEC) 40 MG capsule TAKE 1 CAPSULE BY MOUTH EVERY DAY    sertraline (ZOLOFT) 100 MG tablet TAKE 1 TABLET BY MOUTH EVERY DAY (Patient taking differently: Take 50 mg by mouth every morning.)    tiZANidine (ZANAFLEX) 4 MG tablet TAKE 1.5 TABLETS (6MG) BY MOUTH 3 TIMES A DAY AS NEEDED FOR MUSCLE SPASM    valsartan (DIOVAN) 320 MG tablet Take 1 tablet (320 mg total) by mouth once daily. (Patient taking differently: Take 320 mg by mouth every morning.)    blood-glucose meter ([employer] BLOOD GLUCOSE SYSTM) kit Use as instructed (Patient not taking: No sig reported)    lidocaine-prilocaine (EMLA) cream USE AS DIRECTED ONE HOUR BEFORE CHEMOTHERAPY EXTERNALLY    polyethylene glycol 3350 (MIRALAX ORAL) Take by mouth as needed.     No current facility-administered medications for this visit.     ALLERGIES:   Review of patient's allergies indicates:   Allergen Reactions    Vicodin [hydrocodone-acetaminophen] Anxiety         Review of Systems:     Review of Systems   Constitutional:  Positive for fatigue. Negative for appetite change, chills, diaphoresis, fever and unexpected weight change.   HENT:   Negative for hearing loss, mouth sores, nosebleeds, sore throat, trouble swallowing and voice change.    Eyes:  Negative for eye problems and icterus.   Respiratory:   Negative for chest tightness, cough, hemoptysis, shortness of breath and wheezing.    Cardiovascular:  Negative for chest pain, leg swelling and palpitations.   Gastrointestinal:  Positive for abdominal distention, abdominal pain and nausea. Negative for blood in stool, diarrhea and vomiting.   Endocrine: Negative for hot flashes.   Genitourinary:  Negative for bladder incontinence, difficulty urinating, dysuria and hematuria.    Musculoskeletal:  Negative for arthralgias, back pain, flank pain, gait problem, myalgias, neck pain and neck stiffness.   Skin:  Negative for itching, rash and wound.   Neurological:  Negative for dizziness, extremity weakness, gait problem, headaches, numbness, seizures and speech difficulty.   Hematological:  Negative for adenopathy. Does not bruise/bleed easily.   Psychiatric/Behavioral:  Negative for confusion, depression and sleep disturbance. The patient is not nervous/anxious.       Physical Exam:     Vitals:    09/12/22 1120   BP: (!) 150/70   Pulse: 75   Resp: 16   Temp: 98 °F (36.7 °C)     Physical Exam  Constitutional:       General: She is not in acute distress.     Appearance: She is well-developed. She is not diaphoretic.   HENT:      Head: Normocephalic and atraumatic.      Mouth/Throat:      Pharynx: No oropharyngeal exudate.   Eyes:      Conjunctiva/sclera: Conjunctivae normal.      Pupils: Pupils are equal, round, and reactive to light.   Neck:      Thyroid: No thyromegaly.      Vascular: No JVD.      Trachea: No tracheal deviation.   Cardiovascular:      Rate and Rhythm: Normal rate and regular rhythm.      Heart sounds: Normal heart sounds. No murmur heard.    No friction rub.   Pulmonary:      Effort: Pulmonary effort is normal. No respiratory distress.      Breath sounds: Normal breath sounds. No stridor. No wheezing or rales.   Chest:      Chest wall: No tenderness.   Abdominal:      General: Bowel sounds are normal. There is no distension.      Palpations: Abdomen  is soft.      Tenderness: There is no abdominal tenderness. There is no guarding or rebound.   Musculoskeletal:         General: No tenderness or deformity. Normal range of motion.      Cervical back: Normal range of motion and neck supple.   Skin:     General: Skin is warm and dry.      Capillary Refill: Capillary refill takes less than 2 seconds.      Coloration: Skin is not pale.      Findings: No erythema or rash.   Neurological:      Mental Status: She is alert and oriented to person, place, and time.      Cranial Nerves: No cranial nerve deficit.      Sensory: No sensory deficit.      Motor: No abnormal muscle tone.      Coordination: Coordination normal.      Deep Tendon Reflexes: Reflexes normal.   Psychiatric:         Behavior: Behavior normal.         Thought Content: Thought content normal.         Judgment: Judgment normal.       ECOG Performance Status: (foot note - ECOG PS provided by Eastern Cooperative Oncology Group) 1 - Symptomatic but completely ambulatory      Karnofsky Performance Score:  90%- Able to Carry on Normal Activity: Minor Symptoms of Disease      Labs:   Lab Results   Component Value Date    WBC 5.51 09/12/2022    HGB 11.6 (L) 09/12/2022    HCT 34.0 (L) 09/12/2022     09/12/2022    CHOL 179 07/09/2018    TRIG 91 07/09/2018    HDL 60 07/09/2018    ALT 13 09/12/2022    AST 16 09/12/2022     (L) 09/12/2022    K 4.2 09/12/2022     09/12/2022    CREATININE 0.9 09/12/2022    BUN 16 09/12/2022    CO2 27 09/12/2022    TSH 1.079 02/14/2022    INR 1.0 03/23/2022    HGBA1C 6.2 (H) 06/03/2019       Imaging: Previous imaging has been reviewed   Assessment and Plan:     Ms. Frances is a pleasant 82 year old with DLBCL successfully treated in 2021.    Diffuse Large B Cell Lymphoma  --Treated at [facility] with R-CHOP x 6  --Surveilance PET shows no signs of recurrance  --Denies B symptoms    Abdominal Pain  --CT identified mild intra and extrahepatic biliary ductal dilatation    --MRCP ordered  --Will refer to GI if needed    Full conversation was conducted through an in person     30 minutes were spent face to face with the patient and her  family to discuss the disease, natural history, treatment options and survival statistics. I have provided the patient with an opportunity to ask questions and have all questions answered to her satisfaction.       she will return to clinic following MRCP, but knows to call in the interim if symptoms change or should a problem arise.        Luh Kirby MD  Hematology and Medical Oncology  Bone Marrow Transplant  [facility]      BMT Chart Routing      Follow up with physician 3 months. 1. MRCP asap 2.see me [okay to overbook if needed] 1-2 days after imaging   Follow up with [facility]ON    Infusion scheduling note    Injection scheduling note    Labs    Imaging MRI   MRCP   Pharmacy appointment No pharmacy appointment needed      Other referrals No additional referrals needed

## 2022-10-10 ENCOUNTER — HOSPITAL ENCOUNTER (OUTPATIENT)
Dept: RADIOLOGY | Facility: HOSPITAL | Age: 82
Discharge: HOME | End: 2022-10-10
Attending: INTERNAL MEDICINE
Payer: MEDICARE

## 2022-10-10 DIAGNOSIS — K83.8 COMMON BILE DUCT DILATION: ICD-10-CM

## 2022-10-10 PROCEDURE — 25500020 PHARM REV CODE 255: Performed by: INTERNAL MEDICINE

## 2022-10-10 PROCEDURE — 76376 3D RENDER W/INTRP POSTPROCES: CPT | Mod: 26,GA,, | Performed by: RADIOLOGY

## 2022-10-10 PROCEDURE — A9585 GADOBUTROL INJECTION: HCPCS | Performed by: INTERNAL MEDICINE

## 2022-10-10 PROCEDURE — 76376 MRI ABDOMEN WITH AND WO_INC MRCP (XPD): ICD-10-PCS | Mod: 26,GA,, | Performed by: RADIOLOGY

## 2022-10-10 PROCEDURE — 74183 MRI ABDOMEN WITH AND WO_INC MRCP (XPD): ICD-10-PCS | Mod: 26,GA,, | Performed by: RADIOLOGY

## 2022-10-10 PROCEDURE — 74183 MRI ABD W/O CNTR FLWD CNTR: CPT | Mod: 26,GA,, | Performed by: RADIOLOGY

## 2022-10-10 PROCEDURE — 74183 MRI ABD W/O CNTR FLWD CNTR: CPT | Mod: GA,TC

## 2022-10-10 RX ORDER — GADOBUTROL 604.72 MG/ML
10 INJECTION INTRAVENOUS
Status: COMPLETED | OUTPATIENT
Start: 2022-10-10 | End: 2022-10-10

## 2022-10-10 RX ADMIN — GADOBUTROL 10 ML: 604.72 INJECTION INTRAVENOUS at 02:10

## 2022-10-13 ENCOUNTER — OFFICE VISIT (OUTPATIENT)
Dept: HEMATOLOGY/ONCOLOGY | Facility: CLINIC | Age: 82
End: 2022-10-13
Payer: MEDICARE

## 2022-10-13 ENCOUNTER — TELEPHONE (OUTPATIENT)
Dept: HEMATOLOGY/ONCOLOGY | Facility: CLINIC | Age: 82
End: 2022-10-13
Payer: MEDICARE

## 2022-10-13 VITALS
WEIGHT: 163.56 LBS | DIASTOLIC BLOOD PRESSURE: 60 MMHG | RESPIRATION RATE: 16 BRPM | OXYGEN SATURATION: 97 % | HEART RATE: 82 BPM | SYSTOLIC BLOOD PRESSURE: 131 MMHG | HEIGHT: 63 IN | TEMPERATURE: 99 F | BODY MASS INDEX: 28.98 KG/M2

## 2022-10-13 DIAGNOSIS — D50.9 IRON DEFICIENCY ANEMIA, UNSPECIFIED IRON DEFICIENCY ANEMIA TYPE: ICD-10-CM

## 2022-10-13 DIAGNOSIS — I10 HTN (HYPERTENSION), BENIGN: ICD-10-CM

## 2022-10-13 DIAGNOSIS — K59.09 CHRONIC CONSTIPATION: ICD-10-CM

## 2022-10-13 DIAGNOSIS — K83.8 DILATION OF BILIARY TRACT: Primary | ICD-10-CM

## 2022-10-13 DIAGNOSIS — C83.30 DIFFUSE LARGE B-CELL LYMPHOMA, UNSPECIFIED BODY REGION: ICD-10-CM

## 2022-10-13 PROCEDURE — 99999 PR PBB SHADOW E&M-EST. PATIENT-LVL V: ICD-10-PCS | Mod: PBBFAC,,, | Performed by: INTERNAL MEDICINE

## 2022-10-13 PROCEDURE — 99215 OFFICE O/P EST HI 40 MIN: CPT | Mod: S$GLB,,, | Performed by: INTERNAL MEDICINE

## 2022-10-13 PROCEDURE — 3078F PR MOST RECENT DIASTOLIC BLOOD PRESSURE < 80 MM HG: ICD-10-PCS | Mod: CPTII,S$GLB,, | Performed by: INTERNAL MEDICINE

## 2022-10-13 PROCEDURE — 3075F SYST BP GE 130 - 139MM HG: CPT | Mod: CPTII,S$GLB,, | Performed by: INTERNAL MEDICINE

## 2022-10-13 PROCEDURE — 3288F PR FALLS RISK ASSESSMENT DOCUMENTED: ICD-10-PCS | Mod: CPTII,S$GLB,, | Performed by: INTERNAL MEDICINE

## 2022-10-13 PROCEDURE — 1101F PT FALLS ASSESS-DOCD LE1/YR: CPT | Mod: CPTII,S$GLB,, | Performed by: INTERNAL MEDICINE

## 2022-10-13 PROCEDURE — 3288F FALL RISK ASSESSMENT DOCD: CPT | Mod: CPTII,S$GLB,, | Performed by: INTERNAL MEDICINE

## 2022-10-13 PROCEDURE — 3075F PR MOST RECENT SYSTOLIC BLOOD PRESS GE 130-139MM HG: ICD-10-PCS | Mod: CPTII,S$GLB,, | Performed by: INTERNAL MEDICINE

## 2022-10-13 PROCEDURE — 99215 PR OFFICE/OUTPT VISIT, EST, LEVL V, 40-54 MIN: ICD-10-PCS | Mod: S$GLB,,, | Performed by: INTERNAL MEDICINE

## 2022-10-13 PROCEDURE — 99999 PR PBB SHADOW E&M-EST. PATIENT-LVL V: CPT | Mod: PBBFAC,,, | Performed by: INTERNAL MEDICINE

## 2022-10-13 PROCEDURE — 1101F PR PT FALLS ASSESS DOC 0-1 FALLS W/OUT INJ PAST YR: ICD-10-PCS | Mod: CPTII,S$GLB,, | Performed by: INTERNAL MEDICINE

## 2022-10-13 PROCEDURE — 1125F PR PAIN SEVERITY QUANTIFIED, PAIN PRESENT: ICD-10-PCS | Mod: CPTII,S$GLB,, | Performed by: INTERNAL MEDICINE

## 2022-10-13 PROCEDURE — 1159F MED LIST DOCD IN RCRD: CPT | Mod: CPTII,S$GLB,, | Performed by: INTERNAL MEDICINE

## 2022-10-13 PROCEDURE — 1125F AMNT PAIN NOTED PAIN PRSNT: CPT | Mod: CPTII,S$GLB,, | Performed by: INTERNAL MEDICINE

## 2022-10-13 PROCEDURE — 1159F PR MEDICATION LIST DOCUMENTED IN MEDICAL RECORD: ICD-10-PCS | Mod: CPTII,S$GLB,, | Performed by: INTERNAL MEDICINE

## 2022-10-13 PROCEDURE — 3078F DIAST BP <80 MM HG: CPT | Mod: CPTII,S$GLB,, | Performed by: INTERNAL MEDICINE

## 2022-10-13 NOTE — PROGRESS NOTES
Hematology and Medical Oncology   Follow Up     10/13/2022    Primary Hematologic Diagnosis: Diffuse Large B Cell Lymphoma    History of Present Ilness:   Elena Frances (Elena) is a pleasant 82 y.o.female who is transitioning care from [facility] following the successful treatment of DLBCL. Has recovered from therapy and wishes to consolidate her care to [facility].     Hematologic History:  --Work up started with back pain, MRI showed multiple vertebral sclerotic bony lesions  --Bone biopsy was most consistent with DLBCL  --PET September 2020 showed additional bone lesions, including the left scapula. Biopsy showed a kappa restricted B- cell lymphoma that is negative for CD5 and CD1-.   --Slides reviewed at [facility] confirmed GCB-DLBCL in L5  --Received R-CHOP x 6 in a CR    --Surveilance PET on 6/1/22:   Right iliac bone with a SUV max of 2.5, previously 3.6 (axial fused image 162)  L5 vertebral body with a SUV max of 2.9, previously 5 (axial fused image 146)  The previously hypermetabolic focus at the left scapula is no longer seen now demonstrating an SUV max of 1.2, previously 6.1 (axial fused image 37)  Posterior T4 vertebral body, demonstrates decreased uptake with a SUV of 2.4, previously 3.1 (axial fused image 44)  L3 spinous process demonstrate no increased uptake with SUV max of 1.6, previously 2.4 (axial fused image 128)  Left femoral diaphysis demonstrates no increased uptake with SUV max of 1.2 (axial fused image 212).     --CT abdomen/pelvis on 9/1/22:   1. Mild intra and extrahepatic biliary ductal dilatation that is new when compared to 01/17/2020.  If there are clinical signs of biliary obstruction, MRCP or ERCP could be considered.  2. Diverticulosis without evidence of diverticulitis.  3. New tiny nodule in the right lower lobe when compared to prior imaging.  Attention on follow-up.  --MRCP on 10/10/22:  Extrahepatic duct dilatation up to 0.8 cm with mild central intrahepatic ductal prominence.   Overall findings are nonspecific in the setting of prior cholecystectomy.  No evident intraductal filling defect or stricture.  Consider further evaluation with ERCP as clinically warranted.     Indeterminate subcentimeter T2 hyperintense focus adjacent to the distant common bile duct, of uncertain etiology.  Differential consideration to include small IPMN at the pancreatic head.  Further correlation and follow-up as warranted.      Interval History:  Has a 3 month history of significant abdominal pain. Please to hear there are no enlarged lymph nodes. Has not had a bowel movement in over 4 days. Feels significant pain and pressure in her lower quadrants.      PAST MEDICAL HISTORY:   Past Medical History:   Diagnosis Date    Allergy     Anemia     Arthritis     Asthma     Depression     Generalized headaches 2014    Goiter     MNG    HTN (hypertension), benign     Hyperlipidemia     Hyperparathyroidism     s/p surgery    Intestinal obstruction     resolved spontaneously    Lumbar disc disease     s/p epidural shots    Nuclear sclerosis - Both Eyes 3/11/2014    Osteoporosis, post-menopausal     with 3 rib fractures       PAST SURGICAL HISTORY:   Past Surgical History:   Procedure Laterality Date    APPENDECTOMY      BILATERAL OOPHORECTOMY      BONE MARROW BIOPSY N/A 3/5/2020    Procedure: Biopsy-bone marrow;  Surgeon: Eve Alvarez MD;  [facility];  Service: Oncology;  Laterality: N/A;    BREAST CYST EXCISION      left    CATARACT EXTRACTION W/  INTRAOCULAR LENS IMPLANT Right 2016    Dr. Fang ([facility])    CATARACT EXTRACTION W/  INTRAOCULAR LENS IMPLANT Left 10/17/2016    Dr. Fang ([place])     SECTION, CLASSIC      x 1    CHOLECYSTECTOMY      COLONOSCOPY N/A 3/1/2016    Procedure: COLONOSCOPY;  Surgeon: Dony Bronson MD;  Location: [facility] (4TH FLR);  Service: Endoscopy;  Laterality: N/A;  PM Prep    HYSTERECTOMY      JOINT REPLACEMENT      KNEE SURGERY Right 2017    TKR     LUMBAR EPIDURAL INJECTION      x 4    MOUTH SURGERY      for abscess drainage of gum of frontal teeth    PARATHYROID GLAND SURGERY      for parathyroid adenoma    TOTAL KNEE ARTHROPLASTY Left 2019    Procedure: ARTHROPLASTY, KNEE, TOTAL-DEPUY-SIGMA;  Surgeon: Newton Rodriguez III, MD;  Location: [facility] OR [address] [place] [address];  Service: Orthopedics;  Laterality: Left;    TRANSFORAMINAL EPIDURAL INJECTION OF STEROID Left 2019    Procedure: INJECTION, STEROID, EPIDURAL, TRANSFORAMINAL APPROACH, L3-L4 AND L4-L5;  Surgeon: Venancio Lopez MD;  Location: [facility];  Service: Pain Management;  Laterality: Left;       PAST SOCIAL HISTORY:   reports that she has never smoked. She has never used smokeless tobacco. She reports that she does not drink alcohol and does not use drugs.    FAMILY HISTORY:  Family History   Problem Relation Age of Onset    Heart disease Mother     Hypertension Mother     Heart attack Mother     Heart disease Sister          in their 50's    Heart failure Sister     Heart disease Brother         CABG in age 60's    Hyperlipidemia Brother     Heart disease Sister         in her 50's    Heart disease Sister         in her 50's    Heart disease Sister     Hypertension Sister     Hyperlipidemia Sister     Heart disease Brother         CABG in age 60's    Hyperlipidemia Brother     Thyroid disease Daughter     Amblyopia Neg Hx     Blindness Neg Hx     Cancer Neg Hx     Cataracts Neg Hx     Diabetes Neg Hx     Glaucoma Neg Hx     Macular degeneration Neg Hx     Retinal detachment Neg Hx     Strabismus Neg Hx     Stroke Neg Hx        CURRENT MEDICATIONS:   Current Outpatient Medications   Medication Sig    albuterol (PROAIR HFA) 90 mcg/actuation inhaler Inhale 2 puffs into the lungs every 6 (six) hours as needed for Wheezing. Rescue    atorvastatin (LIPITOR) 80 MG tablet TAKE 1 TABLET BY MOUTH EVERY DAY    diclofenac sodium (VOLTAREN) 1 % Gel     fluticasone propionate (FLONASE) 50 mcg/actuation  nasal spray INHALE 2 SPRAYS (100 MCG TOTAL) IN EACH NOSTRIL ONCE DAILY. (Patient taking differently: every evening.)    fluticasone-salmeterol diskus inhaler 100-50 mcg TAKE 1 PUFF BY MOUTH TWICE A DAY    gabapentin (NEURONTIN) 100 MG capsule Take 3 capsules three times daily as instructed (Patient taking differently: Take by mouth 2 (two) times daily. Take 3 capsules three times daily as instructed)    lidocaine-prilocaine (EMLA) cream USE AS DIRECTED ONE HOUR BEFORE CHEMOTHERAPY EXTERNALLY    metoprolol succinate (TOPROL-XL) 25 MG 24 hr tablet Take 1 tablet (25 mg total) by mouth every morning.    montelukast (SINGULAIR) 10 mg tablet TAKE 1 TABLET BY MOUTH EVERY DAY IN THE EVENING    nabumetone (RELAFEN) 750 MG tablet TAKE 1 TABLET BY MOUTH TWICE A DAY (Patient taking differently: Take by mouth 2 (two) times daily as needed.)    omeprazole (PRILOSEC) 40 MG capsule TAKE 1 CAPSULE BY MOUTH EVERY DAY    polyethylene glycol 3350 (MIRALAX ORAL) Take by mouth as needed.    sertraline (ZOLOFT) 100 MG tablet TAKE 1 TABLET BY MOUTH EVERY DAY (Patient taking differently: Take 50 mg by mouth every morning.)    tiZANidine (ZANAFLEX) 4 MG tablet TAKE 1.5 TABLETS (6MG) BY MOUTH 3 TIMES A DAY AS NEEDED FOR MUSCLE SPASM    acetaminophen (TYLENOL) 650 MG TbSR Take 1 tablet (650 mg total) by mouth every 6 (six) hours as needed (pain). (Patient not taking: Reported on 10/13/2022)    acyclovir (ZOVIRAX) 400 MG tablet SMARTSI Tablet(s) By Mouth Twice Daily    aspirin (ECOTRIN) 81 MG EC tablet Take 1 tablet (81 mg total) by mouth 2 (two) times daily. (Patient taking differently: Take 81 mg by mouth every morning.)    blood-glucose meter ([employer] BLOOD GLUCOSE SYSTM) kit Use as instructed (Patient not taking: No sig reported)    levocetirizine (XYZAL) 5 MG tablet Take 5 mg by mouth nightly.    valsartan (DIOVAN) 320 MG tablet Take 1 tablet (320 mg total) by mouth once daily. (Patient taking differently: Take 320 mg by mouth every  morning.)     No current facility-administered medications for this visit.     ALLERGIES:   Review of patient's allergies indicates:   Allergen Reactions    Vicodin [hydrocodone-acetaminophen] Anxiety         Review of Systems:     Review of Systems   Constitutional:  Positive for fatigue. Negative for appetite change, chills, diaphoresis, fever and unexpected weight change.   HENT:   Negative for hearing loss, mouth sores, nosebleeds, sore throat, trouble swallowing and voice change.    Eyes:  Negative for eye problems and icterus.   Respiratory:  Negative for chest tightness, cough, hemoptysis, shortness of breath and wheezing.    Cardiovascular:  Negative for chest pain, leg swelling and palpitations.   Gastrointestinal:  Positive for abdominal distention, abdominal pain and nausea. Negative for blood in stool, diarrhea and vomiting.   Endocrine: Negative for hot flashes.   Genitourinary:  Negative for bladder incontinence, difficulty urinating, dysuria and hematuria.    Musculoskeletal:  Negative for arthralgias, back pain, flank pain, gait problem, myalgias, neck pain and neck stiffness.   Skin:  Negative for itching, rash and wound.   Neurological:  Negative for dizziness, extremity weakness, gait problem, headaches, numbness, seizures and speech difficulty.   Hematological:  Negative for adenopathy. Does not bruise/bleed easily.   Psychiatric/Behavioral:  Negative for confusion, depression and sleep disturbance. The patient is not nervous/anxious.       Physical Exam:     Vitals:    10/13/22 0909   BP: 131/60   Pulse: 82   Resp: 16   Temp: 98.7 °F (37.1 °C)     Physical Exam  Constitutional:       General: She is not in acute distress.     Appearance: She is well-developed. She is not diaphoretic.   HENT:      Head: Normocephalic and atraumatic.      Mouth/Throat:      Pharynx: No oropharyngeal exudate.   Eyes:      Conjunctiva/sclera: Conjunctivae normal.      Pupils: Pupils are equal, round, and reactive to  light.   Neck:      Thyroid: No thyromegaly.      Vascular: No JVD.      Trachea: No tracheal deviation.   Cardiovascular:      Rate and Rhythm: Normal rate and regular rhythm.      Heart sounds: Normal heart sounds. No murmur heard.    No friction rub.   Pulmonary:      Effort: Pulmonary effort is normal. No respiratory distress.      Breath sounds: Normal breath sounds. No stridor. No wheezing or rales.   Chest:      Chest wall: No tenderness.   Abdominal:      General: Bowel sounds are normal. There is no distension.      Palpations: Abdomen is soft.      Tenderness: There is no abdominal tenderness. There is no guarding or rebound.   Musculoskeletal:         General: No tenderness or deformity. Normal range of motion.      Cervical back: Normal range of motion and neck supple.   Skin:     General: Skin is warm and dry.      Capillary Refill: Capillary refill takes less than 2 seconds.      Coloration: Skin is not pale.      Findings: No erythema or rash.   Neurological:      Mental Status: She is alert and oriented to person, place, and time.      Cranial Nerves: No cranial nerve deficit.      Sensory: No sensory deficit.      Motor: No abnormal muscle tone.      Coordination: Coordination normal.      Deep Tendon Reflexes: Reflexes normal.   Psychiatric:         Behavior: Behavior normal.         Thought Content: Thought content normal.         Judgment: Judgment normal.       ECOG Performance Status: (foot note - ECOG PS provided by Eastern Cooperative Oncology Group) 1 - Symptomatic but completely ambulatory      Karnofsky Performance Score:  90%- Able to Carry on Normal Activity: Minor Symptoms of Disease      Labs:   Lab Results   Component Value Date    WBC 5.51 09/12/2022    HGB 11.6 (L) 09/12/2022    HCT 34.0 (L) 09/12/2022     09/12/2022    CHOL 179 07/09/2018    TRIG 91 07/09/2018    HDL 60 07/09/2018    ALT 13 09/12/2022    AST 16 09/12/2022     (L) 09/12/2022    K 4.2 09/12/2022    CL  100 09/12/2022    CREATININE 0.9 09/12/2022    BUN 16 09/12/2022    CO2 27 09/12/2022    TSH 1.408 10/10/2022    INR 1.0 03/23/2022    HGBA1C 6.2 (H) 06/03/2019       Imaging: Previous imaging has been reviewed   Assessment and Plan:     Ms. Frances is a pleasant 82 year old with DLBCL successfully treated in 2021.    Diffuse Large B Cell Lymphoma  --Treated at [facility] with R-CHOP x 6  --Surveilance PET shows no signs of recurrance  --Denies B symptoms    Abdominal Pain  --CT identified mild intra and extrahepatic biliary ductal dilatation   --MRCP completed  --Refer to GI for consideration of an ERCP given ongoing symptoms    Constipation  --plan for trial of 4 ducolax later today  --If unsuccessful in having a bowel movement in the next day, plan to prescribe lactulose     Full conversation was conducted through a phone     30 minutes were spent face to face with the patient and her  family to discuss the disease, natural history, treatment options and survival statistics. I have provided the patient with an opportunity to ask questions and have all questions answered to her satisfaction.       she will return to clinic following MRCP, but knows to call in the interim if symptoms change or should a problem arise.        Luh Kirby MD  Hematology and Medical Oncology  Bone Marrow Transplant  [facility]      BMT Chart Routing      Follow up with physician Other. 1. referral to GI. 2.See me after ERCP okay to over book if needed -- needs a    Follow up with TALON    Provider visit type    Infusion scheduling note    Injection scheduling note    Labs    Imaging    Pharmacy appointment    Other referrals

## 2022-10-18 ENCOUNTER — TELEPHONE (OUTPATIENT)
Dept: ENDOSCOPY | Facility: HOSPITAL | Age: 82
End: 2022-10-18
Payer: MEDICARE

## 2022-10-18 DIAGNOSIS — R93.89 ABNORMAL FINDING ON IMAGING: Primary | ICD-10-CM

## 2022-10-18 NOTE — TELEPHONE ENCOUNTER
----- Message from Mamadou Graff MD sent at 10/18/2022 11:58 AM CDT -----  This is an EUS=/- ERCP, Deandra is ok.  Dilated CBD- LFTs look normal  ----- Message -----  From: Leah Bueno MA  Sent: 10/17/2022   7:23 PM CDT  To: Mamadou Graff MD    Ok for me to enter order? Deandra ok?  ----- Message -----  From: Dolores Ordaz RN  Sent: 10/17/2022   3:35 PM CDT  To: Leah Bueno MA    Please have your team review.    She needs ERCP quick.  Thank you,    An

## 2022-10-27 ENCOUNTER — OFFICE VISIT (OUTPATIENT)
Dept: HEPATOLOGY | Facility: CLINIC | Age: 82
End: 2022-10-27
Payer: MEDICARE

## 2022-10-27 ENCOUNTER — LAB VISIT (OUTPATIENT)
Dept: LAB | Facility: HOSPITAL | Age: 82
End: 2022-10-27
Attending: INTERNAL MEDICINE
Payer: MEDICARE

## 2022-10-27 VITALS
WEIGHT: 166.25 LBS | HEIGHT: 63 IN | BODY MASS INDEX: 29.46 KG/M2 | HEART RATE: 76 BPM | SYSTOLIC BLOOD PRESSURE: 148 MMHG | TEMPERATURE: 99 F | DIASTOLIC BLOOD PRESSURE: 68 MMHG | OXYGEN SATURATION: 99 % | RESPIRATION RATE: 20 BRPM

## 2022-10-27 DIAGNOSIS — K83.8 INTRAHEPATIC BILE DUCT DILATION: ICD-10-CM

## 2022-10-27 DIAGNOSIS — R97.8 OTHER ABNORMAL TUMOR MARKERS: ICD-10-CM

## 2022-10-27 DIAGNOSIS — C83.30 DIFFUSE LARGE B-CELL LYMPHOMA, UNSPECIFIED BODY REGION: Primary | ICD-10-CM

## 2022-10-27 DIAGNOSIS — K83.8 DILATION OF BILIARY TRACT: ICD-10-CM

## 2022-10-27 LAB
ALBUMIN SERPL BCP-MCNC: 3.5 G/DL (ref 3.5–5.2)
ALP SERPL-CCNC: 162 U/L (ref 55–135)
ALT SERPL W/O P-5'-P-CCNC: 18 U/L (ref 10–44)
ANION GAP SERPL CALC-SCNC: 9 MMOL/L (ref 8–16)
AST SERPL-CCNC: 19 U/L (ref 10–40)
BILIRUB SERPL-MCNC: 0.2 MG/DL (ref 0.1–1)
BUN SERPL-MCNC: 16 MG/DL (ref 8–23)
CALCIUM SERPL-MCNC: 9.6 MG/DL (ref 8.7–10.5)
CANCER AG19-9 SERPL-ACNC: 4.5 U/ML (ref 0–40)
CHLORIDE SERPL-SCNC: 101 MMOL/L (ref 95–110)
CO2 SERPL-SCNC: 25 MMOL/L (ref 23–29)
CREAT SERPL-MCNC: 0.8 MG/DL (ref 0.5–1.4)
EST. GFR  (NO RACE VARIABLE): >60 ML/MIN/1.73 M^2
GLUCOSE SERPL-MCNC: 96 MG/DL (ref 70–110)
POTASSIUM SERPL-SCNC: 4.2 MMOL/L (ref 3.5–5.1)
PROT SERPL-MCNC: 6.9 G/DL (ref 6–8.4)
SODIUM SERPL-SCNC: 135 MMOL/L (ref 136–145)

## 2022-10-27 PROCEDURE — 1159F MED LIST DOCD IN RCRD: CPT | Mod: CPTII,S$GLB,, | Performed by: INTERNAL MEDICINE

## 2022-10-27 PROCEDURE — 3288F FALL RISK ASSESSMENT DOCD: CPT | Mod: CPTII,S$GLB,, | Performed by: INTERNAL MEDICINE

## 2022-10-27 PROCEDURE — 99205 PR OFFICE/OUTPT VISIT, NEW, LEVL V, 60-74 MIN: ICD-10-PCS | Mod: S$GLB,,, | Performed by: INTERNAL MEDICINE

## 2022-10-27 PROCEDURE — 1159F PR MEDICATION LIST DOCUMENTED IN MEDICAL RECORD: ICD-10-PCS | Mod: CPTII,S$GLB,, | Performed by: INTERNAL MEDICINE

## 2022-10-27 PROCEDURE — 1101F PR PT FALLS ASSESS DOC 0-1 FALLS W/OUT INJ PAST YR: ICD-10-PCS | Mod: CPTII,S$GLB,, | Performed by: INTERNAL MEDICINE

## 2022-10-27 PROCEDURE — 36415 COLL VENOUS BLD VENIPUNCTURE: CPT | Performed by: INTERNAL MEDICINE

## 2022-10-27 PROCEDURE — 99999 PR PBB SHADOW E&M-EST. PATIENT-LVL V: CPT | Mod: PBBFAC,,, | Performed by: INTERNAL MEDICINE

## 2022-10-27 PROCEDURE — 1101F PT FALLS ASSESS-DOCD LE1/YR: CPT | Mod: CPTII,S$GLB,, | Performed by: INTERNAL MEDICINE

## 2022-10-27 PROCEDURE — 99999 PR PBB SHADOW E&M-EST. PATIENT-LVL V: ICD-10-PCS | Mod: PBBFAC,,, | Performed by: INTERNAL MEDICINE

## 2022-10-27 PROCEDURE — 80053 COMPREHEN METABOLIC PANEL: CPT | Performed by: INTERNAL MEDICINE

## 2022-10-27 PROCEDURE — 1126F PR PAIN SEVERITY QUANTIFIED, NO PAIN PRESENT: ICD-10-PCS | Mod: CPTII,S$GLB,, | Performed by: INTERNAL MEDICINE

## 2022-10-27 PROCEDURE — 3078F DIAST BP <80 MM HG: CPT | Mod: CPTII,S$GLB,, | Performed by: INTERNAL MEDICINE

## 2022-10-27 PROCEDURE — 99205 OFFICE O/P NEW HI 60 MIN: CPT | Mod: S$GLB,,, | Performed by: INTERNAL MEDICINE

## 2022-10-27 PROCEDURE — 3288F PR FALLS RISK ASSESSMENT DOCUMENTED: ICD-10-PCS | Mod: CPTII,S$GLB,, | Performed by: INTERNAL MEDICINE

## 2022-10-27 PROCEDURE — 1126F AMNT PAIN NOTED NONE PRSNT: CPT | Mod: CPTII,S$GLB,, | Performed by: INTERNAL MEDICINE

## 2022-10-27 PROCEDURE — 1160F RVW MEDS BY RX/DR IN RCRD: CPT | Mod: CPTII,S$GLB,, | Performed by: INTERNAL MEDICINE

## 2022-10-27 PROCEDURE — 3077F SYST BP >= 140 MM HG: CPT | Mod: CPTII,S$GLB,, | Performed by: INTERNAL MEDICINE

## 2022-10-27 PROCEDURE — 3078F PR MOST RECENT DIASTOLIC BLOOD PRESSURE < 80 MM HG: ICD-10-PCS | Mod: CPTII,S$GLB,, | Performed by: INTERNAL MEDICINE

## 2022-10-27 PROCEDURE — 1160F PR REVIEW ALL MEDS BY PRESCRIBER/CLIN PHARMACIST DOCUMENTED: ICD-10-PCS | Mod: CPTII,S$GLB,, | Performed by: INTERNAL MEDICINE

## 2022-10-27 PROCEDURE — 86381 MITOCHONDRIAL ANTIBODY EACH: CPT | Performed by: INTERNAL MEDICINE

## 2022-10-27 PROCEDURE — 86301 IMMUNOASSAY TUMOR CA 19-9: CPT | Performed by: INTERNAL MEDICINE

## 2022-10-27 PROCEDURE — 3077F PR MOST RECENT SYSTOLIC BLOOD PRESSURE >= 140 MM HG: ICD-10-PCS | Mod: CPTII,S$GLB,, | Performed by: INTERNAL MEDICINE

## 2022-10-27 NOTE — PROGRESS NOTES
Subjective:       Patient ID: Elena Frances is a 82 y.o. female.    Chief Complaint: Dilation of Biliary Tract    HPI  I saw this 82 y.o. Pan[place]ian speaking lady with the help of a video  in the liver clinic.    She has a Diffuse Large B Cell Lymphoma that was treated at [facility] and is now in remission.  Work up started with back pain, MRI showed multiple vertebral sclerotic bony lesions  --Bone biopsy was most consistent with DLBCL  --PET September 2020 showed additional bone lesions, including the left scapula. Biopsy showed a kappa restricted B- cell lymphoma that is negative for CD5 and CD1-.   --Slides reviewed at [facility] confirmed GCB-DLBCL in L5  --Received R-CHOP x 6 in a CR    Developed nausea/occ vomiting and backache about 1 month ago.  - new intra and extra hepatic bile duct dilatation on CT abdo (see below)    --CT abdomen/pelvis on 9/1/22:   1. Mild intra and extrahepatic biliary ductal dilatation that is new when compared to 01/17/2020.  If there are clinical signs of biliary obstruction, MRCP or ERCP could be considered.  2. Diverticulosis without evidence of diverticulitis.  3. New tiny nodule in the right lower lobe when compared to prior imaging.  Attention on follow-up.  --MRCP on 10/10/22:  Extrahepatic duct dilatation up to 0.8 cm with mild central intrahepatic ductal prominence.  Overall findings are nonspecific in the setting of prior cholecystectomy.  No evident intraductal filling defect or stricture.  Consider further evaluation with ERCP as clinically warranted    MRI abdo and MRCP: 10/10/22  Extrahepatic duct dilatation up to 0.8 cm with mild central intrahepatic ductal prominence.  Overall findings are nonspecific in the setting of prior cholecystectomy.  No evident intraductal filling defect or stricture.  Consider further evaluation with ERCP as clinically warranted.     Indeterminate subcentimeter T2 hyperintense focus adjacent to the distant common bile duct, of  uncertain etiology.  Differential consideration to include small IPMN at the pancreatic head.  Further correlation and follow-up as warranted.    PMH:  Diffuse Large B Cell Lymphoma- dx in 2020- chemo- in remission  No Heart disease  No DM    Knee replacements x2  Cholecystectomy age 25- [facility]    SH:  Non smoker  No alcohol    FH:  Cir[facility]osis- [facility] (no alcohol)      Review of Systems   Constitutional:  Negative for activity change, appetite change, chills, fatigue, fever and unexpected weight change.   HENT:  Negative for ear pain, hearing loss, nosebleeds, sore throat and trouble swallowing.    Eyes:  Negative for redness and visual disturbance.   Respiratory:  Negative for cough, chest tightness, shortness of breath and wheezing.    Cardiovascular:  Negative for chest pain and palpitations.   Gastrointestinal:  Negative for abdominal distention, abdominal pain, blood in stool, constipation, diarrhea, nausea and vomiting.   Genitourinary:  Negative for difficulty urinating, dysuria, frequency, hematuria and urgency.   Musculoskeletal:  Negative for arthralgias, back pain, gait problem, joint swelling and myalgias.   Skin:  Negative for rash.   Neurological:  Negative for tremors, seizures, speech difficulty, weakness and headaches.   Hematological:  Negative for adenopathy.   Psychiatric/Behavioral:  Negative for confusion, decreased concentration and sleep disturbance. The patient is not nervous/anxious.          Lab Results   Component Value Date    ALT 18 10/27/2022    AST 19 10/27/2022    ALKPHOS 162 (H) 10/27/2022    BILITOT 0.2 10/27/2022     Past Medical History:   Diagnosis Date    Allergy     Anemia     Arthritis     Asthma     Depression     Generalized headaches 4/1/2014    Goiter     MNG    HTN (hypertension), benign     Hyperlipidemia     Hyperparathyroidism     s/p surgery    Intestinal obstruction     resolved spontaneously    Lumbar disc disease     s/p epidural shots     Nuclear sclerosis - Both Eyes 3/11/2014    Osteoporosis, post-menopausal     with 3 rib fractures     Past Surgical History:   Procedure Laterality Date    APPENDECTOMY      BILATERAL OOPHORECTOMY      BONE MARROW BIOPSY N/A 3/5/2020    Procedure: Biopsy-bone marrow;  Surgeon: Eve Alvarez MD;  Location: [facility] OR 2ND FLR;  Service: Oncology;  Laterality: N/A;    BREAST CYST EXCISION      left    CATARACT EXTRACTION W/  INTRAOCULAR LENS IMPLANT Right 2016    Dr. Fang ([place])    CATARACT EXTRACTION W/  INTRAOCULAR LENS IMPLANT Left 10/17/2016    Dr. Fang ([place])     SECTION, CLASSIC      x 1    CHOLECYSTECTOMY      COLONOSCOPY N/A 3/1/2016    Procedure: COLONOSCOPY;  Surgeon: Dony Bronson MD;  Location: [facility] ENDO (4TH FLR);  Service: Endoscopy;  Laterality: N/A;  PM Prep    HYSTERECTOMY      JOINT REPLACEMENT      KNEE SURGERY Right 2017    TKR    LUMBAR EPIDURAL INJECTION      x 4    MOUTH SURGERY      for abscess drainage of gum of frontal teeth    PARATHYROID GLAND SURGERY      for parathyroid adenoma    TOTAL KNEE ARTHROPLASTY Left 2019    Procedure: ARTHROPLASTY, KNEE, TOTAL-DEPUY-SIGMA;  Surgeon: Newton Rodriguez III, MD;  Location: [facility] OR 2ND FLR;  Service: Orthopedics;  Laterality: Left;    TRANSFORAMINAL EPIDURAL INJECTION OF STEROID Left 2019    Procedure: INJECTION, STEROID, EPIDURAL, TRANSFORAMINAL APPROACH, L3-L4 AND L4-L5;  Surgeon: Venancio Lopez MD;  Location: [facility] PAIN MGT;  Service: Pain Management;  Laterality: Left;     Current Outpatient Medications   Medication Sig    acetaminophen (TYLENOL) 650 MG TbSR Take 1 tablet (650 mg total) by mouth every 6 (six) hours as needed (pain).    acyclovir (ZOVIRAX) 400 MG tablet SMARTSI Tablet(s) By Mouth Twice Daily    albuterol (PROAIR HFA) 90 mcg/actuation inhaler Inhale 2 puffs into the lungs every 6 (six) hours as needed for Wheezing. Rescue    atorvastatin (LIPITOR) 80 MG tablet TAKE 1 TABLET BY MOUTH EVERY DAY     diclofenac sodium (VOLTAREN) 1 % Gel     fluticasone propionate (FLONASE) 50 mcg/actuation nasal spray INHALE 2 SPRAYS (100 MCG TOTAL) IN EACH NOSTRIL ONCE DAILY. (Patient taking differently: every evening.)    fluticasone-salmeterol diskus inhaler 100-50 mcg TAKE 1 PUFF BY MOUTH TWICE A DAY    gabapentin (NEURONTIN) 100 MG capsule Take 3 capsules three times daily as instructed (Patient taking differently: Take by mouth 2 (two) times daily. Take 3 capsules three times daily as instructed)    levocetirizine (XYZAL) 5 MG tablet Take 5 mg by mouth nightly.    lidocaine-prilocaine (EMLA) cream USE AS DIRECTED ONE HOUR BEFORE CHEMOTHERAPY EXTERNALLY    metoprolol succinate (TOPROL-XL) 25 MG 24 hr tablet Take 1 tablet (25 mg total) by mouth every morning.    montelukast (SINGULAIR) 10 mg tablet TAKE 1 TABLET BY MOUTH EVERY DAY IN THE EVENING    nabumetone (RELAFEN) 750 MG tablet TAKE 1 TABLET BY MOUTH TWICE A DAY (Patient taking differently: Take by mouth 2 (two) times daily as needed.)    omeprazole (PRILOSEC) 40 MG capsule TAKE 1 CAPSULE BY MOUTH EVERY DAY    polyethylene glycol 3350 (MIRALAX ORAL) Take by mouth as needed.    sertraline (ZOLOFT) 100 MG tablet TAKE 1 TABLET BY MOUTH EVERY DAY (Patient taking differently: Take 50 mg by mouth every morning.)    tiZANidine (ZANAFLEX) 4 MG tablet TAKE 1.5 TABLETS (6MG) BY MOUTH 3 TIMES A DAY AS NEEDED FOR MUSCLE SPASM    aspirin (ECOTRIN) 81 MG EC tablet Take 1 tablet (81 mg total) by mouth 2 (two) times daily. (Patient taking differently: Take 81 mg by mouth every morning.)    blood-glucose meter ([employer] BLOOD GLUCOSE SYSTM) kit Use as instructed (Patient not taking: No sig reported)    valsartan (DIOVAN) 320 MG tablet Take 1 tablet (320 mg total) by mouth once daily. (Patient taking differently: Take 320 mg by mouth every morning.)     No current facility-administered medications for this visit.       Objective:      Physical Exam  Constitutional:       General: She  is not in acute distress.  HENT:      Head: Normocephalic.   Eyes:      Pupils: Pupils are equal, round, and reactive to light.   Neck:      Thyroid: No thyromegaly.      Vascular: No JVD.      Trachea: No tracheal deviation.   Cardiovascular:      Rate and Rhythm: Normal rate and regular rhythm.      Heart sounds: Normal heart sounds. No murmur heard.  Pulmonary:      Effort: Pulmonary effort is normal.      Breath sounds: Normal breath sounds. No stridor.   Abdominal:      Palpations: Abdomen is soft.   Lymphadenopathy:      Head:      Right side of head: No submental, submandibular, tonsillar, preauricular, posterior auricular or occipital adenopathy.      Left side of head: No submental, submandibular, tonsillar, preauricular, posterior auricular or occipital adenopathy.      Cervical: No cervical adenopathy.   Neurological:      Mental Status: She is alert. She is not disoriented.      Cranial Nerves: No cranial nerve deficit.      Sensory: No sensory deficit.       Assessment:       1. Diffuse large B-cell lymphoma, unspecified body region    2. Dilation of biliary tract    3. Other abnormal tumor markers    4. Intrahepatic bile duct dilation          Plan:   She has a new finding of intra and extra hepatic duct dilatation and has a rising Alk Phos. She is already scheduled for an EUS/ERCP with our Advanced endoscopy team.    Today, I simply sent some tumor markers and AMA but I will wiat for the ERCP result before scheduling further investigations.

## 2022-10-30 PROBLEM — K83.8 INTRAHEPATIC BILE DUCT DILATION: Status: ACTIVE | Noted: 2022-10-30

## 2022-10-31 ENCOUNTER — PATIENT MESSAGE (OUTPATIENT)
Dept: HEPATOLOGY | Facility: CLINIC | Age: 82
End: 2022-10-31
Payer: MEDICARE

## 2022-10-31 LAB — MITOCHONDRIA AB TITR SER IF: NORMAL {TITER}

## 2022-11-16 ENCOUNTER — PATIENT MESSAGE (OUTPATIENT)
Dept: HEPATOLOGY | Facility: CLINIC | Age: 82
End: 2022-11-16
Payer: MEDICARE

## 2022-11-21 DIAGNOSIS — M81.0 OSTEOPOROSIS, UNSPECIFIED OSTEOPOROSIS TYPE, UNSPECIFIED PATHOLOGICAL FRACTURE PRESENCE: Primary | ICD-10-CM

## 2022-11-21 NOTE — TELEPHONE ENCOUNTER
Message sent to GI department (Leah)to schedule EUS/ERCP.  Spoke with patient and told her message was sent to GI department to finish scheduling procedure.

## 2022-11-22 ENCOUNTER — PATIENT MESSAGE (OUTPATIENT)
Dept: ENDOSCOPY | Facility: HOSPITAL | Age: 82
End: 2022-11-22
Payer: MEDICARE

## 2022-11-22 ENCOUNTER — TELEPHONE (OUTPATIENT)
Dept: ENDOSCOPY | Facility: HOSPITAL | Age: 82
End: 2022-11-22
Payer: MEDICARE

## 2022-11-22 NOTE — TELEPHONE ENCOUNTER
Telephoned pt to schedule EUS/ERCP.  Spoke with pt's daughter, Alexandra, and procedure scheduled for 11/29/22 at 1:30pm.  Reviewed medical history and medications.  Instructed on procedure and preparation.  Alexandra verbalized understanding.  Copy of instructions sent via patient portal.

## 2022-11-28 ENCOUNTER — PATIENT MESSAGE (OUTPATIENT)
Dept: ENDOSCOPY | Facility: HOSPITAL | Age: 82
End: 2022-11-28
Payer: MEDICARE

## 2022-12-07 ENCOUNTER — ANESTHESIA EVENT (OUTPATIENT)
Dept: ENDOSCOPY | Facility: HOSPITAL | Age: 82
End: 2022-12-07
Payer: MEDICARE

## 2022-12-07 ENCOUNTER — HOSPITAL ENCOUNTER (OUTPATIENT)
Facility: HOSPITAL | Age: 82
Discharge: HOME | End: 2022-12-07
Attending: INTERNAL MEDICINE | Admitting: INTERNAL MEDICINE
Payer: MEDICARE

## 2022-12-07 ENCOUNTER — ANESTHESIA (OUTPATIENT)
Dept: ENDOSCOPY | Facility: HOSPITAL | Age: 82
End: 2022-12-07
Payer: MEDICARE

## 2022-12-07 VITALS
OXYGEN SATURATION: 100 % | BODY MASS INDEX: 27.46 KG/M2 | HEART RATE: 58 BPM | SYSTOLIC BLOOD PRESSURE: 169 MMHG | RESPIRATION RATE: 18 BRPM | WEIGHT: 155 LBS | TEMPERATURE: 98 F | HEIGHT: 63 IN | DIASTOLIC BLOOD PRESSURE: 97 MMHG

## 2022-12-07 DIAGNOSIS — R93.3 ABNORMAL FINDING ON GI TRACT IMAGING: ICD-10-CM

## 2022-12-07 PROCEDURE — 43259 PR ENDOSCOPIC ULTRASOUND EXAM: ICD-10-PCS | Mod: ,,, | Performed by: INTERNAL MEDICINE

## 2022-12-07 PROCEDURE — 37000009 HC ANESTHESIA EA ADD 15 MINS: Performed by: INTERNAL MEDICINE

## 2022-12-07 PROCEDURE — D9220A PRA ANESTHESIA: ICD-10-PCS | Mod: ANES,,, | Performed by: ANESTHESIOLOGY

## 2022-12-07 PROCEDURE — 63600175 PHARM REV CODE 636 W HCPCS: Performed by: NURSE ANESTHETIST, CERTIFIED REGISTERED

## 2022-12-07 PROCEDURE — 25000003 PHARM REV CODE 250: Performed by: NURSE ANESTHETIST, CERTIFIED REGISTERED

## 2022-12-07 PROCEDURE — 25000003 PHARM REV CODE 250: Performed by: INTERNAL MEDICINE

## 2022-12-07 PROCEDURE — 43259 EGD US EXAM DUODENUM/JEJUNUM: CPT | Performed by: INTERNAL MEDICINE

## 2022-12-07 PROCEDURE — D9220A PRA ANESTHESIA: Mod: ANES,,, | Performed by: ANESTHESIOLOGY

## 2022-12-07 PROCEDURE — 43259 EGD US EXAM DUODENUM/JEJUNUM: CPT | Mod: ,,, | Performed by: INTERNAL MEDICINE

## 2022-12-07 PROCEDURE — D9220A PRA ANESTHESIA: Mod: CRNA,,, | Performed by: NURSE ANESTHETIST, CERTIFIED REGISTERED

## 2022-12-07 PROCEDURE — D9220A PRA ANESTHESIA: ICD-10-PCS | Mod: CRNA,,, | Performed by: NURSE ANESTHETIST, CERTIFIED REGISTERED

## 2022-12-07 PROCEDURE — 37000008 HC ANESTHESIA 1ST 15 MINUTES: Performed by: INTERNAL MEDICINE

## 2022-12-07 RX ORDER — SODIUM CHLORIDE 9 MG/ML
INJECTION, SOLUTION INTRAVENOUS CONTINUOUS
Status: DISCONTINUED | OUTPATIENT
Start: 2022-12-07 | End: 2022-12-07 | Stop reason: HOSPADM

## 2022-12-07 RX ORDER — LIDOCAINE HYDROCHLORIDE 20 MG/ML
INJECTION INTRAVENOUS
Status: DISCONTINUED | OUTPATIENT
Start: 2022-12-07 | End: 2022-12-07

## 2022-12-07 RX ORDER — PROPOFOL 10 MG/ML
VIAL (ML) INTRAVENOUS
Status: DISCONTINUED | OUTPATIENT
Start: 2022-12-07 | End: 2022-12-07

## 2022-12-07 RX ORDER — SODIUM CHLORIDE 0.9 % (FLUSH) 0.9 %
10 SYRINGE (ML) INJECTION
Status: DISCONTINUED | OUTPATIENT
Start: 2022-12-07 | End: 2022-12-07 | Stop reason: HOSPADM

## 2022-12-07 RX ORDER — SODIUM CHLORIDE 0.9 % (FLUSH) 0.9 %
3 SYRINGE (ML) INJECTION
Status: DISCONTINUED | OUTPATIENT
Start: 2022-12-07 | End: 2022-12-07 | Stop reason: HOSPADM

## 2022-12-07 RX ORDER — PROPOFOL 10 MG/ML
VIAL (ML) INTRAVENOUS CONTINUOUS PRN
Status: DISCONTINUED | OUTPATIENT
Start: 2022-12-07 | End: 2022-12-07

## 2022-12-07 RX ADMIN — PROPOFOL 20 MG: 10 INJECTION, EMULSION INTRAVENOUS at 02:12

## 2022-12-07 RX ADMIN — PROPOFOL 80 MG: 10 INJECTION, EMULSION INTRAVENOUS at 02:12

## 2022-12-07 RX ADMIN — SODIUM CHLORIDE: 0.9 INJECTION, SOLUTION INTRAVENOUS at 01:12

## 2022-12-07 RX ADMIN — PROPOFOL 40 MG: 10 INJECTION, EMULSION INTRAVENOUS at 02:12

## 2022-12-07 RX ADMIN — PROPOFOL 150 MCG/KG/MIN: 10 INJECTION, EMULSION INTRAVENOUS at 02:12

## 2022-12-07 RX ADMIN — LIDOCAINE HYDROCHLORIDE 100 MG: 20 INJECTION INTRAVENOUS at 02:12

## 2022-12-07 NOTE — TRANSFER OF CARE
"Anesthesia Transfer of Care Note    Patient: Elena Frances    Procedure(s) Performed: Procedure(s) (LRB):  ULTRASOUND, UPPER GI TRACT, ENDOSCOPIC (N/A)  ERCP (ENDOSCOPIC RETROGRADE CHOLANGIOPANCREATOGRAPHY) (N/A)    Patient location: GI    Anesthesia Type: general    Transport from OR: Transported from OR on room air with adequate spontaneous ventilation    Post pain: adequate analgesia    Post assessment: no apparent anesthetic complications and tolerated procedure well    Post vital signs: stable    Level of consciousness: awake, alert and oriented    Nausea/Vomiting: no nausea/vomiting    Complications: none    Transfer of care protocol was followed      Last vitals:   Visit Vitals  BP (!) 142/65   Pulse 70   Temp 36.3 °C (97.3 °F) (Skin)   Resp 16   Ht 5' 3" (1.6 m)   Wt 70.3 kg (155 lb)   SpO2 100%   Breastfeeding No   BMI 27.46 kg/m²     "

## 2022-12-07 NOTE — H&P
Short Stay Endoscopy History and Physical    PCP - Luh Kirby MD  Referring Physician - Verito Mcclure MD  [address] [place] [address]    Procedure - EUS +/- ERCP  ASA - per anesthesia  Mallampati - per anesthesia  History of Anesthesia problems - per anesthesia  Family history Anesthesia problems -  per anesthesia   Plan of anesthesia - per anesthesia    HPI:  This is a 82 y.o. female here for evaluation of: EUS +/- ERCP pending EUS findings to eval abnormal imaging with mild prominence of bile duct (MRCP) without filling defect or stricture; ERCP only if a CBD stone or mass/stricture identified on EUS.    Reflux - yes  Dysphagia - no  Abdominal pain - mild intermittent  Diarrhea - no    ROS:  Constitutional: No fevers, chills, No weight loss  CV: No chest pain  Pulm: No cough, No shortness of breath  Ophtho: No vision changes  GI: see HPI  Derm: No rash    Medical History:  has a past medical history of Allergy, Anemia, Arthritis, Asthma, Depression, Generalized headaches (2014), Goiter, HTN (hypertension), benign, Hyperlipidemia, Hyperparathyroidism, Intestinal obstruction, Lumbar disc disease, Nuclear sclerosis - Both Eyes (3/11/2014), and Osteoporosis, post-menopausal.    Surgical History:  has a past surgical history that includes Bilateral oophorectomy; Parathyroid gland surgery; Cholecystectomy; Hysterectomy;  section, classic; Appendectomy; Breast cyst excision; Lumbar epidural injection; Mouth surgery; Colonoscopy (N/A, 3/1/2016); Cataract extraction w/  intraocular lens implant (Right, 2016); Cataract extraction w/  intraocular lens implant (Left, 10/17/2016); Knee surgery (Right, 2017); Joint replacement; Total knee arthroplasty (Left, 2019); Transforaminal epidural injection of steroid (Left, 2019); and Bone marrow biopsy (N/A, 3/5/2020).    Family History: family history includes Heart attack in her mother; Heart disease in her brother,  brother, mother, sister, sister, sister, and sister; Heart failure in her sister; Hyperlipidemia in her brother, brother, and sister; Hypertension in her mother and sister; Thyroid disease in her daughter..    Social History:  reports that she has never smoked. She has never used smokeless tobacco. She reports that she does not drink alcohol and does not use drugs.    Review of patient's allergies indicates:   Allergen Reactions    Vicodin [hydrocodone-acetaminophen] Anxiety       Medications:   Medications Prior to Admission   Medication Sig Dispense Refill Last Dose    acetaminophen (TYLENOL) 650 MG TbSR Take 1 tablet (650 mg total) by mouth every 6 (six) hours as needed (pain). 120 tablet 0 Past Week    aspirin (ECOTRIN) 81 MG EC tablet Take 1 tablet (81 mg total) by mouth 2 (two) times daily. (Patient taking differently: Take 81 mg by mouth every morning.) 60 tablet 0 12/6/2022    atorvastatin (LIPITOR) 80 MG tablet TAKE 1 TABLET BY MOUTH EVERY DAY 30 tablet 1 12/6/2022    fluticasone propionate (FLONASE) 50 mcg/actuation nasal spray INHALE 2 SPRAYS (100 MCG TOTAL) IN EACH NOSTRIL ONCE DAILY. (Patient taking differently: every evening.) 48 mL 2 12/6/2022    fluticasone-salmeterol diskus inhaler 100-50 mcg TAKE 1 PUFF BY MOUTH TWICE A DAY 1 each 11 12/6/2022    gabapentin (NEURONTIN) 100 MG capsule Take 3 capsules three times daily as instructed (Patient taking differently: Take by mouth 2 (two) times daily. Take 3 capsules three times daily as instructed) 270 capsule 3 12/6/2022    levocetirizine (XYZAL) 5 MG tablet Take 5 mg by mouth nightly.   12/6/2022    metoprolol succinate (TOPROL-XL) 25 MG 24 hr tablet Take 1 tablet (25 mg total) by mouth every morning. 30 tablet 1 12/7/2022    montelukast (SINGULAIR) 10 mg tablet TAKE 1 TABLET BY MOUTH EVERY DAY IN THE EVENING 90 tablet 3 12/6/2022    nabumetone (RELAFEN) 750 MG tablet TAKE 1 TABLET BY MOUTH TWICE A DAY (Patient taking differently: Take by mouth 2 (two)  times daily as needed.) 180 tablet 1 Past Week    omeprazole (PRILOSEC) 40 MG capsule TAKE 1 CAPSULE BY MOUTH EVERY DAY 90 capsule 3 2022    sertraline (ZOLOFT) 100 MG tablet TAKE 1 TABLET BY MOUTH EVERY DAY (Patient taking differently: Take 50 mg by mouth every morning.) 90 tablet 0 2022    valsartan (DIOVAN) 320 MG tablet Take 1 tablet (320 mg total) by mouth once daily. (Patient taking differently: Take 320 mg by mouth every morning.) 90 tablet 3 2022    acyclovir (ZOVIRAX) 400 MG tablet SMARTSI Tablet(s) By Mouth Twice Daily       albuterol (PROAIR HFA) 90 mcg/actuation inhaler Inhale 2 puffs into the lungs every 6 (six) hours as needed for Wheezing. Rescue 18 g 11 More than a month    blood-glucose meter ([employer] BLOOD GLUCOSE SYSTM) kit Use as instructed (Patient not taking: No sig reported) 1 each 0     diclofenac sodium (VOLTAREN) 1 % Gel        lidocaine-prilocaine (EMLA) cream USE AS DIRECTED ONE HOUR BEFORE CHEMOTHERAPY EXTERNALLY       polyethylene glycol 3350 (MIRALAX ORAL) Take by mouth as needed.       tiZANidine (ZANAFLEX) 4 MG tablet TAKE 1.5 TABLETS (6MG) BY MOUTH 3 TIMES A DAY AS NEEDED FOR MUSCLE SPASM 270 tablet 3 More than a month       Physical Exam:    Vital Signs:   Vitals:    22 1331   BP: (!) 170/74   Pulse: 63   Resp: 16   Temp: 97.3 °F (36.3 °C)       General Appearance: Well appearing in no acute distress; no jaundice    Labs:  Lab Results   Component Value Date    WBC 5.51 2022    HGB 11.6 (L) 2022    HCT 34.0 (L) 2022     2022    CHOL 179 2018    TRIG 91 2018    HDL 60 2018    ALT 18 10/27/2022    AST 19 10/27/2022     (L) 10/27/2022    K 4.2 10/27/2022     10/27/2022    CREATININE 0.8 10/27/2022    BUN 16 10/27/2022    CO2 25 10/27/2022    TSH 1.408 10/10/2022    INR 1.0 2022    HGBA1C 6.2 (H) 2019       I have explained the risks and benefits of this endoscopic procedure to the  patient including but not limited to bleeding, inflammation, infection, perforation, pancreatitis, missing a lesion and death.      Verito Mcclure MD

## 2022-12-07 NOTE — PROVATION PATIENT INSTRUCTIONS
Discharge Summary/Instructions after an Endoscopic Procedure  Patient Name: Elena Frances  Patient MRN: [id number]  Patient YOB: 1940  Wednesday, December 7, 2022  Verito Mcclure MD  Dear patient,  As a result of recent federal legislation (The Federal Cures Act), you may   receive lab or pathology results from your procedure in your [facility]   account before your physician is able to contact you. Your physician or   their representative will relay the results to you with their   recommendations at their soonest availability.  Thank you,  RESTRICTIONS:  During your procedure today, you received medications for sedation.  These   medications may affect your judgment, balance and coordination.  Therefore,   for 24 hours, you have the following restrictions:   - DO NOT drive a car, operate machinery, make legal/financial decisions,   sign important papers or drink alcohol.    ACTIVITY:  Today: no heavy lifting, straining or running due to procedural   sedation/anesthesia.  The following day: return to full activity including work.  DIET:  Eat and drink normally unless instructed otherwise.     TREATMENT FOR COMMON SIDE EFFECTS:  - Mild abdominal pain, nausea, belching, bloating or excessive gas:  rest,   eat lightly and use a heating pad.  - Sore Throat: treat with throat lozenges and/or gargle with warm salt   water.  - Because air was used during the procedure, expelling large amounts of air   from your rectum or belching is normal.  - If a bowel prep was taken, you may not have a bowel movement for 1-3 days.    This is normal.  SYMPTOMS TO WATCH FOR AND REPORT TO YOUR PHYSICIAN:  1. Abdominal pain or bloating, other than gas cramps.  2. Chest pain.  3. Back pain.  4. Signs of infection such as: chills or fever occurring within 24 hours   after the procedure.  5. Rectal bleeding, which would show as bright red, maroon, or black stools.   (A tablespoon of blood from the rectum is not serious, especially  if   hemorrhoids are present.)  6. Vomiting.  7. Weakness or dizziness.  GO DIRECTLY TO THE NEAREST EMERGENCY ROOM IF YOU HAVE ANY OF THE FOLLOWING:      Difficulty breathing              Chills and/or fever over 101 F   Persistent vomiting and/or vomiting blood   Severe abdominal pain   Severe chest pain   Black, tarry stools   Bleeding- more than one tablespoon   Any other symptom or condition that you feel may need urgent attention  Your doctor recommends these additional instructions:  If any biopsies were taken, your doctors clinic will contact you in 1 to 2   weeks with any results.  - Discharge patient to home (ambulatory).   - Patient has a contact number available for emergencies.  The signs and   symptoms of potential delayed complications were discussed with the   patient.  Return to normal activities tomorrow.  Written discharge   instructions were provided to the patient.   - Resume previous diet.   - Continue present medications.   - Perform MRCP in 1 year for surveillance of small pancreas head cyst in   close approximation to distal CBD.   - Return to referring physician.  For questions, problems or results please call your physician - Verito Mcclure MD at Work:  [phone].  [facility], EMERGENCY ROOM PHONE NUMBER: [phone]  IF A COMPLICATION OR EMERGENCY SITUATION ARISES AND YOU ARE UNABLE TO REACH   YOUR PHYSICIAN - GO DIRECTLY TO THE EMERGENCY ROOM.  Verito Mcclure MD  12/7/2022 3:16:16 PM  This report has been verified and signed electronically.  Dear patient,  As a result of recent federal legislation (The Federal Cures Act), you may   receive lab or pathology results from your procedure in your [facility]   account before your physician is able to contact you. Your physician or   their representative will relay the results to you with their   recommendations at their soonest availability.  Thank you,  [employer]ATION

## 2022-12-07 NOTE — PLAN OF CARE
Discharge instructions reviewed with pt and pt's daughter at bedside. Verbalized understanding. Packet given.

## 2022-12-07 NOTE — ANESTHESIA PREPROCEDURE EVALUATION
12/07/2022  Elena Frances is a 82 y.o., female.      Pre-op Assessment    I have reviewed the Patient Summary Reports.       I have reviewed the Medications.     Review of Systems  Anesthesia Hx:  No problems with previous Anesthesia  History of prior surgery of interest to airway management or planning: Previous anesthesia: General   Social:  Non-Smoker, No Alcohol Use    Hematology/Oncology:  Hematology Normal      Oncology Comments: Diffuse large B cell lymphoma    EENT/Dental:EENT/Dental Normal   Cardiovascular:   Exercise tolerance: poor Hypertension, well controlled    Pulmonary:   Asthma mild    Renal/:   Chronic Renal Disease    Hepatic/GI:  Hepatic/GI Normal    Musculoskeletal:   Arthritis     Neurological:   Neuromuscular Disease, Headaches    Endocrine:  Endocrine Normal    Dermatological:  Skin Normal    Psych:   Psychiatric History          Physical Exam  General: Well nourished, Cooperative, Alert and Oriented    Airway:  Mallampati: II   Mouth Opening: Normal  TM Distance: Normal  Tongue: Normal  Neck ROM: Normal ROM    Dental:  Intact        Anesthesia Plan  Type of Anesthesia, risks & benefits discussed:    Anesthesia Type: Gen Natural Airway  Intra-op Monitoring Plan: Standard ASA Monitors  Post Op Pain Control Plan: multimodal analgesia and IV/PO Opioids PRN  Induction:  IV  Informed Consent: Informed consent signed with the Patient and all parties understand the risks and agree with anesthesia plan.  All questions answered.   ASA Score: 3    Ready For Surgery From Anesthesia Perspective.     .

## 2022-12-08 NOTE — ANESTHESIA POSTPROCEDURE EVALUATION
Anesthesia Post Evaluation    Patient: Elena Frances    Procedure(s) Performed: Procedure(s) (LRB):  ULTRASOUND, UPPER GI TRACT, ENDOSCOPIC (N/A)  ERCP (ENDOSCOPIC RETROGRADE CHOLANGIOPANCREATOGRAPHY) (N/A)    Final Anesthesia Type: general      Patient location during evaluation: PACU  Patient participation: Yes- Able to Participate  Level of consciousness: awake and alert  Post-procedure vital signs: reviewed and stable  Pain management: adequate  Airway patency: patent    PONV status at discharge: No PONV  Anesthetic complications: no      Cardiovascular status: blood pressure returned to baseline  Respiratory status: unassisted  Hydration status: euvolemic  Follow-up not needed.          Vitals Value Taken Time   /72 12/07/22 1533   Temp 36.5 °C (97.7 °F) 12/07/22 1500   Pulse 57 12/07/22 1539   Resp 18 12/07/22 1530   SpO2 100 % 12/07/22 1539   Vitals shown include unvalidated device data.      No case tracking events are documented in the log.      Pain/Elkin Score: Elkin Score: 10 (12/7/2022  3:15 PM)

## 2023-01-04 ENCOUNTER — HOSPITAL ENCOUNTER (OUTPATIENT)
Dept: RADIOLOGY | Facility: CLINIC | Age: 83
Discharge: HOME | End: 2023-01-04
Attending: INTERNAL MEDICINE
Payer: MEDICARE

## 2023-01-04 DIAGNOSIS — M81.0 OSTEOPOROSIS, UNSPECIFIED OSTEOPOROSIS TYPE, UNSPECIFIED PATHOLOGICAL FRACTURE PRESENCE: ICD-10-CM

## 2023-01-04 PROCEDURE — 77080 DEXA BONE DENSITY SPINE HIP: ICD-10-PCS | Mod: 26,,, | Performed by: INTERNAL MEDICINE

## 2023-01-04 PROCEDURE — 77080 DXA BONE DENSITY AXIAL: CPT | Mod: TC

## 2023-01-04 PROCEDURE — 77080 DXA BONE DENSITY AXIAL: CPT | Mod: 26,,, | Performed by: INTERNAL MEDICINE

## 2023-01-23 DIAGNOSIS — C83.30 DIFFUSE LARGE B-CELL LYMPHOMA, UNSPECIFIED BODY REGION: Primary | ICD-10-CM

## 2023-03-08 ENCOUNTER — TELEPHONE (OUTPATIENT)
Dept: HEPATOLOGY | Facility: CLINIC | Age: 83
End: 2023-03-08
Payer: MEDICARE

## 2023-03-08 NOTE — TELEPHONE ENCOUNTER
Called patient.  No answer.  Left message in Mal[place]ian.      ----- Message from Georgiana Tyson RN sent at 3/6/2023  2:50 PM CST -----  Regarding: FW: Appointment    ----- Message -----  From: Juli Garcia  Sent: 2/27/2023   9:58 AM CST  To: [facility] Clinical Staff  Subject: Appointment                                      Pt wants to cancel their appt with [facility]. They will need a  in [place]ian.      Elena @ [phone]

## 2023-03-28 NOTE — LETTER
March 3, 2022    Elena Frances  [address]  [place] [address]     [facility]  [address]  [place] [address]  Phone: [phone] INSTRUCCIONES PREVIAS AL PROCEDIMIENTO    Bañuelos procedimiento con [facility] está programado para el 4/4/2022. Por favor llegue antes del 6:30am. Tenga en cuenta que la hora de bañuelos jessee en MyChart será diferente.    Debe registrarse y recibir genny pulsera antes de ir a bañuelos procedimiento. Te llamaremos el día anterior para comunicarte el lugar de tu check in.    NO tome aspirina joi 5 días antes de bañuelos procedimiento.    **No coma ni paulo nada entre la medianoche y la hora de bañuelos procedimiento. [place] incluye goma de mascar, mentas y caramelos de june.    **No fume ni tome bebidas alcohólicas 24 horas antes de bañuelos procedimiento.    **Si usa lentes de contacto, dentaduras postizas, audífonos o anteojos, traiga un recipiente para guardarlos joi el procedimiento y entrégueselos a un familiar para que los guarde.    **Si le keating diagnosticado apnea del sueño, traiga bañuelos máquina CPAP.    **Si bañuelos médico le ha programado genny estadía de genny noche, traiga genny pequeña bolsa de viaje con cualquier artículo personal que pueda necesitar.    **[place] arreglos con anticipación para el transporte a casa por un adulto responsable. No es seguro conducir un vehículo joi las 24 horas posteriores al procedimiento.    **Todas las instalaciones y propiedades de [facility] son libres de tabaco. No se permite fumar.    TENGA EN CUENTA: El cronograma del procedimiento tiene muchas variables que afectan el tiempo de bañuelos procedimiento. Los miembros de la zina deben estar disponibles si cambia la hora de bañuelos cirugía.    Si tiene alguna pregunta sobre estas instrucciones, llame a [facility] al [phone] de lunes a viernes de 8:00 a. m. a 4:00 p. m. o al [phone] (pregunte por el residente de radiología intervencionista)  right femoral vein sheath insertion site. fuera del horario de atención.

## 2023-04-25 DIAGNOSIS — C83.32 DIFFUSE LARGE B-CELL LYMPHOMA OF INTRATHORACIC LYMPH NODES: Primary | ICD-10-CM

## 2023-05-01 ENCOUNTER — HOSPITAL ENCOUNTER (OUTPATIENT)
Dept: RADIOLOGY | Facility: HOSPITAL | Age: 83
Discharge: HOME | End: 2023-05-01
Attending: INTERNAL MEDICINE
Payer: MEDICARE

## 2023-05-01 DIAGNOSIS — C83.32 DIFFUSE LARGE B-CELL LYMPHOMA OF INTRATHORACIC LYMPH NODES: ICD-10-CM

## 2023-05-01 LAB
CREAT SERPL-MCNC: 1.1 MG/DL (ref 0.5–1.4)
SAMPLE: NORMAL

## 2023-05-01 PROCEDURE — 74177 CT ABD & PELVIS W/CONTRAST: CPT | Mod: 26,,, | Performed by: RADIOLOGY

## 2023-05-01 PROCEDURE — 25500020 PHARM REV CODE 255: Performed by: INTERNAL MEDICINE

## 2023-05-01 PROCEDURE — 71260 CT THORAX DX C+: CPT | Mod: 26,,, | Performed by: RADIOLOGY

## 2023-05-01 PROCEDURE — 71260 CT THORAX DX C+: CPT | Mod: TC

## 2023-05-01 PROCEDURE — 71260 CT CHEST ABDOMEN PELVIS WITH CONTRAST (XPD): ICD-10-PCS | Mod: 26,,, | Performed by: RADIOLOGY

## 2023-05-01 PROCEDURE — 74177 CT ABD & PELVIS W/CONTRAST: CPT | Mod: TC

## 2023-05-01 PROCEDURE — 74177 CT CHEST ABDOMEN PELVIS WITH CONTRAST (XPD): ICD-10-PCS | Mod: 26,,, | Performed by: RADIOLOGY

## 2023-05-01 RX ADMIN — IOHEXOL 30 ML: 350 INJECTION, SOLUTION INTRAVENOUS at 02:05

## 2023-05-01 RX ADMIN — IOHEXOL 75 ML: 350 INJECTION, SOLUTION INTRAVENOUS at 04:05

## 2023-05-04 ENCOUNTER — OFFICE VISIT (OUTPATIENT)
Dept: HEMATOLOGY/ONCOLOGY | Facility: CLINIC | Age: 83
End: 2023-05-04
Payer: MEDICARE

## 2023-05-04 VITALS
DIASTOLIC BLOOD PRESSURE: 65 MMHG | HEIGHT: 63 IN | WEIGHT: 162.25 LBS | RESPIRATION RATE: 18 BRPM | OXYGEN SATURATION: 98 % | SYSTOLIC BLOOD PRESSURE: 136 MMHG | BODY MASS INDEX: 28.75 KG/M2 | HEART RATE: 73 BPM | TEMPERATURE: 98 F

## 2023-05-04 DIAGNOSIS — D50.9 IRON DEFICIENCY ANEMIA, UNSPECIFIED IRON DEFICIENCY ANEMIA TYPE: ICD-10-CM

## 2023-05-04 DIAGNOSIS — E55.9 VITAMIN D DEFICIENCY DISEASE: ICD-10-CM

## 2023-05-04 DIAGNOSIS — H53.8 BLURRY VISION, RIGHT EYE: ICD-10-CM

## 2023-05-04 DIAGNOSIS — C83.32 DIFFUSE LARGE B-CELL LYMPHOMA OF INTRATHORACIC LYMPH NODES: Primary | ICD-10-CM

## 2023-05-04 PROCEDURE — 3075F PR MOST RECENT SYSTOLIC BLOOD PRESS GE 130-139MM HG: ICD-10-PCS | Mod: CPTII,S$GLB,, | Performed by: INTERNAL MEDICINE

## 2023-05-04 PROCEDURE — 3288F PR FALLS RISK ASSESSMENT DOCUMENTED: ICD-10-PCS | Mod: CPTII,S$GLB,, | Performed by: INTERNAL MEDICINE

## 2023-05-04 PROCEDURE — 1126F PR PAIN SEVERITY QUANTIFIED, NO PAIN PRESENT: ICD-10-PCS | Mod: CPTII,S$GLB,, | Performed by: INTERNAL MEDICINE

## 2023-05-04 PROCEDURE — 3075F SYST BP GE 130 - 139MM HG: CPT | Mod: CPTII,S$GLB,, | Performed by: INTERNAL MEDICINE

## 2023-05-04 PROCEDURE — 99999 PR PBB SHADOW E&M-EST. PATIENT-LVL V: ICD-10-PCS | Mod: PBBFAC,,, | Performed by: INTERNAL MEDICINE

## 2023-05-04 PROCEDURE — 1126F AMNT PAIN NOTED NONE PRSNT: CPT | Mod: CPTII,S$GLB,, | Performed by: INTERNAL MEDICINE

## 2023-05-04 PROCEDURE — 1159F MED LIST DOCD IN RCRD: CPT | Mod: CPTII,S$GLB,, | Performed by: INTERNAL MEDICINE

## 2023-05-04 PROCEDURE — 99215 OFFICE O/P EST HI 40 MIN: CPT | Mod: S$GLB,,, | Performed by: INTERNAL MEDICINE

## 2023-05-04 PROCEDURE — 99999 PR PBB SHADOW E&M-EST. PATIENT-LVL V: CPT | Mod: PBBFAC,,, | Performed by: INTERNAL MEDICINE

## 2023-05-04 PROCEDURE — 1101F PR PT FALLS ASSESS DOC 0-1 FALLS W/OUT INJ PAST YR: ICD-10-PCS | Mod: CPTII,S$GLB,, | Performed by: INTERNAL MEDICINE

## 2023-05-04 PROCEDURE — 3078F PR MOST RECENT DIASTOLIC BLOOD PRESSURE < 80 MM HG: ICD-10-PCS | Mod: CPTII,S$GLB,, | Performed by: INTERNAL MEDICINE

## 2023-05-04 PROCEDURE — 1101F PT FALLS ASSESS-DOCD LE1/YR: CPT | Mod: CPTII,S$GLB,, | Performed by: INTERNAL MEDICINE

## 2023-05-04 PROCEDURE — 3288F FALL RISK ASSESSMENT DOCD: CPT | Mod: CPTII,S$GLB,, | Performed by: INTERNAL MEDICINE

## 2023-05-04 PROCEDURE — 99215 PR OFFICE/OUTPT VISIT, EST, LEVL V, 40-54 MIN: ICD-10-PCS | Mod: S$GLB,,, | Performed by: INTERNAL MEDICINE

## 2023-05-04 PROCEDURE — 1159F PR MEDICATION LIST DOCUMENTED IN MEDICAL RECORD: ICD-10-PCS | Mod: CPTII,S$GLB,, | Performed by: INTERNAL MEDICINE

## 2023-05-04 PROCEDURE — 3078F DIAST BP <80 MM HG: CPT | Mod: CPTII,S$GLB,, | Performed by: INTERNAL MEDICINE

## 2023-05-04 RX ORDER — ALENDRONATE SODIUM 70 MG/1
70 TABLET ORAL
COMMUNITY
Start: 2023-02-23

## 2023-05-04 RX ORDER — LORATADINE 10 MG/1
10 TABLET ORAL DAILY PRN
COMMUNITY
Start: 2023-03-30

## 2023-05-04 RX ORDER — ACETAMINOPHEN AND CODEINE PHOSPHATE 300; 30 MG/1; MG/1
1 TABLET ORAL EVERY 6 HOURS PRN
COMMUNITY
Start: 2023-03-31

## 2023-05-04 RX ORDER — ALPRAZOLAM 0.25 MG/1
0.25 TABLET ORAL 2 TIMES DAILY PRN
COMMUNITY
Start: 2023-03-09

## 2023-05-04 RX ORDER — HYDROXYZINE HYDROCHLORIDE 25 MG/1
TABLET, FILM COATED ORAL
COMMUNITY
Start: 2023-04-05

## 2023-05-04 RX ORDER — BENZONATATE 200 MG/1
200 CAPSULE ORAL
COMMUNITY
Start: 2023-01-26

## 2023-05-04 RX ORDER — FAMOTIDINE 40 MG/1
40 TABLET, FILM COATED ORAL
COMMUNITY
Start: 2023-03-16

## 2023-05-04 NOTE — PROGRESS NOTES
Hematology and Medical Oncology   Follow Up     05/04/2023    Primary Hematologic Diagnosis: Diffuse Large B Cell Lymphoma    History of Present Ilness:   Elena Frances (Elena) is a pleasant 83 y.o.female who is transitioning care from [facility] following the successful treatment of DLBCL. Has recovered from therapy and wishes to consolidate her care to [facility].     Hematologic History:  --Work up started with back pain, MRI showed multiple vertebral sclerotic bony lesions  --Bone biopsy was most consistent with DLBCL  --PET September 2020 showed additional bone lesions, including the left scapula. Biopsy showed a kappa restricted B- cell lymphoma that is negative for CD5 and CD1-.   --Slides reviewed at [facility] confirmed GCB-DLBCL in L5  --Received R-CHOP x 6 in a CR    --Surveilance PET on 6/1/22:   Right iliac bone with a SUV max of 2.5, previously 3.6 (axial fused image 162)  L5 vertebral body with a SUV max of 2.9, previously 5 (axial fused image 146)  The previously hypermetabolic focus at the left scapula is no longer seen now demonstrating an SUV max of 1.2, previously 6.1 (axial fused image 37)  Posterior T4 vertebral body, demonstrates decreased uptake with a SUV of 2.4, previously 3.1 (axial fused image 44)  L3 spinous process demonstrate no increased uptake with SUV max of 1.6, previously 2.4 (axial fused image 128)  Left femoral diaphysis demonstrates no increased uptake with SUV max of 1.2 (axial fused image 212).     --CT abdomen/pelvis on 9/1/22:   1. Mild intra and extrahepatic biliary ductal dilatation that is new when compared to 01/17/2020.  If there are clinical signs of biliary obstruction, MRCP or ERCP could be considered.  2. Diverticulosis without evidence of diverticulitis.  3. New tiny nodule in the right lower lobe when compared to prior imaging.  Attention on follow-up.  --MRCP on 10/10/22:  Extrahepatic duct dilatation up to 0.8 cm with mild central intrahepatic ductal prominence.   Overall findings are nonspecific in the setting of prior cholecystectomy.  No evident intraductal filling defect or stricture.  Consider further evaluation with ERCP as clinically warranted.     Indeterminate subcentimeter T2 hyperintense focus adjacent to the distant common bile duct, of uncertain etiology.  Differential consideration to include small IPMN at the pancreatic head.  Further correlation and follow-up as warranted.  --CT chest/abd/pelvis 5/1/23: No significant interval detrimental change.  No pathologic adenopathy.  Stable appearance of osseous structures by CT with previously described PET avid lesions not well assessed by this exam.  Colonic diverticulosis and additional findings as above.    Interval History:  Abdominal pain has subsided and not re-occurred. Please to hear there are no enlarged lymph nodes on repeat scans.    Blurry vision in right eye x 3 days. Will try to go to optho prior to leaving the country for several weeks.     PAST MEDICAL HISTORY:   Past Medical History:   Diagnosis Date    Allergy     Anemia     Arthritis     Asthma     Depression     Generalized headaches 4/1/2014    Goiter     MNG    HTN (hypertension), benign     Hyperlipidemia     Hyperparathyroidism     s/p surgery    Intestinal obstruction     resolved spontaneously    Lumbar disc disease     s/p epidural shots    Nuclear sclerosis - Both Eyes 3/11/2014    Osteoporosis, post-menopausal     with 3 rib fractures       PAST SURGICAL HISTORY:   Past Surgical History:   Procedure Laterality Date    APPENDECTOMY      BILATERAL OOPHORECTOMY      BONE MARROW BIOPSY N/A 3/5/2020    Procedure: Biopsy-bone marrow;  Surgeon: Eve Alvarez MD;  Location: [facility] OR [address] [place] [address];  Service: Oncology;  Laterality: N/A;    BREAST CYST EXCISION      left    CATARACT EXTRACTION W/  INTRAOCULAR LENS IMPLANT Right 09/26/2016    Dr. Fang ([place])    CATARACT EXTRACTION W/  INTRAOCULAR LENS IMPLANT Left 10/17/2016    Dr. Fang  ([place])     SECTION, CLASSIC      x 1    CHOLECYSTECTOMY      COLONOSCOPY N/A 3/1/2016    Procedure: COLONOSCOPY;  Surgeon: Dony Bronson MD;  Location: [facility] (4TH FLR);  Service: Endoscopy;  Laterality: N/A;  PM Prep    ENDOSCOPIC ULTRASOUND OF UPPER GASTROINTESTINAL TRACT N/A 2022    Procedure: ULTRASOUND, UPPER GI TRACT, ENDOSCOPIC;  Surgeon: Verito Mcclure MD;  Location: [facility] (2ND FLR);  Service: Endoscopy;  Laterality: N/A;  EUS +/- ERCP pending findings with me at [place] in the next 1-2 weeks    22-Instructions via portal-DS  22-Pt rescheduled due to ride-updated instructions via portal-DS    ERCP N/A 2022    Procedure: ERCP (ENDOSCOPIC RETROGRADE CHOLANGIOPANCREATOGRAPHY);  Surgeon: Verito Mcclure MD;  Location: [facility] (2ND FLR);  Service: Endoscopy;  Laterality: N/A;  EUS +/- ERCP pending findings with me at [place] in the next 1-2 weeks    HYSTERECTOMY      JOINT REPLACEMENT      KNEE SURGERY Right 2017    TKR    LUMBAR EPIDURAL INJECTION      x 4    MOUTH SURGERY      for abscess drainage of gum of frontal teeth    PARATHYROID GLAND SURGERY      for parathyroid adenoma    TOTAL KNEE ARTHROPLASTY Left 2019    Procedure: ARTHROPLASTY, KNEE, TOTAL-DEPUY-SIGMA;  Surgeon: Newton Rodriguez III, MD;  Location: [facility] OR 2ND FLR;  Service: Orthopedics;  Laterality: Left;    TRANSFORAMINAL EPIDURAL INJECTION OF STEROID Left 2019    Procedure: INJECTION, STEROID, EPIDURAL, TRANSFORAMINAL APPROACH, L3-L4 AND L4-L5;  Surgeon: Venancio Lopez MD;  Location: [facility] PAIN MGT;  Service: Pain Management;  Laterality: Left;       PAST SOCIAL HISTORY:   reports that she has never smoked. She has never used smokeless tobacco. She reports that she does not drink alcohol and does not use drugs.    FAMILY HISTORY:  Family History   Problem Relation Age of Onset    Heart disease Mother     Hypertension Mother     Heart attack Mother     Heart disease  Sister          in their 50's    Heart failure Sister     Heart disease Brother         CABG in age 60's    Hyperlipidemia Brother     Heart disease Sister         in her 50's    Heart disease Sister         in her 50's    Heart disease Sister     Hypertension Sister     Hyperlipidemia Sister     Heart disease Brother         CABG in age 60's    Hyperlipidemia Brother     Thyroid disease Daughter     Amblyopia Neg Hx     Blindness Neg Hx     Cancer Neg Hx     Cataracts Neg Hx     Diabetes Neg Hx     Glaucoma Neg Hx     Macular degeneration Neg Hx     Retinal detachment Neg Hx     Strabismus Neg Hx     Stroke Neg Hx        CURRENT MEDICATIONS:   Current Outpatient Medications   Medication Sig    acetaminophen (TYLENOL) 650 MG TbSR Take 1 tablet (650 mg total) by mouth every 6 (six) hours as needed (pain).    acetaminophen-codeine 300-30mg (TYLENOL #3) 300-30 mg Tab Take 1 tablet by mouth every 6 (six) hours as needed.    acyclovir (ZOVIRAX) 400 MG tablet SMARTSI Tablet(s) By Mouth Twice Daily    albuterol (PROAIR HFA) 90 mcg/actuation inhaler Inhale 2 puffs into the lungs every 6 (six) hours as needed for Wheezing. Rescue    alendronate (FOSAMAX) 70 MG tablet Take 70 mg by mouth every 7 days.    ALPRAZolam (XANAX) 0.25 MG tablet Take 0.25 mg by mouth 2 (two) times daily as needed.    atorvastatin (LIPITOR) 80 MG tablet TAKE 1 TABLET BY MOUTH EVERY DAY    benzonatate (TESSALON) 200 MG capsule Take 200 mg by mouth.    diclofenac sodium (VOLTAREN) 1 % Gel     famotidine (PEPCID) 40 MG tablet Take 40 mg by mouth.    fluticasone propionate (FLONASE) 50 mcg/actuation nasal spray INHALE 2 SPRAYS (100 MCG TOTAL) IN EACH NOSTRIL ONCE DAILY. (Patient taking differently: every evening.)    fluticasone-salmeterol diskus inhaler 100-50 mcg TAKE 1 PUFF BY MOUTH TWICE A DAY    gabapentin (NEURONTIN) 100 MG capsule Take 3 capsules three times daily as instructed (Patient taking differently:  Take by mouth 2 (two) times daily. Take 3 capsules three times daily as instructed)    hydrOXYzine HCL (ATARAX) 25 MG tablet PLEASE SEE ATTACHED FOR DETAILED DIRECTIONS    levocetirizine (XYZAL) 5 MG tablet Take 5 mg by mouth nightly.    lidocaine-prilocaine (EMLA) cream USE AS DIRECTED ONE HOUR BEFORE CHEMOTHERAPY EXTERNALLY    loratadine (CLARITIN) 10 mg tablet Take 10 mg by mouth daily as needed.    metoprolol succinate (TOPROL-XL) 25 MG 24 hr tablet Take 1 tablet (25 mg total) by mouth every morning.    montelukast (SINGULAIR) 10 mg tablet TAKE 1 TABLET BY MOUTH EVERY DAY IN THE EVENING    nabumetone (RELAFEN) 750 MG tablet TAKE 1 TABLET BY MOUTH TWICE A DAY (Patient taking differently: Take by mouth 2 (two) times daily as needed.)    omeprazole (PRILOSEC) 40 MG capsule TAKE 1 CAPSULE BY MOUTH EVERY DAY    polyethylene glycol 3350 (MIRALAX ORAL) Take by mouth as needed.    sertraline (ZOLOFT) 100 MG tablet TAKE 1 TABLET BY MOUTH EVERY DAY (Patient taking differently: Take 50 mg by mouth every morning.)    tiZANidine (ZANAFLEX) 4 MG tablet TAKE 1.5 TABLETS (6MG) BY MOUTH 3 TIMES A DAY AS NEEDED FOR MUSCLE SPASM    aspirin (ECOTRIN) 81 MG EC tablet Take 1 tablet (81 mg total) by mouth 2 (two) times daily. (Patient taking differently: Take 81 mg by mouth every morning.)    blood-glucose meter ([employer] BLOOD GLUCOSE SYSTM) kit Use as instructed (Patient not taking: No sig reported)    valsartan (DIOVAN) 320 MG tablet Take 1 tablet (320 mg total) by mouth once daily. (Patient taking differently: Take 320 mg by mouth every morning.)     No current facility-administered medications for this visit.     ALLERGIES:   Review of patient's allergies indicates:   Allergen Reactions    Vicodin [hydrocodone-acetaminophen] Anxiety         Review of Systems:     Review of Systems   Constitutional:  Positive for fatigue. Negative for appetite change, chills, diaphoresis, fever and unexpected weight change.    HENT:   Negative for hearing loss, mouth sores, nosebleeds, sore throat, trouble swallowing and voice change.    Eyes:  Negative for eye problems and icterus.   Respiratory:  Negative for chest tightness, cough, hemoptysis, shortness of breath and wheezing.    Cardiovascular:  Negative for chest pain, leg swelling and palpitations.   Gastrointestinal:  Positive for nausea. Negative for abdominal distention, abdominal pain, blood in stool, diarrhea and vomiting.   Endocrine: Negative for hot flashes.   Genitourinary:  Negative for bladder incontinence, difficulty urinating, dysuria and hematuria.    Musculoskeletal:  Negative for arthralgias, back pain, flank pain, gait problem, myalgias, neck pain and neck stiffness.   Skin:  Negative for itching, rash and wound.   Neurological:  Negative for dizziness, extremity weakness, gait problem, headaches, numbness, seizures and speech difficulty.   Hematological:  Negative for adenopathy. Does not bruise/bleed easily.   Psychiatric/Behavioral:  Negative for confusion, depression and sleep disturbance. The patient is not nervous/anxious.       Physical Exam:     Vitals:    05/04/23 0820   BP: 136/65   Pulse: 73   Resp: 18   Temp: 97.9 °F (36.6 °C)     Physical Exam  Constitutional:       General: She is not in acute distress.     Appearance: She is well-developed. She is not diaphoretic.   HENT:      Head: Normocephalic and atraumatic.      Mouth/Throat:      Pharynx: No oropharyngeal exudate.   Eyes:      Conjunctiva/sclera: Conjunctivae normal.      Pupils: Pupils are equal, round, and reactive to light.   Neck:      Thyroid: No thyromegaly.      Vascular: No JVD.      Trachea: No tracheal deviation.   Cardiovascular:      Rate and Rhythm: Normal rate and regular rhythm.      Heart sounds: Normal heart sounds. No murmur heard.    No friction rub.   Pulmonary:      Effort: Pulmonary effort is normal. No respiratory distress.      Breath sounds: Normal breath sounds. No  stridor. No wheezing or rales.   Chest:      Chest wall: No tenderness.   Abdominal:      General: Bowel sounds are normal. There is no distension.      Palpations: Abdomen is soft.      Tenderness: There is no abdominal tenderness. There is no guarding or rebound.   Musculoskeletal:         General: No tenderness or deformity. Normal range of motion.      Cervical back: Normal range of motion and neck supple.   Skin:     General: Skin is warm and dry.      Capillary Refill: Capillary refill takes less than 2 seconds.      Coloration: Skin is not pale.      Findings: No erythema or rash.   Neurological:      Mental Status: She is alert and oriented to person, place, and time.      Cranial Nerves: No cranial nerve deficit.      Sensory: No sensory deficit.      Motor: No abnormal muscle tone.      Coordination: Coordination normal.      Deep Tendon Reflexes: Reflexes normal.   Psychiatric:         Behavior: Behavior normal.         Thought Content: Thought content normal.         Judgment: Judgment normal.       ECOG Performance Status: (foot note - ECOG PS provided by Eastern Cooperative Oncology Group) 1 - Symptomatic but completely ambulatory      Karnofsky Performance Score:  90%- Able to Carry on Normal Activity: Minor Symptoms of Disease      Labs:   Lab Results   Component Value Date    WBC 5.51 09/12/2022    HGB 11.6 (L) 09/12/2022    HCT 34.0 (L) 09/12/2022     09/12/2022    CHOL 179 07/09/2018    TRIG 91 07/09/2018    HDL 60 07/09/2018    ALT 18 10/27/2022    AST 19 10/27/2022     (L) 10/27/2022    K 4.2 10/27/2022     10/27/2022    CREATININE 0.8 10/27/2022    BUN 16 10/27/2022    CO2 25 10/27/2022    TSH 1.408 10/10/2022    INR 1.0 03/23/2022    HGBA1C 6.2 (H) 06/03/2019       Imaging: Previous imaging has been reviewed   Assessment and Plan:     Ms. Frances is a pleasant 83 year old with DLBCL successfully treated in 2021.    Diffuse Large B Cell Lymphoma  --Treated at [facility] with  R-CHOP x 6  --Surveilance PET and repeat CT's shows no signs of recurrance  --Denies B symptoms    Abdominal Pain  --CT identified mild intra and extrahepatic biliary ductal dilatation   --MRCP and ERCP completed      Full conversation was conducted through a phone     30 minutes were spent face to face with the patient and her  family to discuss the disease, natural history, treatment options and survival statistics. I have provided the patient with an opportunity to ask questions and have all questions answered to her satisfaction.       she will return to clinic in 3-4 months with labs, but knows to call in the interim if symptoms change or should a problem arise.        Luh Kirby MD  Hematology and Medical Oncology  Bone Marrow Transplant  [facility]      BMT Chart Routing      Follow up with physician 3 months. 1. see me in 3 months with cbc,cmp, lhd [will need a ]   Follow up with [facility]ON    Provider visit type    Infusion scheduling note    Injection scheduling note    Labs    Imaging    Pharmacy appointment    Other referrals

## 2023-07-29 NOTE — PROGRESS NOTES
Subjective:       Patient ID: Elena Frances is a 77 y.o. female.    Chief Complaint: Cough; Sore Throat; and Chest Congestion    HPI Comments: Disclaimer: This note has been generated using voice-recognition software. There may be typographical errors that have been missed during proof-reading    78 yo presents with cough, productive, present for over 2 weeks.  Initially diagnosed as bronchitis, treated with steroids and Singulair, without improvement of symptoms.  Has noted chills, no fever    Cough   Associated symptoms include chills and a sore throat. Pertinent negatives include no chest pain, ear pain, fever, postnasal drip, rhinorrhea, shortness of breath or wheezing.   Sore Throat    Associated symptoms include coughing. Pertinent negatives include no congestion, ear pain or shortness of breath.     Review of Systems   Constitutional: Positive for chills. Negative for fever.   HENT: Positive for sore throat. Negative for congestion, ear pain, postnasal drip, rhinorrhea and sinus pressure.    Respiratory: Positive for cough. Negative for chest tightness, shortness of breath and wheezing.    Cardiovascular: Negative for chest pain.       Objective:      Physical Exam   Constitutional: She appears well-developed and well-nourished. No distress.   Neck: Normal range of motion. No JVD present. No thyromegaly present.   Pulmonary/Chest: Effort normal. She has no wheezes. She has rales. She exhibits no tenderness.   Rales present, RLL   Lymphadenopathy:     She has no cervical adenopathy.       Assessment:       1. Cough        Plan:       1.  CXR normal  2.  Doxycycline, Tessalon  3.  F/u 3-4 days if not improved      None

## 2023-08-02 ENCOUNTER — OFFICE VISIT (OUTPATIENT)
Dept: HEMATOLOGY/ONCOLOGY | Facility: CLINIC | Age: 83
End: 2023-08-02
Payer: MEDICARE

## 2023-08-02 ENCOUNTER — LAB VISIT (OUTPATIENT)
Dept: LAB | Facility: HOSPITAL | Age: 83
End: 2023-08-02
Payer: MEDICAID

## 2023-08-02 VITALS
TEMPERATURE: 98 F | HEART RATE: 70 BPM | BODY MASS INDEX: 29.05 KG/M2 | OXYGEN SATURATION: 99 % | HEIGHT: 63 IN | WEIGHT: 163.94 LBS | SYSTOLIC BLOOD PRESSURE: 126 MMHG | DIASTOLIC BLOOD PRESSURE: 59 MMHG

## 2023-08-02 DIAGNOSIS — E55.9 VITAMIN D DEFICIENCY DISEASE: ICD-10-CM

## 2023-08-02 DIAGNOSIS — C83.32 DIFFUSE LARGE B-CELL LYMPHOMA OF INTRATHORACIC LYMPH NODES: ICD-10-CM

## 2023-08-02 DIAGNOSIS — D50.9 IRON DEFICIENCY ANEMIA, UNSPECIFIED IRON DEFICIENCY ANEMIA TYPE: ICD-10-CM

## 2023-08-02 DIAGNOSIS — K83.8 INTRAHEPATIC BILE DUCT DILATION: ICD-10-CM

## 2023-08-02 DIAGNOSIS — C83.32 DIFFUSE LARGE B-CELL LYMPHOMA OF INTRATHORACIC LYMPH NODES: Primary | ICD-10-CM

## 2023-08-02 LAB
ALBUMIN SERPL BCP-MCNC: 3.6 G/DL (ref 3.5–5.2)
ALP SERPL-CCNC: 171 U/L (ref 55–135)
ALT SERPL W/O P-5'-P-CCNC: 27 U/L (ref 10–44)
ANION GAP SERPL CALC-SCNC: 12 MMOL/L (ref 8–16)
AST SERPL-CCNC: 28 U/L (ref 10–40)
BASOPHILS # BLD AUTO: 0.04 K/UL (ref 0–0.2)
BASOPHILS NFR BLD: 0.8 % (ref 0–1.9)
BILIRUB SERPL-MCNC: 0.2 MG/DL (ref 0.1–1)
BUN SERPL-MCNC: 23 MG/DL (ref 8–23)
CALCIUM SERPL-MCNC: 9.9 MG/DL (ref 8.7–10.5)
CHLORIDE SERPL-SCNC: 101 MMOL/L (ref 95–110)
CO2 SERPL-SCNC: 23 MMOL/L (ref 23–29)
CREAT SERPL-MCNC: 1.5 MG/DL (ref 0.5–1.4)
DIFFERENTIAL METHOD: ABNORMAL
EOSINOPHIL # BLD AUTO: 0.2 K/UL (ref 0–0.5)
EOSINOPHIL NFR BLD: 3.6 % (ref 0–8)
ERYTHROCYTE [DISTWIDTH] IN BLOOD BY AUTOMATED COUNT: 16.1 % (ref 11.5–14.5)
EST. GFR  (NO RACE VARIABLE): 34.8 ML/MIN/1.73 M^2
GLUCOSE SERPL-MCNC: 134 MG/DL (ref 70–110)
HCT VFR BLD AUTO: 34.9 % (ref 37–48.5)
HGB BLD-MCNC: 11.4 G/DL (ref 12–16)
IMM GRANULOCYTES # BLD AUTO: 0.02 K/UL (ref 0–0.04)
IMM GRANULOCYTES NFR BLD AUTO: 0.4 % (ref 0–0.5)
LDH SERPL L TO P-CCNC: 182 U/L (ref 110–260)
LYMPHOCYTES # BLD AUTO: 1.8 K/UL (ref 1–4.8)
LYMPHOCYTES NFR BLD: 34.7 % (ref 18–48)
MCH RBC QN AUTO: 31.1 PG (ref 27–31)
MCHC RBC AUTO-ENTMCNC: 32.7 G/DL (ref 32–36)
MCV RBC AUTO: 95 FL (ref 82–98)
MONOCYTES # BLD AUTO: 0.5 K/UL (ref 0.3–1)
MONOCYTES NFR BLD: 9.6 % (ref 4–15)
NEUTROPHILS # BLD AUTO: 2.7 K/UL (ref 1.8–7.7)
NEUTROPHILS NFR BLD: 50.9 % (ref 38–73)
NRBC BLD-RTO: 0 /100 WBC
PLATELET # BLD AUTO: 247 K/UL (ref 150–450)
PMV BLD AUTO: 9.7 FL (ref 9.2–12.9)
POTASSIUM SERPL-SCNC: 4.9 MMOL/L (ref 3.5–5.1)
PROT SERPL-MCNC: 7.3 G/DL (ref 6–8.4)
RBC # BLD AUTO: 3.67 M/UL (ref 4–5.4)
SODIUM SERPL-SCNC: 136 MMOL/L (ref 136–145)
WBC # BLD AUTO: 5.31 K/UL (ref 3.9–12.7)

## 2023-08-02 PROCEDURE — 1101F PR PT FALLS ASSESS DOC 0-1 FALLS W/OUT INJ PAST YR: ICD-10-PCS | Mod: CPTII,S$GLB,, | Performed by: INTERNAL MEDICINE

## 2023-08-02 PROCEDURE — 1101F PT FALLS ASSESS-DOCD LE1/YR: CPT | Mod: CPTII,S$GLB,, | Performed by: INTERNAL MEDICINE

## 2023-08-02 PROCEDURE — 99215 PR OFFICE/OUTPT VISIT, EST, LEVL V, 40-54 MIN: ICD-10-PCS | Mod: S$GLB,,, | Performed by: INTERNAL MEDICINE

## 2023-08-02 PROCEDURE — 3078F DIAST BP <80 MM HG: CPT | Mod: CPTII,S$GLB,, | Performed by: INTERNAL MEDICINE

## 2023-08-02 PROCEDURE — 3078F PR MOST RECENT DIASTOLIC BLOOD PRESSURE < 80 MM HG: ICD-10-PCS | Mod: CPTII,S$GLB,, | Performed by: INTERNAL MEDICINE

## 2023-08-02 PROCEDURE — 99999 PR PBB SHADOW E&M-EST. PATIENT-LVL IV: ICD-10-PCS | Mod: PBBFAC,,, | Performed by: INTERNAL MEDICINE

## 2023-08-02 PROCEDURE — 3288F PR FALLS RISK ASSESSMENT DOCUMENTED: ICD-10-PCS | Mod: CPTII,S$GLB,, | Performed by: INTERNAL MEDICINE

## 2023-08-02 PROCEDURE — 36415 COLL VENOUS BLD VENIPUNCTURE: CPT | Performed by: INTERNAL MEDICINE

## 2023-08-02 PROCEDURE — 3074F SYST BP LT 130 MM HG: CPT | Mod: CPTII,S$GLB,, | Performed by: INTERNAL MEDICINE

## 2023-08-02 PROCEDURE — 3074F PR MOST RECENT SYSTOLIC BLOOD PRESSURE < 130 MM HG: ICD-10-PCS | Mod: CPTII,S$GLB,, | Performed by: INTERNAL MEDICINE

## 2023-08-02 PROCEDURE — 3288F FALL RISK ASSESSMENT DOCD: CPT | Mod: CPTII,S$GLB,, | Performed by: INTERNAL MEDICINE

## 2023-08-02 PROCEDURE — 1125F PR PAIN SEVERITY QUANTIFIED, PAIN PRESENT: ICD-10-PCS | Mod: CPTII,S$GLB,, | Performed by: INTERNAL MEDICINE

## 2023-08-02 PROCEDURE — 1125F AMNT PAIN NOTED PAIN PRSNT: CPT | Mod: CPTII,S$GLB,, | Performed by: INTERNAL MEDICINE

## 2023-08-02 PROCEDURE — 99215 OFFICE O/P EST HI 40 MIN: CPT | Mod: S$GLB,,, | Performed by: INTERNAL MEDICINE

## 2023-08-02 PROCEDURE — 1159F MED LIST DOCD IN RCRD: CPT | Mod: CPTII,S$GLB,, | Performed by: INTERNAL MEDICINE

## 2023-08-02 PROCEDURE — 83615 LACTATE (LD) (LDH) ENZYME: CPT | Performed by: INTERNAL MEDICINE

## 2023-08-02 PROCEDURE — 85025 COMPLETE CBC W/AUTO DIFF WBC: CPT | Performed by: INTERNAL MEDICINE

## 2023-08-02 PROCEDURE — 1159F PR MEDICATION LIST DOCUMENTED IN MEDICAL RECORD: ICD-10-PCS | Mod: CPTII,S$GLB,, | Performed by: INTERNAL MEDICINE

## 2023-08-02 PROCEDURE — 80053 COMPREHEN METABOLIC PANEL: CPT | Performed by: INTERNAL MEDICINE

## 2023-08-02 PROCEDURE — 99999 PR PBB SHADOW E&M-EST. PATIENT-LVL IV: CPT | Mod: PBBFAC,,, | Performed by: INTERNAL MEDICINE

## 2023-08-02 RX ORDER — SULFAMETHOXAZOLE AND TRIMETHOPRIM 800; 160 MG/1; MG/1
1 TABLET ORAL 2 TIMES DAILY
COMMUNITY
Start: 2023-07-27

## 2023-08-02 RX ORDER — LINACLOTIDE 145 UG/1
145 CAPSULE, GELATIN COATED ORAL
COMMUNITY
Start: 2023-05-29

## 2023-08-02 RX ORDER — OXYBUTYNIN CHLORIDE 5 MG/1
5 TABLET, EXTENDED RELEASE ORAL
COMMUNITY
Start: 2023-07-27

## 2023-08-02 RX ORDER — CHLORHEXIDINE GLUCONATE ORAL RINSE 1.2 MG/ML
15 SOLUTION DENTAL 2 TIMES DAILY
COMMUNITY
Start: 2023-07-27

## 2023-08-02 NOTE — PROGRESS NOTES
Hematology and Medical Oncology   Follow Up     08/02/2023    Primary Hematologic Diagnosis: Diffuse Large B Cell Lymphoma    History of Present Ilness:   Elena Frances (Elena) is a pleasant 83 y.o.female who is transitioning care from [facility] following the successful treatment of DLBCL. Has recovered from therapy and wishes to consolidate her care to [facility].     Conversation was completed with an in person     Hematologic History:  --Work up started with back pain, MRI showed multiple vertebral sclerotic bony lesions  --Bone biopsy was most consistent with DLBCL  --PET September 2020 showed additional bone lesions, including the left scapula. Biopsy showed a kappa restricted B- cell lymphoma that is negative for CD5 and CD1-.   --Slides reviewed at [facility] confirmed GCB-DLBCL in L5  --Received R-CHOP x 6 in a CR    --Surveilance PET on 6/1/22:   Right iliac bone with a SUV max of 2.5, previously 3.6 (axial fused image 162)  L5 vertebral body with a SUV max of 2.9, previously 5 (axial fused image 146)  The previously hypermetabolic focus at the left scapula is no longer seen now demonstrating an SUV max of 1.2, previously 6.1 (axial fused image 37)  Posterior T4 vertebral body, demonstrates decreased uptake with a SUV of 2.4, previously 3.1 (axial fused image 44)  L3 spinous process demonstrate no increased uptake with SUV max of 1.6, previously 2.4 (axial fused image 128)  Left femoral diaphysis demonstrates no increased uptake with SUV max of 1.2 (axial fused image 212).     --CT abdomen/pelvis on 9/1/22:   1. Mild intra and extrahepatic biliary ductal dilatation that is new when compared to 01/17/2020.  If there are clinical signs of biliary obstruction, MRCP or ERCP could be considered.  2. Diverticulosis without evidence of diverticulitis.  3. New tiny nodule in the right lower lobe when compared to prior imaging.  Attention on follow-up.  --MRCP on 10/10/22:  Extrahepatic duct dilatation up  to 0.8 cm with mild central intrahepatic ductal prominence.  Overall findings are nonspecific in the setting of prior cholecystectomy.  No evident intraductal filling defect or stricture.  Consider further evaluation with ERCP as clinically warranted.     Indeterminate subcentimeter T2 hyperintense focus adjacent to the distant common bile duct, of uncertain etiology.  Differential consideration to include small IPMN at the pancreatic head.  Further correlation and follow-up as warranted.  --CT chest/abd/pelvis 5/1/23: No significant interval detrimental change.  No pathologic adenopathy.  Stable appearance of osseous structures by CT with previously described PET avid lesions not well assessed by this exam.  Colonic diverticulosis and additional findings as above.    Interval History:  Abdominal pain has subsided and not re-occurred. Please to hear there are no enlarged lymph nodes on repeat scans.    Blurry vision in right eye x 3 days. Will try to go to optho prior to leaving the country for several weeks.     PAST MEDICAL HISTORY:   Past Medical History:   Diagnosis Date    Allergy     Anemia     Arthritis     Asthma     Depression     Generalized headaches 4/1/2014    Goiter     MNG    HTN (hypertension), benign     Hyperlipidemia     Hyperparathyroidism     s/p surgery    Intestinal obstruction     resolved spontaneously    Lumbar disc disease     s/p epidural shots    Nuclear sclerosis - Both Eyes 3/11/2014    Osteoporosis, post-menopausal     with 3 rib fractures       PAST SURGICAL HISTORY:   Past Surgical History:   Procedure Laterality Date    APPENDECTOMY      BILATERAL OOPHORECTOMY      BONE MARROW BIOPSY N/A 3/5/2020    Procedure: Biopsy-bone marrow;  Surgeon: Eve Alvarez MD;  Location: [facility] OR [address] [place] [address];  Service: Oncology;  Laterality: N/A;    BREAST CYST EXCISION      left    CATARACT EXTRACTION W/  INTRAOCULAR LENS IMPLANT Right 09/26/2016    Dr. Fang ([place])    CATARACT  EXTRACTION W/  INTRAOCULAR LENS IMPLANT Left 10/17/2016    Dr. Fang ([place])     SECTION, CLASSIC      x 1    CHOLECYSTECTOMY      COLONOSCOPY N/A 3/1/2016    Procedure: COLONOSCOPY;  Surgeon: Dony Bronson MD;  Location: [facility] (4TH FLR);  Service: Endoscopy;  Laterality: N/A;  PM Prep    ENDOSCOPIC ULTRASOUND OF UPPER GASTROINTESTINAL TRACT N/A 2022    Procedure: ULTRASOUND, UPPER GI TRACT, ENDOSCOPIC;  Surgeon: Verito Mcclure MD;  Location: [facility] (2ND FLR);  Service: Endoscopy;  Laterality: N/A;  EUS +/- ERCP pending findings with me at [facility] in the next 1-2 weeks    22-Instructions via portal-DS  22-Pt rescheduled due to ride-updated instructions via portal-DS    ERCP N/A 2022    Procedure: ERCP (ENDOSCOPIC RETROGRADE CHOLANGIOPANCREATOGRAPHY);  Surgeon: Verito Mcclure MD;  Location: [facility] (2ND FLR);  Service: Endoscopy;  Laterality: N/A;  EUS +/- ERCP pending findings with me at [facility] in the next 1-2 weeks    HYSTERECTOMY      JOINT REPLACEMENT      KNEE SURGERY Right 2017    TKR    LUMBAR EPIDURAL INJECTION      x 4    MOUTH SURGERY      for abscess drainage of gum of frontal teeth    PARATHYROID GLAND SURGERY      for parathyroid adenoma    TOTAL KNEE ARTHROPLASTY Left 2019    Procedure: ARTHROPLASTY, KNEE, TOTAL-DEPUY-SIGMA;  Surgeon: Newton Rodriguez III, MD;  Location: [facility] OR 2ND FLR;  Service: Orthopedics;  Laterality: Left;    TRANSFORAMINAL EPIDURAL INJECTION OF STEROID Left 2019    Procedure: INJECTION, STEROID, EPIDURAL, TRANSFORAMINAL APPROACH, L3-L4 AND L4-L5;  Surgeon: Venancio Lopez MD;  Location: [facility] PAIN MGT;  Service: Pain Management;  Laterality: Left;       PAST SOCIAL HISTORY:   reports that she has never smoked. She has never used smokeless tobacco. She reports that she does not drink alcohol and does not use drugs.    FAMILY HISTORY:  Family History   Problem Relation Age of Onset    Heart disease Mother      Hypertension Mother     Heart attack Mother     Heart disease Sister          in their 50's    Heart failure Sister     Heart disease Brother         CABG in age 60's    Hyperlipidemia Brother     Heart disease Sister         in her 50's    Heart disease Sister         in her 50's    Heart disease Sister     Hypertension Sister     Hyperlipidemia Sister     Heart disease Brother         CABG in age 60's    Hyperlipidemia Brother     Thyroid disease Daughter     Amblyopia Neg Hx     Blindness Neg Hx     Cancer Neg Hx     Cataracts Neg Hx     Diabetes Neg Hx     Glaucoma Neg Hx     Macular degeneration Neg Hx     Retinal detachment Neg Hx     Strabismus Neg Hx     Stroke Neg Hx        CURRENT MEDICATIONS:   Current Outpatient Medications   Medication Sig    acetaminophen (TYLENOL) 650 MG TbSR Take 1 tablet (650 mg total) by mouth every 6 (six) hours as needed (pain).    acetaminophen-codeine 300-30mg (TYLENOL #3) 300-30 mg Tab Take 1 tablet by mouth every 6 (six) hours as needed.    acyclovir (ZOVIRAX) 400 MG tablet SMARTSI Tablet(s) By Mouth Twice Daily    albuterol (PROAIR HFA) 90 mcg/actuation inhaler Inhale 2 puffs into the lungs every 6 (six) hours as needed for Wheezing. Rescue    alendronate (FOSAMAX) 70 MG tablet Take 70 mg by mouth every 7 days.    ALPRAZolam (XANAX) 0.25 MG tablet Take 0.25 mg by mouth 2 (two) times daily as needed.    atorvastatin (LIPITOR) 80 MG tablet TAKE 1 TABLET BY MOUTH EVERY DAY    BACTRIM -160 mg Tab Take 1 tablet by mouth 2 (two) times daily.    benzonatate (TESSALON) 200 MG capsule Take 200 mg by mouth.    chlorhexidine (PERIDEX) 0.12 % solution 15 mLs 2 (two) times daily.    diclofenac sodium (VOLTAREN) 1 % Gel     famotidine (PEPCID) 40 MG tablet Take 40 mg by mouth.    fluticasone propionate (FLONASE) 50 mcg/actuation nasal spray INHALE 2 SPRAYS (100 MCG TOTAL) IN EACH NOSTRIL ONCE DAILY. (Patient taking differently: every  evening.)    fluticasone-salmeterol diskus inhaler 100-50 mcg TAKE 1 PUFF BY MOUTH TWICE A DAY    gabapentin (NEURONTIN) 100 MG capsule Take 3 capsules three times daily as instructed (Patient taking differently: Take by mouth 2 (two) times daily. Take 3 capsules three times daily as instructed)    hydrOXYzine HCL (ATARAX) 25 MG tablet PLEASE SEE ATTACHED FOR DETAILED DIRECTIONS    levocetirizine (XYZAL) 5 MG tablet Take 5 mg by mouth nightly.    lidocaine-prilocaine (EMLA) cream USE AS DIRECTED ONE HOUR BEFORE CHEMOTHERAPY EXTERNALLY    LINZESS 145 mcg Cap capsule Take 145 mcg by mouth.    loratadine (CLARITIN) 10 mg tablet Take 10 mg by mouth daily as needed.    metoprolol succinate (TOPROL-XL) 25 MG 24 hr tablet Take 1 tablet (25 mg total) by mouth every morning.    montelukast (SINGULAIR) 10 mg tablet TAKE 1 TABLET BY MOUTH EVERY DAY IN THE EVENING    nabumetone (RELAFEN) 750 MG tablet TAKE 1 TABLET BY MOUTH TWICE A DAY (Patient taking differently: Take by mouth 2 (two) times daily as needed.)    omeprazole (PRILOSEC) 40 MG capsule TAKE 1 CAPSULE BY MOUTH EVERY DAY    oxybutynin (DITROPAN-XL) 5 MG TR24 Take 5 mg by mouth.    polyethylene glycol 3350 (MIRALAX ORAL) Take by mouth as needed.    sertraline (ZOLOFT) 100 MG tablet TAKE 1 TABLET BY MOUTH EVERY DAY (Patient taking differently: Take 50 mg by mouth every morning.)    tiZANidine (ZANAFLEX) 4 MG tablet TAKE 1.5 TABLETS (6MG) BY MOUTH 3 TIMES A DAY AS NEEDED FOR MUSCLE SPASM    valsartan (DIOVAN) 320 MG tablet Take 1 tablet (320 mg total) by mouth once daily. (Patient taking differently: Take 320 mg by mouth every morning.)    aspirin (ECOTRIN) 81 MG EC tablet Take 1 tablet (81 mg total) by mouth 2 (two) times daily. (Patient taking differently: Take 81 mg by mouth every morning.)    blood-glucose meter (OptimizelyULT BLOOD GLUCOSE SYSTM) kit Use as instructed (Patient not taking: Reported on 2/14/2022)     No current facility-administered  medications for this visit.     ALLERGIES:   Review of patient's allergies indicates:   Allergen Reactions    Vicodin [hydrocodone-acetaminophen] Anxiety         Review of Systems:     Review of Systems   Constitutional:  Positive for fatigue. Negative for appetite change, chills, diaphoresis, fever and unexpected weight change.   HENT:   Negative for hearing loss, mouth sores, nosebleeds, sore throat, trouble swallowing and voice change.    Eyes:  Negative for eye problems and icterus.   Respiratory:  Negative for chest tightness, cough, hemoptysis, shortness of breath and wheezing.    Cardiovascular:  Negative for chest pain, leg swelling and palpitations.   Gastrointestinal:  Positive for nausea. Negative for abdominal distention, abdominal pain, blood in stool, diarrhea and vomiting.   Endocrine: Negative for hot flashes.   Genitourinary:  Negative for bladder incontinence, difficulty urinating, dysuria and hematuria.    Musculoskeletal:  Negative for arthralgias, back pain, flank pain, gait problem, myalgias, neck pain and neck stiffness.   Skin:  Negative for itching, rash and wound.   Neurological:  Negative for dizziness, extremity weakness, gait problem, headaches, numbness, seizures and speech difficulty.   Hematological:  Negative for adenopathy. Does not bruise/bleed easily.   Psychiatric/Behavioral:  Negative for confusion, depression and sleep disturbance. The patient is not nervous/anxious.         Physical Exam:     Vitals:    08/02/23 0801   BP: (!) 126/59   Pulse: 70   Temp: 98 °F (36.7 °C)     Physical Exam  Constitutional:       General: She is not in acute distress.     Appearance: She is well-developed. She is not diaphoretic.   HENT:      Head: Normocephalic and atraumatic.      Mouth/Throat:      Pharynx: No oropharyngeal exudate.   Eyes:      Conjunctiva/sclera: Conjunctivae normal.      Pupils: Pupils are equal, round, and reactive to light.   Neck:      Thyroid: No thyromegaly.       Vascular: No JVD.      Trachea: No tracheal deviation.   Cardiovascular:      Rate and Rhythm: Normal rate and regular rhythm.      Heart sounds: Normal heart sounds. No murmur heard.     No friction rub.   Pulmonary:      Effort: Pulmonary effort is normal. No respiratory distress.      Breath sounds: Normal breath sounds. No stridor. No wheezing or rales.   Chest:      Chest wall: No tenderness.   Abdominal:      General: Bowel sounds are normal. There is no distension.      Palpations: Abdomen is soft.      Tenderness: There is no abdominal tenderness. There is no guarding or rebound.   Musculoskeletal:         General: No tenderness or deformity. Normal range of motion.      Cervical back: Normal range of motion and neck supple.   Skin:     General: Skin is warm and dry.      Capillary Refill: Capillary refill takes less than 2 seconds.      Coloration: Skin is not pale.      Findings: No erythema or rash.   Neurological:      Mental Status: She is alert and oriented to person, place, and time.      Cranial Nerves: No cranial nerve deficit.      Sensory: No sensory deficit.      Motor: No abnormal muscle tone.      Coordination: Coordination normal.      Deep Tendon Reflexes: Reflexes normal.   Psychiatric:         Behavior: Behavior normal.         Thought Content: Thought content normal.         Judgment: Judgment normal.     ECOG Performance Status: (foot note - ECOG PS provided by Eastern Cooperative Oncology Group) 1 - Symptomatic but completely ambulatory      Karnofsky Performance Score:  90%- Able to Carry on Normal Activity: Minor Symptoms of Disease      Labs:   Lab Results   Component Value Date    WBC 5.31 08/02/2023    HGB 11.4 (L) 08/02/2023    HCT 34.9 (L) 08/02/2023     08/02/2023    CHOL 179 07/09/2018    TRIG 91 07/09/2018    HDL 60 07/09/2018    ALT 27 08/02/2023    AST 28 08/02/2023     08/02/2023    K 4.9 08/02/2023     08/02/2023    CREATININE 1.5 (H) 08/02/2023    BUN 23  08/02/2023    CO2 23 08/02/2023    TSH 1.408 10/10/2022    INR 1.0 03/23/2022    HGBA1C 6.2 (H) 06/03/2019       Imaging: Previous imaging has been reviewed   Assessment and Plan:     Ms. Frances is a pleasant 83 year old with DLBCL successfully treated in 2021.    Diffuse Large B Cell Lymphoma  --Treated at [facility] with R-CHOP x 6  --Surveilance PET and repeat CT's shows no signs of recurrance  --Denies B symptoms    Abdominal Pain  --CT identified mild intra and extrahepatic biliary ductal dilatation   --MRCP and ERCP completed      Full conversation was conducted through a phone     30 minutes were spent face to face with the patient and her  family to discuss the disease, natural history, treatment options and survival statistics. I have provided the patient with an opportunity to ask questions and have all questions answered to her satisfaction.       she will return to clinic in 3-4 months with labs, but knows to call in the interim if symptoms change or should a problem arise.        Luh Kirby MD  Hematology and Medical Oncology  Bone Marrow Transplant  [facility]      BMT Chart Routing      Follow up with physician 3 months. 1. see me in 3 months with cbc,cmp, ldh. WIll need an inperson    Follow up with TALON    Provider visit type    Infusion scheduling note    Injection scheduling note    Labs    Imaging    Pharmacy appointment    Other referrals

## 2023-08-14 ENCOUNTER — HOSPITAL ENCOUNTER (OUTPATIENT)
Dept: RADIOLOGY | Facility: OTHER | Age: 83
Discharge: HOME | End: 2023-08-14
Attending: EMERGENCY MEDICINE
Payer: MEDICARE

## 2023-08-14 ENCOUNTER — OFFICE VISIT (OUTPATIENT)
Dept: SPINE | Facility: CLINIC | Age: 83
End: 2023-08-14
Attending: ANESTHESIOLOGY
Payer: MEDICARE

## 2023-08-14 VITALS
OXYGEN SATURATION: 100 % | BODY MASS INDEX: 28.7 KG/M2 | SYSTOLIC BLOOD PRESSURE: 139 MMHG | TEMPERATURE: 98 F | HEART RATE: 72 BPM | DIASTOLIC BLOOD PRESSURE: 62 MMHG | WEIGHT: 162 LBS | RESPIRATION RATE: 18 BRPM

## 2023-08-14 DIAGNOSIS — M54.16 LUMBAR RADICULOPATHY: ICD-10-CM

## 2023-08-14 DIAGNOSIS — M51.36 DDD (DEGENERATIVE DISC DISEASE), LUMBAR: Primary | ICD-10-CM

## 2023-08-14 DIAGNOSIS — M48.062 SPINAL STENOSIS OF LUMBAR REGION WITH NEUROGENIC CLAUDICATION: ICD-10-CM

## 2023-08-14 DIAGNOSIS — M51.36 DDD (DEGENERATIVE DISC DISEASE), LUMBAR: ICD-10-CM

## 2023-08-14 DIAGNOSIS — M54.16 LUMBAR RADICULOPATHY, CHRONIC: ICD-10-CM

## 2023-08-14 DIAGNOSIS — R29.818 NEUROGENIC CLAUDICATION: ICD-10-CM

## 2023-08-14 PROCEDURE — 72110 XR LUMBAR SPINE AP AND LAT WITH FLEX/EXT: ICD-10-PCS | Mod: 26,,, | Performed by: RADIOLOGY

## 2023-08-14 PROCEDURE — 3078F DIAST BP <80 MM HG: CPT | Mod: CPTII,S$GLB,, | Performed by: ANESTHESIOLOGY

## 2023-08-14 PROCEDURE — 1159F PR MEDICATION LIST DOCUMENTED IN MEDICAL RECORD: ICD-10-PCS | Mod: CPTII,S$GLB,, | Performed by: ANESTHESIOLOGY

## 2023-08-14 PROCEDURE — 99999 PR PBB SHADOW E&M-EST. PATIENT-LVL V: CPT | Mod: PBBFAC,,, | Performed by: ANESTHESIOLOGY

## 2023-08-14 PROCEDURE — 73502 XR HIP WITH PELVIS WHEN PERFORMED, 2 OR 3  VIEWS RIGHT: ICD-10-PCS | Mod: 26,RT,, | Performed by: RADIOLOGY

## 2023-08-14 PROCEDURE — 1160F PR REVIEW ALL MEDS BY PRESCRIBER/CLIN PHARMACIST DOCUMENTED: ICD-10-PCS | Mod: CPTII,S$GLB,, | Performed by: ANESTHESIOLOGY

## 2023-08-14 PROCEDURE — 73502 X-RAY EXAM HIP UNI 2-3 VIEWS: CPT | Mod: TC,FY,RT

## 2023-08-14 PROCEDURE — 1101F PR PT FALLS ASSESS DOC 0-1 FALLS W/OUT INJ PAST YR: ICD-10-PCS | Mod: CPTII,S$GLB,, | Performed by: ANESTHESIOLOGY

## 2023-08-14 PROCEDURE — 99204 OFFICE O/P NEW MOD 45 MIN: CPT | Mod: GC,S$GLB,, | Performed by: ANESTHESIOLOGY

## 2023-08-14 PROCEDURE — 3288F FALL RISK ASSESSMENT DOCD: CPT | Mod: CPTII,S$GLB,, | Performed by: ANESTHESIOLOGY

## 2023-08-14 PROCEDURE — 3075F SYST BP GE 130 - 139MM HG: CPT | Mod: CPTII,S$GLB,, | Performed by: ANESTHESIOLOGY

## 2023-08-14 PROCEDURE — 72110 X-RAY EXAM L-2 SPINE 4/>VWS: CPT | Mod: TC,FY

## 2023-08-14 PROCEDURE — 73502 X-RAY EXAM HIP UNI 2-3 VIEWS: CPT | Mod: 26,RT,, | Performed by: RADIOLOGY

## 2023-08-14 PROCEDURE — 72110 X-RAY EXAM L-2 SPINE 4/>VWS: CPT | Mod: 26,,, | Performed by: RADIOLOGY

## 2023-08-14 PROCEDURE — 1160F RVW MEDS BY RX/DR IN RCRD: CPT | Mod: CPTII,S$GLB,, | Performed by: ANESTHESIOLOGY

## 2023-08-14 PROCEDURE — 99204 PR OFFICE/OUTPT VISIT, NEW, LEVL IV, 45-59 MIN: ICD-10-PCS | Mod: GC,S$GLB,, | Performed by: ANESTHESIOLOGY

## 2023-08-14 PROCEDURE — 1101F PT FALLS ASSESS-DOCD LE1/YR: CPT | Mod: CPTII,S$GLB,, | Performed by: ANESTHESIOLOGY

## 2023-08-14 PROCEDURE — 3288F PR FALLS RISK ASSESSMENT DOCUMENTED: ICD-10-PCS | Mod: CPTII,S$GLB,, | Performed by: ANESTHESIOLOGY

## 2023-08-14 PROCEDURE — 1125F AMNT PAIN NOTED PAIN PRSNT: CPT | Mod: CPTII,S$GLB,, | Performed by: ANESTHESIOLOGY

## 2023-08-14 PROCEDURE — 99999 PR PBB SHADOW E&M-EST. PATIENT-LVL V: ICD-10-PCS | Mod: PBBFAC,,, | Performed by: ANESTHESIOLOGY

## 2023-08-14 PROCEDURE — 3075F PR MOST RECENT SYSTOLIC BLOOD PRESS GE 130-139MM HG: ICD-10-PCS | Mod: CPTII,S$GLB,, | Performed by: ANESTHESIOLOGY

## 2023-08-14 PROCEDURE — 1159F MED LIST DOCD IN RCRD: CPT | Mod: CPTII,S$GLB,, | Performed by: ANESTHESIOLOGY

## 2023-08-14 PROCEDURE — 1125F PR PAIN SEVERITY QUANTIFIED, PAIN PRESENT: ICD-10-PCS | Mod: CPTII,S$GLB,, | Performed by: ANESTHESIOLOGY

## 2023-08-14 PROCEDURE — 3078F PR MOST RECENT DIASTOLIC BLOOD PRESSURE < 80 MM HG: ICD-10-PCS | Mod: CPTII,S$GLB,, | Performed by: ANESTHESIOLOGY

## 2023-08-14 RX ORDER — GABAPENTIN 300 MG/1
300 CAPSULE ORAL 3 TIMES DAILY
Qty: 90 CAPSULE | Refills: 2 | Status: SHIPPED | OUTPATIENT
Start: 2023-08-14 | End: 2023-11-12

## 2023-08-14 NOTE — PROGRESS NOTES
Chronic patient Established Note (new patient  visit)      SUBJECTIVE:    Chief Complaint: LBP                Interval History 08/14/23    Patient reports that she was doing well with her lower back pain until 2-3 months ago. Patient reports that the pain radiates to bilateral thighs and posterior calves. Patient reports that the pain limits her ability to walk, she can walk 1/2 block before the pain limits her. Patient reports the pain is a 7/10 currently, but at worst is a 9/10. Patient reports weakness in her legs and well as numbness with the pain. No fevers, chills, fecal or urinary incontinence.      Interval History 12/05/19  Elena Frances presents to the clinic for the evaluation of low back pain. The pain started 15 days ago following excessive cleaning and symptoms have been worsening.The pain is located in the low back  area and radiates to the left leg.  The pain is described as shooting and constant and is rated as 9/10. The pain is rated with a score of  9/10 on the BEST day and a score of 9/10 on the WORST day.  Symptoms interfere with daily activity. The pain is exacerbated by Standing.  The pain is mitigated by nothing. She reports spending 3-4 hours per day reclining. The patient reports 7 hours of uninterrupted sleep per night.     Patient denies night fever/night sweats, urinary incontinence, bowel incontinence, significant weight loss, significant motor weakness and loss of sensations.     Patient last seen 5/2015:  Elena Frances presents to the clinic for a follow-up appointment for back pain. Since the last visit, Elena Frances states the pain has been stable. S/p bilateral L3-4 TF MARYBETH on 4/7/15 with 80% relief. She states the leg pain has resolved. She is complaining of neck and headache pain although this was present before the procedure. Current pain intensity is 8/10.    Pain Disability  Index Review:  Last 3 PDI Scores 8/14/2023 1/15/2020 12/5/2019   Pain Disability Index (PDI) 40 43 46       Pain Medications:  Voltaren Gel  Gabapentin 100mg 3 po BID  Nabumetone 750mg BID  Tizanidine 4mg qhs      Opioid Contract: no     report:  Reviewed and consistent with medication use as prescribed.    Pain Procedures:   04/07/15 Bilateral L3/4 TFESI     12/18/19 Left L3/4, L4/5 TFESI     Physical Therapy/Home Exercise: yes    Imaging:     MRI LUMBAR SPINE WITHOUT CONTRAST     CLINICAL HISTORY:  Low back pain, >6wks conservative tx, persistent-progressive sx, surgical candidate; Radiculopathy, thoracolumbar region     TECHNIQUE:  Multiplanar, multisequence MR images were acquired from the thoracolumbar junction to the sacrum without contrast.     COMPARISON:  MRI of the lumbar spine from 03/25/2014.     FINDINGS:  Alignment: There is grade 1 anterolisthesis L3 on L4.  Otherwise spinal alignment is anatomic.     Vertebrae: There are numerous T1 and T2 hypointense lesions throughout the lumbar spine and sacrum, new from prior.  Index lesion within the L3 vertebral body measures 1.3 cm.  Index lesion within the L5 vertebral body measures 1.8 cm.  Index lesion within the left sacral ala measures 1.6 cm.     Discs: There is moderate disc height loss anteriorly at L1-L2.  There is mild disc height loss at L2-L3.  There is moderate disc height loss of L3-L4.  Remaining disc heights are maintained.     Cord: Normal.  Conus terminates at L1-L2.     Degenerative findings:     T12-L1: Mild bilateral facet arthropathy and ligamentum flavum hypertrophy.  No spinal canal stenosis or neural foraminal narrowing.     L1-L2: Diffuse disc bulge with moderate bilateral facet arthropathy results in mild bilateral neural foraminal narrowing.  No spinal canal stenosis.     L2-L3: Diffuse disc bulge with a superimposed left lateral recess disc extrusion migrating superiorly with probable compression of the exiting left L2 nerve  root or descending left L3 nerve root.  Mild bilateral facet arthropathy and ligamentum flavum hypertrophy.  Mild right neural foraminal narrowing.  Mild spinal canal stenosis.     L3-L4: Diffuse disc bulge with moderate bilateral facet arthropathy and ligamentum flavum hypertrophy results in moderate spinal canal stenosis and moderate bilateral neural foraminal narrowing.     L4-L5: Diffuse disc bulge with moderate bilateral facet arthropathy and ligamentum flavum hypertrophy results in mild spinal canal stenosis and mild right and moderate left neural foraminal narrowing.     L5-S1: No spinal canal stenosis or neural foraminal narrowing.  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     Paraspinal muscles & soft tissues: Unremarkable.     Impression:     1. Multiple new lesions throughout the lumbar spine, concerning for osseous metastatic disease.  2. Lumbar spondylosis, resulting in moderate spinal canal stenosis at L3-4, and moderate neural foraminal narrowing from L2-3 through L4-5, as above.  3. Moderate-sized left disc extrusion at L2-3, possibly impinging upon the exiting left L2 or descending L3 nerve roots.  This report was flagged in Epic as abnormal.     Electronically signed by resident: Howie Sky  Date:                                            12/13/2019  Time:                                           11:25     Electronically signed by: Antonio Miller MD  Date:                                            12/13/2019  Time:                                           12:20    Allergies:   Review of patient's allergies indicates:   Allergen Reactions    Vicodin [hydrocodone-acetaminophen] Anxiety       Current Medications:   Current Outpatient Medications   Medication Sig Dispense Refill    acetaminophen (TYLENOL) 650 MG TbSR Take 1 tablet (650 mg total) by mouth every 6 (six) hours as needed (pain). 120 tablet 0    acetaminophen-codeine 300-30mg (TYLENOL #3) 300-30 mg Tab Take 1 tablet by mouth  every 6 (six) hours as needed.      acyclovir (ZOVIRAX) 400 MG tablet SMARTSI Tablet(s) By Mouth Twice Daily      albuterol (PROAIR HFA) 90 mcg/actuation inhaler Inhale 2 puffs into the lungs every 6 (six) hours as needed for Wheezing. Rescue 18 g 11    alendronate (FOSAMAX) 70 MG tablet Take 70 mg by mouth every 7 days.      ALPRAZolam (XANAX) 0.25 MG tablet Take 0.25 mg by mouth 2 (two) times daily as needed.      atorvastatin (LIPITOR) 80 MG tablet TAKE 1 TABLET BY MOUTH EVERY DAY 30 tablet 1    BACTRIM -160 mg Tab Take 1 tablet by mouth 2 (two) times daily.      benzonatate (TESSALON) 200 MG capsule Take 200 mg by mouth.      chlorhexidine (PERIDEX) 0.12 % solution 15 mLs 2 (two) times daily.      diclofenac sodium (VOLTAREN) 1 % Gel       famotidine (PEPCID) 40 MG tablet Take 40 mg by mouth.      fluticasone propionate (FLONASE) 50 mcg/actuation nasal spray INHALE 2 SPRAYS (100 MCG TOTAL) IN EACH NOSTRIL ONCE DAILY. (Patient taking differently: every evening.) 48 mL 2    fluticasone-salmeterol diskus inhaler 100-50 mcg TAKE 1 PUFF BY MOUTH TWICE A DAY 1 each 11    hydrOXYzine HCL (ATARAX) 25 MG tablet PLEASE SEE ATTACHED FOR DETAILED DIRECTIONS      lidocaine-prilocaine (EMLA) cream USE AS DIRECTED ONE HOUR BEFORE CHEMOTHERAPY EXTERNALLY      LINZESS 145 mcg Cap capsule Take 145 mcg by mouth.      loratadine (CLARITIN) 10 mg tablet Take 10 mg by mouth daily as needed.      metoprolol succinate (TOPROL-XL) 25 MG 24 hr tablet Take 1 tablet (25 mg total) by mouth every morning. 30 tablet 1    montelukast (SINGULAIR) 10 mg tablet TAKE 1 TABLET BY MOUTH EVERY DAY IN THE EVENING 90 tablet 3    nabumetone (RELAFEN) 750 MG tablet TAKE 1 TABLET BY MOUTH TWICE A DAY (Patient taking differently: Take by mouth 2 (two) times daily as needed.) 180 tablet 1    omeprazole (PRILOSEC) 40 MG capsule TAKE 1 CAPSULE BY MOUTH EVERY DAY 90 capsule 3    oxybutynin (DITROPAN-XL) 5 MG TR24 Take 5 mg by mouth.      polyethylene  glycol 3350 (MIRALAX ORAL) Take by mouth as needed.      sertraline (ZOLOFT) 100 MG tablet TAKE 1 TABLET BY MOUTH EVERY DAY (Patient taking differently: Take 50 mg by mouth every morning.) 90 tablet 0    tiZANidine (ZANAFLEX) 4 MG tablet TAKE 1.5 TABLETS (6MG) BY MOUTH 3 TIMES A DAY AS NEEDED FOR MUSCLE SPASM 270 tablet 3    aspirin (ECOTRIN) 81 MG EC tablet Take 1 tablet (81 mg total) by mouth 2 (two) times daily. (Patient taking differently: Take 81 mg by mouth every morning.) 60 tablet 0    blood-glucose meter ([employer] BLOOD GLUCOSE SYSTM) kit Use as instructed (Patient not taking: Reported on 2/14/2022) 1 each 0    gabapentin (NEURONTIN) 300 MG capsule Take 1 capsule (300 mg total) by mouth 3 (three) times daily. 90 capsule 2    levocetirizine (XYZAL) 5 MG tablet Take 5 mg by mouth nightly.      valsartan (DIOVAN) 320 MG tablet Take 1 tablet (320 mg total) by mouth once daily. (Patient taking differently: Take 320 mg by mouth every morning.) 90 tablet 3     No current facility-administered medications for this visit.       REVIEW OF SYSTEMS:    GENERAL:  No weight loss, malaise or fevers.  HEENT:  Negative for frequent or significant headaches.  NECK:  Negative for lumps, goiter, pain and significant neck swelling.  RESPIRATORY:  Negative for cough, wheezing or shortness of breath.  CARDIOVASCULAR:  Negative for chest pain, leg swelling or palpitations.  GI:  Negative for abdominal discomfort, blood in stools or black stools or change in bowel habits.  MUSCULOSKELETAL:  See HPI.  SKIN:  Negative for lesions, rash, and itching.  PSYCH:  +ve for sleep disturbance, mood disorder and recent psychosocial stressors.  HEMATOLOGY/LYMPHOLOGY:  Negative for prolonged bleeding, bruising easily or swollen nodes.  NEURO:   No history of headaches, syncope, paralysis, seizures or tremors.  All other reviewed and negative other than HPI.    Past Medical History:  Past Medical History:   Diagnosis Date    Allergy     Anemia      Arthritis     Asthma     Depression     Generalized headaches 2014    Goiter     MNG    HTN (hypertension), benign     Hyperlipidemia     Hyperparathyroidism     s/p surgery    Intestinal obstruction     resolved spontaneously    Lumbar disc disease     s/p epidural shots    Nuclear sclerosis - Both Eyes 3/11/2014    Osteoporosis, post-menopausal     with 3 rib fractures       Past Surgical History:  Past Surgical History:   Procedure Laterality Date    APPENDECTOMY      BILATERAL OOPHORECTOMY      BONE MARROW BIOPSY N/A 3/5/2020    Procedure: Biopsy-bone marrow;  Surgeon: Eve Alvarez MD;  Location: [facility] OR 2ND FLR;  Service: Oncology;  Laterality: N/A;    BREAST CYST EXCISION      left    CATARACT EXTRACTION W/  INTRAOCULAR LENS IMPLANT Right 2016    Dr. Fang ([place])    CATARACT EXTRACTION W/  INTRAOCULAR LENS IMPLANT Left 10/17/2016    Dr. Fang ([place])     SECTION, CLASSIC      x 1    CHOLECYSTECTOMY      COLONOSCOPY N/A 3/1/2016    Procedure: COLONOSCOPY;  Surgeon: Dony Bronson MD;  Location: [facility] (4TH FLR);  Service: Endoscopy;  Laterality: N/A;  PM Prep    ENDOSCOPIC ULTRASOUND OF UPPER GASTROINTESTINAL TRACT N/A 2022    Procedure: ULTRASOUND, UPPER GI TRACT, ENDOSCOPIC;  Surgeon: Verito Mcclure MD;  Location: [facility] (2ND FLR);  Service: Endoscopy;  Laterality: N/A;  EUS +/- ERCP pending findings with me at [facility] in the next 1-2 weeks    22-Instructions via portal-DS  22-Pt rescheduled due to ride-updated instructions via portal-DS    ERCP N/A 2022    Procedure: ERCP (ENDOSCOPIC RETROGRADE CHOLANGIOPANCREATOGRAPHY);  Surgeon: Verito Mcclure MD;  Location: [facility] (2ND FLR);  Service: Endoscopy;  Laterality: N/A;  EUS +/- ERCP pending findings with me at [facility] in the next 1-2 weeks    HYSTERECTOMY      JOINT REPLACEMENT      KNEE SURGERY Right 2017    TKR    LUMBAR EPIDURAL INJECTION      x 4    MOUTH SURGERY      for abscess drainage of gum of  frontal teeth    PARATHYROID GLAND SURGERY      for parathyroid adenoma    TOTAL KNEE ARTHROPLASTY Left 2019    Procedure: ARTHROPLASTY, KNEE, TOTAL-DEPUY-SIGMA;  Surgeon: Newton Rodriguez III, MD;  Location: [facility] OR [address] [place] [address];  Service: Orthopedics;  Laterality: Left;    TRANSFORAMINAL EPIDURAL INJECTION OF STEROID Left 2019    Procedure: INJECTION, STEROID, EPIDURAL, TRANSFORAMINAL APPROACH, L3-L4 AND L4-L5;  Surgeon: Venancio Lopez MD;  Location: [facility] PAIN MGT;  Service: Pain Management;  Laterality: Left;       Family History:  Family History   Problem Relation Age of Onset    Heart disease Mother     Hypertension Mother     Heart attack Mother     Heart disease Sister          in their 50's    Heart failure Sister     Heart disease Brother         CABG in age 60's    Hyperlipidemia Brother     Heart disease Sister         in her 50's    Heart disease Sister         in her 50's    Heart disease Sister     Hypertension Sister     Hyperlipidemia Sister     Heart disease Brother         CABG in age 60's    Hyperlipidemia Brother     Thyroid disease Daughter     Amblyopia Neg Hx     Blindness Neg Hx     Cancer Neg Hx     Cataracts Neg Hx     Diabetes Neg Hx     Glaucoma Neg Hx     Macular degeneration Neg Hx     Retinal detachment Neg Hx     Strabismus Neg Hx     Stroke Neg Hx        Social History:  Social History     Socioeconomic History    Marital status:    Tobacco Use    Smoking status: Never    Smokeless tobacco: Never   Substance and Sexual Activity    Alcohol use: No    Drug use: No       OBJECTIVE:    /62   Pulse 72   Temp 97.6 °F (36.4 °C) (Oral)   Resp 18   Wt 73.5 kg (162 lb)   SpO2 100%   BMI 28.70 kg/m²     PHYSICAL EXAMINATION:    General appearance: Well appearing, in no acute distress, alert and oriented x3.  Psych:  Mood and affect appropriate.  Skin: Skin color, texture, turgor normal, no rashes or lesions, in both upper and lower body.  Head/face:  Atraumatic,  normocephalic. No palpable lymph nodes  Neck: No pain to palpation over the cervical paraspinous muscles. Spurling Negative. No pain with neck flexion, extension, or lateral flexion. .  Cor: RRR by palpation  Pulm: breathing is even and unlabored, equal chest rise  GI:  non-distended  Back: Straight leg raising in the sitting and supine positions is positive to radicular pain. Moderate pain to palpation over the spine or costovertebral angles. Positive axial loading test bilateral. Positive tenderness over both SIJ with positive thigh and sacral thrust test, Positive FABERE,Ganselin and Yeoman's test on the both side.negative FADIR  Extremities: Peripheral joint ROM is full and pain free without obvious instability or laxity in all four extremities. No deformities, edema, or skin discoloration. Good capillary refill.  Musculoskeletal: Shoulder, hip  and knee provocative maneuvers are negative. Bilateral upper and lower extremity strength is normal and symmetric.  No atrophy or tone abnormalities are noted.  Neuro: Bilateral upper and lower extremity coordination and muscle stretch reflexes are physiologic and symmetric.  Plantar response are downgoing. No loss of sensation is noted.  Gait: antalgic    ASSESSMENT: 83 y.o. year old female with lower back pain, consistent with      1. DDD (degenerative disc disease), lumbar  X-Ray Lumbar Spine Ap Lateral w/Flex Ext    X-Ray Hip 2 or 3 views Right (with Pelvis when performed)    Procedure Order to Pain Management      2. Lumbar radiculopathy  X-Ray Lumbar Spine Ap Lateral w/Flex Ext    X-Ray Hip 2 or 3 views Right (with Pelvis when performed)    Procedure Order to Pain Management      3. Neurogenic claudication  MRI Lumbar Spine Without Contrast    gabapentin (NEURONTIN) 300 MG capsule      4. Spinal stenosis of lumbar region with neurogenic claudication  MRI Lumbar Spine Without Contrast    gabapentin (NEURONTIN) 300 MG capsule      5. Lumbar radiculopathy, chronic   MRI Lumbar Spine Without Contrast            PLAN:   - I have stressed the importance of physical activity and a home exercise plan to help with pain and improve health.  - Patient can continue with medications for now since they are providing benefits, using them appropriately, and without side effects.  - Will order X-ray Lumbar spine and bilateral hips  - Will update Lumbar MRI  - Will order Bialteral L3/4 TFESI   - RTC 4 weeks after procedure  - Counseled patient regarding the importance of activity modification and physical therapy.    The above plan and management options were discussed at length with patient. Patient is in agreement with the above and verbalized understanding.    ALEJANDRO GAVIRIA   I have personally reviewed the history and exam of this patient and agree with the resident/fellow/NPs note as stated above.    Venancio Lopez MD    08/14/2023

## 2023-08-14 NOTE — H&P (VIEW-ONLY)
Chronic patient Established Note (new patient  visit)      SUBJECTIVE:    Chief Complaint: LBP                Interval History 08/14/23    Patient reports that she was doing well with her lower back pain until 2-3 months ago. Patient reports that the pain radiates to bilateral thighs and posterior calves. Patient reports that the pain limits her ability to walk, she can walk 1/2 block before the pain limits her. Patient reports the pain is a 7/10 currently, but at worst is a 9/10. Patient reports weakness in her legs and well as numbness with the pain. No fevers, chills, fecal or urinary incontinence.      Interval History 12/05/19  Elena Frances presents to the clinic for the evaluation of low back pain. The pain started 15 days ago following excessive cleaning and symptoms have been worsening.The pain is located in the low back  area and radiates to the left leg.  The pain is described as shooting and constant and is rated as 9/10. The pain is rated with a score of  9/10 on the BEST day and a score of 9/10 on the WORST day.  Symptoms interfere with daily activity. The pain is exacerbated by Standing.  The pain is mitigated by nothing. She reports spending 3-4 hours per day reclining. The patient reports 7 hours of uninterrupted sleep per night.     Patient denies night fever/night sweats, urinary incontinence, bowel incontinence, significant weight loss, significant motor weakness and loss of sensations.     Patient last seen 5/2015:  Elena Frances presents to the clinic for a follow-up appointment for back pain. Since the last visit, Elena Frances states the pain has been stable. S/p bilateral L3-4 TF MARYBETH on 4/7/15 with 80% relief. She states the leg pain has resolved. She is complaining of neck and headache pain although this was present before the procedure. Current pain intensity is 8/10.    Pain Disability  Index Review:  Last 3 PDI Scores 8/14/2023 1/15/2020 12/5/2019   Pain Disability Index (PDI) 40 43 46       Pain Medications:  Voltaren Gel  Gabapentin 100mg 3 po BID  Nabumetone 750mg BID  Tizanidine 4mg qhs      Opioid Contract: no     report:  Reviewed and consistent with medication use as prescribed.    Pain Procedures:   04/07/15 Bilateral L3/4 TFESI     12/18/19 Left L3/4, L4/5 TFESI     Physical Therapy/Home Exercise: yes    Imaging:     MRI LUMBAR SPINE WITHOUT CONTRAST     CLINICAL HISTORY:  Low back pain, >6wks conservative tx, persistent-progressive sx, surgical candidate; Radiculopathy, thoracolumbar region     TECHNIQUE:  Multiplanar, multisequence MR images were acquired from the thoracolumbar junction to the sacrum without contrast.     COMPARISON:  MRI of the lumbar spine from 03/25/2014.     FINDINGS:  Alignment: There is grade 1 anterolisthesis L3 on L4.  Otherwise spinal alignment is anatomic.     Vertebrae: There are numerous T1 and T2 hypointense lesions throughout the lumbar spine and sacrum, new from prior.  Index lesion within the L3 vertebral body measures 1.3 cm.  Index lesion within the L5 vertebral body measures 1.8 cm.  Index lesion within the left sacral ala measures 1.6 cm.     Discs: There is moderate disc height loss anteriorly at L1-L2.  There is mild disc height loss at L2-L3.  There is moderate disc height loss of L3-L4.  Remaining disc heights are maintained.     Cord: Normal.  Conus terminates at L1-L2.     Degenerative findings:     T12-L1: Mild bilateral facet arthropathy and ligamentum flavum hypertrophy.  No spinal canal stenosis or neural foraminal narrowing.     L1-L2: Diffuse disc bulge with moderate bilateral facet arthropathy results in mild bilateral neural foraminal narrowing.  No spinal canal stenosis.     L2-L3: Diffuse disc bulge with a superimposed left lateral recess disc extrusion migrating superiorly with probable compression of the exiting left L2 nerve  root or descending left L3 nerve root.  Mild bilateral facet arthropathy and ligamentum flavum hypertrophy.  Mild right neural foraminal narrowing.  Mild spinal canal stenosis.     L3-L4: Diffuse disc bulge with moderate bilateral facet arthropathy and ligamentum flavum hypertrophy results in moderate spinal canal stenosis and moderate bilateral neural foraminal narrowing.     L4-L5: Diffuse disc bulge with moderate bilateral facet arthropathy and ligamentum flavum hypertrophy results in mild spinal canal stenosis and mild right and moderate left neural foraminal narrowing.     L5-S1: No spinal canal stenosis or neural foraminal narrowing.  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     Paraspinal muscles & soft tissues: Unremarkable.     Impression:     1. Multiple new lesions throughout the lumbar spine, concerning for osseous metastatic disease.  2. Lumbar spondylosis, resulting in moderate spinal canal stenosis at L3-4, and moderate neural foraminal narrowing from L2-3 through L4-5, as above.  3. Moderate-sized left disc extrusion at L2-3, possibly impinging upon the exiting left L2 or descending L3 nerve roots.  This report was flagged in Epic as abnormal.     Electronically signed by resident: Howie Sky  Date:                                            12/13/2019  Time:                                           11:25     Electronically signed by: Antonio Miller MD  Date:                                            12/13/2019  Time:                                           12:20    Allergies:   Review of patient's allergies indicates:   Allergen Reactions    Vicodin [hydrocodone-acetaminophen] Anxiety       Current Medications:   Current Outpatient Medications   Medication Sig Dispense Refill    acetaminophen (TYLENOL) 650 MG TbSR Take 1 tablet (650 mg total) by mouth every 6 (six) hours as needed (pain). 120 tablet 0    acetaminophen-codeine 300-30mg (TYLENOL #3) 300-30 mg Tab Take 1 tablet by mouth  every 6 (six) hours as needed.      acyclovir (ZOVIRAX) 400 MG tablet SMARTSI Tablet(s) By Mouth Twice Daily      albuterol (PROAIR HFA) 90 mcg/actuation inhaler Inhale 2 puffs into the lungs every 6 (six) hours as needed for Wheezing. Rescue 18 g 11    alendronate (FOSAMAX) 70 MG tablet Take 70 mg by mouth every 7 days.      ALPRAZolam (XANAX) 0.25 MG tablet Take 0.25 mg by mouth 2 (two) times daily as needed.      atorvastatin (LIPITOR) 80 MG tablet TAKE 1 TABLET BY MOUTH EVERY DAY 30 tablet 1    BACTRIM -160 mg Tab Take 1 tablet by mouth 2 (two) times daily.      benzonatate (TESSALON) 200 MG capsule Take 200 mg by mouth.      chlorhexidine (PERIDEX) 0.12 % solution 15 mLs 2 (two) times daily.      diclofenac sodium (VOLTAREN) 1 % Gel       famotidine (PEPCID) 40 MG tablet Take 40 mg by mouth.      fluticasone propionate (FLONASE) 50 mcg/actuation nasal spray INHALE 2 SPRAYS (100 MCG TOTAL) IN EACH NOSTRIL ONCE DAILY. (Patient taking differently: every evening.) 48 mL 2    fluticasone-salmeterol diskus inhaler 100-50 mcg TAKE 1 PUFF BY MOUTH TWICE A DAY 1 each 11    hydrOXYzine HCL (ATARAX) 25 MG tablet PLEASE SEE ATTACHED FOR DETAILED DIRECTIONS      lidocaine-prilocaine (EMLA) cream USE AS DIRECTED ONE HOUR BEFORE CHEMOTHERAPY EXTERNALLY      LINZESS 145 mcg Cap capsule Take 145 mcg by mouth.      loratadine (CLARITIN) 10 mg tablet Take 10 mg by mouth daily as needed.      metoprolol succinate (TOPROL-XL) 25 MG 24 hr tablet Take 1 tablet (25 mg total) by mouth every morning. 30 tablet 1    montelukast (SINGULAIR) 10 mg tablet TAKE 1 TABLET BY MOUTH EVERY DAY IN THE EVENING 90 tablet 3    nabumetone (RELAFEN) 750 MG tablet TAKE 1 TABLET BY MOUTH TWICE A DAY (Patient taking differently: Take by mouth 2 (two) times daily as needed.) 180 tablet 1    omeprazole (PRILOSEC) 40 MG capsule TAKE 1 CAPSULE BY MOUTH EVERY DAY 90 capsule 3    oxybutynin (DITROPAN-XL) 5 MG TR24 Take 5 mg by mouth.      polyethylene  glycol 3350 (MIRALAX ORAL) Take by mouth as needed.      sertraline (ZOLOFT) 100 MG tablet TAKE 1 TABLET BY MOUTH EVERY DAY (Patient taking differently: Take 50 mg by mouth every morning.) 90 tablet 0    tiZANidine (ZANAFLEX) 4 MG tablet TAKE 1.5 TABLETS (6MG) BY MOUTH 3 TIMES A DAY AS NEEDED FOR MUSCLE SPASM 270 tablet 3    aspirin (ECOTRIN) 81 MG EC tablet Take 1 tablet (81 mg total) by mouth 2 (two) times daily. (Patient taking differently: Take 81 mg by mouth every morning.) 60 tablet 0    blood-glucose meter ([employer] BLOOD GLUCOSE SYSTM) kit Use as instructed (Patient not taking: Reported on 2/14/2022) 1 each 0    gabapentin (NEURONTIN) 300 MG capsule Take 1 capsule (300 mg total) by mouth 3 (three) times daily. 90 capsule 2    levocetirizine (XYZAL) 5 MG tablet Take 5 mg by mouth nightly.      valsartan (DIOVAN) 320 MG tablet Take 1 tablet (320 mg total) by mouth once daily. (Patient taking differently: Take 320 mg by mouth every morning.) 90 tablet 3     No current facility-administered medications for this visit.       REVIEW OF SYSTEMS:    GENERAL:  No weight loss, malaise or fevers.  HEENT:  Negative for frequent or significant headaches.  NECK:  Negative for lumps, goiter, pain and significant neck swelling.  RESPIRATORY:  Negative for cough, wheezing or shortness of breath.  CARDIOVASCULAR:  Negative for chest pain, leg swelling or palpitations.  GI:  Negative for abdominal discomfort, blood in stools or black stools or change in bowel habits.  MUSCULOSKELETAL:  See HPI.  SKIN:  Negative for lesions, rash, and itching.  PSYCH:  +ve for sleep disturbance, mood disorder and recent psychosocial stressors.  HEMATOLOGY/LYMPHOLOGY:  Negative for prolonged bleeding, bruising easily or swollen nodes.  NEURO:   No history of headaches, syncope, paralysis, seizures or tremors.  All other reviewed and negative other than HPI.    Past Medical History:  Past Medical History:   Diagnosis Date    Allergy     Anemia      Arthritis     Asthma     Depression     Generalized headaches 2014    Goiter     MNG    HTN (hypertension), benign     Hyperlipidemia     Hyperparathyroidism     s/p surgery    Intestinal obstruction     resolved spontaneously    Lumbar disc disease     s/p epidural shots    Nuclear sclerosis - Both Eyes 3/11/2014    Osteoporosis, post-menopausal     with 3 rib fractures       Past Surgical History:  Past Surgical History:   Procedure Laterality Date    APPENDECTOMY      BILATERAL OOPHORECTOMY      BONE MARROW BIOPSY N/A 3/5/2020    Procedure: Biopsy-bone marrow;  Surgeon: Eve Alvarez MD;  Location: [facility] OR 2ND FLR;  Service: Oncology;  Laterality: N/A;    BREAST CYST EXCISION      left    CATARACT EXTRACTION W/  INTRAOCULAR LENS IMPLANT Right 2016    Dr. Fang ([place])    CATARACT EXTRACTION W/  INTRAOCULAR LENS IMPLANT Left 10/17/2016    Dr. Fang ([place])     SECTION, CLASSIC      x 1    CHOLECYSTECTOMY      COLONOSCOPY N/A 3/1/2016    Procedure: COLONOSCOPY;  Surgeon: Dony Bronson MD;  Location: [facility] (4TH FLR);  Service: Endoscopy;  Laterality: N/A;  PM Prep    ENDOSCOPIC ULTRASOUND OF UPPER GASTROINTESTINAL TRACT N/A 2022    Procedure: ULTRASOUND, UPPER GI TRACT, ENDOSCOPIC;  Surgeon: Verito Mcclure MD;  Location: [facility] (2ND FLR);  Service: Endoscopy;  Laterality: N/A;  EUS +/- ERCP pending findings with me at [place] in the next 1-2 weeks    22-Instructions via portal-DS  22-Pt rescheduled due to ride-updated instructions via portal-DS    ERCP N/A 2022    Procedure: ERCP (ENDOSCOPIC RETROGRADE CHOLANGIOPANCREATOGRAPHY);  Surgeon: Verito Mcclure MD;  Location: [facility] (2ND FLR);  Service: Endoscopy;  Laterality: N/A;  EUS +/- ERCP pending findings with me at [place] in the next 1-2 weeks    HYSTERECTOMY      JOINT REPLACEMENT      KNEE SURGERY Right 2017    TKR    LUMBAR EPIDURAL INJECTION      x 4    MOUTH SURGERY      for abscess drainage of gum of  frontal teeth    PARATHYROID GLAND SURGERY      for parathyroid adenoma    TOTAL KNEE ARTHROPLASTY Left 2019    Procedure: ARTHROPLASTY, KNEE, TOTAL-DEPUY-SIGMA;  Surgeon: Newton Rodriguez III, MD;  Location: [facility] OR [address] [place] [address];  Service: Orthopedics;  Laterality: Left;    TRANSFORAMINAL EPIDURAL INJECTION OF STEROID Left 2019    Procedure: INJECTION, STEROID, EPIDURAL, TRANSFORAMINAL APPROACH, L3-L4 AND L4-L5;  Surgeon: Venancio Lopez MD;  Location: [facility] PAIN [facility]GT;  Service: Pain Management;  Laterality: Left;       Family History:  Family History   Problem Relation Age of Onset    Heart disease Mother     Hypertension Mother     Heart attack Mother     Heart disease Sister          in their 50's    Heart failure Sister     Heart disease Brother         CABG in age 60's    Hyperlipidemia Brother     Heart disease Sister         in her 50's    Heart disease Sister         in her 50's    Heart disease Sister     Hypertension Sister     Hyperlipidemia Sister     Heart disease Brother         CABG in age 60's    Hyperlipidemia Brother     Thyroid disease Daughter     Amblyopia Neg Hx     Blindness Neg Hx     Cancer Neg Hx     Cataracts Neg Hx     Diabetes Neg Hx     Glaucoma Neg Hx     Macular degeneration Neg Hx     Retinal detachment Neg Hx     Strabismus Neg Hx     Stroke Neg Hx        Social History:  Social History     Socioeconomic History    Marital status:    Tobacco Use    Smoking status: Never    Smokeless tobacco: Never   Substance and Sexual Activity    Alcohol use: No    Drug use: No       OBJECTIVE:    /62   Pulse 72   Temp 97.6 °F (36.4 °C) (Oral)   Resp 18   Wt 73.5 kg (162 lb)   SpO2 100%   BMI 28.70 kg/m²     PHYSICAL EXAMINATION:    General appearance: Well appearing, in no acute distress, alert and oriented x3.  Psych:  Mood and affect appropriate.  Skin: Skin color, texture, turgor normal, no rashes or lesions, in both upper and lower body.  Head/face:  Atraumatic,  normocephalic. No palpable lymph nodes  Neck: No pain to palpation over the cervical paraspinous muscles. Spurling Negative. No pain with neck flexion, extension, or lateral flexion. .  Cor: RRR by palpation  Pulm: breathing is even and unlabored, equal chest rise  GI:  non-distended  Back: Straight leg raising in the sitting and supine positions is positive to radicular pain. Moderate pain to palpation over the spine or costovertebral angles. Positive axial loading test bilateral. Positive tenderness over both SIJ with positive thigh and sacral thrust test, Positive FABERE,Ganselin and Yeoman's test on the both side.negative FADIR  Extremities: Peripheral joint ROM is full and pain free without obvious instability or laxity in all four extremities. No deformities, edema, or skin discoloration. Good capillary refill.  Musculoskeletal: Shoulder, hip  and knee provocative maneuvers are negative. Bilateral upper and lower extremity strength is normal and symmetric.  No atrophy or tone abnormalities are noted.  Neuro: Bilateral upper and lower extremity coordination and muscle stretch reflexes are physiologic and symmetric.  Plantar response are downgoing. No loss of sensation is noted.  Gait: antalgic    ASSESSMENT: 83 y.o. year old female with lower back pain, consistent with      1. DDD (degenerative disc disease), lumbar  X-Ray Lumbar Spine Ap Lateral w/Flex Ext    X-Ray Hip 2 or 3 views Right (with Pelvis when performed)    Procedure Order to Pain Management      2. Lumbar radiculopathy  X-Ray Lumbar Spine Ap Lateral w/Flex Ext    X-Ray Hip 2 or 3 views Right (with Pelvis when performed)    Procedure Order to Pain Management      3. Neurogenic claudication  MRI Lumbar Spine Without Contrast    gabapentin (NEURONTIN) 300 MG capsule      4. Spinal stenosis of lumbar region with neurogenic claudication  MRI Lumbar Spine Without Contrast    gabapentin (NEURONTIN) 300 MG capsule      5. Lumbar radiculopathy, chronic   MRI Lumbar Spine Without Contrast            PLAN:   - I have stressed the importance of physical activity and a home exercise plan to help with pain and improve health.  - Patient can continue with medications for now since they are providing benefits, using them appropriately, and without side effects.  - Will order X-ray Lumbar spine and bilateral hips  - Will update Lumbar MRI  - Will order Bialteral L3/4 TFESI   - RTC 4 weeks after procedure  - Counseled patient regarding the importance of activity modification and physical therapy.    The above plan and management options were discussed at length with patient. Patient is in agreement with the above and verbalized understanding.    ALEJANDRO GAVIRIA   I have personally reviewed the history and exam of this patient and agree with the resident/fellow/NPs note as stated above.    Venancio Lopez MD    08/14/2023

## 2023-08-17 ENCOUNTER — PATIENT MESSAGE (OUTPATIENT)
Dept: PAIN MEDICINE | Facility: OTHER | Age: 83
End: 2023-08-17
Payer: MEDICARE

## 2023-08-31 ENCOUNTER — HOSPITAL ENCOUNTER (OUTPATIENT)
Dept: RADIOLOGY | Facility: HOSPITAL | Age: 83
Discharge: HOME | End: 2023-08-31
Attending: EMERGENCY MEDICINE
Payer: MEDICARE

## 2023-08-31 DIAGNOSIS — M54.16 LUMBAR RADICULOPATHY, CHRONIC: ICD-10-CM

## 2023-08-31 DIAGNOSIS — R29.818 NEUROGENIC CLAUDICATION: ICD-10-CM

## 2023-08-31 DIAGNOSIS — M48.062 SPINAL STENOSIS OF LUMBAR REGION WITH NEUROGENIC CLAUDICATION: ICD-10-CM

## 2023-08-31 PROCEDURE — 72148 MRI LUMBAR SPINE W/O DYE: CPT | Mod: TC

## 2023-08-31 PROCEDURE — 72148 MRI LUMBAR SPINE WITHOUT CONTRAST: ICD-10-PCS | Mod: 26,,, | Performed by: RADIOLOGY

## 2023-08-31 PROCEDURE — 72148 MRI LUMBAR SPINE W/O DYE: CPT | Mod: 26,,, | Performed by: RADIOLOGY

## 2023-09-06 ENCOUNTER — HOSPITAL ENCOUNTER (OUTPATIENT)
Facility: OTHER | Age: 83
Discharge: HOME | End: 2023-09-06
Attending: ANESTHESIOLOGY | Admitting: ANESTHESIOLOGY
Payer: MEDICARE

## 2023-09-06 VITALS
HEIGHT: 63 IN | TEMPERATURE: 98 F | DIASTOLIC BLOOD PRESSURE: 78 MMHG | BODY MASS INDEX: 27.46 KG/M2 | WEIGHT: 155 LBS | RESPIRATION RATE: 16 BRPM | SYSTOLIC BLOOD PRESSURE: 162 MMHG | OXYGEN SATURATION: 99 % | HEART RATE: 70 BPM

## 2023-09-06 DIAGNOSIS — G89.29 CHRONIC PAIN: ICD-10-CM

## 2023-09-06 DIAGNOSIS — M54.16 LUMBAR RADICULOPATHY: Primary | ICD-10-CM

## 2023-09-06 PROCEDURE — 64483 PR EPIDURAL INJ, ANES/STEROID, TRANSFORAMINAL, LUMB/SACR, SNGL LEVL: ICD-10-PCS | Mod: 50,,, | Performed by: ANESTHESIOLOGY

## 2023-09-06 PROCEDURE — 25000003 PHARM REV CODE 250: Performed by: STUDENT IN AN ORGANIZED HEALTH CARE EDUCATION/TRAINING PROGRAM

## 2023-09-06 PROCEDURE — 64483 NJX AA&/STRD TFRM EPI L/S 1: CPT | Mod: 50 | Performed by: ANESTHESIOLOGY

## 2023-09-06 PROCEDURE — 63600175 PHARM REV CODE 636 W HCPCS: Performed by: ANESTHESIOLOGY

## 2023-09-06 PROCEDURE — 25000003 PHARM REV CODE 250: Performed by: ANESTHESIOLOGY

## 2023-09-06 PROCEDURE — 64483 NJX AA&/STRD TFRM EPI L/S 1: CPT | Mod: 50,,, | Performed by: ANESTHESIOLOGY

## 2023-09-06 PROCEDURE — 25500020 PHARM REV CODE 255: Performed by: ANESTHESIOLOGY

## 2023-09-06 RX ORDER — MIDAZOLAM HYDROCHLORIDE 1 MG/ML
INJECTION INTRAMUSCULAR; INTRAVENOUS
Status: DISCONTINUED | OUTPATIENT
Start: 2023-09-06 | End: 2023-09-06 | Stop reason: HOSPADM

## 2023-09-06 RX ORDER — FENTANYL CITRATE 50 UG/ML
INJECTION, SOLUTION INTRAMUSCULAR; INTRAVENOUS
Status: DISCONTINUED | OUTPATIENT
Start: 2023-09-06 | End: 2023-09-06 | Stop reason: HOSPADM

## 2023-09-06 RX ORDER — SODIUM CHLORIDE 9 MG/ML
INJECTION, SOLUTION INTRAVENOUS CONTINUOUS
Status: DISCONTINUED | OUTPATIENT
Start: 2023-09-06 | End: 2023-09-06 | Stop reason: HOSPADM

## 2023-09-06 RX ORDER — LIDOCAINE HYDROCHLORIDE 10 MG/ML
INJECTION, SOLUTION EPIDURAL; INFILTRATION; INTRACAUDAL; PERINEURAL
Status: DISCONTINUED | OUTPATIENT
Start: 2023-09-06 | End: 2023-09-06 | Stop reason: HOSPADM

## 2023-09-06 RX ORDER — LIDOCAINE HYDROCHLORIDE 20 MG/ML
INJECTION, SOLUTION INFILTRATION; PERINEURAL
Status: DISCONTINUED | OUTPATIENT
Start: 2023-09-06 | End: 2023-09-06 | Stop reason: HOSPADM

## 2023-09-06 RX ORDER — DEXAMETHASONE SODIUM PHOSPHATE 10 MG/ML
INJECTION INTRAMUSCULAR; INTRAVENOUS
Status: DISCONTINUED | OUTPATIENT
Start: 2023-09-06 | End: 2023-09-06 | Stop reason: HOSPADM

## 2023-09-06 NOTE — OP NOTE
Lumbar Transforaminal Epidural Steroid Injection under Fluoroscopic Guidance    The procedure, risks, benefits, and options were discussed with the patient. There are no contraindications to the procedure. The patent expressed understanding and agreed to the procedure. Informed written consent was obtained prior to the start of the procedure and can be found in the patient's chart.    PATIENT NAME: Elena Frances   MRN: [id number]     DATE OF PROCEDURE: 09/06/2023    PROCEDURE:  Bilateral  L3/4 Lumbar Transforaminal Epidural Steroid Injection under Fluoroscopic Guidance    PRE-OP DIAGNOSIS: DDD (degenerative disc disease), lumbar [M51.36]  Lumbar radiculopathy [M54.16] Lumbar radiculopathy [M54.16]    POST-OP DIAGNOSIS: Same    PHYSICIAN: Venancio Lopez MD    ASSISTANTS: Virgilio Soni, [facility] Fellow       MEDICATIONS INJECTED: Preservative-free Decadron 10mg with 5cc of Lidocaine 1% MPF     LOCAL ANESTHETIC INJECTED: Xylocaine 2%     SEDATION: Versed 2mg and Fentanyl 50mcg                                                                                                                                                                                     Conscious sedation ordered by M.D. Patient re-evaluation prior to administration of conscious sedation. No changes noted in patient's status from initial evaluation. The patient's vital signs were monitored by RN and patient remained hemodynamically stable throughout the procedure.    Event Time In   Sedation Start 1001   Sedation end: 1010    ESTIMATED BLOOD LOSS: None    COMPLICATIONS: None    TECHNIQUE: Time-out was performed to identify the patient and procedure to be performed. With the patient laying in a prone position, the surgical area was prepped and draped in the usual sterile fashion using ChloraPrep and a fenestrated drape.The levels were determined under fluoroscopy guidance. Skin anesthesia was achieved by injecting Lidocaine 2% over the  injection sites. The transforaminal spaces were then approached with a 22 gauge, 3.5 inch spinal quinke needle that was introduced under fluoroscopic guidance in the AP and Lateral views. Once the needle tip was in the area of the transforaminal space, and there was no blood, CSF or paraesthesias, contrast dye Omnipaque (300mg/mL) was injected to confirm placement and there was no vascular runoff. Fluoroscopic imaging in the AP and lateral views revealed a clear outline of the spinal nerve with proximal spread of agent through the neural foramen into the epidural space. 3 mL of the medication mixture listed above was injected slowly at each site. Displacement of the radio opaque contrast after injection of the medication confirmed that the medication went into the area of the transforaminal spaces. The needles were removed and bleeding was nil. A sterile dressing was applied. No specimens collected. The patient tolerated the procedure well.       The patient was monitored after the procedure in the recovery area. They were given post-procedure and discharge instructions to follow at home. The patient was discharged in a stable condition.    Virgilio Soni MD    I reviewed and edited the fellow's note. I conducted my own interview and physical examination. I agree with the findings. I was present and supervising all critical portions of the procedure.    Venancio Lopez MD

## 2023-09-06 NOTE — DISCHARGE SUMMARY
Discharge Note  Short Stay      SUMMARY     Admit Date: 2023    Attending Physician: Virgilio Soni      Discharge Physician: Virgilio Soni      Discharge Date: 2023 9:58 AM    Procedure(s) (LRB):  INJECTION, STEROID, EPIDURAL, TRANSFORAMINAL APPROACH, BILATERAL L3/L4 SOONER DATE (Bilateral)    Final Diagnosis: DDD (degenerative disc disease), lumbar [M51.36]  Lumbar radiculopathy [M54.16]    Disposition: Home or self care    Patient Instructions:   Current Discharge Medication List        CONTINUE these medications which have NOT CHANGED    Details   acetaminophen (TYLENOL) 650 MG TbSR Take 1 tablet (650 mg total) by mouth every 6 (six) hours as needed (pain).  Qty: 120 tablet, Refills: 0      acetaminophen-codeine 300-30mg (TYLENOL #3) 300-30 mg Tab Take 1 tablet by mouth every 6 (six) hours as needed.      acyclovir (ZOVIRAX) 400 MG tablet SMARTSI Tablet(s) By Mouth Twice Daily      albuterol (PROAIR HFA) 90 mcg/actuation inhaler Inhale 2 puffs into the lungs every 6 (six) hours as needed for Wheezing. Rescue  Qty: 18 g, Refills: 11      alendronate (FOSAMAX) 70 MG tablet Take 70 mg by mouth every 7 days.      ALPRAZolam (XANAX) 0.25 MG tablet Take 0.25 mg by mouth 2 (two) times daily as needed.      aspirin (ECOTRIN) 81 MG EC tablet Take 1 tablet (81 mg total) by mouth 2 (two) times daily.  Qty: 60 tablet, Refills: 0      atorvastatin (LIPITOR) 80 MG tablet TAKE 1 TABLET BY MOUTH EVERY DAY  Qty: 30 tablet, Refills: 1    Associated Diagnoses: Hyperlipidemia, unspecified hyperlipidemia type      BACTRIM -160 mg Tab Take 1 tablet by mouth 2 (two) times daily.      benzonatate (TESSALON) 200 MG capsule Take 200 mg by mouth.      blood-glucose meter ([employer] BLOOD GLUCOSE SYSTM) kit Use as instructed  Qty: 1 each, Refills: 0    Comments: [employer]S kit      chlorhexidine (PERIDEX) 0.12 % solution 15 mLs 2 (two) times daily.      diclofenac sodium (VOLTAREN) 1 % Gel       famotidine (PEPCID) 40 MG  tablet Take 40 mg by mouth.      fluticasone propionate (FLONASE) 50 mcg/actuation nasal spray INHALE 2 SPRAYS (100 MCG TOTAL) IN EACH NOSTRIL ONCE DAILY.  Qty: 48 mL, Refills: 2      fluticasone-salmeterol diskus inhaler 100-50 mcg TAKE 1 PUFF BY MOUTH TWICE A DAY  Qty: 1 each, Refills: 11      gabapentin (NEURONTIN) 300 MG capsule Take 1 capsule (300 mg total) by mouth 3 (three) times daily.  Qty: 90 capsule, Refills: 2    Associated Diagnoses: Neurogenic claudication; Spinal stenosis of lumbar region with neurogenic claudication      hydrOXYzine HCL (ATARAX) 25 MG tablet PLEASE SEE ATTACHED FOR DETAILED DIRECTIONS      levocetirizine (XYZAL) 5 MG tablet Take 5 mg by mouth nightly.      lidocaine-prilocaine (EMLA) cream USE AS DIRECTED ONE HOUR BEFORE CHEMOTHERAPY EXTERNALLY      LINZESS 145 mcg Cap capsule Take 145 mcg by mouth.      loratadine (CLARITIN) 10 mg tablet Take 10 mg by mouth daily as needed.      metoprolol succinate (TOPROL-XL) 25 MG 24 hr tablet Take 1 tablet (25 mg total) by mouth every morning.  Qty: 30 tablet, Refills: 1      montelukast (SINGULAIR) 10 mg tablet TAKE 1 TABLET BY MOUTH EVERY DAY IN THE EVENING  Qty: 90 tablet, Refills: 3      nabumetone (RELAFEN) 750 MG tablet TAKE 1 TABLET BY MOUTH TWICE A DAY  Qty: 180 tablet, Refills: 1      omeprazole (PRILOSEC) 40 MG capsule TAKE 1 CAPSULE BY MOUTH EVERY DAY  Qty: 90 capsule, Refills: 3      oxybutynin (DITROPAN-XL) 5 MG TR24 Take 5 mg by mouth.      polyethylene glycol 3350 (MIRALAX ORAL) Take by mouth as needed.      sertraline (ZOLOFT) 100 MG tablet TAKE 1 TABLET BY MOUTH EVERY DAY  Qty: 90 tablet, Refills: 0      tiZANidine (ZANAFLEX) 4 MG tablet TAKE 1.5 TABLETS (6MG) BY MOUTH 3 TIMES A DAY AS NEEDED FOR MUSCLE SPASM  Qty: 270 tablet, Refills: 3      valsartan (DIOVAN) 320 MG tablet Take 1 tablet (320 mg total) by mouth once daily.  Qty: 90 tablet, Refills: 3    Comments: .                 Discharge Diagnosis: DDD (degenerative disc  disease), lumbar [M51.36]  Lumbar radiculopathy [M54.16]  Condition on Discharge: Stable with no complications to procedure   Diet on Discharge: Same as before.  Activity: as per instruction sheet.  Discharge to: Home with a responsible adult.  Follow up: 2-4 weeks       Please call my office or pager at [phone] if experienced any weakness or loss of sensation, fever > 101.5, pain uncontrolled with oral medications, persistent nausea/vomiting/or diarrhea, redness or drainage from the incisions, or any other worrisome concerns. If physician on call was not reached or could not communicate with our office for any reason please go to the nearest emergency department

## 2023-09-21 ENCOUNTER — OFFICE VISIT (OUTPATIENT)
Dept: PAIN MEDICINE | Facility: CLINIC | Age: 83
End: 2023-09-21
Attending: ANESTHESIOLOGY
Payer: MEDICARE

## 2023-09-21 VITALS
TEMPERATURE: 99 F | OXYGEN SATURATION: 100 % | DIASTOLIC BLOOD PRESSURE: 65 MMHG | HEART RATE: 75 BPM | BODY MASS INDEX: 28.66 KG/M2 | RESPIRATION RATE: 18 BRPM | WEIGHT: 161.81 LBS | SYSTOLIC BLOOD PRESSURE: 118 MMHG

## 2023-09-21 DIAGNOSIS — M54.17 LUMBOSACRAL RADICULOPATHY: ICD-10-CM

## 2023-09-21 DIAGNOSIS — M51.36 DDD (DEGENERATIVE DISC DISEASE), LUMBAR: Primary | ICD-10-CM

## 2023-09-21 DIAGNOSIS — M48.062 SPINAL STENOSIS OF LUMBAR REGION WITH NEUROGENIC CLAUDICATION: ICD-10-CM

## 2023-09-21 PROCEDURE — 3074F PR MOST RECENT SYSTOLIC BLOOD PRESSURE < 130 MM HG: ICD-10-PCS | Mod: CPTII,S$GLB,, | Performed by: ANESTHESIOLOGY

## 2023-09-21 PROCEDURE — 1159F MED LIST DOCD IN RCRD: CPT | Mod: CPTII,S$GLB,, | Performed by: ANESTHESIOLOGY

## 2023-09-21 PROCEDURE — 3288F FALL RISK ASSESSMENT DOCD: CPT | Mod: CPTII,S$GLB,, | Performed by: ANESTHESIOLOGY

## 2023-09-21 PROCEDURE — 3074F SYST BP LT 130 MM HG: CPT | Mod: CPTII,S$GLB,, | Performed by: ANESTHESIOLOGY

## 2023-09-21 PROCEDURE — 1125F AMNT PAIN NOTED PAIN PRSNT: CPT | Mod: CPTII,S$GLB,, | Performed by: ANESTHESIOLOGY

## 2023-09-21 PROCEDURE — 99214 PR OFFICE/OUTPT VISIT, EST, LEVL IV, 30-39 MIN: ICD-10-PCS | Mod: GC,S$GLB,, | Performed by: ANESTHESIOLOGY

## 2023-09-21 PROCEDURE — 3078F PR MOST RECENT DIASTOLIC BLOOD PRESSURE < 80 MM HG: ICD-10-PCS | Mod: CPTII,S$GLB,, | Performed by: ANESTHESIOLOGY

## 2023-09-21 PROCEDURE — 1125F PR PAIN SEVERITY QUANTIFIED, PAIN PRESENT: ICD-10-PCS | Mod: CPTII,S$GLB,, | Performed by: ANESTHESIOLOGY

## 2023-09-21 PROCEDURE — 99999 PR PBB SHADOW E&M-EST. PATIENT-LVL V: CPT | Mod: PBBFAC,,, | Performed by: ANESTHESIOLOGY

## 2023-09-21 PROCEDURE — 1101F PR PT FALLS ASSESS DOC 0-1 FALLS W/OUT INJ PAST YR: ICD-10-PCS | Mod: CPTII,S$GLB,, | Performed by: ANESTHESIOLOGY

## 2023-09-21 PROCEDURE — 1159F PR MEDICATION LIST DOCUMENTED IN MEDICAL RECORD: ICD-10-PCS | Mod: CPTII,S$GLB,, | Performed by: ANESTHESIOLOGY

## 2023-09-21 PROCEDURE — 99214 OFFICE O/P EST MOD 30 MIN: CPT | Mod: GC,S$GLB,, | Performed by: ANESTHESIOLOGY

## 2023-09-21 PROCEDURE — 3078F DIAST BP <80 MM HG: CPT | Mod: CPTII,S$GLB,, | Performed by: ANESTHESIOLOGY

## 2023-09-21 PROCEDURE — 1101F PT FALLS ASSESS-DOCD LE1/YR: CPT | Mod: CPTII,S$GLB,, | Performed by: ANESTHESIOLOGY

## 2023-09-21 PROCEDURE — 99999 PR PBB SHADOW E&M-EST. PATIENT-LVL V: ICD-10-PCS | Mod: PBBFAC,,, | Performed by: ANESTHESIOLOGY

## 2023-09-21 PROCEDURE — 3288F PR FALLS RISK ASSESSMENT DOCUMENTED: ICD-10-PCS | Mod: CPTII,S$GLB,, | Performed by: ANESTHESIOLOGY

## 2023-09-21 NOTE — PROGRESS NOTES
Chronic patient Established Note (Follow up visit)      Interval History 9/21/23    Elena Frances presents to the clinic for a follow-up appointment for low back and LE radicular type pain. She underwent a bilateral L3/4 TF MARYBETH on 9/6/23 and reports a 60% reduction in pain and improvement in function  Since the last visit, Elena Frances states the pain has been improving. Current pain intensity is 5/10. Reports walking/standing increases her pain and Sitting/Lying decreases her pain. Reports pain begins after walking a couple hundred feet.     Pain Disability Index Review:      8/14/2023     1:25 PM 1/15/2020     9:53 AM 12/5/2019     9:34 AM   Last 3 PDI Scores   Pain Disability Index (PDI) 40 43 46     Interval History 08/14/23    Patient reports that she was doing well with her lower back pain until 2-3 months ago. Patient reports that the pain radiates to bilateral thighs and posterior calves. Patient reports that the pain limits her ability to walk, she can walk 1/2 block before the pain limits her. Patient reports the pain is a 7/10 currently, but at worst is a 9/10. Patient reports weakness in her legs and well as numbness with the pain. No fevers, chills, fecal or urinary incontinence.       Interval History 12/05/19  Elena Frances presents to the clinic for the evaluation of low back pain. The pain started 15 days ago following excessive cleaning and symptoms have been worsening.The pain is located in the low back  area and radiates to the left leg.  The pain is described as shooting and constant and is rated as 9/10. The pain is rated with a score of  9/10 on the BEST day and a score of 9/10 on the WORST day.  Symptoms interfere with daily activity. The pain is exacerbated by Standing.  The pain is mitigated by nothing. She reports spending 3-4 hours per day reclining. The patient reports 7 hours of uninterrupted sleep per night.     Patient denies night fever/night sweats,  urinary incontinence, bowel incontinence, significant weight loss, significant motor weakness and loss of sensations.     Patient last seen 5/2015:  Elena Frances presents to the clinic for a follow-up appointment for back pain. Since the last visit, Elena Frances states the pain has been stable. S/p bilateral L3-4 TF MARYBETH on 4/7/15 with 80% relief. She states the leg pain has resolved. She is complaining of neck and headache pain although this was present before the procedure. Current pain intensity is 8/10.    Pain Medications:  - Voltaren Gel  - Gabapentin 100mg 3 po BID  - Nabumetone 750mg BID  - Tizanidine 4mg qhs      Opioid Contract: no     report:  Not applicable    Pain Procedures:  04/07/15 Bilateral L3/4 TFESI     12/18/19 Left L3/4, L4/5 TFESI  9/6/23 Bilateral L3/4 TF MRAYBETH    Physical Therapy/Home Exercise: yes    Imaging:     MRI LUMBAR SPINE WITHOUT CONTRAST - 8/14/23     CLINICAL HISTORY:  Lumbar radiculopathy, symptoms persist with conservative treatment; Other symptoms and signs involving the nervous system     TECHNIQUE:  Multiplanar, multisequence MR images were acquired from the thoracolumbar junction to the sacrum without the administration of contrast.     COMPARISON:  MRI 12/13/2019.  Radiograph 08/14/2023     FINDINGS:  Alignment: Mild dextrocurvature.  Grade 1 anterolisthesis of L3 on L4.  Minimal anterolisthesis of L4 on L5.     Vertebrae: Heterogeneous bone marrow signal.  Heterogeneous signal with predominant T1 and T2 hypointense signal in L3 and L5, could represent residual or treated lesion in this patient with history of lymphoma in light of prior MRI examination of 12/13/2019.     Discs: Multilevel disc desiccation.  L1-L2 annular fissure.  Disc space narrowing predominantly L1-L2 L2-L3 L3-L4 levels.     Cord: No abnormal signal.  Conus terminates at L1-L2.  Dural ectasia versus Tarlov cyst in the left S2 neural foramina.     Degenerative findings:     T12-L1:  Mild facet arthropathy.  No significant spinal canal stenosis or neural foraminal narrowing.     L1-L2: Circumferential disc bulge and bilateral facet arthropathy.  Mild bilateral neural foraminal narrowing.  No significant spinal canal stenosis.     L2-L3: Circumferential disc bulge superimposed with mild disc extrusion, bilateral facet arthropathy, and ligamentum flavum buckling.  There is effacement of the left lateral recess.  Mild bilateral neural foraminal narrowing.  No significant spinal canal stenosis.     L3-L4: Circumferential disc bulge superimposed with left foraminal disc protrusion, bilateral facet arthropathy, and ligamentum flavum buckling.  Moderate spinal canal stenosis.  Moderate bilateral neural foraminal narrowing.     L4-L5: Circumferential disc bulge, bilateral facet arthropathy, ligamentum flavum buckling contributing to severe spinal canal stenosis and mild-moderate bilateral neural foraminal narrowing.     L5-S1: Bilaterally facet arthropathies.  No significant spinal canal stenosis or neural foraminal narrowing.     Paraspinal muscles & soft tissues: Unremarkable.     Impression:     Multilevel degenerative spine changes as detailed above, most prominent at L4-L5 with severe spinal canal stenosis and mild-moderate bilateral neural foraminal narrowing.  To a lesser extent, moderate central canal narrowing L3-L4.     Heterogeneous bone signal predominantly at L3 and L5 vertebral bodies, could represent treated or residual lymphoma in light of prior MRI 12/13/2019.     This report was flagged in Epic as abnormal.     Electronically signed by resident: Marty Eason  Date:                                            09/01/2023  Time:                                           08:09     Electronically signed by: John Paul Candelario MD  Date:                                            09/01/2023  Time:                                           09:10    Allergies:   Review of patient's allergies  indicates:   Allergen Reactions    Vicodin [hydrocodone-acetaminophen] Anxiety       Current Medications:   Current Outpatient Medications   Medication Sig Dispense Refill    acetaminophen (TYLENOL) 650 MG TbSR Take 1 tablet (650 mg total) by mouth every 6 (six) hours as needed (pain). 120 tablet 0    acetaminophen-codeine 300-30mg (TYLENOL #3) 300-30 mg Tab Take 1 tablet by mouth every 6 (six) hours as needed.      acyclovir (ZOVIRAX) 400 MG tablet SMARTSI Tablet(s) By Mouth Twice Daily      albuterol (PROAIR HFA) 90 mcg/actuation inhaler Inhale 2 puffs into the lungs every 6 (six) hours as needed for Wheezing. Rescue 18 g 11    alendronate (FOSAMAX) 70 MG tablet Take 70 mg by mouth every 7 days.      ALPRAZolam (XANAX) 0.25 MG tablet Take 0.25 mg by mouth 2 (two) times daily as needed.      atorvastatin (LIPITOR) 80 MG tablet TAKE 1 TABLET BY MOUTH EVERY DAY 30 tablet 1    BACTRIM -160 mg Tab Take 1 tablet by mouth 2 (two) times daily.      benzonatate (TESSALON) 200 MG capsule Take 200 mg by mouth.      chlorhexidine (PERIDEX) 0.12 % solution 15 mLs 2 (two) times daily.      diclofenac sodium (VOLTAREN) 1 % Gel       famotidine (PEPCID) 40 MG tablet Take 40 mg by mouth.      fluticasone propionate (FLONASE) 50 mcg/actuation nasal spray INHALE 2 SPRAYS (100 MCG TOTAL) IN EACH NOSTRIL ONCE DAILY. (Patient taking differently: every evening.) 48 mL 2    fluticasone-salmeterol diskus inhaler 100-50 mcg TAKE 1 PUFF BY MOUTH TWICE A DAY 1 each 11    gabapentin (NEURONTIN) 300 MG capsule Take 1 capsule (300 mg total) by mouth 3 (three) times daily. 90 capsule 2    hydrOXYzine HCL (ATARAX) 25 MG tablet PLEASE SEE ATTACHED FOR DETAILED DIRECTIONS      levocetirizine (XYZAL) 5 MG tablet Take 5 mg by mouth nightly.      lidocaine-prilocaine (EMLA) cream USE AS DIRECTED ONE HOUR BEFORE CHEMOTHERAPY EXTERNALLY      LINZESS 145 mcg Cap capsule Take 145 mcg by mouth.      loratadine (CLARITIN) 10 mg tablet Take 10 mg by  mouth daily as needed.      metoprolol succinate (TOPROL-XL) 25 MG 24 hr tablet Take 1 tablet (25 mg total) by mouth every morning. 30 tablet 1    montelukast (SINGULAIR) 10 mg tablet TAKE 1 TABLET BY MOUTH EVERY DAY IN THE EVENING 90 tablet 3    nabumetone (RELAFEN) 750 MG tablet TAKE 1 TABLET BY MOUTH TWICE A DAY (Patient taking differently: Take by mouth 2 (two) times daily as needed.) 180 tablet 1    omeprazole (PRILOSEC) 40 MG capsule TAKE 1 CAPSULE BY MOUTH EVERY DAY 90 capsule 3    oxybutynin (DITROPAN-XL) 5 MG TR24 Take 5 mg by mouth.      polyethylene glycol 3350 (MIRALAX ORAL) Take by mouth as needed.      sertraline (ZOLOFT) 100 MG tablet TAKE 1 TABLET BY MOUTH EVERY DAY (Patient taking differently: Take 50 mg by mouth every morning.) 90 tablet 0    tiZANidine (ZANAFLEX) 4 MG tablet TAKE 1.5 TABLETS (6MG) BY MOUTH 3 TIMES A DAY AS NEEDED FOR MUSCLE SPASM 270 tablet 3    aspirin (ECOTRIN) 81 MG EC tablet Take 1 tablet (81 mg total) by mouth 2 (two) times daily. (Patient taking differently: Take 81 mg by mouth every morning.) 60 tablet 0    blood-glucose meter ([employer] BLOOD GLUCOSE SYSTM) kit Use as instructed (Patient not taking: Reported on 2/14/2022) 1 each 0    valsartan (DIOVAN) 320 MG tablet Take 1 tablet (320 mg total) by mouth once daily. (Patient taking differently: Take 320 mg by mouth every morning.) 90 tablet 3     No current facility-administered medications for this visit.       REVIEW OF SYSTEMS:    GENERAL:  No weight loss, malaise or fevers.  HEENT:  Negative for frequent or significant headaches.  NECK:  Negative for lumps, goiter, pain and significant neck swelling.  RESPIRATORY:  Negative for cough, wheezing or shortness of breath.  CARDIOVASCULAR:  Negative for chest pain, leg swelling or palpitations.  GI:  Negative for abdominal discomfort, blood in stools or black stools or change in bowel habits.  MUSCULOSKELETAL:  See HPI.  SKIN:  Negative for lesions, rash, and  itching.  PSYCH:  + for sleep disturbance, mood disorder and recent psychosocial stressors.  HEMATOLOGY/LYMPHOLOGY:  Negative for prolonged bleeding, bruising easily or swollen nodes.  NEURO:   No history of headaches, syncope, paralysis, seizures or tremors.  All other reviewed and negative other than HPI.    Past Medical History:  Past Medical History:   Diagnosis Date    Allergy     Anemia     Arthritis     Asthma     Depression     Generalized headaches 2014    Goiter     MNG    HTN (hypertension), benign     Hyperlipidemia     Hyperparathyroidism     s/p surgery    Intestinal obstruction     resolved spontaneously    Lumbar disc disease     s/p epidural shots    Nuclear sclerosis - Both Eyes 3/11/2014    Osteoporosis, post-menopausal     with 3 rib fractures       Past Surgical History:  Past Surgical History:   Procedure Laterality Date    APPENDECTOMY      BILATERAL OOPHORECTOMY      BONE MARROW BIOPSY N/A 3/5/2020    Procedure: Biopsy-bone marrow;  Surgeon: Eve Alvarez MD;  Location: [facility] OR 2ND FLR;  Service: Oncology;  Laterality: N/A;    BREAST CYST EXCISION      left    CATARACT EXTRACTION W/  INTRAOCULAR LENS IMPLANT Right 2016    Dr. Fang ([place])    CATARACT EXTRACTION W/  INTRAOCULAR LENS IMPLANT Left 10/17/2016    Dr. Fang ([place])     SECTION, CLASSIC      x 1    CHOLECYSTECTOMY      COLONOSCOPY N/A 3/1/2016    Procedure: COLONOSCOPY;  Surgeon: Dony Bronson MD;  Location: [facility] (4TH FLR);  Service: Endoscopy;  Laterality: N/A;  PM Prep    ENDOSCOPIC ULTRASOUND OF UPPER GASTROINTESTINAL TRACT N/A 2022    Procedure: ULTRASOUND, UPPER GI TRACT, ENDOSCOPIC;  Surgeon: Verito Mcclure MD;  Location: [facility] (2ND FLR);  Service: Endoscopy;  Laterality: N/A;  EUS +/- ERCP pending findings with me at [place] in the next 1-2 weeks    22-Instructions via portal-DS  22-Pt rescheduled due to ride-updated instructions via portal-DS    ERCP N/A 2022    Procedure:  ERCP (ENDOSCOPIC RETROGRADE CHOLANGIOPANCREATOGRAPHY);  Surgeon: Verito Mcclure MD;  Location: [facility] (2ND FLR);  Service: Endoscopy;  Laterality: N/A;  EUS +/- ERCP pending findings with me at [place] in the next 1-2 weeks    HYSTERECTOMY      JOINT REPLACEMENT      KNEE SURGERY Right 2017    TKR    LUMBAR EPIDURAL INJECTION      x 4    MOUTH SURGERY      for abscess drainage of gum of frontal teeth    PARATHYROID GLAND SURGERY      for parathyroid adenoma    TOTAL KNEE ARTHROPLASTY Left 2019    Procedure: ARTHROPLASTY, KNEE, TOTAL-DEPUY-SIGMA;  Surgeon: Newton Rodriguez III, MD;  Location: [facility] OR 2ND FLR;  Service: Orthopedics;  Laterality: Left;    TRANSFORAMINAL EPIDURAL INJECTION OF STEROID Left 2019    Procedure: INJECTION, STEROID, EPIDURAL, TRANSFORAMINAL APPROACH, L3-L4 AND L4-L5;  Surgeon: Venancio Lopez MD;  Location: [facility] PAIN MGT;  Service: Pain Management;  Laterality: Left;    TRANSFORAMINAL EPIDURAL INJECTION OF STEROID Bilateral 2023    Procedure: INJECTION, STEROID, EPIDURAL, TRANSFORAMINAL APPROACH, BILATERAL L3/L4 SOONER DATE;  Surgeon: Venancio Lopez MD;  Location: [facility] PAIN MGT;  Service: Pain Management;  Laterality: Bilateral;       Family History:  Family History   Problem Relation Age of Onset    Heart disease Mother     Hypertension Mother     Heart attack Mother     Heart disease Sister          in their 50's    Heart failure Sister     Heart disease Brother         CABG in age 60's    Hyperlipidemia Brother     Heart disease Sister         in her 50's    Heart disease Sister         in her 50's    Heart disease Sister     Hypertension Sister     Hyperlipidemia Sister     Heart disease Brother         CABG in age 60's    Hyperlipidemia Brother     Thyroid disease Daughter     Amblyopia Neg Hx     Blindness Neg Hx     Cancer Neg Hx     Cataracts Neg Hx     Diabetes Neg Hx     Glaucoma Neg Hx     Macular degeneration Neg Hx     Retinal detachment Neg Hx      Strabismus Neg Hx     Stroke Neg Hx        Social History:  Social History     Socioeconomic History    Marital status:    Tobacco Use    Smoking status: Never    Smokeless tobacco: Never   Substance and Sexual Activity    Alcohol use: No    Drug use: No       OBJECTIVE:    /65   Pulse 75   Temp 98.7 °F (37.1 °C)   Resp 18   Wt 73.4 kg (161 lb 13.1 oz)   SpO2 100%   BMI 28.66 kg/m²     PHYSICAL EXAMINATION:    General appearance: Well appearing, in no acute distress, alert and oriented x3.  Psych:  Mood and affect appropriate.  Skin: Skin color, texture, turgor normal, no rashes or lesions, in both upper and lower body.  Head/face:  Atraumatic, normocephalic. No palpable lymph nodes  Neck: No pain to palpation over the cervical paraspinous muscles. Spurling Negative. No pain with neck flexion, extension, or lateral flexion. .  Cor: RRR  Pulm: CTA  GI: Abdomen soft and non-tender.  Back: Straight leg raising in the sitting and supine positions is negative to radicular pain. No pain to palpation over the spine or costovertebral angles. Normal range of motion, but pain reported with lumbar extension. + facet loading (R>L). TTP bilateral L4/5, L5/S1 facets   Extremities: Peripheral joint ROM is full and pain free without obvious instability or laxity in all four extremities. No deformities, edema, or skin discoloration. Good capillary refill.  Musculoskeletal: Shoulder, hip, sacroiliac and knee provocative maneuvers are negative. Bilateral upper and lower extremity strength is normal and symmetric.  No atrophy or tone abnormalities are noted.  Neuro: Bilateral upper and lower extremity coordination and muscle stretch reflexes are physiologic and symmetric.  Plantar response are downgoing. No loss of sensation is noted.  Gait: Normal.    ASSESSMENT: 83 y.o. year old female with low back and bilateral LE radicular pain, consistent with      1. DDD (degenerative disc disease), lumbar  Procedure  Request Order for Pain Management      2. Lumbosacral radiculopathy  Procedure Request Order for Pain Management      3. Spinal stenosis of lumbar region with neurogenic claudication  Procedure Request Order for Pain Management            PLAN:     - I have stressed the importance of physical activity and a home exercise plan to help with pain and improve health.  - Patient can continue with medications for now since they are providing benefits, using them appropriately, and without side effects.  - increase Gabapentin by 1 pill every 3 day until up to 2 pills 3 times daily. Start by adding pill to the pm dose, then 3 days later add 2nd pill to midday dose, then 3 days later to morning dose  - Schedule for a second L4/5 IL MARYBETH  - RTC 4 weeks after procedure  - Counseled patient regarding the importance of activity modification.    The above plan and management options were discussed at length with patient. Patient is in agreement with the above and verbalized understanding.    Etienne Hood D.O., DPT  [employer] [facility], PGY-2   I have personally reviewed the history and exam of this patient and agree with the resident/fellow/NPs note as stated above.    Venancio Lopez MD  9/21/23

## 2023-09-21 NOTE — H&P (VIEW-ONLY)
Chronic patient Established Note (Follow up visit)      Interval History 9/21/23    Elena Frances presents to the clinic for a follow-up appointment for low back and LE radicular type pain. She underwent a bilateral L3/4 TF MARYBETH on 9/6/23 and reports a 60% reduction in pain and improvement in function  Since the last visit, Elena Frances states the pain has been improving. Current pain intensity is 5/10. Reports walking/standing increases her pain and Sitting/Lying decreases her pain. Reports pain begins after walking a couple hundred feet.     Pain Disability Index Review:      8/14/2023     1:25 PM 1/15/2020     9:53 AM 12/5/2019     9:34 AM   Last 3 PDI Scores   Pain Disability Index (PDI) 40 43 46     Interval History 08/14/23    Patient reports that she was doing well with her lower back pain until 2-3 months ago. Patient reports that the pain radiates to bilateral thighs and posterior calves. Patient reports that the pain limits her ability to walk, she can walk 1/2 block before the pain limits her. Patient reports the pain is a 7/10 currently, but at worst is a 9/10. Patient reports weakness in her legs and well as numbness with the pain. No fevers, chills, fecal or urinary incontinence.       Interval History 12/05/19  Elena Frances presents to the clinic for the evaluation of low back pain. The pain started 15 days ago following excessive cleaning and symptoms have been worsening.The pain is located in the low back  area and radiates to the left leg.  The pain is described as shooting and constant and is rated as 9/10. The pain is rated with a score of  9/10 on the BEST day and a score of 9/10 on the WORST day.  Symptoms interfere with daily activity. The pain is exacerbated by Standing.  The pain is mitigated by nothing. She reports spending 3-4 hours per day reclining. The patient reports 7 hours of uninterrupted sleep per night.     Patient denies night fever/night sweats,  urinary incontinence, bowel incontinence, significant weight loss, significant motor weakness and loss of sensations.     Patient last seen 5/2015:  Elena Frances presents to the clinic for a follow-up appointment for back pain. Since the last visit, Elena Frances states the pain has been stable. S/p bilateral L3-4 TF MARYBETH on 4/7/15 with 80% relief. She states the leg pain has resolved. She is complaining of neck and headache pain although this was present before the procedure. Current pain intensity is 8/10.    Pain Medications:  - Voltaren Gel  - Gabapentin 100mg 3 po BID  - Nabumetone 750mg BID  - Tizanidine 4mg qhs      Opioid Contract: no     report:  Not applicable    Pain Procedures:  04/07/15 Bilateral L3/4 TFESI     12/18/19 Left L3/4, L4/5 TFESI  9/6/23 Bilateral L3/4 TF MARYBETH    Physical Therapy/Home Exercise: yes    Imaging:     MRI LUMBAR SPINE WITHOUT CONTRAST - 8/14/23     CLINICAL HISTORY:  Lumbar radiculopathy, symptoms persist with conservative treatment; Other symptoms and signs involving the nervous system     TECHNIQUE:  Multiplanar, multisequence MR images were acquired from the thoracolumbar junction to the sacrum without the administration of contrast.     COMPARISON:  MRI 12/13/2019.  Radiograph 08/14/2023     FINDINGS:  Alignment: Mild dextrocurvature.  Grade 1 anterolisthesis of L3 on L4.  Minimal anterolisthesis of L4 on L5.     Vertebrae: Heterogeneous bone marrow signal.  Heterogeneous signal with predominant T1 and T2 hypointense signal in L3 and L5, could represent residual or treated lesion in this patient with history of lymphoma in light of prior MRI examination of 12/13/2019.     Discs: Multilevel disc desiccation.  L1-L2 annular fissure.  Disc space narrowing predominantly L1-L2 L2-L3 L3-L4 levels.     Cord: No abnormal signal.  Conus terminates at L1-L2.  Dural ectasia versus Tarlov cyst in the left S2 neural foramina.     Degenerative findings:     T12-L1:  Mild facet arthropathy.  No significant spinal canal stenosis or neural foraminal narrowing.     L1-L2: Circumferential disc bulge and bilateral facet arthropathy.  Mild bilateral neural foraminal narrowing.  No significant spinal canal stenosis.     L2-L3: Circumferential disc bulge superimposed with mild disc extrusion, bilateral facet arthropathy, and ligamentum flavum buckling.  There is effacement of the left lateral recess.  Mild bilateral neural foraminal narrowing.  No significant spinal canal stenosis.     L3-L4: Circumferential disc bulge superimposed with left foraminal disc protrusion, bilateral facet arthropathy, and ligamentum flavum buckling.  Moderate spinal canal stenosis.  Moderate bilateral neural foraminal narrowing.     L4-L5: Circumferential disc bulge, bilateral facet arthropathy, ligamentum flavum buckling contributing to severe spinal canal stenosis and mild-moderate bilateral neural foraminal narrowing.     L5-S1: Bilaterally facet arthropathies.  No significant spinal canal stenosis or neural foraminal narrowing.     Paraspinal muscles & soft tissues: Unremarkable.     Impression:     Multilevel degenerative spine changes as detailed above, most prominent at L4-L5 with severe spinal canal stenosis and mild-moderate bilateral neural foraminal narrowing.  To a lesser extent, moderate central canal narrowing L3-L4.     Heterogeneous bone signal predominantly at L3 and L5 vertebral bodies, could represent treated or residual lymphoma in light of prior MRI 12/13/2019.     This report was flagged in Epic as abnormal.     Electronically signed by resident: Marty Eason  Date:                                            09/01/2023  Time:                                           08:09     Electronically signed by: John Paul Candelario MD  Date:                                            09/01/2023  Time:                                           09:10    Allergies:   Review of patient's allergies  indicates:   Allergen Reactions    Vicodin [hydrocodone-acetaminophen] Anxiety       Current Medications:   Current Outpatient Medications   Medication Sig Dispense Refill    acetaminophen (TYLENOL) 650 MG TbSR Take 1 tablet (650 mg total) by mouth every 6 (six) hours as needed (pain). 120 tablet 0    acetaminophen-codeine 300-30mg (TYLENOL #3) 300-30 mg Tab Take 1 tablet by mouth every 6 (six) hours as needed.      acyclovir (ZOVIRAX) 400 MG tablet SMARTSI Tablet(s) By Mouth Twice Daily      albuterol (PROAIR HFA) 90 mcg/actuation inhaler Inhale 2 puffs into the lungs every 6 (six) hours as needed for Wheezing. Rescue 18 g 11    alendronate (FOSAMAX) 70 MG tablet Take 70 mg by mouth every 7 days.      ALPRAZolam (XANAX) 0.25 MG tablet Take 0.25 mg by mouth 2 (two) times daily as needed.      atorvastatin (LIPITOR) 80 MG tablet TAKE 1 TABLET BY MOUTH EVERY DAY 30 tablet 1    BACTRIM -160 mg Tab Take 1 tablet by mouth 2 (two) times daily.      benzonatate (TESSALON) 200 MG capsule Take 200 mg by mouth.      chlorhexidine (PERIDEX) 0.12 % solution 15 mLs 2 (two) times daily.      diclofenac sodium (VOLTAREN) 1 % Gel       famotidine (PEPCID) 40 MG tablet Take 40 mg by mouth.      fluticasone propionate (FLONASE) 50 mcg/actuation nasal spray INHALE 2 SPRAYS (100 MCG TOTAL) IN EACH NOSTRIL ONCE DAILY. (Patient taking differently: every evening.) 48 mL 2    fluticasone-salmeterol diskus inhaler 100-50 mcg TAKE 1 PUFF BY MOUTH TWICE A DAY 1 each 11    gabapentin (NEURONTIN) 300 MG capsule Take 1 capsule (300 mg total) by mouth 3 (three) times daily. 90 capsule 2    hydrOXYzine HCL (ATARAX) 25 MG tablet PLEASE SEE ATTACHED FOR DETAILED DIRECTIONS      levocetirizine (XYZAL) 5 MG tablet Take 5 mg by mouth nightly.      lidocaine-prilocaine (EMLA) cream USE AS DIRECTED ONE HOUR BEFORE CHEMOTHERAPY EXTERNALLY      LINZESS 145 mcg Cap capsule Take 145 mcg by mouth.      loratadine (CLARITIN) 10 mg tablet Take 10 mg by  mouth daily as needed.      metoprolol succinate (TOPROL-XL) 25 MG 24 hr tablet Take 1 tablet (25 mg total) by mouth every morning. 30 tablet 1    montelukast (SINGULAIR) 10 mg tablet TAKE 1 TABLET BY MOUTH EVERY DAY IN THE EVENING 90 tablet 3    nabumetone (RELAFEN) 750 MG tablet TAKE 1 TABLET BY MOUTH TWICE A DAY (Patient taking differently: Take by mouth 2 (two) times daily as needed.) 180 tablet 1    omeprazole (PRILOSEC) 40 MG capsule TAKE 1 CAPSULE BY MOUTH EVERY DAY 90 capsule 3    oxybutynin (DITROPAN-XL) 5 MG TR24 Take 5 mg by mouth.      polyethylene glycol 3350 (MIRALAX ORAL) Take by mouth as needed.      sertraline (ZOLOFT) 100 MG tablet TAKE 1 TABLET BY MOUTH EVERY DAY (Patient taking differently: Take 50 mg by mouth every morning.) 90 tablet 0    tiZANidine (ZANAFLEX) 4 MG tablet TAKE 1.5 TABLETS (6MG) BY MOUTH 3 TIMES A DAY AS NEEDED FOR MUSCLE SPASM 270 tablet 3    aspirin (ECOTRIN) 81 MG EC tablet Take 1 tablet (81 mg total) by mouth 2 (two) times daily. (Patient taking differently: Take 81 mg by mouth every morning.) 60 tablet 0    blood-glucose meter (WiFast BLOOD GLUCOSE SYSTM) kit Use as instructed (Patient not taking: Reported on 2/14/2022) 1 each 0    valsartan (DIOVAN) 320 MG tablet Take 1 tablet (320 mg total) by mouth once daily. (Patient taking differently: Take 320 mg by mouth every morning.) 90 tablet 3     No current facility-administered medications for this visit.       REVIEW OF SYSTEMS:    GENERAL:  No weight loss, malaise or fevers.  HEENT:  Negative for frequent or significant headaches.  NECK:  Negative for lumps, goiter, pain and significant neck swelling.  RESPIRATORY:  Negative for cough, wheezing or shortness of breath.  CARDIOVASCULAR:  Negative for chest pain, leg swelling or palpitations.  GI:  Negative for abdominal discomfort, blood in stools or black stools or change in bowel habits.  MUSCULOSKELETAL:  See HPI.  SKIN:  Negative for lesions, rash, and  itching.  PSYCH:  + for sleep disturbance, mood disorder and recent psychosocial stressors.  HEMATOLOGY/LYMPHOLOGY:  Negative for prolonged bleeding, bruising easily or swollen nodes.  NEURO:   No history of headaches, syncope, paralysis, seizures or tremors.  All other reviewed and negative other than HPI.    Past Medical History:  Past Medical History:   Diagnosis Date    Allergy     Anemia     Arthritis     Asthma     Depression     Generalized headaches 2014    Goiter     MNG    HTN (hypertension), benign     Hyperlipidemia     Hyperparathyroidism     s/p surgery    Intestinal obstruction     resolved spontaneously    Lumbar disc disease     s/p epidural shots    Nuclear sclerosis - Both Eyes 3/11/2014    Osteoporosis, post-menopausal     with 3 rib fractures       Past Surgical History:  Past Surgical History:   Procedure Laterality Date    APPENDECTOMY      BILATERAL OOPHORECTOMY      BONE MARROW BIOPSY N/A 3/5/2020    Procedure: Biopsy-bone marrow;  Surgeon: Eve Alvarez MD;  Location: [facility] OR 2ND FLR;  Service: Oncology;  Laterality: N/A;    BREAST CYST EXCISION      left    CATARACT EXTRACTION W/  INTRAOCULAR LENS IMPLANT Right 2016    Dr. Fang ([place])    CATARACT EXTRACTION W/  INTRAOCULAR LENS IMPLANT Left 10/17/2016    Dr. Fang ([place])     SECTION, CLASSIC      x 1    CHOLECYSTECTOMY      COLONOSCOPY N/A 3/1/2016    Procedure: COLONOSCOPY;  Surgeon: Dony Bronson MD;  Location: [facility] (4TH FLR);  Service: Endoscopy;  Laterality: N/A;  PM Prep    ENDOSCOPIC ULTRASOUND OF UPPER GASTROINTESTINAL TRACT N/A 2022    Procedure: ULTRASOUND, UPPER GI TRACT, ENDOSCOPIC;  Surgeon: Verito Mcclure MD;  Location: [facility] (2ND FLR);  Service: Endoscopy;  Laterality: N/A;  EUS +/- ERCP pending findings with me at [place] in the next 1-2 weeks    22-Instructions via portal-DS  22-Pt rescheduled due to ride-updated instructions via portal-DS    ERCP N/A 2022    Procedure:  ERCP (ENDOSCOPIC RETROGRADE CHOLANGIOPANCREATOGRAPHY);  Surgeon: Verito Mcclure MD;  Location: [facility] (2ND FLR);  Service: Endoscopy;  Laterality: N/A;  EUS +/- ERCP pending findings with me at [place] in the next 1-2 weeks    HYSTERECTOMY      JOINT REPLACEMENT      KNEE SURGERY Right 2017    TKR    LUMBAR EPIDURAL INJECTION      x 4    MOUTH SURGERY      for abscess drainage of gum of frontal teeth    PARATHYROID GLAND SURGERY      for parathyroid adenoma    TOTAL KNEE ARTHROPLASTY Left 2019    Procedure: ARTHROPLASTY, KNEE, TOTAL-DEPUY-SIGMA;  Surgeon: Newton Rodriguez III, MD;  Location: [facility] OR 2ND FLR;  Service: Orthopedics;  Laterality: Left;    TRANSFORAMINAL EPIDURAL INJECTION OF STEROID Left 2019    Procedure: INJECTION, STEROID, EPIDURAL, TRANSFORAMINAL APPROACH, L3-L4 AND L4-L5;  Surgeon: Venancio Lopez MD;  Location: [facility] PAIN MGT;  Service: Pain Management;  Laterality: Left;    TRANSFORAMINAL EPIDURAL INJECTION OF STEROID Bilateral 2023    Procedure: INJECTION, STEROID, EPIDURAL, TRANSFORAMINAL APPROACH, BILATERAL L3/L4 SOONER DATE;  Surgeon: Venancio Lopez MD;  Location: [facility] PAIN MGT;  Service: Pain Management;  Laterality: Bilateral;       Family History:  Family History   Problem Relation Age of Onset    Heart disease Mother     Hypertension Mother     Heart attack Mother     Heart disease Sister          in their 50's    Heart failure Sister     Heart disease Brother         CABG in age 60's    Hyperlipidemia Brother     Heart disease Sister         in her 50's    Heart disease Sister         in her 50's    Heart disease Sister     Hypertension Sister     Hyperlipidemia Sister     Heart disease Brother         CABG in age 60's    Hyperlipidemia Brother     Thyroid disease Daughter     Amblyopia Neg Hx     Blindness Neg Hx     Cancer Neg Hx     Cataracts Neg Hx     Diabetes Neg Hx     Glaucoma Neg Hx     Macular degeneration Neg Hx     Retinal detachment Neg Hx      Strabismus Neg Hx     Stroke Neg Hx        Social History:  Social History     Socioeconomic History    Marital status:    Tobacco Use    Smoking status: Never    Smokeless tobacco: Never   Substance and Sexual Activity    Alcohol use: No    Drug use: No       OBJECTIVE:    /65   Pulse 75   Temp 98.7 °F (37.1 °C)   Resp 18   Wt 73.4 kg (161 lb 13.1 oz)   SpO2 100%   BMI 28.66 kg/m²     PHYSICAL EXAMINATION:    General appearance: Well appearing, in no acute distress, alert and oriented x3.  Psych:  Mood and affect appropriate.  Skin: Skin color, texture, turgor normal, no rashes or lesions, in both upper and lower body.  Head/face:  Atraumatic, normocephalic. No palpable lymph nodes  Neck: No pain to palpation over the cervical paraspinous muscles. Spurling Negative. No pain with neck flexion, extension, or lateral flexion. .  Cor: RRR  Pulm: CTA  GI: Abdomen soft and non-tender.  Back: Straight leg raising in the sitting and supine positions is negative to radicular pain. No pain to palpation over the spine or costovertebral angles. Normal range of motion, but pain reported with lumbar extension. + facet loading (R>L). TTP bilateral L4/5, L5/S1 facets   Extremities: Peripheral joint ROM is full and pain free without obvious instability or laxity in all four extremities. No deformities, edema, or skin discoloration. Good capillary refill.  Musculoskeletal: Shoulder, hip, sacroiliac and knee provocative maneuvers are negative. Bilateral upper and lower extremity strength is normal and symmetric.  No atrophy or tone abnormalities are noted.  Neuro: Bilateral upper and lower extremity coordination and muscle stretch reflexes are physiologic and symmetric.  Plantar response are downgoing. No loss of sensation is noted.  Gait: Normal.    ASSESSMENT: 83 y.o. year old female with low back and bilateral LE radicular pain, consistent with      1. DDD (degenerative disc disease), lumbar  Procedure  Request Order for Pain Management      2. Lumbosacral radiculopathy  Procedure Request Order for Pain Management      3. Spinal stenosis of lumbar region with neurogenic claudication  Procedure Request Order for Pain Management            PLAN:     - I have stressed the importance of physical activity and a home exercise plan to help with pain and improve health.  - Patient can continue with medications for now since they are providing benefits, using them appropriately, and without side effects.  - increase Gabapentin by 1 pill every 3 day until up to 2 pills 3 times daily. Start by adding pill to the pm dose, then 3 days later add 2nd pill to midday dose, then 3 days later to morning dose  - Schedule for a second L4/5 IL MARYBETH  - RTC 4 weeks after procedure  - Counseled patient regarding the importance of activity modification.    The above plan and management options were discussed at length with patient. Patient is in agreement with the above and verbalized understanding.    Etienne Hood D.O., DPT  [facility], PGY-2   I have personally reviewed the history and exam of this patient and agree with the resident/fellow/NPs note as stated above.    Venancio Lopez MD  9/21/23

## 2023-09-22 ENCOUNTER — PATIENT MESSAGE (OUTPATIENT)
Dept: PAIN MEDICINE | Facility: OTHER | Age: 83
End: 2023-09-22
Payer: MEDICARE

## 2023-10-05 DIAGNOSIS — K86.9 PANCREATIC LESION: Primary | ICD-10-CM

## 2023-10-11 ENCOUNTER — HOSPITAL ENCOUNTER (OUTPATIENT)
Facility: OTHER | Age: 83
Discharge: HOME | End: 2023-10-11
Attending: ANESTHESIOLOGY | Admitting: ANESTHESIOLOGY
Payer: MEDICARE

## 2023-10-11 VITALS
TEMPERATURE: 98 F | HEIGHT: 63 IN | HEART RATE: 82 BPM | RESPIRATION RATE: 16 BRPM | OXYGEN SATURATION: 99 % | SYSTOLIC BLOOD PRESSURE: 131 MMHG | BODY MASS INDEX: 25.69 KG/M2 | DIASTOLIC BLOOD PRESSURE: 87 MMHG | WEIGHT: 145 LBS

## 2023-10-11 DIAGNOSIS — M51.36 DDD (DEGENERATIVE DISC DISEASE), LUMBAR: Primary | ICD-10-CM

## 2023-10-11 DIAGNOSIS — G89.29 CHRONIC PAIN: ICD-10-CM

## 2023-10-11 DIAGNOSIS — M54.16 LUMBAR RADICULOPATHY: ICD-10-CM

## 2023-10-11 PROCEDURE — A4216 STERILE WATER/SALINE, 10 ML: HCPCS | Performed by: ANESTHESIOLOGY

## 2023-10-11 PROCEDURE — 25000003 PHARM REV CODE 250: Performed by: ANESTHESIOLOGY

## 2023-10-11 PROCEDURE — 62323 NJX INTERLAMINAR LMBR/SAC: CPT | Performed by: ANESTHESIOLOGY

## 2023-10-11 PROCEDURE — 63600175 PHARM REV CODE 636 W HCPCS: Performed by: ANESTHESIOLOGY

## 2023-10-11 PROCEDURE — 25500020 PHARM REV CODE 255: Performed by: ANESTHESIOLOGY

## 2023-10-11 PROCEDURE — 62323 PR INJ LUMBAR/SACRAL, W/IMAGING GUIDANCE: ICD-10-PCS | Mod: ,,, | Performed by: ANESTHESIOLOGY

## 2023-10-11 PROCEDURE — 62323 NJX INTERLAMINAR LMBR/SAC: CPT | Mod: ,,, | Performed by: ANESTHESIOLOGY

## 2023-10-11 PROCEDURE — 25000003 PHARM REV CODE 250: Performed by: STUDENT IN AN ORGANIZED HEALTH CARE EDUCATION/TRAINING PROGRAM

## 2023-10-11 RX ORDER — LIDOCAINE HYDROCHLORIDE 10 MG/ML
INJECTION, SOLUTION EPIDURAL; INFILTRATION; INTRACAUDAL; PERINEURAL
Status: DISCONTINUED | OUTPATIENT
Start: 2023-10-11 | End: 2023-10-11 | Stop reason: HOSPADM

## 2023-10-11 RX ORDER — LIDOCAINE HYDROCHLORIDE 20 MG/ML
INJECTION, SOLUTION INFILTRATION; PERINEURAL
Status: DISCONTINUED | OUTPATIENT
Start: 2023-10-11 | End: 2023-10-11 | Stop reason: HOSPADM

## 2023-10-11 RX ORDER — DEXAMETHASONE SODIUM PHOSPHATE 10 MG/ML
INJECTION INTRAMUSCULAR; INTRAVENOUS
Status: DISCONTINUED | OUTPATIENT
Start: 2023-10-11 | End: 2023-10-11 | Stop reason: HOSPADM

## 2023-10-11 RX ORDER — MIDAZOLAM HYDROCHLORIDE 1 MG/ML
INJECTION INTRAMUSCULAR; INTRAVENOUS
Status: DISCONTINUED | OUTPATIENT
Start: 2023-10-11 | End: 2023-10-11 | Stop reason: HOSPADM

## 2023-10-11 RX ORDER — FENTANYL CITRATE 50 UG/ML
INJECTION, SOLUTION INTRAMUSCULAR; INTRAVENOUS
Status: DISCONTINUED | OUTPATIENT
Start: 2023-10-11 | End: 2023-10-11 | Stop reason: HOSPADM

## 2023-10-11 RX ORDER — SODIUM CHLORIDE 9 MG/ML
INJECTION, SOLUTION INTRAVENOUS CONTINUOUS
Status: DISCONTINUED | OUTPATIENT
Start: 2023-10-11 | End: 2023-10-11 | Stop reason: HOSPADM

## 2023-10-11 RX ORDER — SODIUM CHLORIDE 9 MG/ML
INJECTION, SOLUTION INTRAMUSCULAR; INTRAVENOUS; SUBCUTANEOUS
Status: DISCONTINUED | OUTPATIENT
Start: 2023-10-11 | End: 2023-10-11 | Stop reason: HOSPADM

## 2023-10-11 NOTE — DISCHARGE SUMMARY
Discharge Note  Short Stay      SUMMARY     Admit Date: 10/11/2023    Attending Physician: Venancio Lopez MD      Discharge Physician: Frantz Lorenzo MD      Discharge Date: 10/11/2023 12:16 PM    Procedure(s) (LRB):  INJECTION, STEROID, EPIDURAL, L4-L5 (N/A)    Final Diagnosis: DDD (degenerative disc disease), lumbar [M51.36]  Lumbosacral radiculopathy [M54.17]  Spinal stenosis of lumbar region with neurogenic claudication [M48.062]    Disposition: Home or self care    Patient Instructions:   Current Discharge Medication List        CONTINUE these medications which have NOT CHANGED    Details   acetaminophen (TYLENOL) 650 MG TbSR Take 1 tablet (650 mg total) by mouth every 6 (six) hours as needed (pain).  Qty: 120 tablet, Refills: 0      acetaminophen-codeine 300-30mg (TYLENOL #3) 300-30 mg Tab Take 1 tablet by mouth every 6 (six) hours as needed.      acyclovir (ZOVIRAX) 400 MG tablet SMARTSI Tablet(s) By Mouth Twice Daily      albuterol (PROAIR HFA) 90 mcg/actuation inhaler Inhale 2 puffs into the lungs every 6 (six) hours as needed for Wheezing. Rescue  Qty: 18 g, Refills: 11      alendronate (FOSAMAX) 70 MG tablet Take 70 mg by mouth every 7 days.      ALPRAZolam (XANAX) 0.25 MG tablet Take 0.25 mg by mouth 2 (two) times daily as needed.      aspirin (ECOTRIN) 81 MG EC tablet Take 1 tablet (81 mg total) by mouth 2 (two) times daily.  Qty: 60 tablet, Refills: 0      atorvastatin (LIPITOR) 80 MG tablet TAKE 1 TABLET BY MOUTH EVERY DAY  Qty: 30 tablet, Refills: 1    Associated Diagnoses: Hyperlipidemia, unspecified hyperlipidemia type      BACTRIM -160 mg Tab Take 1 tablet by mouth 2 (two) times daily.      benzonatate (TESSALON) 200 MG capsule Take 200 mg by mouth.      blood-glucose meter ([employer] BLOOD GLUCOSE SYSTM) kit Use as instructed  Qty: 1 each, Refills: 0    Comments: [employer]S kit      chlorhexidine (PERIDEX) 0.12 % solution 15 mLs 2 (two) times daily.      diclofenac sodium  (VOLTAREN) 1 % Gel       famotidine (PEPCID) 40 MG tablet Take 40 mg by mouth.      fluticasone propionate (FLONASE) 50 mcg/actuation nasal spray INHALE 2 SPRAYS (100 MCG TOTAL) IN EACH NOSTRIL ONCE DAILY.  Qty: 48 mL, Refills: 2      fluticasone-salmeterol diskus inhaler 100-50 mcg TAKE 1 PUFF BY MOUTH TWICE A DAY  Qty: 1 each, Refills: 11      gabapentin (NEURONTIN) 300 MG capsule Take 1 capsule (300 mg total) by mouth 3 (three) times daily.  Qty: 90 capsule, Refills: 2    Associated Diagnoses: Neurogenic claudication; Spinal stenosis of lumbar region with neurogenic claudication      hydrOXYzine HCL (ATARAX) 25 MG tablet PLEASE SEE ATTACHED FOR DETAILED DIRECTIONS      levocetirizine (XYZAL) 5 MG tablet Take 5 mg by mouth nightly.      lidocaine-prilocaine (EMLA) cream USE AS DIRECTED ONE HOUR BEFORE CHEMOTHERAPY EXTERNALLY      LINZESS 145 mcg Cap capsule Take 145 mcg by mouth.      loratadine (CLARITIN) 10 mg tablet Take 10 mg by mouth daily as needed.      metoprolol succinate (TOPROL-XL) 25 MG 24 hr tablet Take 1 tablet (25 mg total) by mouth every morning.  Qty: 30 tablet, Refills: 1      montelukast (SINGULAIR) 10 mg tablet TAKE 1 TABLET BY MOUTH EVERY DAY IN THE EVENING  Qty: 90 tablet, Refills: 3      nabumetone (RELAFEN) 750 MG tablet TAKE 1 TABLET BY MOUTH TWICE A DAY  Qty: 180 tablet, Refills: 1      omeprazole (PRILOSEC) 40 MG capsule TAKE 1 CAPSULE BY MOUTH EVERY DAY  Qty: 90 capsule, Refills: 3      oxybutynin (DITROPAN-XL) 5 MG TR24 Take 5 mg by mouth.      polyethylene glycol 3350 (MIRALAX ORAL) Take by mouth as needed.      sertraline (ZOLOFT) 100 MG tablet TAKE 1 TABLET BY MOUTH EVERY DAY  Qty: 90 tablet, Refills: 0      tiZANidine (ZANAFLEX) 4 MG tablet TAKE 1.5 TABLETS (6MG) BY MOUTH 3 TIMES A DAY AS NEEDED FOR MUSCLE SPASM  Qty: 270 tablet, Refills: 3      valsartan (DIOVAN) 320 MG tablet Take 1 tablet (320 mg total) by mouth once daily.  Qty: 90 tablet, Refills: 3    Comments: .                  Discharge Diagnosis: DDD (degenerative disc disease), lumbar [M51.36]  Lumbosacral radiculopathy [M54.17]  Spinal stenosis of lumbar region with neurogenic claudication [M48.062]  Condition on Discharge: Stable with no complications to procedure   Diet on Discharge: Same as before.  Activity: as per instruction sheet.  Discharge to: Home with a responsible adult.  Follow up: 2-4 weeks       Please call my office or pager at [phone] if experienced any weakness or loss of sensation, fever > 101.5, pain uncontrolled with oral medications, persistent nausea/vomiting/or diarrhea, redness or drainage from the incisions, or any other worrisome concerns. If physician on call was not reached or could not communicate with our office for any reason please go to the nearest emergency department

## 2023-10-11 NOTE — OP NOTE
Lumbar Interlaminar Epidural Steroid Injection under Fluoroscopic Guidance    The procedure, risks, benefits, and options were discussed with the patient. There are no contraindications to the procedure. The patent expressed understanding and agreed to the procedure. Informed written consent was obtained prior to the start of the procedure and can be found in the patient's chart.    PATIENT NAME: Elena Frances   MRN: [id number]     DATE OF PROCEDURE: 10/11/2023    PROCEDURE: Lumbar Interlaminar Epidural Steroid Injection L4/L5 under Fluoroscopic Guidance    PRE-OP DIAGNOSIS: DDD (degenerative disc disease), lumbar [M51.36]  Lumbosacral radiculopathy [M54.17]  Spinal stenosis of lumbar region with neurogenic claudication [M48.062] Lumbar radiculopathy [M54.16]    POST-OP DIAGNOSIS: Same    PHYSICIAN: Venancio Lopez MD    ASSISTANTS: Frantz Lorenzo MD Fellow    MEDICATIONS INJECTED: Preservative-free Decadron 10mg with 4cc of Lidocaine 1% MPF and preservative free normal saline    LOCAL ANESTHETIC INJECTED: Xylocaine 2%     SEDATION: Versed 2mg and Fentanyl 50mcg                                                                                                                                                                                     Conscious sedation ordered by M.D. Patient re-evaluation prior to administration of conscious sedation. No changes noted in patient's status from initial evaluation. The patient's vital signs were monitored by RN and patient remained hemodynamically stable throughout the procedure.    Event Time In   Sedation Start 1206   Sedation End 1212       ESTIMATED BLOOD LOSS: None    COMPLICATIONS: None    TECHNIQUE: Time-out was performed to identify the patient and procedure to be performed. With the patient laying in a prone position, the surgical area was prepped and draped in the usual sterile fashion using ChloraPrep and a fenestrated drape. The level was determined under  fluoroscopy guidance. Skin anesthesia was achieved by injecting Lidocaine 2% over the injection site. The interlaminar space was then approached with a 20 gauge,  3.5 inch Tuohy needle that was introduced under fluoroscopic guidance in the AP, lateral and/or contralateral oblique imaging. Once the Ligamentum flavum was encountered loss of resistance to saline was used to enter the epidural space. With positive loss of resistance and negative aspiration for CSF or Blood, contrast dye Omnipaque (300mg/mL) was injected to confirm placement and there was no vascular runoff. 5 mL of the medication mixture listed above was injected slowly. Displacement of the radio opaque contrast after injection of the medication confirmed that the medication went into the area of the epidural space. The needles were removed and bleeding was nil. A sterile dressing was applied. No specimens collected. The patient tolerated the procedure well.     The patient was monitored after the procedure in the recovery area. They were given post-procedure and discharge instructions to follow at home. The patient was discharged in a stable condition.    Frantz Lorenzo MD    I reviewed and edited the fellow's note. I conducted my own interview and physical examination. I agree with the findings. I was present and supervising all critical portions of the procedure.      Venancio Lopez MD

## 2023-10-26 ENCOUNTER — PATIENT MESSAGE (OUTPATIENT)
Dept: ENDOSCOPY | Facility: HOSPITAL | Age: 83
End: 2023-10-26
Payer: MEDICARE

## 2024-02-14 ENCOUNTER — PATIENT MESSAGE (OUTPATIENT)
Dept: HEMATOLOGY/ONCOLOGY | Facility: CLINIC | Age: 84
End: 2024-02-14
Payer: MEDICARE

## 2024-02-20 ENCOUNTER — OFFICE VISIT (OUTPATIENT)
Dept: HEMATOLOGY/ONCOLOGY | Facility: CLINIC | Age: 84
End: 2024-02-20
Payer: MEDICARE

## 2024-02-20 ENCOUNTER — LAB VISIT (OUTPATIENT)
Dept: LAB | Facility: HOSPITAL | Age: 84
End: 2024-02-20
Payer: MEDICARE

## 2024-02-20 VITALS
SYSTOLIC BLOOD PRESSURE: 168 MMHG | TEMPERATURE: 98 F | BODY MASS INDEX: 27.52 KG/M2 | HEIGHT: 63 IN | WEIGHT: 155.31 LBS | RESPIRATION RATE: 18 BRPM | HEART RATE: 69 BPM | OXYGEN SATURATION: 100 % | DIASTOLIC BLOOD PRESSURE: 72 MMHG

## 2024-02-20 DIAGNOSIS — C83.35 DIFFUSE LARGE B-CELL LYMPHOMA OF LYMPH NODES OF INGUINAL REGION: Primary | ICD-10-CM

## 2024-02-20 DIAGNOSIS — C83.32 DIFFUSE LARGE B-CELL LYMPHOMA OF INTRATHORACIC LYMPH NODES: ICD-10-CM

## 2024-02-20 DIAGNOSIS — D50.0 IRON DEFICIENCY ANEMIA DUE TO CHRONIC BLOOD LOSS: ICD-10-CM

## 2024-02-20 LAB
ALBUMIN SERPL BCP-MCNC: 3.2 G/DL (ref 3.5–5.2)
ALP SERPL-CCNC: 181 U/L (ref 55–135)
ALT SERPL W/O P-5'-P-CCNC: 13 U/L (ref 10–44)
ANION GAP SERPL CALC-SCNC: 7 MMOL/L (ref 8–16)
AST SERPL-CCNC: 14 U/L (ref 10–40)
BASOPHILS # BLD AUTO: 0.04 K/UL (ref 0–0.2)
BASOPHILS NFR BLD: 0.8 % (ref 0–1.9)
BILIRUB SERPL-MCNC: 0.2 MG/DL (ref 0.1–1)
BUN SERPL-MCNC: 20 MG/DL (ref 8–23)
CALCIUM SERPL-MCNC: 9.6 MG/DL (ref 8.7–10.5)
CHLORIDE SERPL-SCNC: 102 MMOL/L (ref 95–110)
CO2 SERPL-SCNC: 24 MMOL/L (ref 23–29)
CREAT SERPL-MCNC: 1.4 MG/DL (ref 0.5–1.4)
DIFFERENTIAL METHOD BLD: ABNORMAL
EOSINOPHIL # BLD AUTO: 0.3 K/UL (ref 0–0.5)
EOSINOPHIL NFR BLD: 5.4 % (ref 0–8)
ERYTHROCYTE [DISTWIDTH] IN BLOOD BY AUTOMATED COUNT: 17 % (ref 11.5–14.5)
EST. GFR  (NO RACE VARIABLE): 37.8 ML/MIN/1.73 M^2
GLUCOSE SERPL-MCNC: 216 MG/DL (ref 70–110)
HCT VFR BLD AUTO: 33 % (ref 37–48.5)
HGB BLD-MCNC: 10.2 G/DL (ref 12–16)
IMM GRANULOCYTES # BLD AUTO: 0.02 K/UL (ref 0–0.04)
IMM GRANULOCYTES NFR BLD AUTO: 0.4 % (ref 0–0.5)
LDH SERPL L TO P-CCNC: 171 U/L (ref 110–260)
LYMPHOCYTES # BLD AUTO: 1.4 K/UL (ref 1–4.8)
LYMPHOCYTES NFR BLD: 27.3 % (ref 18–48)
MCH RBC QN AUTO: 28.2 PG (ref 27–31)
MCHC RBC AUTO-ENTMCNC: 30.9 G/DL (ref 32–36)
MCV RBC AUTO: 91 FL (ref 82–98)
MONOCYTES # BLD AUTO: 0.4 K/UL (ref 0.3–1)
MONOCYTES NFR BLD: 6.9 % (ref 4–15)
NEUTROPHILS # BLD AUTO: 3.1 K/UL (ref 1.8–7.7)
NEUTROPHILS NFR BLD: 59.2 % (ref 38–73)
NRBC BLD-RTO: 0 /100 WBC
PLATELET # BLD AUTO: 256 K/UL (ref 150–450)
PMV BLD AUTO: 10.2 FL (ref 9.2–12.9)
POTASSIUM SERPL-SCNC: 4.5 MMOL/L (ref 3.5–5.1)
PROT SERPL-MCNC: 6.7 G/DL (ref 6–8.4)
RBC # BLD AUTO: 3.62 M/UL (ref 4–5.4)
SODIUM SERPL-SCNC: 133 MMOL/L (ref 136–145)
WBC # BLD AUTO: 5.21 K/UL (ref 3.9–12.7)

## 2024-02-20 PROCEDURE — 99999 PR PBB SHADOW E&M-EST. PATIENT-LVL IV: CPT | Mod: PBBFAC,,, | Performed by: INTERNAL MEDICINE

## 2024-02-20 PROCEDURE — 99215 OFFICE O/P EST HI 40 MIN: CPT | Mod: S$GLB,,, | Performed by: INTERNAL MEDICINE

## 2024-02-20 PROCEDURE — 80053 COMPREHEN METABOLIC PANEL: CPT | Performed by: INTERNAL MEDICINE

## 2024-02-20 PROCEDURE — 36415 COLL VENOUS BLD VENIPUNCTURE: CPT | Performed by: INTERNAL MEDICINE

## 2024-02-20 PROCEDURE — 85025 COMPLETE CBC W/AUTO DIFF WBC: CPT | Performed by: INTERNAL MEDICINE

## 2024-02-20 PROCEDURE — 83615 LACTATE (LD) (LDH) ENZYME: CPT | Performed by: INTERNAL MEDICINE

## 2024-02-20 RX ORDER — NIRMATRELVIR AND RITONAVIR 150-100 MG
KIT ORAL
COMMUNITY
Start: 2023-12-18

## 2024-02-20 RX ORDER — PREDNISONE 5 MG/1
TABLET ORAL
COMMUNITY
Start: 2023-12-26

## 2024-02-20 RX ORDER — SEMAGLUTIDE 0.68 MG/ML
0.5 INJECTION, SOLUTION SUBCUTANEOUS
COMMUNITY
Start: 2023-12-12

## 2024-02-20 RX ORDER — VIBEGRON 75 MG/1
TABLET, FILM COATED ORAL
COMMUNITY
Start: 2023-12-21

## 2024-02-20 NOTE — PROGRESS NOTES
Hematology and Medical Oncology   Follow Up     02/20/2024    Primary Hematologic Diagnosis: Diffuse Large B Cell Lymphoma    History of Present Ilness:   Elena Frances (Elena) is a pleasant 83 y.o.female who is transitioning care from [facility] following the successful treatment of DLBCL. Has recovered from therapy and wishes to consolidate her care to [facility].     Conversation was completed with an in person     Hematologic History:  --Work up started with back pain, MRI showed multiple vertebral sclerotic bony lesions  --Bone biopsy was most consistent with DLBCL  --PET September 2020 showed additional bone lesions, including the left scapula. Biopsy showed a kappa restricted B- cell lymphoma that is negative for CD5 and CD1-.   --Slides reviewed at [facility] confirmed GCB-DLBCL in L5  --Received R-CHOP x 6 in a CR    --Surveilance PET on 6/1/22:   Right iliac bone with a SUV max of 2.5, previously 3.6 (axial fused image 162)  L5 vertebral body with a SUV max of 2.9, previously 5 (axial fused image 146)  The previously hypermetabolic focus at the left scapula is no longer seen now demonstrating an SUV max of 1.2, previously 6.1 (axial fused image 37)  Posterior T4 vertebral body, demonstrates decreased uptake with a SUV of 2.4, previously 3.1 (axial fused image 44)  L3 spinous process demonstrate no increased uptake with SUV max of 1.6, previously 2.4 (axial fused image 128)  Left femoral diaphysis demonstrates no increased uptake with SUV max of 1.2 (axial fused image 212).     --CT abdomen/pelvis on 9/1/22:   1. Mild intra and extrahepatic biliary ductal dilatation that is new when compared to 01/17/2020.  If there are clinical signs of biliary obstruction, MRCP or ERCP could be considered.  2. Diverticulosis without evidence of diverticulitis.  3. New tiny nodule in the right lower lobe when compared to prior imaging.  Attention on follow-up.  --MRCP on 10/10/22:  Extrahepatic duct dilatation up  to 0.8 cm with mild central intrahepatic ductal prominence.  Overall findings are nonspecific in the setting of prior cholecystectomy.  No evident intraductal filling defect or stricture.  Consider further evaluation with ERCP as clinically warranted.     Indeterminate subcentimeter T2 hyperintense focus adjacent to the distant common bile duct, of uncertain etiology.  Differential consideration to include small IPMN at the pancreatic head.  Further correlation and follow-up as warranted.  --CT chest/abd/pelvis 23: No significant interval detrimental change.  No pathologic adenopathy.  Stable appearance of osseous structures by CT with previously described PET avid lesions not well assessed by this exam.  Colonic diverticulosis and additional findings as above.    Interval History:  Had flu after traveling home to [place] after thanks[place].    Feels like she has lost weight, about 5 pounds.      No further abdominal pain.        PAST MEDICAL HISTORY:   Past Medical History:   Diagnosis Date    Allergy     Anemia     Arthritis     Asthma     Depression     Generalized headaches 2014    Goiter     MNG    HTN (hypertension), benign     Hyperlipidemia     Hyperparathyroidism     s/p surgery    Intestinal obstruction     resolved spontaneously    Lumbar disc disease     s/p epidural shots    Nuclear sclerosis - Both Eyes 3/11/2014    Osteoporosis, post-menopausal     with 3 rib fractures       PAST SURGICAL HISTORY:   Past Surgical History:   Procedure Laterality Date    APPENDECTOMY      BILATERAL OOPHORECTOMY      BONE MARROW BIOPSY N/A 3/5/2020    Procedure: Biopsy-bone marrow;  Surgeon: Eve Alvarez MD;  Location: [facility] OR [address] [place] [address];  Service: Oncology;  Laterality: N/A;    BREAST CYST EXCISION      left    CATARACT EXTRACTION W/  INTRAOCULAR LENS IMPLANT Right 2016    Dr. Fang ([place])    CATARACT EXTRACTION W/  INTRAOCULAR LENS IMPLANT Left 10/17/2016    Dr. aFng ([place]oxy)     SECTION, CLASSIC       x 1    CHOLECYSTECTOMY      COLONOSCOPY N/A 3/1/2016    Procedure: COLONOSCOPY;  Surgeon: Dony Bronson MD;  Location: [facility] ENDO (4TH FLR);  Service: Endoscopy;  Laterality: N/A;  PM Prep    ENDOSCOPIC ULTRASOUND OF UPPER GASTROINTESTINAL TRACT N/A 12/7/2022    Procedure: ULTRASOUND, UPPER GI TRACT, ENDOSCOPIC;  Surgeon: Verito Mcclure MD;  Location: [facility] ENDO (2ND FLR);  Service: Endoscopy;  Laterality: N/A;  EUS +/- ERCP pending findings with me at [facility] in the next 1-2 weeks    11/22/22-Instructions via portal-DS  11/28/22-Pt rescheduled due to ride-updated instructions via portal-DS    EPIDURAL STEROID INJECTION N/A 10/11/2023    Procedure: INJECTION, STEROID, EPIDURAL, L4-L5;  Surgeon: Venancio Lopez MD;  Location: [facility] PAIN MGT;  Service: Pain Management;  Laterality: N/A;    ERCP N/A 12/7/2022    Procedure: ERCP (ENDOSCOPIC RETROGRADE CHOLANGIOPANCREATOGRAPHY);  Surgeon: Verito Mcclure MD;  Location: [facility] ENDO (2ND FLR);  Service: Endoscopy;  Laterality: N/A;  EUS +/- ERCP pending findings with me at [facility] in the next 1-2 weeks    HYSTERECTOMY      JOINT REPLACEMENT      KNEE SURGERY Right 02/07/2017    TKR    LUMBAR EPIDURAL INJECTION      x 4    MOUTH SURGERY      for abscess drainage of gum of frontal teeth    PARATHYROID GLAND SURGERY      for parathyroid adenoma    TOTAL KNEE ARTHROPLASTY Left 8/21/2019    Procedure: ARTHROPLASTY, KNEE, TOTAL-DEPUY-SIGMA;  Surgeon: Newton Rodriguez III, MD;  Location: [facility] OR 2ND FLR;  Service: Orthopedics;  Laterality: Left;    TRANSFORAMINAL EPIDURAL INJECTION OF STEROID Left 12/18/2019    Procedure: INJECTION, STEROID, EPIDURAL, TRANSFORAMINAL APPROACH, L3-L4 AND L4-L5;  Surgeon: Venancio Lopez MD;  Location: [facility] PAIN MGT;  Service: Pain Management;  Laterality: Left;    TRANSFORAMINAL EPIDURAL INJECTION OF STEROID Bilateral 9/6/2023    Procedure: INJECTION, STEROID, EPIDURAL, TRANSFORAMINAL APPROACH, BILATERAL L3/L4 SOONER DATE;  Surgeon: Jessica  Venancio ANAYA MD;  Location: [facility] PAIN MGT;  Service: Pain Management;  Laterality: Bilateral;       PAST SOCIAL HISTORY:   reports that she has never smoked. She has never used smokeless tobacco. She reports that she does not drink alcohol and does not use drugs.    FAMILY HISTORY:  Family History   Problem Relation Age of Onset    Heart disease Mother     Hypertension Mother     Heart attack Mother     Heart disease Sister          in their 50's    Heart failure Sister     Heart disease Brother         CABG in age 60's    Hyperlipidemia Brother     Heart disease Sister         in her 50's    Heart disease Sister         in her 50's    Heart disease Sister     Hypertension Sister     Hyperlipidemia Sister     Heart disease Brother         CABG in age 60's    Hyperlipidemia Brother     Thyroid disease Daughter     Amblyopia Neg Hx     Blindness Neg Hx     Cancer Neg Hx     Cataracts Neg Hx     Diabetes Neg Hx     Glaucoma Neg Hx     Macular degeneration Neg Hx     Retinal detachment Neg Hx     Strabismus Neg Hx     Stroke Neg Hx        CURRENT MEDICATIONS:   Current Outpatient Medications   Medication Sig    acetaminophen (TYLENOL) 650 MG TbSR Take 1 tablet (650 mg total) by mouth every 6 (six) hours as needed (pain).    acetaminophen-codeine 300-30mg (TYLENOL #3) 300-30 mg Tab Take 1 tablet by mouth every 6 (six) hours as needed.    acyclovir (ZOVIRAX) 400 MG tablet SMARTSI Tablet(s) By Mouth Twice Daily    albuterol (PROAIR HFA) 90 mcg/actuation inhaler Inhale 2 puffs into the lungs every 6 (six) hours as needed for Wheezing. Rescue    alendronate (FOSAMAX) 70 MG tablet Take 70 mg by mouth every 7 days.    ALPRAZolam (XANAX) 0.25 MG tablet Take 0.25 mg by mouth 2 (two) times daily as needed.    atorvastatin (LIPITOR) 80 MG tablet TAKE 1 TABLET BY MOUTH EVERY DAY    BACTRIM -160 mg Tab Take 1 tablet by mouth 2 (two) times daily.    benzonatate (TESSALON) 200 MG capsule Take 200 mg by mouth.     chlorhexidine (PERIDEX) 0.12 % solution 15 mLs 2 (two) times daily.    diclofenac sodium (VOLTAREN) 1 % Gel     famotidine (PEPCID) 40 MG tablet Take 40 mg by mouth.    fluticasone propionate (FLONASE) 50 mcg/actuation nasal spray INHALE 2 SPRAYS (100 MCG TOTAL) IN EACH NOSTRIL ONCE DAILY. (Patient taking differently: every evening.)    fluticasone-salmeterol diskus inhaler 100-50 mcg TAKE 1 PUFF BY MOUTH TWICE A DAY    hydrOXYzine HCL (ATARAX) 25 MG tablet PLEASE SEE ATTACHED FOR DETAILED DIRECTIONS    lidocaine-prilocaine (EMLA) cream USE AS DIRECTED ONE HOUR BEFORE CHEMOTHERAPY EXTERNALLY    LINZESS 145 mcg Cap capsule Take 145 mcg by mouth.    loratadine (CLARITIN) 10 mg tablet Take 10 mg by mouth daily as needed.    metoprolol succinate (TOPROL-XL) 25 MG 24 hr tablet Take 1 tablet (25 mg total) by mouth every morning.    montelukast (SINGULAIR) 10 mg tablet TAKE 1 TABLET BY MOUTH EVERY DAY IN THE EVENING    nabumetone (RELAFEN) 750 MG tablet TAKE 1 TABLET BY MOUTH TWICE A DAY (Patient taking differently: Take by mouth 2 (two) times daily as needed.)    omeprazole (PRILOSEC) 40 MG capsule TAKE 1 CAPSULE BY MOUTH EVERY DAY    oxybutynin (DITROPAN-XL) 5 MG TR24 Take 5 mg by mouth.    polyethylene glycol 3350 (MIRALAX ORAL) Take by mouth as needed.    sertraline (ZOLOFT) 100 MG tablet TAKE 1 TABLET BY MOUTH EVERY DAY (Patient taking differently: Take 50 mg by mouth every morning.)    tiZANidine (ZANAFLEX) 4 MG tablet TAKE 1.5 TABLETS (6MG) BY MOUTH 3 TIMES A DAY AS NEEDED FOR MUSCLE SPASM    aspirin (ECOTRIN) 81 MG EC tablet Take 1 tablet (81 mg total) by mouth 2 (two) times daily. (Patient taking differently: Take 81 mg by mouth every morning.)    blood-glucose meter ([employer] BLOOD GLUCOSE SYSTM) kit Use as instructed (Patient not taking: Reported on 2/14/2022)    gabapentin (NEURONTIN) 300 MG capsule Take 1 capsule (300 mg total) by mouth 3 (three) times daily.    levocetirizine (XYZAL) 5 MG tablet Take 5 mg  by mouth nightly.    valsartan (DIOVAN) 320 MG tablet Take 1 tablet (320 mg total) by mouth once daily. (Patient taking differently: Take 320 mg by mouth every morning.)     No current facility-administered medications for this visit.     ALLERGIES:   Review of patient's allergies indicates:   Allergen Reactions    Vicodin [hydrocodone-acetaminophen] Anxiety         Review of Systems:     Review of Systems   Constitutional:  Positive for fatigue. Negative for appetite change, chills, diaphoresis, fever and unexpected weight change.   HENT:   Negative for hearing loss, mouth sores, nosebleeds, sore throat, trouble swallowing and voice change.    Eyes:  Negative for eye problems and icterus.   Respiratory:  Negative for chest tightness, cough, hemoptysis, shortness of breath and wheezing.    Cardiovascular:  Negative for chest pain, leg swelling and palpitations.   Gastrointestinal:  Positive for nausea. Negative for abdominal distention, abdominal pain, blood in stool, diarrhea and vomiting.   Endocrine: Negative for hot flashes.   Genitourinary:  Negative for bladder incontinence, difficulty urinating, dysuria and hematuria.    Musculoskeletal:  Negative for arthralgias, back pain, flank pain, gait problem, myalgias, neck pain and neck stiffness.   Skin:  Negative for itching, rash and wound.   Neurological:  Negative for dizziness, extremity weakness, gait problem, headaches, numbness, seizures and speech difficulty.   Hematological:  Negative for adenopathy. Does not bruise/bleed easily.   Psychiatric/Behavioral:  Negative for confusion, depression and sleep disturbance. The patient is not nervous/anxious.         Physical Exam:     Vitals:    02/20/24 1011   BP: (!) 168/72   Pulse:    Resp:    Temp:        Physical Exam  Constitutional:       General: She is not in acute distress.     Appearance: She is well-developed. She is not diaphoretic.   HENT:      Head: Normocephalic and atraumatic.      Mouth/Throat:       Pharynx: No oropharyngeal exudate.   Eyes:      Conjunctiva/sclera: Conjunctivae normal.      Pupils: Pupils are equal, round, and reactive to light.   Neck:      Thyroid: No thyromegaly.      Vascular: No JVD.      Trachea: No tracheal deviation.   Cardiovascular:      Rate and Rhythm: Normal rate and regular rhythm.      Heart sounds: Normal heart sounds. No murmur heard.     No friction rub.   Pulmonary:      Effort: Pulmonary effort is normal. No respiratory distress.      Breath sounds: Normal breath sounds. No stridor. No wheezing or rales.   Chest:      Chest wall: No tenderness.   Abdominal:      General: Bowel sounds are normal. There is no distension.      Palpations: Abdomen is soft.      Tenderness: There is no abdominal tenderness. There is no guarding or rebound.   Musculoskeletal:         General: No tenderness or deformity. Normal range of motion.      Cervical back: Normal range of motion and neck supple.   Skin:     General: Skin is warm and dry.      Capillary Refill: Capillary refill takes less than 2 seconds.      Coloration: Skin is not pale.      Findings: No erythema or rash.   Neurological:      Mental Status: She is alert and oriented to person, place, and time.      Cranial Nerves: No cranial nerve deficit.      Sensory: No sensory deficit.      Motor: No abnormal muscle tone.      Coordination: Coordination normal.      Deep Tendon Reflexes: Reflexes normal.   Psychiatric:         Behavior: Behavior normal.         Thought Content: Thought content normal.         Judgment: Judgment normal.       ECOG Performance Status: (foot note - ECOG PS provided by Eastern Cooperative Oncology Group) 1 - Symptomatic but completely ambulatory      Karnofsky Performance Score:  90%- Able to Carry on Normal Activity: Minor Symptoms of Disease      Labs:   Lab Results   Component Value Date    WBC 5.21 02/20/2024    HGB 10.2 (L) 02/20/2024    HCT 33.0 (L) 02/20/2024     02/20/2024    CHOL 179  07/09/2018    TRIG 91 07/09/2018    HDL 60 07/09/2018    ALT 13 02/20/2024    AST 14 02/20/2024     (L) 02/20/2024    K 4.5 02/20/2024     02/20/2024    CREATININE 1.4 02/20/2024    BUN 20 02/20/2024    CO2 24 02/20/2024    TSH 1.408 10/10/2022    INR 1.0 03/23/2022    HGBA1C 6.2 (H) 06/03/2019       Imaging: Previous imaging has been reviewed   Assessment and Plan:     Ms. Frances is a pleasant 83 year old with DLBCL successfully treated in 2021.    Diffuse Large B Cell Lymphoma  --Treated at [facility] with R-CHOP x 6  --Surveilance PET and repeat CT's shows no signs of recurrance  --Denies B symptoms        30 minutes were spent face to face with the patient and her  family to discuss the disease, natural history, treatment options and survival statistics. I have provided the patient with an opportunity to ask questions and have all questions answered to her satisfaction.       she will return to clinic in 4 months with labs, but knows to call in the interim if symptoms change or should a problem arise.        Luh Kirby MD  Hematology and Medical Oncology  Bone Marrow Transplant  [facility]      BMT Chart Routing      Follow up with physician 4 months. 1. see me in 4 months with cbc,cmp, ldh   Follow up with TALON    Provider visit type    Infusion scheduling note    Injection scheduling note    Labs    Imaging    Pharmacy appointment    Other referrals

## 2024-05-01 ENCOUNTER — PATIENT MESSAGE (OUTPATIENT)
Dept: HEMATOLOGY/ONCOLOGY | Facility: CLINIC | Age: 84
End: 2024-05-01
Payer: MEDICARE

## 2024-05-02 ENCOUNTER — TELEPHONE (OUTPATIENT)
Dept: HEMATOLOGY/ONCOLOGY | Facility: CLINIC | Age: 84
End: 2024-05-02
Payer: MEDICARE

## 2024-05-02 ENCOUNTER — OFFICE VISIT (OUTPATIENT)
Dept: HEMATOLOGY/ONCOLOGY | Facility: CLINIC | Age: 84
End: 2024-05-02
Payer: MEDICARE

## 2024-05-02 ENCOUNTER — LAB VISIT (OUTPATIENT)
Dept: LAB | Facility: HOSPITAL | Age: 84
End: 2024-05-02
Attending: INTERNAL MEDICINE
Payer: MEDICARE

## 2024-05-02 DIAGNOSIS — C83.35 DIFFUSE LARGE B-CELL LYMPHOMA OF LYMPH NODES OF INGUINAL REGION: Primary | ICD-10-CM

## 2024-05-02 DIAGNOSIS — R63.4 WEIGHT LOSS: ICD-10-CM

## 2024-05-02 DIAGNOSIS — C83.31 DIFFUSE LARGE B-CELL LYMPHOMA OF LYMPH NODES OF NECK: Primary | ICD-10-CM

## 2024-05-02 DIAGNOSIS — D50.0 IRON DEFICIENCY ANEMIA DUE TO CHRONIC BLOOD LOSS: ICD-10-CM

## 2024-05-02 DIAGNOSIS — C83.35 DIFFUSE LARGE B-CELL LYMPHOMA OF LYMPH NODES OF INGUINAL REGION: ICD-10-CM

## 2024-05-02 LAB
ALBUMIN SERPL BCP-MCNC: 3.6 G/DL (ref 3.5–5.2)
ALP SERPL-CCNC: 163 U/L (ref 55–135)
ALT SERPL W/O P-5'-P-CCNC: 16 U/L (ref 10–44)
ANION GAP SERPL CALC-SCNC: 6 MMOL/L (ref 8–16)
AST SERPL-CCNC: 16 U/L (ref 10–40)
BILIRUB SERPL-MCNC: 0.2 MG/DL (ref 0.1–1)
BUN SERPL-MCNC: 23 MG/DL (ref 8–23)
CALCIUM SERPL-MCNC: 10.2 MG/DL (ref 8.7–10.5)
CHLORIDE SERPL-SCNC: 102 MMOL/L (ref 95–110)
CO2 SERPL-SCNC: 27 MMOL/L (ref 23–29)
CREAT SERPL-MCNC: 1.2 MG/DL (ref 0.5–1.4)
EST. GFR  (NO RACE VARIABLE): 45.2 ML/MIN/1.73 M^2
GLUCOSE SERPL-MCNC: 137 MG/DL (ref 70–110)
LDH SERPL L TO P-CCNC: 195 U/L (ref 110–260)
POTASSIUM SERPL-SCNC: 5.5 MMOL/L (ref 3.5–5.1)
PROT SERPL-MCNC: 7.2 G/DL (ref 6–8.4)
SODIUM SERPL-SCNC: 135 MMOL/L (ref 136–145)

## 2024-05-02 PROCEDURE — 83615 LACTATE (LD) (LDH) ENZYME: CPT | Performed by: INTERNAL MEDICINE

## 2024-05-02 PROCEDURE — 99215 OFFICE O/P EST HI 40 MIN: CPT | Mod: 95,,, | Performed by: INTERNAL MEDICINE

## 2024-05-02 PROCEDURE — 80053 COMPREHEN METABOLIC PANEL: CPT | Performed by: INTERNAL MEDICINE

## 2024-05-02 PROCEDURE — 36415 COLL VENOUS BLD VENIPUNCTURE: CPT | Performed by: INTERNAL MEDICINE

## 2024-05-02 RX ORDER — LUBIPROSTONE 24 UG/1
24 CAPSULE ORAL 2 TIMES DAILY PRN
COMMUNITY
Start: 2024-03-11

## 2024-05-02 NOTE — TELEPHONE ENCOUNTER
Left message via  [facility] # [id number] stating that her cbc lab values clotted and that she had an appointment for labs to be redrawn today at the [facility] for 11:20 am.

## 2024-05-02 NOTE — PROGRESS NOTES
Hematology and Medical Oncology   Follow Up     05/02/2024    Primary Hematologic Diagnosis: Diffuse Large B Cell Lymphoma    Telemedicine Documentation:  The patient location is: clinic  The chief complaint leading to consultation is: DLBCL    Visit type: audiovisual    Face to Face time with patient: 25  40 minutes of total time spent on the encounter, which includes face to face time and non-face to face time preparing to see the patient (eg, review of tests), Obtaining and/or reviewing separately obtained history, Documenting clinical information in the electronic or other health record, Independently interpreting results (not separately reported) and communicating results to the patient/family/caregiver, or Care coordination (not separately reported).         Each patient to whom he or she provides medical services by telemedicine is:  (1) informed of the relationship between the physician and patient and the respective role of any other health care provider with respect to management of the patient; and (2) notified that he or she may decline to receive medical services by telemedicine and may withdraw from such care at any time.    History of Present Ilness:   Elena Frances (Elena) is a pleasant 84 y.o.female who is transitioning care from [facility] following the successful treatment of DLBCL. Has recovered from therapy and wishes to consolidate her care to [facility].     Conversation was completed with an in person     Hematologic History:  --Work up started with back pain, MRI showed multiple vertebral sclerotic bony lesions  --Bone biopsy was most consistent with DLBCL  --PET September 2020 showed additional bone lesions, including the left scapula. Biopsy showed a kappa restricted B- cell lymphoma that is negative for CD5 and CD1-.   --Slides reviewed at [facility] confirmed GCB-DLBCL in L5  --Received R-CHOP x 6 in a CR    --Surveilance PET on 6/1/22:   Right iliac bone with a SUV max of 2.5,  previously 3.6 (axial fused image 162)  L5 vertebral body with a SUV max of 2.9, previously 5 (axial fused image 146)  The previously hypermetabolic focus at the left scapula is no longer seen now demonstrating an SUV max of 1.2, previously 6.1 (axial fused image 37)  Posterior T4 vertebral body, demonstrates decreased uptake with a SUV of 2.4, previously 3.1 (axial fused image 44)  L3 spinous process demonstrate no increased uptake with SUV max of 1.6, previously 2.4 (axial fused image 128)  Left femoral diaphysis demonstrates no increased uptake with SUV max of 1.2 (axial fused image 212).     --CT abdomen/pelvis on 9/1/22:   1. Mild intra and extrahepatic biliary ductal dilatation that is new when compared to 01/17/2020.  If there are clinical signs of biliary obstruction, MRCP or ERCP could be considered.  2. Diverticulosis without evidence of diverticulitis.  3. New tiny nodule in the right lower lobe when compared to prior imaging.  Attention on follow-up.  --MRCP on 10/10/22:  Extrahepatic duct dilatation up to 0.8 cm with mild central intrahepatic ductal prominence.  Overall findings are nonspecific in the setting of prior cholecystectomy.  No evident intraductal filling defect or stricture.  Consider further evaluation with ERCP as clinically warranted.     Indeterminate subcentimeter T2 hyperintense focus adjacent to the distant common bile duct, of uncertain etiology.  Differential consideration to include small IPMN at the pancreatic head.  Further correlation and follow-up as warranted.  --CT chest/abd/pelvis 5/1/23: No significant interval detrimental change.  No pathologic adenopathy.  Stable appearance of osseous structures by CT with previously described PET avid lesions not well assessed by this exam.  Colonic diverticulosis and additional findings as above.    Interval History:  Had flu after traveling home to [place] after thanks[place].    Has lost 20-30 pounds since November. Decreased  appetite.           PAST MEDICAL HISTORY:   Past Medical History:   Diagnosis Date    Allergy     Anemia     Arthritis     Asthma     Depression     Generalized headaches 2014    Goiter     MNG    HTN (hypertension), benign     Hyperlipidemia     Hyperparathyroidism     s/p surgery    Intestinal obstruction     resolved spontaneously    Lumbar disc disease     s/p epidural shots    Nuclear sclerosis - Both Eyes 3/11/2014    Osteoporosis, post-menopausal     with 3 rib fractures       PAST SURGICAL HISTORY:   Past Surgical History:   Procedure Laterality Date    APPENDECTOMY      BILATERAL OOPHORECTOMY      BONE MARROW BIOPSY N/A 3/5/2020    Procedure: Biopsy-bone marrow;  Surgeon: Eve Alvarez MD;  Location: [facility] OR 2ND FLR;  Service: Oncology;  Laterality: N/A;    BREAST CYST EXCISION      left    CATARACT EXTRACTION W/  INTRAOCULAR LENS IMPLANT Right 2016    Dr. Fang ([place])    CATARACT EXTRACTION W/  INTRAOCULAR LENS IMPLANT Left 10/17/2016    Dr. Fang ([place])     SECTION, CLASSIC      x 1    CHOLECYSTECTOMY      COLONOSCOPY N/A 3/1/2016    Procedure: COLONOSCOPY;  Surgeon: Dony Bronson MD;  Location: [facility] (4TH FLR);  Service: Endoscopy;  Laterality: N/A;  PM Prep    ENDOSCOPIC ULTRASOUND OF UPPER GASTROINTESTINAL TRACT N/A 2022    Procedure: ULTRASOUND, UPPER GI TRACT, ENDOSCOPIC;  Surgeon: Verito Mcclure MD;  Location: [facility] (2ND FLR);  Service: Endoscopy;  Laterality: N/A;  EUS +/- ERCP pending findings with me at [place] in the next 1-2 weeks    22-Instructions via portal-DS  22-Pt rescheduled due to ride-updated instructions via portal-DS    EPIDURAL STEROID INJECTION N/A 10/11/2023    Procedure: INJECTION, STEROID, EPIDURAL, L4-L5;  Surgeon: Venancio Lopez MD;  Location: [facility] PAIN MGT;  Service: Pain Management;  Laterality: N/A;    ERCP N/A 2022    Procedure: ERCP (ENDOSCOPIC RETROGRADE CHOLANGIOPANCREATOGRAPHY);  Surgeon: Verito Mcclure MD;   Location: [facility] ENDO (2ND FLR);  Service: Endoscopy;  Laterality: N/A;  EUS +/- ERCP pending findings with me at [employer] in the next 1-2 weeks    HYSTERECTOMY      JOINT REPLACEMENT      KNEE SURGERY Right 2017    TKR    LUMBAR EPIDURAL INJECTION      x 4    MOUTH SURGERY      for abscess drainage of gum of frontal teeth    PARATHYROID GLAND SURGERY      for parathyroid adenoma    TOTAL KNEE ARTHROPLASTY Left 2019    Procedure: ARTHROPLASTY, KNEE, TOTAL-DEPUY-SIGMA;  Surgeon: Newton Rodriguez III, MD;  Location: [facility] OR 2ND FLR;  Service: Orthopedics;  Laterality: Left;    TRANSFORAMINAL EPIDURAL INJECTION OF STEROID Left 2019    Procedure: INJECTION, STEROID, EPIDURAL, TRANSFORAMINAL APPROACH, L3-L4 AND L4-L5;  Surgeon: Venancio Lopez MD;  Location: [facility] PAIN MGT;  Service: Pain Management;  Laterality: Left;    TRANSFORAMINAL EPIDURAL INJECTION OF STEROID Bilateral 2023    Procedure: INJECTION, STEROID, EPIDURAL, TRANSFORAMINAL APPROACH, BILATERAL L3/L4 SOONER DATE;  Surgeon: Venancio Lopez MD;  Location: [facility] PAIN MGT;  Service: Pain Management;  Laterality: Bilateral;       PAST SOCIAL HISTORY:   reports that she has never smoked. She has never used smokeless tobacco. She reports that she does not drink alcohol and does not use drugs.    FAMILY HISTORY:  Family History   Problem Relation Name Age of Onset    Heart disease Mother      Hypertension Mother      Heart attack Mother      Heart disease Sister           in their 50's    Heart failure Sister      Heart disease Brother          CABG in age 60's    Hyperlipidemia Brother      Heart disease Sister          in her 50's    Heart disease Sister          in her 50's    Heart disease Sister      Hypertension Sister      Hyperlipidemia Sister      Heart disease Brother          CABG in age 60's    Hyperlipidemia Brother      Thyroid disease Daughter      Amblyopia Neg Hx      Blindness Neg Hx      Cancer Neg Hx      Cataracts Neg  Hx      Diabetes Neg Hx      Glaucoma Neg Hx      Macular degeneration Neg Hx      Retinal detachment Neg Hx      Strabismus Neg Hx      Stroke Neg Hx         CURRENT MEDICATIONS:   Current Outpatient Medications   Medication Sig Dispense Refill    lubiprostone (AMITIZA) 24 MCG Cap Take 24 mcg by mouth 2 (two) times daily as needed.      acetaminophen (TYLENOL) 650 MG TbSR Take 1 tablet (650 mg total) by mouth every 6 (six) hours as needed (pain). 120 tablet 0    acetaminophen-codeine 300-30mg (TYLENOL #3) 300-30 mg Tab Take 1 tablet by mouth every 6 (six) hours as needed.      acyclovir (ZOVIRAX) 400 MG tablet SMARTSI Tablet(s) By Mouth Twice Daily      albuterol (PROAIR HFA) 90 mcg/actuation inhaler Inhale 2 puffs into the lungs every 6 (six) hours as needed for Wheezing. Rescue 18 g 11    alendronate (FOSAMAX) 70 MG tablet Take 70 mg by mouth every 7 days.      ALPRAZolam (XANAX) 0.25 MG tablet Take 0.25 mg by mouth 2 (two) times daily as needed.      aspirin (ECOTRIN) 81 MG EC tablet Take 1 tablet (81 mg total) by mouth 2 (two) times daily. (Patient taking differently: Take 81 mg by mouth every morning.) 60 tablet 0    atorvastatin (LIPITOR) 80 MG tablet TAKE 1 TABLET BY MOUTH EVERY DAY 30 tablet 1    BACTRIM -160 mg Tab Take 1 tablet by mouth 2 (two) times daily.      benzonatate (TESSALON) 200 MG capsule Take 200 mg by mouth.      blood-glucose meter ([employer] BLOOD GLUCOSE SYSTM) kit Use as instructed (Patient not taking: Reported on 2022) 1 each 0    chlorhexidine (PERIDEX) 0.12 % solution 15 mLs 2 (two) times daily.      diclofenac sodium (VOLTAREN) 1 % Gel       famotidine (PEPCID) 40 MG tablet Take 40 mg by mouth.      fluticasone propionate (FLONASE) 50 mcg/actuation nasal spray INHALE 2 SPRAYS (100 MCG TOTAL) IN EACH NOSTRIL ONCE DAILY. (Patient taking differently: every evening.) 48 mL 2    fluticasone-salmeterol diskus inhaler 100-50 mcg TAKE 1 PUFF BY MOUTH TWICE A DAY 1 each 11     gabapentin (NEURONTIN) 300 MG capsule Take 1 capsule (300 mg total) by mouth 3 (three) times daily. 90 capsule 2    GEMTESA 75 mg Tab TOME IVONNE TABLETA TODOS LOS D AS      hydrOXYzine HCL (ATARAX) 25 MG tablet PLEASE SEE ATTACHED FOR DETAILED DIRECTIONS      levocetirizine (XYZAL) 5 MG tablet Take 5 mg by mouth nightly.      lidocaine-prilocaine (EMLA) cream USE AS DIRECTED ONE HOUR BEFORE CHEMOTHERAPY EXTERNALLY      LINZESS 145 mcg Cap capsule Take 145 mcg by mouth.      loratadine (CLARITIN) 10 mg tablet Take 10 mg by mouth daily as needed.      metoprolol succinate (TOPROL-XL) 25 MG 24 hr tablet Take 1 tablet (25 mg total) by mouth every morning. 30 tablet 1    montelukast (SINGULAIR) 10 mg tablet TAKE 1 TABLET BY MOUTH EVERY DAY IN THE EVENING 90 tablet 3    nabumetone (RELAFEN) 750 MG tablet TAKE 1 TABLET BY MOUTH TWICE A DAY (Patient taking differently: Take by mouth 2 (two) times daily as needed.) 180 tablet 1    omeprazole (PRILOSEC) 40 MG capsule TAKE 1 CAPSULE BY MOUTH EVERY DAY 90 capsule 3    oxybutynin (DITROPAN-XL) 5 MG TR24 Take 5 mg by mouth.      OZEMPIC 0.25 mg or 0.5 mg (2 mg/3 mL) pen injector Inject 0.5 mg into the skin every 7 days.      PAXLOVID 150-100 mg DsPk PLEASE SEE ATTACHED FOR DETAILED DIRECTIONS      polyethylene glycol 3350 (MIRALAX ORAL) Take by mouth as needed.      predniSONE (DELTASONE) 5 MG tablet Take by mouth.      sertraline (ZOLOFT) 100 MG tablet TAKE 1 TABLET BY MOUTH EVERY DAY (Patient taking differently: Take 50 mg by mouth every morning.) 90 tablet 0    tiZANidine (ZANAFLEX) 4 MG tablet TAKE 1.5 TABLETS (6MG) BY MOUTH 3 TIMES A DAY AS NEEDED FOR MUSCLE SPASM 270 tablet 3    valsartan (DIOVAN) 320 MG tablet Take 1 tablet (320 mg total) by mouth once daily. (Patient taking differently: Take 320 mg by mouth every morning.) 90 tablet 3     No current facility-administered medications for this visit.     ALLERGIES:   Review of patient's allergies indicates:   Allergen  Reactions    Vicodin [hydrocodone-acetaminophen] Anxiety         Review of Systems:     Review of Systems   Constitutional:  Positive for appetite change, fatigue and unexpected weight change. Negative for chills, diaphoresis and fever.   HENT:   Negative for hearing loss, mouth sores, nosebleeds, sore throat, trouble swallowing and voice change.    Eyes:  Negative for eye problems and icterus.   Respiratory:  Negative for chest tightness, cough, hemoptysis, shortness of breath and wheezing.    Cardiovascular:  Negative for chest pain, leg swelling and palpitations.   Gastrointestinal:  Positive for abdominal pain and nausea. Negative for abdominal distention, blood in stool, diarrhea and vomiting.   Endocrine: Negative for hot flashes.   Genitourinary:  Negative for bladder incontinence, difficulty urinating, dysuria and hematuria.    Musculoskeletal:  Negative for arthralgias, back pain, flank pain, gait problem, myalgias, neck pain and neck stiffness.   Skin:  Negative for itching, rash and wound.   Neurological:  Negative for dizziness, extremity weakness, gait problem, headaches, numbness, seizures and speech difficulty.   Hematological:  Negative for adenopathy. Does not bruise/bleed easily.   Psychiatric/Behavioral:  Negative for confusion, depression and sleep disturbance. The patient is not nervous/anxious.         Physical Exam:   Limited Secondary to virtual visit    ECOG Performance Status: (foot note - ECOG PS provided by [employer] Co[employer] Oncology Group) 1 - Symptomatic but completely ambulatory      Karnofsky Performance Score:  90%- Able to Carry on Normal Activity: Minor Symptoms of Disease      Labs:   Lab Results   Component Value Date    WBC 5.21 02/20/2024    HGB 10.2 (L) 02/20/2024    HCT 33.0 (L) 02/20/2024     02/20/2024    CHOL 179 07/09/2018    TRIG 91 07/09/2018    HDL 60 07/09/2018    ALT 16 05/02/2024    AST 16 05/02/2024     (L) 05/02/2024    K 5.5 (H) 05/02/2024    CL  102 05/02/2024    CREATININE 1.2 05/02/2024    BUN 23 05/02/2024    CO2 27 05/02/2024    TSH 1.408 10/10/2022    INR 1.0 03/23/2022    HGBA1C 6.2 (H) 06/03/2019       Imaging: Previous imaging has been reviewed   Assessment and Plan:     Ms. Frances is a pleasant 84 year old with DLBCL successfully treated in 2021.    Diffuse Large B Cell Lymphoma  --Treated at [facility] with R-CHOP x 6  --Surveilance PET and repeat CT's shows no signs of recurrance  --Denies B symptoms    Weight Loss  --Additional 20 + pound weight loss  --Tumor markers checked  --Will check PET for FDG avid areas    30 minutes were spent face to face with the patient and her  family to discuss the disease, natural history, treatment options and survival statistics. I have provided the patient with an opportunity to ask questions and have all questions answered to her satisfaction.       she will return to clinic based on imaging, but knows to call in the interim if symptoms change or should a problem arise.        Luh Kirby MD  Hematology and Medical Oncology  Bone Marrow Transplant  [facility]      BMT Chart Routing      Follow up with physician Other. 1. PET in the next 1-2 weeks   Follow up with [facility]    Provider visit type    Infusion scheduling note    Injection scheduling note    Labs    Imaging    Pharmacy appointment    Other referrals

## 2024-05-20 ENCOUNTER — HOSPITAL ENCOUNTER (OUTPATIENT)
Dept: RADIOLOGY | Facility: HOSPITAL | Age: 84
Discharge: HOME | End: 2024-05-20
Attending: INTERNAL MEDICINE
Payer: MEDICARE

## 2024-05-20 DIAGNOSIS — C83.31 DIFFUSE LARGE B-CELL LYMPHOMA OF LYMPH NODES OF NECK: ICD-10-CM

## 2024-05-20 LAB — POCT GLUCOSE: 131 MG/DL (ref 70–110)

## 2024-05-20 PROCEDURE — A9552 F18 FDG: HCPCS | Performed by: INTERNAL MEDICINE

## 2024-05-20 PROCEDURE — 78815 PET IMAGE W/CT SKULL-THIGH: CPT | Mod: TC

## 2024-05-20 PROCEDURE — 78815 PET IMAGE W/CT SKULL-THIGH: CPT | Mod: 26,PS,, | Performed by: STUDENT IN AN ORGANIZED HEALTH CARE EDUCATION/TRAINING PROGRAM

## 2024-05-20 RX ORDER — FLUDEOXYGLUCOSE F18 500 MCI/ML
11.85 INJECTION INTRAVENOUS
Status: COMPLETED | OUTPATIENT
Start: 2024-05-20 | End: 2024-05-20

## 2024-05-20 RX ADMIN — FLUDEOXYGLUCOSE F-18 11.85 MILLICURIE: 500 INJECTION INTRAVENOUS at 09:05

## 2024-05-28 NOTE — PLAN OF CARE
Patient tolerated procedure well.  VSS, complaints of pain well-controlled with interventions, tolerating po.  Preparing for discharge.  AVS reviewed with patient and family at the bedside. Verbalized understanding of S/S of complications, site care, activity and bathing restrictions, pain control, follow up and general recovery.  IV to be removed prior to discharge.   No lymphadedenopathy

## 2024-05-30 ENCOUNTER — PATIENT MESSAGE (OUTPATIENT)
Dept: HEMATOLOGY/ONCOLOGY | Facility: CLINIC | Age: 84
End: 2024-05-30
Payer: MEDICARE

## 2024-10-13 NOTE — TELEPHONE ENCOUNTER
----- Message from Yeni Bartlett sent at 7/16/2019 10:28 AM CDT -----  Contact: Pt self Mobile/Home [phone]   Patient is calling for an RX refill or new RX.  Is this a refill or new RX:  Refill  RX name and strength: fluticasone (FLONASE) 50 mcg/actuation nasal spray  Directions (copy/paste from chart): N/A   Is this a 30 day or 90 day RX:  90  Local pharmacy or mail order pharmacy:  [facility]/pharmacy #[id number] - [place], [place] - [address]  Pharmacy name and phone # [facility] Phone# [phone], Fax# [phone]    Informed Consent for Blood Component Transfusion Note    I have discussed with the patient the rationale for blood component transfusion; its benefits in treating or preventing fatigue, organ damage, or death; and its risk which includes mild transfusion reactions, rare risk of blood borne infection, or more serious but rare reactions. I have discussed the alternatives to transfusion, including the risk and consequences of not receiving transfusion. The patient had an opportunity to ask questions and had agreed to proceed with transfusion of blood components.    Electronically signed by EDDA Westbrook NP on 10/12/24 at 10:04 PM EDT

## 2025-02-19 ENCOUNTER — TELEPHONE (OUTPATIENT)
Dept: HEMATOLOGY/ONCOLOGY | Facility: CLINIC | Age: 85
End: 2025-02-19
Payer: MEDICARE

## 2025-03-19 NOTE — PROGRESS NOTES
"PATIENT: Elena Frances  MRN: [id number]  DATE: 3/24/2025    Diagnosis:   1. Pulmonary nodules    2. Diffuse large B-cell lymphoma of lymph nodes of neck    3. Advance care planning      Chief Complaint: Pulmonary Nodules    Oncologic History:      Oncologic History Diffuse large B-cell lymphoma      Oncologic Treatment R-CHOP x 6      Pathology See below        Subjective:    History of Present Illness: Ms. Frances is a 85 y.o. female who presents for evaluation and management of pulmonary nodules. She is referred by Dr. De La Torre.    - she had previously seen Dr. Kirby for diffuse large B-cell lymphoma, which she had been treated for at [facility] with R-CHOP x 6 in 2020/2021.    Hematologic History:  --Work up started with back pain, MRI showed multiple vertebral sclerotic bony lesions  --Bone biopsy was most consistent with DLBCL  --PET September 2020 showed additional bone lesions, including the left scapula. Biopsy showed a kappa restricted B- cell lymphoma that is negative for CD5 and CD1-.   --Slides reviewed at [facility] confirmed GCB-DLBCL in L5  --Received R-CHOP x 6 in a CR    - PET scan (5/20/24) revealed a "stable 0.3 cm left upper lobe pulmonary nodule (2-61) which is unchanged from 01/17/2020."    - CT chest (2/5/25) [outside scan] revealed a "3mm nodule present anteriorly left apex."    - she endorses fatigue, dyspnea upon exertion (improved). She states she had two "mini heart attacks" but she is unsure of when they happened.      Past medical, surgical, family, and social histories have been reviewed and updated below.    Past Medical History:   Past Medical History:   Diagnosis Date    Allergy     Anemia     Arthritis     Asthma     Depression     Generalized headaches 4/1/2014    Goiter     MNG    HTN (hypertension), benign     Hyperlipidemia     Hyperparathyroidism     s/p surgery    Intestinal obstruction     resolved spontaneously    Lumbar disc disease     s/p epidural shots    Nuclear sclerosis " - Both Eyes 3/11/2014    Osteoporosis, post-menopausal     with 3 rib fractures       Past Surgical History:   Past Surgical History:   Procedure Laterality Date    APPENDECTOMY      BILATERAL OOPHORECTOMY      BONE MARROW BIOPSY N/A 3/5/2020    Procedure: Biopsy-bone marrow;  Surgeon: Eve Alvarez MD;  Location: [facility] OR 2ND FLR;  Service: Oncology;  Laterality: N/A;    BREAST CYST EXCISION      left    CATARACT EXTRACTION W/  INTRAOCULAR LENS IMPLANT Right 2016    Dr. Fang ([place])    CATARACT EXTRACTION W/  INTRAOCULAR LENS IMPLANT Left 10/17/2016    Dr. Fang ([place])     SECTION, CLASSIC      x 1    CHOLECYSTECTOMY      COLONOSCOPY N/A 3/1/2016    Procedure: COLONOSCOPY;  Surgeon: Dony Bronson MD;  Location: [facility] (4TH FLR);  Service: Endoscopy;  Laterality: N/A;  PM Prep    ENDOSCOPIC ULTRASOUND OF UPPER GASTROINTESTINAL TRACT N/A 2022    Procedure: ULTRASOUND, UPPER GI TRACT, ENDOSCOPIC;  Surgeon: Verito Mcclure MD;  Location: [facility] (2ND FLR);  Service: Endoscopy;  Laterality: N/A;  EUS +/- ERCP pending findings with me at [place] in the next 1-2 weeks    22-Instructions via portal-DS  22-Pt rescheduled due to ride-updated instructions via portal-DS    EPIDURAL STEROID INJECTION N/A 10/11/2023    Procedure: INJECTION, STEROID, EPIDURAL, L4-L5;  Surgeon: Venancio Lopez MD;  Location: [facility] PAIN MGT;  Service: Pain Management;  Laterality: N/A;    ERCP N/A 2022    Procedure: ERCP (ENDOSCOPIC RETROGRADE CHOLANGIOPANCREATOGRAPHY);  Surgeon: Verito Mcclure MD;  Location: [facility] ENDO (2ND FLR);  Service: Endoscopy;  Laterality: N/A;  EUS +/- ERCP pending findings with me at [place] in the next 1-2 weeks    HYSTERECTOMY      JOINT REPLACEMENT      KNEE SURGERY Right 2017    TKR    LUMBAR EPIDURAL INJECTION      x 4    MOUTH SURGERY      for abscess drainage of gum of frontal teeth    PARATHYROID GLAND SURGERY      for parathyroid adenoma    TOTAL KNEE ARTHROPLASTY  Left 2019    Procedure: ARTHROPLASTY, KNEE, TOTAL-DEPUY-SIGMA;  Surgeon: Newton Rodriguez III, MD;  Location: [facility] OR [address] [place] [address];  Service: Orthopedics;  Laterality: Left;    TRANSFORAMINAL EPIDURAL INJECTION OF STEROID Left 2019    Procedure: INJECTION, STEROID, EPIDURAL, TRANSFORAMINAL APPROACH, L3-L4 AND L4-L5;  Surgeon: Venancio Lopez MD;  Location: [facility] PAIN MGT;  Service: Pain Management;  Laterality: Left;    TRANSFORAMINAL EPIDURAL INJECTION OF STEROID Bilateral 2023    Procedure: INJECTION, STEROID, EPIDURAL, TRANSFORAMINAL APPROACH, BILATERAL L3/L4 SOONER DATE;  Surgeon: Venancio Lopez MD;  Location: [facility] PAIN MGT;  Service: Pain Management;  Laterality: Bilateral;       Family History:   Family History   Problem Relation Name Age of Onset    Heart disease Mother      Hypertension Mother      Heart attack Mother      Heart disease Sister           in their 50's    Heart failure Sister      Heart disease Brother          CABG in age 60's    Hyperlipidemia Brother      Heart disease Sister          in her 50's    Heart disease Sister          in her 50's    Heart disease Sister      Hypertension Sister      Hyperlipidemia Sister      Heart disease Brother          CABG in age 60's    Hyperlipidemia Brother      Thyroid disease Daughter      Amblyopia Neg Hx      Blindness Neg Hx      Cancer Neg Hx      Cataracts Neg Hx      Diabetes Neg Hx      Glaucoma Neg Hx      Macular degeneration Neg Hx      Retinal detachment Neg Hx      Strabismus Neg Hx      Stroke Neg Hx         Social History:  reports that she has never smoked. She has never used smokeless tobacco. She reports that she does not drink alcohol and does not use drugs.    Allergies:  Review of patient's allergies indicates:   Allergen Reactions    Vicodin [hydrocodone-acetaminophen] Anxiety       Medications:  Current Medications[1]    Review of Systems   Constitutional:  Positive for fatigue.   HENT:  Negative for sore  throat.    Eyes:  Negative for visual disturbance.   Respiratory:  Positive for shortness of breath. Negative for cough.    Cardiovascular:  Negative for chest pain.   Gastrointestinal:  Negative for abdominal pain, constipation, diarrhea, nausea and vomiting.   Genitourinary:  Negative for dysuria.   Musculoskeletal:  Negative for back pain.   Skin:  Negative for rash.   Neurological:  Negative for headaches.   Hematological:  Negative for adenopathy.   Psychiatric/Behavioral:  The patient is not nervous/anxious.        ECOG Performance Status:   ECOG SCORE    1 - Restricted in strenuous activity-ambulatory and able to carry out work of a light nature         Objective:      Vitals:   Vitals:    03/24/25 1002   BP: (!) 140/65   Pulse: 66   Temp: 97.5 °F (36.4 °C)   SpO2: 100%   Weight: 68.4 kg (150 lb 12.7 oz)     BMI: Body mass index is 26.71 kg/m².    Physical Exam  Vitals and nursing note reviewed.   Constitutional:       Appearance: She is well-developed.   HENT:      Head: Normocephalic and atraumatic.   Eyes:      Pupils: Pupils are equal, round, and reactive to light.   Cardiovascular:      Rate and Rhythm: Normal rate and regular rhythm.   Pulmonary:      Effort: Pulmonary effort is normal.      Breath sounds: Normal breath sounds.   Abdominal:      General: Bowel sounds are normal.      Palpations: Abdomen is soft.   Musculoskeletal:         General: Normal range of motion.      Cervical back: Normal range of motion and neck supple.   Skin:     General: Skin is warm and dry.   Neurological:      Mental Status: She is alert and oriented to person, place, and time.   Psychiatric:         Behavior: Behavior normal.         Thought Content: Thought content normal.         Judgment: Judgment normal.         Laboratory Data:  Labs have been reviewed.    Lab Results   Component Value Date    WBC 6.32 05/02/2024    WBC 6.32 05/02/2024    HGB 9.4 (L) 05/02/2024    HGB 9.4 (L) 05/02/2024    HCT 30.2 (L) 05/02/2024  "   HCT 30.2 (L) 05/02/2024    MCV 87 05/02/2024    MCV 87 05/02/2024     05/02/2024     05/02/2024           Imaging:      - CT chest (2/5/25) [outside scan] revealed a "3mm nodule present anteriorly left apex."    PET scan (5/20/24): I have personally reviewed the images    Quality of the study: Adequate.     Reference values:     Mediastinal blood pool maximum SUV: 2.5     Normal liver background maximum SUV: 3.2     In the head and neck, there are no hypermetabolic lesions worrisome for malignancy. There are no hypermetabolic mucosal lesions, and there are no pathologically enlarged or hypermetabolic lymph nodes.     In the chest, there are no hypermetabolic lesions worrisome for malignancy.  There are no concerning pulmonary nodules or masses, and there are no pathologically enlarged or hypermetabolic lymph nodes.  There is a stable 0.3 cm left upper lobe pulmonary nodule (2-61) which is unchanged from 01/17/2020.  Stable linear area of scarring versus atelectasis at the right lung apex.     In the abdomen and pelvis, there is physiologic tracer distribution within the abdominal organs and excretion into the genitourinary system.     The spleen has normal uptake and is normal at 7 cm in craniocaudal dimension.     In the bones, the index lesions are as follows:     *Posterior T4 vertebral body, demonstrates decreased uptake with a SUV of 2.0 (axial image 55)  (previously 2.4).     *L5 vertebral body demonstrates diffuse low level tracer uptake with a SUV max of 2.5 (previously 2.9) (image 165).     *Right iliac bone with a SUV max of 1.9 (axial image 179) (previously 2.5).     Few of the previously index lesions no longer show increase tracer uptake.     In the extremities, there are no hypermetabolic lesions worrisome for malignancy.     Additional CT findings:     Small left thyroid lobe nodule, similar to CT 01/17/2020.  Calcific disease of the coronary arteries.  Colonic diverticulosis.  No CT " "evidence of acute diverticulitis.  Bilateral knee arthroplasties.  Bilateral gluteal calcifications.     Impression:     Interval normalized uptake of index osseous lesions, similar to normal background marrow.  No new hypermetabolic lesion elsewhere.    Assessment:       1. Pulmonary nodules    2. Diffuse large B-cell lymphoma of lymph nodes of neck    3. Advance care planning           Plan:     Pulmonary nodules / diffuse large B-cell lymphoma  - I have reviewed her chart and outside labs.  - she had previously seen Dr. Kirby for diffuse large B-cell lymphoma, which she had been treated for at [facility] with R-CHOP x 6 in 2020/2021.  Hematologic History:  --Work up started with back pain, MRI showed multiple vertebral sclerotic bony lesions  --Bone biopsy was most consistent with DLBCL  --PET September 2020 showed additional bone lesions, including the left scapula. Biopsy showed a kappa restricted B- cell lymphoma that is negative for CD5 and CD1-.   --Slides reviewed at [facility] confirmed GCB-DLBCL in L5  --Received R-CHOP x 6 in a CR  - PET scan (5/20/24) revealed a "stable 0.3 cm left upper lobe pulmonary nodule (2-61) which is unchanged from 01/17/2020."  - given stability of pulmonary nodule from May 2024 to February 2025, I do not feel this represents a malignancy. I do not feel she needs further surveillance scans.  - return to clinic as needed.    2. Advance Care Planning     Date: 03/24/2025    Power of   I initiated the process of voluntary advance care planning today and explained the importance of this process to the patient.  I introduced the concept of advance directives to the patient, as well. Then the patient received detailed information about the importance of designating a Health Care Power of  (HCPOA). She was also instructed to communicate with this person about their wishes for future healthcare, should she become sick and lose decision-making capacity. The patient has not " previously appointed a HCPOA. After our discussion, the patient has decided to complete a HCPOA and has appointed her  children Zelalem Mendenhall ([phone]), Alexandra Joseph ([phone]), and Varun Mendenhall ([phone]) . I encouraged her to communicate with this person about their wishes for future healthcare, should she become sick and lose decision-making capacity.      A total of 16 min was spent on advance care planning, goals of care discussion, emotional support, formulating and communicating prognosis and exploring burden/benefit of various approaches of treatment. This discussion occurred on a fully voluntary basis with the verbal consent of the patient and/or family.       - return to clinic as needed.    Earle Rockwell M.D.  Hematology/Oncology  [facility] [address], [address]  [place] [address]  Phone: [phone]  Fax: [phone]         [1]   Current Outpatient Medications   Medication Sig Dispense Refill    acetaminophen (TYLENOL) 650 MG TbSR Take 1 tablet (650 mg total) by mouth every 6 (six) hours as needed (pain). 120 tablet 0    acetaminophen-codeine 300-30mg (TYLENOL #3) 300-30 mg Tab Take 1 tablet by mouth every 6 (six) hours as needed.      acyclovir (ZOVIRAX) 400 MG tablet SMARTSI Tablet(s) By Mouth Twice Daily      albuterol (PROAIR HFA) 90 mcg/actuation inhaler Inhale 2 puffs into the lungs every 6 (six) hours as needed for Wheezing. Rescue 18 g 11    alendronate (FOSAMAX) 70 MG tablet Take 70 mg by mouth every 7 days.      ALPRAZolam (XANAX) 0.25 MG tablet Take 0.25 mg by mouth 2 (two) times daily as needed.      aspirin (ECOTRIN) 81 MG EC tablet Take 1 tablet (81 mg total) by mouth 2 (two) times daily. (Patient taking differently: Take 81 mg by mouth every morning.) 60 tablet 0    atorvastatin (LIPITOR) 80 MG tablet TAKE 1 TABLET BY MOUTH EVERY DAY 30 tablet 1    BACTRIM -160 mg Tab Take 1 tablet by mouth 2 (two) times daily.       benzonatate (TESSALON) 200 MG capsule Take 200 mg by mouth.      blood-glucose meter ([employer] BLOOD GLUCOSE SYSTM) kit Use as instructed (Patient not taking: Reported on 2/14/2022) 1 each 0    chlorhexidine (PERIDEX) 0.12 % solution 15 mLs 2 (two) times daily.      diclofenac sodium (VOLTAREN) 1 % Gel       famotidine (PEPCID) 40 MG tablet Take 40 mg by mouth.      fluticasone propionate (FLONASE) 50 mcg/actuation nasal spray INHALE 2 SPRAYS (100 MCG TOTAL) IN EACH NOSTRIL ONCE DAILY. (Patient taking differently: every evening.) 48 mL 2    fluticasone-salmeterol diskus inhaler 100-50 mcg TAKE 1 PUFF BY MOUTH TWICE A DAY 1 each 11    gabapentin (NEURONTIN) 300 MG capsule Take 1 capsule (300 mg total) by mouth 3 (three) times daily. 90 capsule 2    GEMTESA 75 mg Tab TOME IVONNE TABLETA TODOS LOS D AS      hydrOXYzine HCL (ATARAX) 25 MG tablet PLEASE SEE ATTACHED FOR DETAILED DIRECTIONS      levocetirizine (XYZAL) 5 MG tablet Take 5 mg by mouth nightly.      lidocaine-prilocaine (EMLA) cream USE AS DIRECTED ONE HOUR BEFORE CHEMOTHERAPY EXTERNALLY      LINZESS 145 mcg Cap capsule Take 145 mcg by mouth.      loratadine (CLARITIN) 10 mg tablet Take 10 mg by mouth daily as needed.      lubiprostone (AMITIZA) 24 MCG Cap Take 24 mcg by mouth 2 (two) times daily as needed.      metoprolol succinate (TOPROL-XL) 25 MG 24 hr tablet Take 1 tablet (25 mg total) by mouth every morning. 30 tablet 1    montelukast (SINGULAIR) 10 mg tablet TAKE 1 TABLET BY MOUTH EVERY DAY IN THE EVENING 90 tablet 3    nabumetone (RELAFEN) 750 MG tablet TAKE 1 TABLET BY MOUTH TWICE A DAY (Patient taking differently: Take by mouth 2 (two) times daily as needed.) 180 tablet 1    omeprazole (PRILOSEC) 40 MG capsule TAKE 1 CAPSULE BY MOUTH EVERY DAY 90 capsule 3    oxybutynin (DITROPAN-XL) 5 MG TR24 Take 5 mg by mouth.      OZEMPIC 0.25 mg or 0.5 mg (2 mg/3 mL) pen injector Inject 0.5 mg into the skin every 7 days.      PAXLOVID 150-100 mg DsPk PLEASE SEE  ATTACHED FOR DETAILED DIRECTIONS      polyethylene glycol 3350 (MIRALAX ORAL) Take by mouth as needed.      predniSONE (DELTASONE) 5 MG tablet Take by mouth.      sertraline (ZOLOFT) 100 MG tablet TAKE 1 TABLET BY MOUTH EVERY DAY (Patient taking differently: Take 50 mg by mouth every morning.) 90 tablet 0    tiZANidine (ZANAFLEX) 4 MG tablet TAKE 1.5 TABLETS (6MG) BY MOUTH 3 TIMES A DAY AS NEEDED FOR MUSCLE SPASM 270 tablet 3    valsartan (DIOVAN) 320 MG tablet Take 1 tablet (320 mg total) by mouth once daily. (Patient taking differently: Take 320 mg by mouth every morning.) 90 tablet 3     No current facility-administered medications for this visit.

## 2025-03-24 ENCOUNTER — OFFICE VISIT (OUTPATIENT)
Dept: HEMATOLOGY/ONCOLOGY | Facility: CLINIC | Age: 85
End: 2025-03-24
Payer: MEDICARE

## 2025-03-24 VITALS
BODY MASS INDEX: 26.71 KG/M2 | OXYGEN SATURATION: 100 % | HEART RATE: 66 BPM | WEIGHT: 150.81 LBS | DIASTOLIC BLOOD PRESSURE: 65 MMHG | TEMPERATURE: 98 F | SYSTOLIC BLOOD PRESSURE: 140 MMHG

## 2025-03-24 DIAGNOSIS — C83.31 DIFFUSE LARGE B-CELL LYMPHOMA OF LYMPH NODES OF NECK: ICD-10-CM

## 2025-03-24 DIAGNOSIS — R91.8 PULMONARY NODULES: Primary | ICD-10-CM

## 2025-03-24 DIAGNOSIS — Z71.89 ADVANCE CARE PLANNING: ICD-10-CM

## 2025-03-24 PROCEDURE — 99999 PR PBB SHADOW E&M-EST. PATIENT-LVL IV: CPT | Mod: PBBFAC,,, | Performed by: INTERNAL MEDICINE

## (undated) DEVICE — CATH SUCTION 10FR

## (undated) DEVICE — SOL BETADINE 5%

## (undated) DEVICE — TOWEL OR XRAY WHITE 17X26IN

## (undated) DEVICE — MIXER BONE CEMENT

## (undated) DEVICE — KIT IRR SUCTION HND PIECE

## (undated) DEVICE — BLADE SAGITTAL 18 X 1.27 X 90M

## (undated) DEVICE — SEE MEDLINE ITEM 146298

## (undated) DEVICE — SEE MEDLINE ITEM 146271

## (undated) DEVICE — SEE MEDLINE ITEM 146308

## (undated) DEVICE — SUT 2/0 36IN COATED VICRYL

## (undated) DEVICE — KIT TOTAL KNEE TKOFG

## (undated) DEVICE — ADHESIVE DERMABOND ADVANCED

## (undated) DEVICE — SUT 1 36IN COATED VICRYL UN

## (undated) DEVICE — SOL IRR NACL .9% 3000ML

## (undated) DEVICE — BANDAGE ESMARK 6X12

## (undated) DEVICE — DRAPE INCISE IOBAN 2 23X33IN

## (undated) DEVICE — SEE MEDLINE ITEM 157117

## (undated) DEVICE — BOWL CEMENT

## (undated) DEVICE — CUP MEDICINE STERILE 2OZ

## (undated) DEVICE — DRESSING AQUACEL AG ADV 3.5X12

## (undated) DEVICE — SUT MONOCYRL 4-0 PS2 UND

## (undated) DEVICE — ELECTRODE REM PLYHSV RETURN 9

## (undated) DEVICE — SYR SLIP TIP 10ML SHIELD

## (undated) DEVICE — DRESSING LEUKOPLAST FLEX 1X3IN

## (undated) DEVICE — SEE MEDLINE ITEM 154981

## (undated) DEVICE — SEE MEDLINE ITEM 157131

## (undated) DEVICE — NDL 18GA X1 1/2 REG BEVEL

## (undated) DEVICE — SYS KNEE EPAK PIN ATTUNE
Type: IMPLANTABLE DEVICE | Site: KNEE | Status: NON-FUNCTIONAL
Removed: 2019-08-21

## (undated) DEVICE — SEE MEDLINE ITEM 157144

## (undated) DEVICE — DRESSING TELFA STRL 4X3 LF

## (undated) DEVICE — SYR 30CC LUER LOCK

## (undated) DEVICE — MASK FLYTE HOOD PEEL AWAY

## (undated) DEVICE — DRESSING TRANS 4X4 TEGADERM

## (undated) DEVICE — HOOD T-5 TEAR AWAY STERILE

## (undated) DEVICE — NDL SPINAL 18GX3.5 SPINOCAN

## (undated) DEVICE — DRAPE SURG W/TWL 17 5/8X23

## (undated) DEVICE — PAD COLD THERAPY KNEE WRAP ON

## (undated) DEVICE — TAPE SILK 3IN

## (undated) DEVICE — SYR 50CC LL

## (undated) DEVICE — TOURNIQUET SB QC DP 34X4IN

## (undated) DEVICE — SEE MEDLINE ITEM 157128

## (undated) DEVICE — CONTAINER SPECIMEN STRL 4OZ

## (undated) DEVICE — BLADE RECIP RIBBED

## (undated) DEVICE — DRESSING AQUACEL RIBBON 2X45CM

## (undated) DEVICE — PUMP COLD THERAPY

## (undated) DEVICE — GAUZE SPONGE 4X4 12PLY

## (undated) DEVICE — PRESSURIZER HI VAC HIP KIT

## (undated) DEVICE — SPONGE GAUZE 16PLY 4X4

## (undated) DEVICE — Device

## (undated) DEVICE — NDL SPINAL 20GX3.5 HUB

## (undated) DEVICE — MARKER SKIN STND TIP BLUE BARR

## (undated) DEVICE — SEE MEDLINE ITEM 152487

## (undated) DEVICE — DRESSING TELFA N ADH 3X8

## (undated) DEVICE — DRESSING AQUACEL AG RBBN 2X45